# Patient Record
Sex: FEMALE | Race: WHITE | NOT HISPANIC OR LATINO | Employment: OTHER | URBAN - METROPOLITAN AREA
[De-identification: names, ages, dates, MRNs, and addresses within clinical notes are randomized per-mention and may not be internally consistent; named-entity substitution may affect disease eponyms.]

---

## 2017-07-13 LAB — HBA1C MFR BLD HPLC: 8.8 %

## 2017-10-13 LAB
CREAT ?TM UR-SCNC: 164.2 UMOL/L
HBA1C MFR BLD HPLC: 8.2 %
MICROALBUMIN UR-MCNC: 6.7 MG/L (ref 0–20)
MICROALBUMIN/CREAT UR: 4.1 MG/G{CREAT}

## 2018-01-16 LAB — HBA1C MFR BLD HPLC: 7.7 %

## 2018-02-07 ENCOUNTER — OFFICE VISIT (OUTPATIENT)
Dept: DIABETES SERVICES | Facility: HOSPITAL | Age: 68
End: 2018-02-07

## 2018-02-07 DIAGNOSIS — Z96.41 INSULIN PUMP STATUS: Primary | ICD-10-CM

## 2018-02-07 NOTE — PROGRESS NOTES
Met with Danielle for assistance with her [employer] infusion set for [employer] 630G  She brought 2 of them with her today  She filled her reservoir and completed insertion on her own after my instruction  She has very bad RA and has some shaking, it is a challenge for her to get the site on and off  She practiced putting a set on a dummy belly I have, was better able to do it there after some practice  We tried doing a [place] set as well as I thought getting unclipped from a [place] would be easier for her  It was, but she is not strong enough to cock the natasha and has a hard time getting it open  She will stick with the [employer]ie [employer]ff and will call [employer] to order an insertion device  Answered her questions, she knows to call if she needs anything else  She will follow up with Dr Jazzy Wheeler before April

## 2018-02-07 NOTE — PATIENT INSTRUCTIONS
Call [employer] to order the [facility] insertion device, or leo     Call with any questions!

## 2018-03-22 RX ORDER — LISINOPRIL 10 MG/1
TABLET ORAL
Qty: 90 TABLET | Refills: 3 | Status: SHIPPED | OUTPATIENT
Start: 2018-03-22 | End: 2019-04-15 | Stop reason: SDUPTHER

## 2018-04-06 LAB — HBA1C MFR BLD HPLC: 7.6 %

## 2018-04-13 ENCOUNTER — OFFICE VISIT (OUTPATIENT)
Dept: ENDOCRINOLOGY | Facility: HOSPITAL | Age: 68
End: 2018-04-13
Payer: MEDICARE

## 2018-04-13 VITALS
BODY MASS INDEX: 20.84 KG/M2 | DIASTOLIC BLOOD PRESSURE: 78 MMHG | HEIGHT: 61 IN | SYSTOLIC BLOOD PRESSURE: 122 MMHG | WEIGHT: 110.4 LBS | HEART RATE: 66 BPM

## 2018-04-13 DIAGNOSIS — E78.5 HYPERLIPIDEMIA, UNSPECIFIED HYPERLIPIDEMIA TYPE: ICD-10-CM

## 2018-04-13 DIAGNOSIS — E10.649 HYPOGLYCEMIC UNAWARENESS ASSOCIATED WITH TYPE 1 DIABETES MELLITUS: ICD-10-CM

## 2018-04-13 DIAGNOSIS — E10.40 TYPE 1 DIABETES MELLITUS WITH DIABETIC NEUROPATHY (HCC): Primary | ICD-10-CM

## 2018-04-13 PROCEDURE — 99214 OFFICE O/P EST MOD 30 MIN: CPT | Performed by: INTERNAL MEDICINE

## 2018-04-13 RX ORDER — METHOTREXATE 25 MG/ML
INJECTION, SOLUTION INTRA-ARTERIAL; INTRAMUSCULAR; INTRAVENOUS WEEKLY
COMMUNITY
Start: 2018-01-31

## 2018-04-13 RX ORDER — OMEGA-3S/DHA/EPA/FISH OIL/D3 1150-1000
LIQUID (ML) ORAL DAILY
COMMUNITY

## 2018-04-13 RX ORDER — IBUPROFEN 200 MG
200 TABLET ORAL EVERY 6 HOURS PRN
COMMUNITY
End: 2020-01-07 | Stop reason: ALTCHOICE

## 2018-04-13 RX ORDER — MELATONIN
1000 DAILY
COMMUNITY

## 2018-04-13 RX ORDER — LISINOPRIL 10 MG/1
10 TABLET ORAL DAILY
COMMUNITY
Start: 2018-03-22

## 2018-04-13 RX ORDER — FOLIC ACID 1 MG/1
1 TABLET ORAL DAILY
COMMUNITY

## 2018-04-13 NOTE — PROGRESS NOTES
4/13/2018    Assessment/Plan      Diagnoses and all orders for this visit:    Type 1 diabetes mellitus with diabetic neuropathy ([facility] )  -     HEMOGLOBIN A1C W/ EAG ESTIMATION Lab Collect; Future  -     Comprehensive metabolic panel Lab Collect; Future  -     Lipid Panel with Direct LDL reflex Lab Collect; Future  -     glucagon (GLUCAGON EMERGENCY) 1 MG injection; Inject 1 mg under the skin once as needed for low blood sugar for up to 1 dose    Hypoglycemic unawareness associated with type 1 diabetes mellitus (HCC)  -     glucagon (GLUCAGON EMERGENCY) 1 MG injection; Inject 1 mg under the skin once as needed for low blood sugar for up to 1 dose    Hyperlipidemia, unspecified hyperlipidemia type    Other orders  -     lisinopril (ZESTRIL) 10 mg tablet; Take 10 mg by mouth daily    -     HUMALOG 100 UNIT/ML injection;   -     methotrexate 50 MG/2ML injection; Inject under the skin once a week    -     Omega-3 Fat Ac-Cholecalciferol (DRY EYE OMEGA BENEFITS/VIT D-3) 654-884 MG-UNIT CAPS; Take by mouth daily    -     cholecalciferol (VITAMIN D3) 1,000 units tablet; Take 1,000 Units by mouth daily  -     aspirin 81 MG tablet; Take 81 mg by mouth daily  -     Multiple Vitamins-Minerals (MULTIVITAMIN ADULT PO); Take by mouth daily    -     Probiotic Product ([employer] SENIOR PROBIOTIC PO); Take by mouth daily    -     mupirocin (BACTROBAN) 2 % ointment; Apply topically 3 (three) times a day  -     glucagon (GLUCAGEN) 1 mg injection; 1 mg once  -     folic acid (FOLVITE) 1 mg tablet; Take 1 mg by mouth daily  -     TURMERIC CURCUMIN PO; Take 2 each by mouth daily  -     RiTUXimab (RITUXAN IV); Infuse into a venous catheter Every 16 weeks, 2nd dose 2 weeks later then repeat  -     ibuprofen (MOTRIN) 200 mg tablet; Take 200 mg by mouth every 6 (six) hours as needed for mild pain        Assessment/Plan:  1  Type 1 diabetes  When I saw her in the office, had not gotten her hemoglobin A1c report    It did come back at 7 6% which although not yet at goal is definitely markedly improved from last visit when it was 8 2%  I will have her increase her bolus rate from 5:00 p m  to 12:00 a m  to 1 unit per 11 g carbohydrate with meals  The rest of the rates will remain the same  She will continue to check her sugars 3 to 5 times a day  She was notified of her hemoglobin A1c report  2   Diabetic neuropathy  She has some mild diabetic signs on exam, but no diabetic symptoms that she complains of  I will follow these signs over time with no treatment needed at present  3   Hypoglycemic unawareness  She had an episode in the past of loss of consciousness  Because of this, I would like to see her hemoglobin A1c in the low sevens, but not below 7 which would be more dangerous for hypoglycemia  4   Hyperlipidemia  She did not start her atorvastatin as asked, she did some sort of cholesterol supplement over-the-counter instead  I do not necessarily agree with this, but I will wait till next visit repeat her lipid profile before asking her to restart her atorvastatin  I will see her in 3 months with preceding hemoglobin A1c, CMP, and lipid profile  CC: Diabetes Consult    History of Present Illness     HPI: Osvaldo Turner is a 69y o  year old female with type 1 diabetes for 39 years  She was started on an insulin pump around 1998  She is on insulin at home and takes Humalog insulin via the insulin pump basal rate from 12:00 a m  to 8:00 a m  0 475 units/hour, and 8:00 a m  to 12:00 a m  0 9 units/hour, bolus rates include an insulin to carbohydrate ratio from 12:00 a m  to 7:00 a m  of 1 unit per 15 g carbohydrate, 7:00 a m  to 11:00 a m  1 unit per 8 g carbohydrate, 11:00 a m  to 5:00 p m  1 unit per 11 g carbohydrate and 5:00 p m  to 12:00 a m  1 unit per 12 g carbohydrate, with an insulin sensitivity of 1 unit for every 55 blood sugar points for a target blood glucose of 100-120 with a 4 15 hour insulin action   She denies any polyuria and polydipsia  She has no polyphagia, but has once a night nocturia  She is getting some blurry vision with some cataracts  She has no numbness or tingling of the feet  She denies chest pain or shortness of breath  She denies nephropathy, heart attack, stroke and claudication but does admit to neuropathy and retinopathy  Started a diabetes supplement (GBC 2) about 2-3 weeks ago  Thinks blood sugars lower on this  Did not restart atorvastatin in October 2017, instead started cholesterol supplement  Astachol  Hypoglycemic episodes: Yes occasional   H/o of hypoglycemia causing hospitalization or intervention such as glucagon injection  or ambulance call  Yes  Only LOC once  Hypoglycemia symptoms: none and or sweating and slight shakiness  Treatment of hypoglycemia: glucose tablets, or juice if below 5   Glucagon:Yes  Medic alert tag: recommended,Yes  The patient's last eye exam was in December 2017 for a left eye sty, October 2017 for regular appointment with mild retinopathy  The patient's last foot exam was in not seen  Last A1C was 7 6% on April 6, 2018  Blood Sugar/Glucometer/Pump/CGM review: checks blood sugar 3-5 times a day  Vary from low to 200s with no pattern , Feels lower last few weeks with new supplement  Before breakfast:   Before lunch: 100-200s  Before dinner: upper 100s [id number]  Bedtime: low to 200s    Review of Systems   Constitutional: Positive for fatigue  Negative for appetite change and unexpected weight change  3 lb increase in weight since January 2018  HENT: Negative for ear pain, hearing loss, tinnitus and trouble swallowing  Eyes: Positive for visual disturbance  Some blurry vision, cataracts worsening, due for surgery soon  Respiratory: Negative for chest tightness and shortness of breath  Cardiovascular: Negative for chest pain, palpitations and leg swelling     Gastrointestinal: Negative for abdominal pain, constipation, diarrhea and nausea  Endocrine: Negative for polydipsia, polyphagia and polyuria  Nocturia once a night  Musculoskeletal: Positive for arthralgias, back pain and joint swelling  Significant hand pain right greater than left with arthritis  Bilateral shoulder pain and right wrist pain  Some low back pain  Skin: Negative for rash and wound  Has chronic dry skin  Neurological: Negative for dizziness, tremors, light-headedness, numbness and headaches  Psychiatric/Behavioral: Negative for dysphoric mood and sleep disturbance  The patient is not nervous/anxious  Still worry daily about soon         Historical Information   Past Medical History:   Diagnosis Date    Basal cell carcinoma (BCC) of face     left cheek    Cataract     Rheumatoid arthritis ([facility] )      Past Surgical History:   Procedure Laterality Date    EXPLORATORY LAPAROTOMY      FOOT SURGERY Left     foot reconstruction in 2 phases    FOOT SURGERY Right     foot reconstruction    LIVER BIOPSY      TOTAL SHOULDER REPLACEMENT Right      Social History   History   Alcohol Use    3 0 oz/week    5 Cans of beer per week     History   Drug Use No     History   Smoking Status    Never Smoker   Smokeless Tobacco    Never Used     Family History:   Family History   Problem Relation Age of Onset    Stroke Mother     Cancer Father      renal metastatic    Brain cancer Sister     Diabetes type I Maternal Uncle     Diabetes type I Maternal Grandmother     Osteoarthritis Brother     Osteoarthritis Sister     Rheum arthritis Sister     Osteoarthritis Sister     Osteoarthritis Sister     Osteoarthritis Sister     Osteoarthritis Brother     Osteoarthritis Brother     Bipolar disorder Son        Meds/Allergies   Current Outpatient Prescriptions   Medication Sig Dispense Refill    aspirin 81 MG tablet Take 81 mg by mouth daily      cholecalciferol (VITAMIN D3) 1,000 units tablet Take 1,000 Units by mouth daily      folic acid (FOLVITE) 1 mg tablet Take 1 mg by mouth daily      glucagon (GLUCAGEN) 1 mg injection 1 mg once      HUMALOG 100 UNIT/ML injection       ibuprofen (MOTRIN) 200 mg tablet Take 200 mg by mouth every 6 (six) hours as needed for mild pain      lisinopril (ZESTRIL) 10 mg tablet Take 10 mg by mouth daily        methotrexate 50 MG/2ML injection Inject under the skin once a week        Multiple Vitamins-Minerals (MULTIVITAMIN ADULT PO) Take by mouth daily        mupirocin (BACTROBAN) 2 % ointment Apply topically 3 (three) times a day      Omega-3 Fat Ac-Cholecalciferol (DRY EYE OMEGA BENEFITS/VIT D-3) 193-495 MG-UNIT CAPS Take by mouth daily        Probiotic Product (CVS SENIOR PROBIOTIC PO) Take by mouth daily        RiTUXimab (RITUXAN IV) Infuse into a venous catheter Every 16 weeks, 2nd dose 2 weeks later then repeat      TURMERIC CURCUMIN PO Take 2 each by mouth daily      glucagon (GLUCAGON EMERGENCY) 1 MG injection Inject 1 mg under the skin once as needed for low blood sugar for up to 1 dose 1 each 4     No current facility-administered medications for this visit  Allergies   Allergen Reactions    Bactrim [Sulfamethoxazole-Trimethoprim] GI Intolerance    Neosporin [Neomycin-Bacitracin Zn-Polymyx] Itching    Tetracycline GI Intolerance       Objective   Vitals: Blood pressure 122/78, pulse 66, height 5' 1" (1 549 m), weight 50 1 kg (110 lb 6 4 oz)  Invasive Devices          No matching active lines, drains, or airways          Physical Exam   Constitutional: She is oriented to person, place, and time  She appears well-developed and well-nourished  HENT:   Head: Normocephalic and atraumatic  Eyes: Conjunctivae and EOM are normal  Pupils are equal, round, and reactive to light  Neck: Normal range of motion  Neck supple  No thyromegaly present  No carotid bruits  Cardiovascular: Normal rate, regular rhythm, normal heart sounds and intact distal pulses      No murmur heard   Pulmonary/Chest: Effort normal and breath sounds normal  She has no wheezes  Abdominal: Soft  Bowel sounds are normal  There is no tenderness  Musculoskeletal: Normal range of motion  She exhibits deformity  She exhibits no edema  Multiple deformity of the toes and foot from RA with previous surgical scars  Callus plantar surface of bilateral 1st toes  No tremor of the outstretched hands  No CVAT  No spinous process tenderness  Lymphadenopathy:     She has no cervical adenopathy  Neurological: She is alert and oriented to person, place, and time  She has normal reflexes  Vibration sensation diminished to the bilateral DIP  Microfilament sensation intact to the bilateral feet except absent to the left 1st toe  Skin: Skin is warm and dry  No rash noted  Vitals reviewed  The history was obtained from the review of the chart and from the patient  Lab Results:   Most recent blood work on 04/06/2018 showed hemoglobin A1c of 7 6%  CMP showed a glucose of 157 fasting, sodium of 145, but was otherwise normal  CBC was unremarkable  Last hemoglobin A1c on 10/13/2017 was 8 2%  At that time, TSH was 3 29 with a urine microalbumin to creatinine ratio of 4 1  Total cholesterol was 214, triglyceride 142, HDL 66, and   No results found for this or any previous visit (from the past 96614 hour(s))        Future Appointments  Date Time Provider Srikanth Flores   7/12/2018 1:45 PM [facility]

## 2018-04-13 NOTE — LETTER
April 13, 2018     Merlinda Gibbons, MD  [facility] [place] [address] [address]    Patient: Rin Bermeo   YOB: 1950   Date of Visit: 4/13/2018       Dear Dr Naheed Holland: Thank you for referring Rin Bermeo to me for evaluation  Below are my notes for this consultation  If you have questions, please do not hesitate to call me  I look forward to following your patient along with you  Sincerely,        Verna Veliz MD        CC: No Recipients  Verna Veliz MD  4/13/2018  3:56 PM  Sign at close encounter  4/13/2018    Assessment/Plan      Diagnoses and all orders for this visit:    Type [facility] diabetes [facility] diabetic [facility]  -     HEMOGLOBIN A1C W/ EAG ESTIMATION Lab Collect; Future  -     Comprehensive metabolic panel Lab Collect; Future  -     Lipid Panel with Direct LDL reflex Lab Collect; Future  -     glucagon (GLUCAGON EMERGENCY) 1 MG injection; Inject 1 mg under the skin once as needed for low blood sugar for up to 1 dose    Hypoglycemic unawareness associated with type 1 diabetes mellitus (HCC)  -     glucagon (GLUCAGON EMERGENCY) 1 MG injection; Inject 1 mg under the skin once as needed for low blood sugar for up to 1 dose    Hyperlipidemia, unspecified hyperlipidemia type    Other orders  -     lisinopril (ZESTRIL) 10 mg tablet; Take 10 mg by mouth daily    -     HUMALOG 100 UNIT/ML injection;   -     methotrexate 50 MG/2ML injection; Inject under the skin once a week    -     Omega-3 Fat Ac-Cholecalciferol (DRY EYE OMEGA BENEFITS/VIT D-3) 921-541 MG-UNIT CAPS; Take by mouth daily    -     cholecalciferol (VITAMIN D3) 1,000 units tablet; Take 1,000 Units by mouth daily  -     aspirin 81 MG tablet; Take 81 mg by mouth daily  -     Multiple Vitamins-Minerals (MULTIVITAMIN ADULT PO); Take by mouth daily    -     Probiotic Product (CVS SENIOR PROBIOTIC PO); Take by mouth daily    -     mupirocin (BACTROBAN) 2 % ointment;  Apply topically 3 (three) times a day  - glucagon (GLUCAGEN) 1 mg injection; 1 mg once  -     folic acid (FOLVITE) 1 mg tablet; Take 1 mg by mouth daily  -     TURMERIC CURCUMIN PO; Take 2 each by mouth daily  -     RiTUXimab (RITUXAN IV); Infuse into a venous catheter Every 16 weeks, 2nd dose 2 weeks later then repeat  -     ibuprofen (MOTRIN) 200 mg tablet; Take 200 mg by mouth every 6 (six) hours as needed for mild pain        Assessment/Plan:  1  Type 1 diabetes  When I saw her in the office, had not gotten her hemoglobin A1c report  It did come back at 7 6% which although not yet at goal is definitely markedly improved from last visit when it was 8 2%  I will have her increase her bolus rate from 5:00 p m  to 12:00 a m  to 1 unit per 11 g carbohydrate with meals  The rest of the rates will remain the same  She will continue to check her sugars 3 to 5 times a day  She was notified of her hemoglobin A1c report  2   Diabetic neuropathy  She has some mild diabetic signs on exam, but no diabetic symptoms that she complains of  I will follow these signs over time with no treatment needed at present  3   Hypoglycemic unawareness  She had an episode in the past of loss of consciousness  Because of this, I would like to see her hemoglobin A1c in the low sevens, but not below 7 which would be more dangerous for hypoglycemia  4   Hyperlipidemia  She did not start her atorvastatin as asked, she did some sort of cholesterol supplement over-the-counter instead  I do not necessarily agree with this, but I will wait till next visit repeat her lipid profile before asking her to restart her atorvastatin  I will see her in 3 months with preceding hemoglobin A1c, CMP, and lipid profile  CC: Diabetes Consult    History of Present Illness     HPI: Stan Lemons is a 69y o  year old female with type 1 diabetes for 39 years  She was started on an insulin pump around 1998    She is on insulin at home and takes Humalog insulin via the insulin pump basal rate from 12:00 a m  to 8:00 a m  0 475 units/hour, and 8:00 a m  to 12:00 a m  0 9 units/hour, bolus rates include an insulin to carbohydrate ratio from 12:00 a m  to 7:00 a m  of 1 unit per 15 g carbohydrate, 7:00 a m  to 11:00 a m  1 unit per 8 g carbohydrate, 11:00 a m  to 5:00 p m  1 unit per 11 g carbohydrate and 5:00 p m  to 12:00 a m  1 unit per 12 g carbohydrate, with an insulin sensitivity of 1 unit for every 55 blood sugar points for a target blood glucose of 100-120 with a 4 15 hour insulin action  She denies any polyuria and polydipsia  She has no polyphagia, but has once a night nocturia  She is getting some blurry vision with some cataracts  She has no numbness or tingling of the feet  She denies chest pain or shortness of breath  She denies nephropathy, heart attack, stroke and claudication but does admit to neuropathy and retinopathy  Started a diabetes supplement (GBC 2) about 2-3 weeks ago  Thinks blood sugars lower on this  Did not restart atorvastatin in October 2017, instead started cholesterol supplement  Astachol  Hypoglycemic episodes: Yes occasional   H/o of hypoglycemia causing hospitalization or intervention such as glucagon injection  or ambulance call  Yes  Only LOC once  Hypoglycemia symptoms: none and or sweating and slight shakiness  Treatment of hypoglycemia: glucose tablets, or juice if below 5   Glucagon:Yes  Medic alert tag: recommended,Yes  The patient's last eye exam was in December 2017 for a left eye sty, October 2017 for regular appointment with mild retinopathy  The patient's last foot exam was in not seen  Last A1C was 7 6% on April 6, 2018  Blood Sugar/Glucometer/Pump/CGM review: checks blood sugar 3-5 times a day  Vary from low to 200s with no pattern , Feels lower last few weeks with new supplement    Before breakfast:   Before lunch: 100-200s  Before dinner: upper 100s [id number]  Bedtime: low to 200s    Review of Systems   Constitutional: Positive for fatigue  Negative for appetite change and unexpected weight change  3 lb increase in weight since January 2018  HENT: Negative for ear pain, hearing loss, tinnitus and trouble swallowing  Eyes: Positive for visual disturbance  Some blurry vision, cataracts worsening, due for surgery soon  Respiratory: Negative for chest tightness and shortness of breath  Cardiovascular: Negative for chest pain, palpitations and leg swelling  Gastrointestinal: Negative for abdominal pain, constipation, diarrhea and nausea  Endocrine: Negative for polydipsia, polyphagia and polyuria  Nocturia once a night  Musculoskeletal: Positive for arthralgias, back pain and joint swelling  Significant hand pain right greater than left with arthritis  Bilateral shoulder pain and right wrist pain  Some low back pain  Skin: Negative for rash and wound  Has chronic dry skin  Neurological: Negative for dizziness, tremors, light-headedness, numbness and headaches  Psychiatric/Behavioral: Negative for dysphoric mood and sleep disturbance  The patient is not nervous/anxious  Still worry daily about soon         Historical Information   Past Medical History:   Diagnosis Date    Basal cell carcinoma (BCC) of face     left cheek    Cataract     Rheumatoid arthritis ([address] )      Past Surgical History:   Procedure Laterality Date    EXPLORATORY LAPAROTOMY      FOOT SURGERY Left     foot reconstruction in 2 phases    FOOT SURGERY Right     foot reconstruction    LIVER BIOPSY      TOTAL SHOULDER REPLACEMENT Right      Social History   History   Alcohol Use    3 0 oz/week    5 Cans of beer per week     History   Drug Use No     History   Smoking Status    Never Smoker   Smokeless Tobacco    Never Used     Family History:   Family History   Problem Relation Age of Onset    Stroke Mother     Cancer Father      renal metastatic    Brain cancer Sister     Diabetes type I Maternal Uncle  Diabetes type I Maternal Grandmother     Osteoarthritis Brother     Osteoarthritis Sister     Rheum arthritis Sister     Osteoarthritis Sister     Osteoarthritis Sister     Osteoarthritis Sister     Osteoarthritis Brother     Osteoarthritis Brother     Bipolar disorder Son        Meds/Allergies   Current Outpatient Prescriptions   Medication Sig Dispense Refill    aspirin 81 MG tablet Take 81 mg by mouth daily      cholecalciferol (VITAMIN D3) 1,000 units tablet Take 1,000 Units by mouth daily      folic acid (FOLVITE) 1 mg tablet Take 1 mg by mouth daily      glucagon (GLUCAGEN) 1 mg injection 1 mg once      HUMALOG 100 UNIT/ML injection       ibuprofen (MOTRIN) 200 mg tablet Take 200 mg by mouth every 6 (six) hours as needed for mild pain      lisinopril (ZESTRIL) 10 mg tablet Take 10 mg by mouth daily        methotrexate 50 MG/2ML injection Inject under the skin once a week        Multiple Vitamins-Minerals (MULTIVITAMIN ADULT PO) Take by mouth daily        mupirocin (BACTROBAN) 2 % ointment Apply topically 3 (three) times a day      Omega-3 Fat Ac-Cholecalciferol (DRY EYE OMEGA BENEFITS/VIT D-3) 750-027 MG-UNIT CAPS Take by mouth daily        Probiotic Product (CVS SENIOR PROBIOTIC PO) Take by mouth daily        RiTUXimab (RITUXAN IV) Infuse into a venous catheter Every 16 weeks, 2nd dose 2 weeks later then repeat      TURMERIC CURCUMIN PO Take 2 each by mouth daily      glucagon (GLUCAGON EMERGENCY) 1 MG injection Inject 1 mg under the skin once as needed for low blood sugar for up to 1 dose 1 each 4     No current facility-administered medications for this visit  Allergies   Allergen Reactions    Bactrim [Sulfamethoxazole-Trimethoprim] GI Intolerance    Neosporin [Neomycin-Bacitracin Zn-Polymyx] Itching    Tetracycline GI Intolerance       Objective   Vitals: Blood pressure 122/78, pulse 66, height 5' 1" (1 549 m), weight 50 1 kg (110 lb 6 4 oz)    Invasive Devices No matching active lines, drains, or airways          Physical Exam   Constitutional: She is oriented to person, place, and time  She appears well-developed and well-nourished  HENT:   Head: Normocephalic and atraumatic  Eyes: Conjunctivae and EOM are normal  Pupils are equal, round, and reactive to light  Neck: Normal range of motion  Neck supple  No thyromegaly present  No carotid bruits  Cardiovascular: Normal rate, regular rhythm, normal heart sounds and intact distal pulses  No murmur heard  Pulmonary/Chest: Effort normal and breath sounds normal  She has no wheezes  Abdominal: Soft  Bowel sounds are normal  There is no tenderness  Musculoskeletal: Normal range of motion  She exhibits deformity  She exhibits no edema  Multiple deformity of the toes and foot from RA with previous surgical scars  Callus plantar surface of bilateral 1st toes  No tremor of the outstretched hands  No CVAT  No spinous process tenderness  Lymphadenopathy:     She has no cervical adenopathy  Neurological: She is alert and oriented to person, place, and time  She has normal reflexes  Vibration sensation diminished to the bilateral DIP  Microfilament sensation intact to the bilateral feet except absent to the left 1st toe  Skin: Skin is warm and dry  No rash noted  Vitals reviewed  The history was obtained from the review of the chart and from the patient  Lab Results:   Most recent blood work on 04/06/2018 showed hemoglobin A1c of 7 6%  CMP showed a glucose of 157 fasting, sodium of 145, but was otherwise normal  CBC was unremarkable  Last hemoglobin A1c on 10/13/2017 was 8 2%  At that time, TSH was 3 29 with a urine microalbumin to creatinine ratio of 4 1  Total cholesterol was 214, triglyceride 142, HDL 66, and   No results found for this or any previous visit (from the past 96260 hour(s))        Future Appointments  Date Time Provider Sirkanth Flores   7/12/2018 1:45 PM Chapito De Oliveira [place], [address]  [facility]

## 2018-04-13 NOTE — PATIENT INSTRUCTIONS
Blood work is pending, we'll call you with results  Adjust bolus rate at 5 pm to 1 unit per 11 grams carbohydrate(we did this in the office)  Continue all other rates the same  Follow up in with 3 months with blood work

## 2018-06-26 ENCOUNTER — OFFICE VISIT (OUTPATIENT)
Dept: INTERNAL MEDICINE | Facility: CLINIC | Age: 68
End: 2018-06-26
Payer: MEDICARE

## 2018-06-26 VITALS
HEART RATE: 76 BPM | SYSTOLIC BLOOD PRESSURE: 122 MMHG | DIASTOLIC BLOOD PRESSURE: 76 MMHG | OXYGEN SATURATION: 96 % | TEMPERATURE: 98 F | WEIGHT: 108.4 LBS | BODY MASS INDEX: 21.85 KG/M2 | RESPIRATION RATE: 16 BRPM | HEIGHT: 59 IN

## 2018-06-26 DIAGNOSIS — M06.9 RHEUMATOID ARTHRITIS, INVOLVING UNSPECIFIED SITE, UNSPECIFIED RHEUMATOID FACTOR PRESENCE: ICD-10-CM

## 2018-06-26 DIAGNOSIS — Z12.31 VISIT FOR SCREENING MAMMOGRAM: ICD-10-CM

## 2018-06-26 DIAGNOSIS — L02.419 ABSCESS OF AXILLA: Primary | ICD-10-CM

## 2018-06-26 DIAGNOSIS — E10.9 TYPE 1 DIABETES MELLITUS WITHOUT COMPLICATION (CMS/HCC): ICD-10-CM

## 2018-06-26 PROBLEM — E10.649 HYPOGLYCEMIC UNAWARENESS ASSOCIATED WITH TYPE 1 DIABETES MELLITUS: Status: ACTIVE | Noted: 2018-04-13

## 2018-06-26 PROCEDURE — 99214 OFFICE O/P EST MOD 30 MIN: CPT | Performed by: INTERNAL MEDICINE

## 2018-06-26 RX ORDER — METHOTREXATE 2.5 MG/1
TABLET ORAL WEEKLY
COMMUNITY
End: 2018-06-26

## 2018-06-26 RX ORDER — FOLIC ACID 1 MG/1
1 TABLET ORAL DAILY
COMMUNITY
Start: 2017-10-11

## 2018-06-26 RX ORDER — LEVOFLOXACIN 500 MG/1
500 TABLET, FILM COATED ORAL DAILY
Qty: 10 TABLET | Refills: 0 | Status: SHIPPED | OUTPATIENT
Start: 2018-06-26 | End: 2019-10-14 | Stop reason: SDUPTHER

## 2018-06-26 RX ORDER — ASPIRIN 81 MG/1
81 TABLET ORAL DAILY
COMMUNITY
Start: 2016-09-12 | End: 2020-11-10

## 2018-06-26 RX ORDER — VIT C/E/ZN/COPPR/LUTEIN/ZEAXAN 250MG-90MG
1000 CAPSULE ORAL DAILY
COMMUNITY

## 2018-06-26 ASSESSMENT — ENCOUNTER SYMPTOMS
ARTHRALGIAS: 1
CHILLS: 0
FEVER: 0

## 2018-06-26 NOTE — PROGRESS NOTES
Subjective      Patient ID: Lillie Ward is a 67 y.o. female.    Patient presents with c/o infection in her axilla bilaterally.  Has had them on and off for years.  Usually ends up needing antibiotics.  No fevers or chills.  Tender and painful.  No drainage.  Self-injects methotrexate at home using her insulin syringe.  Doesn't know how many cc's she's supposed to inject and no one has been able to give her clear answers.  Never been offered tablets.  Sugars have been well controlled since stopping the prednisone.  No other new/acute complaints.         The following have been reviewed and updated as appropriate in this visit:       Past Medical History:   Diagnosis Date   • Diabetes mellitus type I (CMS/[facility]) ([facility])     Dr. Cerda - Baudilio Smith   • DKA, type 1 (CMS/[facility]) ([facility]) 09/2016    due to insulin pump malfunction - hospitalized   • GERD (gastroesophageal reflux disease)    • H/O colonoscopy 12/2013    [place].  Normal   • Hyperlipidemia    • Pulmonary fibrosis (CMS/[facility]) ([facility])    • Pulmonary nodule, left 07/2017   • Rheumatoid arthritis (CMS/[facility]) ([facility])     Dr. Lawrence Leventhal      Past Surgical History:   Procedure Laterality Date   • FOOT SURGERY Left 2009    reconstruction   • FOOT SURGERY Right 2007    reconstruction   • REPLACEMENT TOTAL KNEE Left 04/04/2017    [facility]   • TOTAL SHOULDER ARTHROPLASTY  1990     Family History   Problem Relation Age of Onset   • Stroke Mother    • Lung cancer Father    • No Known Problems Sister    • Rheum arthritis Other      1 of 8 siblings   • Bipolar disorder Son    • Alcohol abuse Son      Social History     Social History   • Marital status:      Spouse name: N/A   • Number of children: 1   • Years of education: N/A     Occupational History   • Retired teacher      Social History Main Topics   • Smoking status: Never Smoker   • Smokeless tobacco: Never Used   • Alcohol use Yes   • Drug use: No   • Sexual activity: Yes     Other Topics Concern   • Not on file  "    Social History Narrative    Marital status-     Children- 1 son    Caffeine - coffee    Exercise-walking    Diet-low carb    Pets-    Anabaptist- none    Seat belt-yes    Smoke alarm-yes    Firearms-               Review of Systems   Constitutional: Negative for chills and fever.   Respiratory: Negative for shortness of breath.    Cardiovascular: Negative for chest pain.   Gastrointestinal: Negative for abdominal pain.   Musculoskeletal: Positive for arthralgias.   Skin: Positive for rash.       Allergies   Allergen Reactions   • No Known Allergies      Current Outpatient Prescriptions   Medication Sig Dispense Refill   • aspirin 81 mg enteric coated tablet Take 81 mg by mouth daily.     • folic acid (FOLVITE) 1 mg tablet Take 1 mg by mouth daily.     • insulin lispro (HumaLOG U-100 Insulin) 100 unit/mL cartridge inject by subcutaneous route per prescriber's instructions. Insulin dosing requires individualization.     • lisinopril (PRINIVIL) 10 mg tablet TAKE ONE TABLET BY MOUTH EVERY DAY 90 tablet 3   • METHOTREXATE SODIUM INJ Inject 2.5 mg as directed once a week.     • riTUXimab (RITUXAN) 10 mg/mL chemo injection Inject 10 mg/mL as directed. Every 16 weeks     • cholecalciferol, vitamin D3, (VITAMIN D3) 1,000 unit capsule Take by mouth daily.     • Lactobac no.41-Bifidobact no.7 (PROBIOTIC-10) 70 mg (3 billion cell) capsule Take by mouth daily.     • levofloxacin (LEVAQUIN) 500 mg tablet Take 1 tablet (500 mg total) by mouth daily for 10 days. 10 tablet 0   • multivit with minerals/lutein (MULTIVITAMIN 50 PLUS ORAL) No SIG Entered     • turmeric, bulk, (CURCUMIN) 95 % powder No SIG Entered       No current facility-administered medications for this visit.        Objective   Vitals:    06/26/18 0949   BP: 122/76   Pulse: 76   Resp: 16   Temp: 36.7 °C (98 °F)   TempSrc: Oral   SpO2: 96%   Weight: 49.2 kg (108 lb 6.4 oz)   Height: 1.499 m (4' 11\")       Physical Exam   Constitutional: She is oriented to " person, place, and time. She appears well-developed and well-nourished. No distress.   HENT:   Head: Normocephalic and atraumatic.   Pulmonary/Chest: Effort normal.   Musculoskeletal: Normal range of motion.   Neurological: She is alert and oriented to person, place, and time.   Skin: Skin is warm and dry.   Small pimple like lesions on bilateral axilla, L with multiple 7-8mm firm, subcutaenous indurations with central punctum/white head.  Erythematous and tender.  R. Side with few healing lesions   Psychiatric: She has a normal mood and affect. Judgment and thought content normal.       Assessment/Plan   Problem List Items Addressed This Visit     Type 1 diabetes mellitus (CMS/[facility]) ([facility])     Well controlled, per pt.  Improved after stopping steroids.  Will send next set of lab results.          Relevant Medications    insulin lispro (HumaLOG U-100 Insulin) 100 unit/mL cartridge    Rheumatoid arthritis (CMS/[facility]) ([facility])     Advised to discuss with rheum regarding switching to methotrexate tablets rather than injections.         Abscess of axilla - Primary     Pt not sure of what antibiotics she's used in the past.  Will treat with levaquin to cover GNRs given DM.  May need to switch to Bactrim DS or Doxycycline if not better, to cover for MRSA.  Monitor closely         Visit for screening mammogram     Due.  Ordered.         Relevant Orders    BI SCREENING MAMMOGRAM BILATERAL          Machelle Gallardo MD    6/30/2018

## 2018-06-26 NOTE — ASSESSMENT & PLAN NOTE
Pt not sure of what antibiotics she's used in the past.  Will treat with levaquin to cover GNRs given DM.  May need to switch to Bactrim DS or Doxycycline if not better, to cover for MRSA.  Monitor closely

## 2018-06-30 ASSESSMENT — ENCOUNTER SYMPTOMS
SHORTNESS OF BREATH: 0
ABDOMINAL PAIN: 0

## 2018-07-09 LAB
ALBUMIN SERPL-MCNC: 4.1 G/DL (ref 3.6–4.8)
ALBUMIN/GLOB SERPL: 1.6 {RATIO} (ref 1.2–2.2)
ALP SERPL-CCNC: 66 IU/L (ref 39–117)
ALT SERPL-CCNC: 14 IU/L (ref 0–32)
AMBIG ABBREV DEFAULT: NORMAL
AST SERPL-CCNC: 21 IU/L (ref 0–40)
BILIRUB SERPL-MCNC: 0.6 MG/DL (ref 0–1.2)
BUN SERPL-MCNC: 12 MG/DL (ref 8–27)
BUN/CREAT SERPL: 16 (ref 12–28)
CALCIUM SERPL-MCNC: 9.3 MG/DL (ref 8.7–10.3)
CHLORIDE SERPL-SCNC: 105 MMOL/L (ref 96–106)
CHOLEST SERPL-MCNC: 208 MG/DL (ref 100–199)
CO2 SERPL-SCNC: 25 MMOL/L (ref 20–29)
CREAT SERPL-MCNC: 0.76 MG/DL (ref 0.57–1)
EST. AVERAGE GLUCOSE BLD GHB EST-MCNC: 169 MG/DL
GLOBULIN SER-MCNC: 2.5 G/DL (ref 1.5–4.5)
GLUCOSE SERPL-MCNC: 148 MG/DL (ref 65–99)
HBA1C MFR BLD: 7.5 % (ref 4.8–5.6)
HDLC SERPL-MCNC: 81 MG/DL
LDLC SERPL DIRECT ASSAY-MCNC: 111 MG/DL (ref 0–99)
POTASSIUM SERPL-SCNC: 4.3 MMOL/L (ref 3.5–5.2)
PROT SERPL-MCNC: 6.6 G/DL (ref 6–8.5)
SL AMB EGFR AFRICAN AMERICAN: 93 ML/MIN/1.73
SL AMB EGFR NON AFRICAN AMERICAN: 80 ML/MIN/1.73
SODIUM SERPL-SCNC: 144 MMOL/L (ref 134–144)
TRIGL SERPL-MCNC: 113 MG/DL (ref 0–149)

## 2018-07-12 ENCOUNTER — OFFICE VISIT (OUTPATIENT)
Dept: ENDOCRINOLOGY | Facility: HOSPITAL | Age: 68
End: 2018-07-12
Payer: MEDICARE

## 2018-07-12 VITALS
HEART RATE: 62 BPM | BODY MASS INDEX: 20.69 KG/M2 | SYSTOLIC BLOOD PRESSURE: 114 MMHG | DIASTOLIC BLOOD PRESSURE: 70 MMHG | WEIGHT: 109.6 LBS | HEIGHT: 61 IN

## 2018-07-12 DIAGNOSIS — E78.5 HYPERLIPIDEMIA, UNSPECIFIED HYPERLIPIDEMIA TYPE: ICD-10-CM

## 2018-07-12 DIAGNOSIS — E10.649 HYPOGLYCEMIC UNAWARENESS ASSOCIATED WITH TYPE 1 DIABETES MELLITUS: ICD-10-CM

## 2018-07-12 DIAGNOSIS — E10.40 TYPE 1 DIABETES MELLITUS WITH DIABETIC NEUROPATHY (HCC): Primary | ICD-10-CM

## 2018-07-12 DIAGNOSIS — Z96.41 INSULIN PUMP STATUS: ICD-10-CM

## 2018-07-12 PROCEDURE — 99214 OFFICE O/P EST MOD 30 MIN: CPT | Performed by: NURSE PRACTITIONER

## 2018-07-12 RX ORDER — ATORVASTATIN CALCIUM 10 MG/1
10 TABLET, FILM COATED ORAL DAILY
Qty: 90 TABLET | Refills: 2 | Status: SHIPPED | OUTPATIENT
Start: 2018-07-12 | End: 2019-09-30 | Stop reason: SDUPTHER

## 2018-07-12 NOTE — PATIENT INSTRUCTIONS
Be mindful of diet  Stay active in stay hydrated  Please check your blood sugars 4 times daily, enter all information in the pump, and for the record to the office in 2 weeks for review and adjustment, if necessary  Continue lisinopril  Consider starting atorvastatin 10 mg daily, as discussed  Continue with regular supplementation of vitamin D3 daily  Consider a DEX COM sensor trial and for personal use  Consider follow up with podiatry

## 2018-07-12 NOTE — PROGRESS NOTES
Tye Morrison 69 y o  female MRN: [id number]    Encounter: [id number]      Assessment/Plan     Assessment: This is a 69y o -year-old female with type 1 diabetes on insulin pump therapy with hypoglycemic unawareness and hyperlipidemia  Plan:  1  Type 1 diabetes:  Her most recent hemoglobin A1c has improved to 7 5, however this remains above goal   Her [employer] pump was downloaded and report reviewed with the patient at bedside  She is checking her blood sugars only 2 7 times per day  Review of her report reveals some hyperglycemia and limited hypoglycemia in no consistent pattern  She states that she sometimes forgets to bolus for meals causing her hyperglycemia and sometimes boluses for meals and does not eat a lot of carbohydrates causing her to go low  I have asked her to check her blood sugars 4 times daily and enter all information into the pump  She will forward a pump report to the office for review in 1-2 weeks  For now, no changes will be made to her pump settings  Due to her type 1 diabetes on insulin pump therapy and her hyperglycemic unawareness, she would benefit greatly from a dex com continuous glucose monitor for her safety  She is interested in participating in a dex com continuous glucose monitor trial   She was urged to see Podiatry for regular diabetic foot care  Check hemoglobin A1c prior to next visit  2  Hyperlipidemia:  Reviewed her most recent fasting lipid panel with her at the time of the visit  She would like to restart her atorvastatin 10 mg daily which was sent to the pharmacy  Check fasting lipid profile to reassess prior to next visit  3   Hypertension:  She is normotensive in the office today  Continue lisinopril  Check comprehensive metabolic panel prior to next visit  4   Vitamin-D deficiency:  Continue supplementation with 1000 units of vitamin D3 daily      CC:  Type 1 Diabetes follow-up    History of Present Illness     HPI:  69y o  year old female with type 1 diabetes for 39 years  She was started on an insulin pump around 1998  She is on insulin at home and takes Humalog insulin via the insulin pump basal rate from 12:00 a m  to 8:00 a m  0 475 units/hour, and 8:00 a m  to 12:00 a m  0 9 units/hour, bolus rates include an insulin to carbohydrate ratio from 12:00 a m  to 7:00 a m  of 1 unit per 15 g carbohydrate, 7:00 a m  to 11:00 a m  1 unit per 8 g carbohydrate, 11:00 a m  to 5:00 p m  1 unit per 11 g carbohydrate and 5:00 p m  to 12:00 a m  1 unit per 12 g carbohydrate, with an insulin sensitivity of 1 unit for every 55 blood sugar points for a target blood glucose of 100-120 with a 4 15 hour insulin action  She denies any polyuria and polydipsia  She has no polyphagia, but has once a night nocturia  She is getting some blurry vision with some cataracts  She has no numbness or tingling of the feet  She denies chest pain or shortness of breath  She denies nephropathy, heart attack, stroke and claudication but does admit to neuropathy and retinopathy         The patient's last eye exam was last week, she will be obtain surgery for cataracts in September  She is seen every 6 months  She has no complaints about her feet and does not follow Podiatry for regular diabetic foot care  Her most recent hemoglobin A1C was 7 5% on July 6, 2018      Review of her pump report report reveals some hyperglycemia and limited hypoglycemia in no consistent pattern  She states that she sometimes forgets to bolus for meals causing her hyperglycemia and sometimes boluses for meals and does not eat a lot of carbohydrates causing her to go low  She is only checking her blood sugars 2 7 times per day, according to the report     For her hypertension, she is treated with lisinopril 10 mg daily  She was asked to start atorvastatin at her last office visit  She had opted to start an over-the-counter regimen in lieu of starting statin therapy    Her most recent fasting lipid panel from July 6, 2018 reveals a total cholesterol of 208, triglycerides of 113, HDL of 81 and LDL cholesterol of 111  For her vitamin-D deficiency, she supplements with 1000 units of vitamin D3 daily  Review of Systems   Constitutional: Negative  Negative for chills and fever  HENT: Negative  Eyes: Positive for visual disturbance (bliurry vision (especially left eye))  Respiratory: Negative  Negative for chest tightness and shortness of breath  Cardiovascular: Negative  Negative for chest pain  Gastrointestinal: Negative  Negative for abdominal pain, constipation, diarrhea and vomiting  Endocrine: Negative  Negative for cold intolerance, heat intolerance, polydipsia, polyphagia and polyuria  Genitourinary: Negative  Musculoskeletal: Negative  Skin: Negative  Allergic/Immunologic: Negative  Neurological: Negative  Negative for dizziness, syncope, light-headedness and headaches  Hematological: Negative  Psychiatric/Behavioral: Negative  All other systems reviewed and are negative        Historical Information   Past Medical History:   Diagnosis Date    Basal cell carcinoma (BCC) of face     left cheek    Cataract     Rheumatoid arthritis ([address] )      Past Surgical History:   Procedure Laterality Date    EXPLORATORY LAPAROTOMY      FOOT SURGERY Left     foot reconstruction in 2 phases    FOOT SURGERY Right     foot reconstruction    LIVER BIOPSY      TOTAL SHOULDER REPLACEMENT Right      Social History   History   Alcohol Use    3 0 oz/week    5 Cans of beer per week     History   Drug Use No     History   Smoking Status    Never Smoker   Smokeless Tobacco    Never Used     Family History:   Family History   Problem Relation Age of Onset    Stroke Mother     Cancer Father         renal metastatic    Brain cancer Sister     Diabetes type I Maternal Uncle     Diabetes type I Maternal Grandmother     Osteoarthritis Brother     Osteoarthritis Sister     Rheum arthritis Sister     Osteoarthritis Sister     Osteoarthritis Sister     Osteoarthritis Sister     Osteoarthritis Brother     Osteoarthritis Brother     Bipolar disorder Son        Meds/Allergies   Current Outpatient Prescriptions   Medication Sig Dispense Refill    aspirin 81 MG tablet Take 81 mg by mouth daily      cholecalciferol (VITAMIN D3) 1,000 units tablet Take 1,000 Units by mouth daily      folic acid (FOLVITE) 1 mg tablet Take 1 mg by mouth daily      glucagon (GLUCAGEN) 1 mg injection 1 mg once      glucagon (GLUCAGON EMERGENCY) 1 MG injection Inject 1 mg under the skin once as needed for low blood sugar for up to 1 dose 1 each 4    HUMALOG 100 UNIT/ML injection       ibuprofen (MOTRIN) 200 mg tablet Take 200 mg by mouth every 6 (six) hours as needed for mild pain      lisinopril (ZESTRIL) 10 mg tablet Take 10 mg by mouth daily        methotrexate 50 MG/2ML injection Inject under the skin once a week        Multiple Vitamins-Minerals (MULTIVITAMIN ADULT PO) Take by mouth daily        mupirocin (BACTROBAN) 2 % ointment Apply topically 3 (three) times a day      Omega-3 Fat Ac-Cholecalciferol (DRY EYE OMEGA BENEFITS/VIT D-3) 505-413 MG-UNIT CAPS Take by mouth daily        Probiotic Product ([employer] SENIOR PROBIOTIC PO) Take by mouth daily        RiTUXimab (RITUXAN IV) Infuse into a venous catheter Every 16 weeks, 2nd dose 2 weeks later then repeat      TURMERIC CURCUMIN PO Take 2 each by mouth daily       No current facility-administered medications for this visit  Allergies   Allergen Reactions    Bactrim [Sulfamethoxazole-Trimethoprim] GI Intolerance    Neosporin [Neomycin-Bacitracin Zn-Polymyx] Itching    Tetracycline GI Intolerance       Objective   Vitals: There were no vitals taken for this visit  Physical Exam   Constitutional: She is oriented to person, place, and time  She appears well-developed and well-nourished  No distress     Thin build   HENT:   Head: Normocephalic and atraumatic  Mouth/Throat: Oropharynx is clear and moist    Eyes: Conjunctivae and EOM are normal  Pupils are equal, round, and reactive to light  Right eye exhibits no discharge  Left eye exhibits no discharge  Neck: Normal range of motion  Neck supple  No JVD present  No tracheal deviation present  No thyromegaly present  Cardiovascular: Normal rate, regular rhythm, normal heart sounds and intact distal pulses  Pulmonary/Chest: Effort normal and breath sounds normal  No stridor  No respiratory distress  She has no wheezes  She has no rales  She exhibits no tenderness  Abdominal: Soft  Bowel sounds are normal  She exhibits no distension  There is no tenderness  Musculoskeletal: Normal range of motion  She exhibits no edema, tenderness or deformity  Lymphadenopathy:     She has no cervical adenopathy  Neurological: She is alert and oriented to person, place, and time  She displays normal reflexes  No cranial nerve deficit  Coordination normal    Skin: Skin is warm and dry  No rash noted  No erythema  No pallor  Dry skin, especially to feet   Psychiatric: She has a normal mood and affect  Her behavior is normal  Judgment and thought content normal    Vitals reviewed  Lab Results:   Lab Results   Component Value Date/Time    Hemoglobin A1C 7 5 (H) 07/06/2018 09:33 AM    BUN 12 07/06/2018 09:33 AM    Creatinine, Serum 0 76 07/06/2018 09:33 AM    HDL 81 07/06/2018 09:33 AM    Triglycerides 113 07/06/2018 09:33 AM     Portions of the record may have been created with voice recognition software  Occasional wrong word or "sound a like" substitutions may have occurred due to the inherent limitations of voice recognition software  Read the chart carefully and recognize, using context, where substitutions have occurred

## 2018-08-01 DIAGNOSIS — E10.8 TYPE 1 DIABETES MELLITUS WITH COMPLICATION (HCC): Primary | ICD-10-CM

## 2018-08-02 RX ORDER — BLOOD-GLUCOSE METER
EACH MISCELLANEOUS ONCE
Qty: 1 KIT | Refills: 0 | Status: SHIPPED | OUTPATIENT
Start: 2018-08-02 | End: 2022-04-13

## 2018-08-20 ENCOUNTER — CONSULT (OUTPATIENT)
Dept: INTERNAL MEDICINE | Facility: CLINIC | Age: 68
End: 2018-08-20
Payer: MEDICARE

## 2018-08-20 VITALS
HEIGHT: 59 IN | DIASTOLIC BLOOD PRESSURE: 64 MMHG | TEMPERATURE: 98.2 F | WEIGHT: 108.8 LBS | OXYGEN SATURATION: 99 % | RESPIRATION RATE: 12 BRPM | HEART RATE: 74 BPM | SYSTOLIC BLOOD PRESSURE: 122 MMHG | BODY MASS INDEX: 21.93 KG/M2

## 2018-08-20 DIAGNOSIS — H26.9 CATARACT OF BOTH EYES, UNSPECIFIED CATARACT TYPE: ICD-10-CM

## 2018-08-20 DIAGNOSIS — E10.9 TYPE 1 DIABETES MELLITUS WITHOUT COMPLICATION (CMS/HCC): ICD-10-CM

## 2018-08-20 DIAGNOSIS — Z01.818 PRE-OP EVALUATION: Primary | ICD-10-CM

## 2018-08-20 PROCEDURE — 99214 OFFICE O/P EST MOD 30 MIN: CPT | Performed by: INTERNAL MEDICINE

## 2018-08-20 PROCEDURE — 93000 ELECTROCARDIOGRAM COMPLETE: CPT | Performed by: INTERNAL MEDICINE

## 2018-08-20 RX ORDER — FAMOTIDINE 10 MG/1
10 TABLET ORAL DAILY PRN
COMMUNITY
End: 2020-11-10

## 2018-08-20 RX ORDER — ATORVASTATIN CALCIUM 10 MG/1
10 TABLET, FILM COATED ORAL NIGHTLY
COMMUNITY
End: 2021-06-01

## 2018-08-20 ASSESSMENT — ENCOUNTER SYMPTOMS
NERVOUS/ANXIOUS: 0
SORE THROAT: 0
FEVER: 0
CONSTIPATION: 0
SINUS PRESSURE: 0
FREQUENCY: 0
UNEXPECTED WEIGHT CHANGE: 0
SHORTNESS OF BREATH: 0
WEAKNESS: 0
NUMBNESS: 0
SLEEP DISTURBANCE: 0
DIFFICULTY URINATING: 0
BRUISES/BLEEDS EASILY: 1
HEADACHES: 0
PALPITATIONS: 0
ABDOMINAL PAIN: 0
NECK PAIN: 0
CHILLS: 0
DYSURIA: 0
BACK PAIN: 1
DIARRHEA: 0
JOINT SWELLING: 0
COUGH: 0
DIZZINESS: 0
FATIGUE: 0
ARTHRALGIAS: 1
DYSPHORIC MOOD: 0
SEIZURES: 0

## 2018-08-20 NOTE — PROGRESS NOTES
Subjective      Patient ID: Lillie Ward is a 67 y.o. female.    Patient presents for preoperative evaluation requested by allyson Viveros for L. Eye cataract surgery scheduled for 9/6/18 at [facility]. R. Cataract surgery is scheduled in October. Has a severe episode of hypoglycemia about a month ago.  Took an insulin bolus thinking she'll have pasta.  Ate salmon and salad and didn't really eat the pasta.  Her partner heard her make unusual sounds while sleeping and found her unresponsive and called 911.  BG was 18.  It was 99 before bed.  Her insulin pump was administering continuous basal insulin dose.  After multiple glucose injections and oral glucose she came to and stopped the pump herself.  She wasn't taken to the hospital.  BG after the EMT left was about 400.  Spoke with her endocrinologist and will be getting a continuous glucose monitor with an alarm.  Feeling well these days, back to her usual self.  No recurrent episodes of hypoglycemia. No CP, dyspnea, LH/dizziness at rest or with exertion.  No other new/acute complaints.         The following have been reviewed and updated as appropriate in this visit:       Past Medical History:   Diagnosis Date   • Diabetes mellitus type I (CMS/[facility]CC) ([facility])     Dr. Cerda - Baudilio Smith   • DKA, type 1 (CMS/HCC) ([facility]) 09/2016    due to insulin pump malfunction - hospitalized   • GERD (gastroesophageal reflux disease)    • H/O colonoscopy 12/2013    [place].  Normal   • Hyperlipidemia    • Pulmonary fibrosis (CMS/HCC) ([facility])    • Pulmonary nodule, left 07/2017   • Rheumatoid arthritis (CMS/HCC) ([facility])     Dr. Lawrence Leventhal      Past Surgical History:   Procedure Laterality Date   • FOOT SURGERY Left 2009    reconstruction   • FOOT SURGERY Right 2007    reconstruction   • REPLACEMENT TOTAL KNEE Left 04/04/2017    [facility]   • TOTAL SHOULDER ARTHROPLASTY  1990     Family History   Problem Relation Age of Onset   • Stroke Mother    • Lung cancer Father     • No Known Problems Sister    • Rheum arthritis Other         1 of 8 siblings   • Bipolar disorder Son    • Alcohol abuse Son      Social History     Social History   • Marital status:      Spouse name: N/A   • Number of children: 1   • Years of education: N/A     Occupational History   • Retired teacher      Social History Main Topics   • Smoking status: Never Smoker   • Smokeless tobacco: Never Used   • Alcohol use Yes   • Drug use: No   • Sexual activity: Yes     Other Topics Concern   • Not on file     Social History Narrative    Marital status-     Children- 1 son    Caffeine - coffee    Exercise-walking    Diet-low carb    Pets-    Restoration- none    Seat belt-yes    Smoke alarm-yes    Firearms-               Review of Systems   Constitutional: Negative for chills, fatigue, fever and unexpected weight change.   HENT: Negative for congestion, hearing loss, sinus pressure and sore throat.    Eyes: Negative for visual disturbance.   Respiratory: Negative for cough and shortness of breath.    Cardiovascular: Negative for chest pain and palpitations.   Gastrointestinal: Negative for abdominal pain, constipation and diarrhea.   Endocrine: Negative for cold intolerance and heat intolerance.   Genitourinary: Negative for difficulty urinating, dysuria and frequency.   Musculoskeletal: Positive for arthralgias and back pain. Negative for joint swelling and neck pain.   Skin: Negative for rash.        2 lesions on scalp   Allergic/Immunologic: Negative for environmental allergies and food allergies.   Neurological: Negative for dizziness, seizures, weakness, numbness and headaches.   Hematological: Bruises/bleeds easily.   Psychiatric/Behavioral: Negative for dysphoric mood and sleep disturbance. The patient is not nervous/anxious.        Allergies   Allergen Reactions   • Neomycin-Bacitracnzn-Polymyxnb Itching   • Sulfamethoxazole-Trimethoprim      Other reaction(s): GI Intolerance   • Tetracycline       "Other reaction(s): GI Intolerance     Current Outpatient Prescriptions   Medication Sig Dispense Refill   • aspirin 81 mg enteric coated tablet Take 81 mg by mouth daily.     • atorvastatin (LIPITOR) 10 mg tablet Take 10 mg by mouth daily.     • cholecalciferol, vitamin D3, (VITAMIN D3) 1,000 unit capsule Take by mouth daily.     • famotidine (PEPCID) 10 mg tablet Take 10 mg by mouth daily as needed. With ibuprofen     • folic acid (FOLVITE) 1 mg tablet Take 1 mg by mouth daily.     • insulin lispro (HumaLOG U-100 Insulin) 100 unit/mL cartridge inject by subcutaneous route per prescriber's instructions. Insulin dosing requires individualization.     • Lactobac no.41-Bifidobact no.7 (PROBIOTIC-10) 70 mg (3 billion cell) capsule Take by mouth daily.     • lisinopril (PRINIVIL) 10 mg tablet TAKE ONE TABLET BY MOUTH EVERY DAY 90 tablet 3   • METHOTREXATE SODIUM INJ Inject 2.5 mg as directed once a week.     • multivit with minerals/lutein (MULTIVITAMIN 50 PLUS ORAL) No SIG Entered     • riTUXimab (RITUXAN) 10 mg/mL chemo injection Inject 10 mg/mL as directed. Every 16 weeks     • turmeric, bulk, (CURCUMIN) 95 % powder No SIG Entered       No current facility-administered medications for this visit.        Objective   Vitals:    08/20/18 1052   BP: 122/64   BP Location: Right upper arm   Patient Position: Sitting   Pulse: 74   Resp: 12   Temp: 36.8 °C (98.2 °F)   TempSrc: Oral   SpO2: 99%   Weight: 49.4 kg (108 lb 12.8 oz)   Height: 1.499 m (4' 11\")       Physical Exam   Constitutional: She is oriented to person, place, and time. She appears well-developed and well-nourished.   HENT:   Head: Normocephalic and atraumatic.   Right Ear: External ear normal. A foreign body (cerumen impaction) is present.   Left Ear: Tympanic membrane, external ear and ear canal normal.   Nose: Nose normal.   Mouth/Throat: Oropharynx is clear and moist.   Eyes: Conjunctivae and EOM are normal. Pupils are equal, round, and reactive to light. "   Neck: Normal range of motion. Neck supple. No thyromegaly present.   Cardiovascular: Normal rate, regular rhythm, normal heart sounds and intact distal pulses.    No murmur heard.  Pulmonary/Chest: Effort normal and breath sounds normal. No respiratory distress. She has no wheezes. She has no rales.   Abdominal: Soft. Bowel sounds are normal. She exhibits no distension. There is no tenderness. There is no rebound and no guarding.   Musculoskeletal: Normal range of motion. She exhibits no edema.   Neurological: She is alert and oriented to person, place, and time. No cranial nerve deficit or sensory deficit.   Skin: Skin is warm and dry. No rash noted.   Psychiatric: She has a normal mood and affect. Her behavior is normal. Judgment and thought content normal.       Assessment/Plan   Problem List Items Addressed This Visit     Type 1 diabetes mellitus (CMS/HCC) (HCC)     1 episode of hypoglycemia about a month ago.  Saw endo.  Will be getting a continuous glucose monitor.  No recurrence thus far.  Continue to monitor.  Pt to discuss with endo regarding insulin pump prior to surgery         Pre-op evaluation - Primary     Cadiac risk index score 1.   ECG NSR.  Labs last month at [facility] reviewed with pt.  Ok to proceed without further non-invasive cardiac testing         Relevant Orders    ECG 12 LEAD OFFICE PERFORMED (Completed)    Cataract of both eyes     L. Eye surgery on 9/6/18 with Dr. Mireles.  Form to be filled out.  Advised to stop the baby aspirin 1 week prior.                 Machelle Gallardo MD    8/20/2018

## 2018-08-21 NOTE — ASSESSMENT & PLAN NOTE
1 episode of hypoglycemia about a month ago.  Saw endo.  Will be getting a continuous glucose monitor.  No recurrence thus far.  Continue to monitor.  Pt to discuss with endo regarding insulin pump prior to surgery

## 2018-08-21 NOTE — ASSESSMENT & PLAN NOTE
L. Eye surgery on 9/6/18 with Dr. Mireles.  Form to be filled out.  Advised to stop the baby aspirin 1 week prior.

## 2018-08-21 NOTE — ASSESSMENT & PLAN NOTE
Cadiac risk index score 1.   ECG NSR.  Labs last month at [facility] reviewed with pt.  Ok to proceed without further non-invasive cardiac testing

## 2018-08-29 ENCOUNTER — TELEPHONE (OUTPATIENT)
Dept: INTERNAL MEDICINE | Facility: CLINIC | Age: 68
End: 2018-08-29

## 2018-08-30 ENCOUNTER — TELEPHONE (OUTPATIENT)
Dept: ENDOCRINOLOGY | Facility: CLINIC | Age: 68
End: 2018-08-30

## 2018-09-04 NOTE — TELEPHONE ENCOUNTER
Spoke to Cathy Blount from [employer]  She advised that patient would also have to come in for appt as chart note cannot be addended after 45 days  So she has to have appt and show that she is testing 4x/ day in order to be approved for dexcom

## 2018-09-04 NOTE — TELEPHONE ENCOUNTER
Spoke to patient  She understands and will just keep 10/10 appt with Eliot Carlson  She has been having issues with the coordination between her pump and meter  It does register a reading unless she boluses and old meter could not be downloaded  She now has one-touch that can be downloaded so we can do that at next appt

## 2018-09-04 NOTE — TELEPHONE ENCOUNTER
Called pt to advise that we need b/s readings to show that she is testing 4x/ day in order to be approved for dexcom  Pt advised she sent that in at end of July w/ letter to Babak Badillo  I advised that we received lettter but medtronic download was from March/ April and showed that she was only testing 2 9x /day and medtronic will not approve if testing less than 4x/ day  Patient believes she faxed over more recent download/ b/s readings  I advised we do have those

## 2018-10-05 LAB
ALBUMIN SERPL-MCNC: 4.1 G/DL (ref 3.5–4.8)
ALBUMIN/GLOB SERPL: 1.6 {RATIO} (ref 1.2–2.2)
ALP SERPL-CCNC: 63 IU/L (ref 39–117)
ALT SERPL-CCNC: 18 IU/L (ref 0–32)
AMBIG ABBREV DEFAULT: NORMAL
AMBIG ABBREV DEFAULT: NORMAL
AST SERPL-CCNC: 32 IU/L (ref 0–40)
BILIRUB SERPL-MCNC: 0.6 MG/DL (ref 0–1.2)
BUN SERPL-MCNC: 10 MG/DL (ref 8–27)
BUN/CREAT SERPL: 14 (ref 12–28)
CALCIUM SERPL-MCNC: 9.2 MG/DL (ref 8.7–10.3)
CHLORIDE SERPL-SCNC: 103 MMOL/L (ref 96–106)
CHOLEST SERPL-MCNC: 174 MG/DL (ref 100–199)
CO2 SERPL-SCNC: 25 MMOL/L (ref 20–29)
CREAT SERPL-MCNC: 0.71 MG/DL (ref 0.57–1)
EST. AVERAGE GLUCOSE BLD GHB EST-MCNC: 154 MG/DL
GLOBULIN SER-MCNC: 2.5 G/DL (ref 1.5–4.5)
GLUCOSE SERPL-MCNC: 153 MG/DL (ref 65–99)
HBA1C MFR BLD: 7 % (ref 4.8–5.6)
HDLC SERPL-MCNC: 94 MG/DL
LDLC SERPL CALC-MCNC: 67 MG/DL (ref 0–99)
POTASSIUM SERPL-SCNC: 4.8 MMOL/L (ref 3.5–5.2)
PROT SERPL-MCNC: 6.6 G/DL (ref 6–8.5)
SL AMB EGFR AFRICAN AMERICAN: 100 ML/MIN/1.73
SL AMB EGFR NON AFRICAN AMERICAN: 87 ML/MIN/1.73
SL AMB VLDL CHOLESTEROL CALC: 13 MG/DL (ref 5–40)
SODIUM SERPL-SCNC: 143 MMOL/L (ref 134–144)
TRIGL SERPL-MCNC: 64 MG/DL (ref 0–149)

## 2018-10-10 ENCOUNTER — OFFICE VISIT (OUTPATIENT)
Dept: ENDOCRINOLOGY | Facility: HOSPITAL | Age: 68
End: 2018-10-10
Payer: MEDICARE

## 2018-10-10 VITALS
HEIGHT: 61 IN | HEART RATE: 66 BPM | SYSTOLIC BLOOD PRESSURE: 100 MMHG | WEIGHT: 111 LBS | BODY MASS INDEX: 20.96 KG/M2 | DIASTOLIC BLOOD PRESSURE: 70 MMHG

## 2018-10-10 DIAGNOSIS — E10.40 TYPE 1 DIABETES MELLITUS WITH DIABETIC NEUROPATHY (HCC): Primary | ICD-10-CM

## 2018-10-10 DIAGNOSIS — Z96.41 INSULIN PUMP STATUS: ICD-10-CM

## 2018-10-10 DIAGNOSIS — E78.5 HYPERLIPIDEMIA, UNSPECIFIED HYPERLIPIDEMIA TYPE: ICD-10-CM

## 2018-10-10 DIAGNOSIS — E10.649 HYPOGLYCEMIC UNAWARENESS ASSOCIATED WITH TYPE 1 DIABETES MELLITUS: ICD-10-CM

## 2018-10-10 PROCEDURE — 99214 OFFICE O/P EST MOD 30 MIN: CPT | Performed by: NURSE PRACTITIONER

## 2018-10-10 NOTE — PROGRESS NOTES
Andres Lagunas 77 y o  female MRN: [id number]    Encounter: [id number]      Assessment/Plan     Assessment: This is a 77y o -year-old female with  type 1 diabetes on insulin pump therapy with hypoglycemic unawareness and hyperlipidemia        Plan:  1   Type 1 diabetes:  Her most recent hemoglobin A1c has improved to 7 0, however this remains above goal   Her Medtronic pump was downloaded and report reviewed with the patient at bedside  She is checking her blood sugars  4 4 times per day  Review of her report reveals some hyperglycemia and limited hypoglycemia in no consistent pattern  She states that she sometimes forgets to bolus for meals causing her hyperglycemia and sometimes boluses for meals and does not eat a lot of carbohydrates causing her to go low  She also seems to over correct for both hypo and hyperglycemia  For now I have adjusted her midnight basal rate slightly to 0 550 to decrease her incidence of hypoglycemia in the morning  I have asked her to continue to check her blood sugars 4 times daily and enter all information into the pump  She will forward a pump report to the office for review in 1-2 weeks  For now, no changes will be made to her pump settings  Due to her type 1 diabetes, on insulin pump therapy, Checking her blood sugars 4+ times daily and her hypoglycemic unawareness, she would benefit greatly from a dex com continuous glucose monitor for her safety  She is interested in participating in a dex com continuous glucose monitor trial   Check hemoglobin A1c prior to next visit  2  Hyperlipidemia:   Improved since starting atorvastatin  Continue current regimen  3   Hypertension:  She is normotensive in the office today  Continue lisinopril  Check comprehensive metabolic panel prior to next visit  4   Vitamin-D deficiency:  Continue supplementation with 1000 units of vitamin D3 daily      CC:   Type 1 Diabetes follow-up    History of Present Illness     HPI:  69 y o  year old female with type 1 diabetes for 39 years   She was started on an insulin pump around 1998   She is on insulin at home and takes Humalog insulin via the insulin pump basal rate from 12:00 a m  to 8:00 a m  0 6 units/hour, and 8:00 a m  to 12:00 a m  0 7 units/hour, bolus rates include an insulin to carbohydrate ratio from 12:00 a m  to 7:00 a m  of 1 unit per 15 g carbohydrate, 7:00 a m  to 11:00 a m  1 unit per 8 g carbohydrate, 11:00 a m  to 5:00 p m  1 unit per 11 g carbohydrate and 5:00 p m  to 12:00 a m  1 unit per 12 g carbohydrate, with an insulin sensitivity of 1 unit for every 55 blood sugar points for a target blood glucose of 100-120 with a 4 15 hour insulin action  Her most recent hemoglobin A1c from October 4, 2018 is 7 0  She denies any polyuria and polydipsia  Delaney Gilbert has no polyphagia, but has once a night nocturia  Delaney Gilbert is getting some blurry vision with some cataracts   She has no numbness or tingling of the feet   She denies chest pain or shortness of breath   She denies nephropathy, heart attack, stroke and claudication but does admit to neuropathy and retinopathy          The patient's last eye exam was last week, she obtained surgery for cataracts in September and another on October 18, 2018  She is seen every 6 months  She has no complaints about her feet and does not follow Podiatry for regular diabetic foot care       Review of her pump report report reveals some hyperglycemia and limited hypoglycemia in no consistent pattern  She states that she sometimes forgets to bolus for meals causing her hyperglycemia and sometimes boluses for meals and does not eat a lot of carbohydrates causing her to go low  She is checking her blood sugars 4 4 times per day, according to the report      For her hypertension, she is treated with lisinopril 10 mg daily      She was asked to start atorvastatin at her last office visit  She started atorvastatin 10 mg after her last visit    Her most recent fasting lipid panel from October 7, 2018 reveals a total cholesterol of 174, triglycerides of a 64, HDL of 94 and LDL cholesterol of 67      For her vitamin-D deficiency, she supplements with 1000 units of vitamin D3 daily  Review of Systems   Constitutional: Negative  Negative for chills, fatigue and fever  HENT: Negative  Negative for trouble swallowing and voice change  Eyes: Positive for visual disturbance (blurry vision-cataracts)  Negative for photophobia, pain, discharge, redness and itching  Respiratory: Negative  Negative for chest tightness and shortness of breath  Cardiovascular: Negative  Negative for chest pain and palpitations  Gastrointestinal: Negative  Negative for abdominal pain, constipation, diarrhea and vomiting  Endocrine: Negative for cold intolerance, heat intolerance, polydipsia, polyphagia and polyuria  Genitourinary: Negative  Musculoskeletal: Positive for arthralgias (arthritis) and back pain (lower back)  Skin: Negative  Allergic/Immunologic: Negative  Neurological: Negative  Negative for dizziness, syncope, light-headedness and headaches  Hematological: Negative  Psychiatric/Behavioral: Negative  All other systems reviewed and are negative        Historical Information   Past Medical History:   Diagnosis Date    Basal cell carcinoma (BCC) of face     left cheek    Cataract     Rheumatoid arthritis ([address] )      Past Surgical History:   Procedure Laterality Date    EXPLORATORY LAPAROTOMY      FOOT SURGERY Left     foot reconstruction in 2 phases    FOOT SURGERY Right     foot reconstruction    LIVER BIOPSY      TOTAL SHOULDER REPLACEMENT Right      Social History   History   Alcohol Use    3 0 oz/week    5 Cans of beer per week     History   Drug Use No     History   Smoking Status    Never Smoker   Smokeless Tobacco    Never Used     Family History:   Family History   Problem Relation Age of Onset    Stroke Mother     Cancer Father renal metastatic    Brain cancer Sister     Diabetes type I Maternal Uncle     Diabetes type I Maternal Grandmother     Osteoarthritis Brother     Osteoarthritis Sister     Rheum arthritis Sister     Osteoarthritis Sister     Osteoarthritis Sister     Osteoarthritis Sister     Osteoarthritis Brother     Osteoarthritis Brother     Bipolar disorder Son        Meds/Allergies   Current Outpatient Prescriptions   Medication Sig Dispense Refill    aspirin 81 MG tablet Take 81 mg by mouth daily      atorvastatin (LIPITOR) 10 mg tablet Take 1 tablet (10 mg total) by mouth daily (Patient taking differently: Take 10 mg by mouth Every other day  ) 90 tablet 2    Blood Glucose Monitoring Suppl ([employer] [facility]) w/Device KIT by Does not apply route once for 1 dose OneTouch Verip 1 kit 0    cholecalciferol (VITAMIN D3) 1,000 units tablet Take 1,000 Units by mouth daily      folic acid (FOLVITE) 1 mg tablet Take 1 mg by mouth daily      glucagon (GLUCAGON EMERGENCY) 1 MG injection Inject 1 mg under the skin once as needed for low blood sugar for up to 1 dose 1 each 4    glucose blood test strip Use as instructed Test 4x a day 400 each 3    HUMALOG 100 UNIT/ML injection Use via insulin pump       ibuprofen (MOTRIN) 200 mg tablet Take 200 mg by mouth every 6 (six) hours as needed for mild pain      lisinopril (ZESTRIL) 10 mg tablet Take 10 mg by mouth daily        methotrexate 50 MG/2ML injection Inject under the skin once a week        Multiple Vitamins-Minerals (MULTIVITAMIN ADULT PO) Take by mouth daily        mupirocin (BACTROBAN) 2 % ointment Apply topically 3 (three) times a day      Omega-3 Fat Ac-Cholecalciferol (DRY EYE OMEGA BENEFITS/VIT D-3) 860-989 MG-UNIT CAPS Take by mouth daily        Probiotic Product ([employer] SENIOR PROBIOTIC PO) Take by mouth daily        RiTUXimab (RITUXAN IV) Infuse into a venous catheter Every 16 weeks, 2nd dose 2 weeks later then repeat      TURMERIC CURCUMIN PO Take 2 each by mouth daily       No current facility-administered medications for this visit  Allergies   Allergen Reactions    Bactrim [Sulfamethoxazole-Trimethoprim] GI Intolerance     Other reaction(s): GI Intolerance    Neosporin [Neomycin-Bacitracin Zn-Polymyx] Itching    Tetracycline GI Intolerance     Other reaction(s): GI Intolerance       Objective   Vitals: Blood pressure 100/70, pulse 66, height 5' 1" (1 549 m), weight 50 3 kg (111 lb)  Physical Exam   Constitutional: She is oriented to person, place, and time  She appears well-developed and well-nourished  No distress  Thin build   HENT:   Head: Normocephalic and atraumatic  Mouth/Throat: Oropharynx is clear and moist    Eyes: Pupils are equal, round, and reactive to light  Conjunctivae and EOM are normal  Right eye exhibits no discharge  Left eye exhibits no discharge  No scleral icterus  Stye right eye   Neck: Normal range of motion  Neck supple  No JVD present  No tracheal deviation present  No thyromegaly present  Cardiovascular: Normal rate, regular rhythm, normal heart sounds and intact distal pulses  Exam reveals no gallop and no friction rub  No murmur heard  Pulmonary/Chest: Effort normal and breath sounds normal  No stridor  No respiratory distress  She has no wheezes  She has no rales  She exhibits no tenderness  Abdominal: Soft  Bowel sounds are normal  She exhibits no distension  There is no tenderness  Musculoskeletal: Normal range of motion  She exhibits no edema, tenderness or deformity  Lymphadenopathy:     She has no cervical adenopathy  Neurological: She is alert and oriented to person, place, and time  She displays normal reflexes  No cranial nerve deficit  Coordination normal    Skin: Skin is warm and dry  No rash noted  No erythema  No pallor  Dry skin   Psychiatric: She has a normal mood and affect  Her behavior is normal  Judgment and thought content normal    Vitals reviewed      Lab Results: Lab Results   Component Value Date/Time    Hemoglobin A1C 7 0 (H) 10/04/2018 09:25 AM    Hemoglobin A1C 7 5 (H) 07/06/2018 09:33 AM    Hemoglobin A1C 7 6 04/06/2018    Hemoglobin A1C 7 7 01/16/2018    Hemoglobin A1C 8 2 10/13/2017    BUN 10 10/04/2018 09:25 AM    BUN 12 07/06/2018 09:33 AM    Chloride 103 10/04/2018 09:25 AM    Chloride 105 07/06/2018 09:33 AM    CO2 25 10/04/2018 09:25 AM    CO2 25 07/06/2018 09:33 AM    Creatinine 0 71 10/04/2018 09:25 AM    Creatinine 0 76 07/06/2018 09:33 AM    Albumin 4 1 10/04/2018 09:25 AM    Albumin 4 1 07/06/2018 09:33 AM    HDL 94 10/04/2018 09:25 AM    HDL 81 07/06/2018 09:33 AM    LDL Direct 67 10/04/2018 09:25 AM    Triglycerides 64 10/04/2018 09:25 AM    Triglycerides 113 07/06/2018 09:33 AM     Portions of the record may have been created with voice recognition software  Occasional wrong word or "sound a like" substitutions may have occurred due to the inherent limitations of voice recognition software  Read the chart carefully and recognize, using context, where substitutions have occurred

## 2019-01-02 DIAGNOSIS — E10.8 TYPE 1 DIABETES MELLITUS WITH COMPLICATION (HCC): Primary | ICD-10-CM

## 2019-01-02 NOTE — TELEPHONE ENCOUNTER
Pt called for refill of her Humalog please  Pt has follow up in February with Dr Harleen Lynch and last appt and labs were in October

## 2019-01-08 LAB
ALBUMIN SERPL-MCNC: 4.2 G/DL (ref 3.5–4.8)
ALBUMIN/CREAT UR: 4.4 MG/G CREAT (ref 0–30)
ALBUMIN/GLOB SERPL: 1.8 {RATIO} (ref 1.2–2.2)
ALP SERPL-CCNC: 69 IU/L (ref 39–117)
ALT SERPL-CCNC: 23 IU/L (ref 0–32)
AST SERPL-CCNC: 34 IU/L (ref 0–40)
BILIRUB SERPL-MCNC: 1 MG/DL (ref 0–1.2)
BUN SERPL-MCNC: 12 MG/DL (ref 8–27)
BUN/CREAT SERPL: 17 (ref 12–28)
CALCIUM SERPL-MCNC: 9.4 MG/DL (ref 8.7–10.3)
CHLORIDE SERPL-SCNC: 105 MMOL/L (ref 96–106)
CO2 SERPL-SCNC: 22 MMOL/L (ref 20–29)
CREAT SERPL-MCNC: 0.71 MG/DL (ref 0.57–1)
CREAT UR-MCNC: 132.8 MG/DL
EST. AVERAGE GLUCOSE BLD GHB EST-MCNC: 154 MG/DL
GLOBULIN SER-MCNC: 2.4 G/DL (ref 1.5–4.5)
GLUCOSE SERPL-MCNC: 162 MG/DL (ref 65–99)
HBA1C MFR BLD: 7 % (ref 4.8–5.6)
LABCORP COMMENT: NORMAL
MICROALBUMIN UR-MCNC: 5.9 UG/ML
POTASSIUM SERPL-SCNC: 4.5 MMOL/L (ref 3.5–5.2)
PROT SERPL-MCNC: 6.6 G/DL (ref 6–8.5)
SL AMB EGFR AFRICAN AMERICAN: 100 ML/MIN/1.73
SL AMB EGFR NON AFRICAN AMERICAN: 87 ML/MIN/1.73
SODIUM SERPL-SCNC: 142 MMOL/L (ref 134–144)

## 2019-01-10 ENCOUNTER — OFFICE VISIT (OUTPATIENT)
Dept: ENDOCRINOLOGY | Facility: HOSPITAL | Age: 69
End: 2019-01-10
Payer: MEDICARE

## 2019-01-10 VITALS
DIASTOLIC BLOOD PRESSURE: 76 MMHG | HEART RATE: 82 BPM | BODY MASS INDEX: 21.11 KG/M2 | WEIGHT: 111.8 LBS | SYSTOLIC BLOOD PRESSURE: 120 MMHG | HEIGHT: 61 IN

## 2019-01-10 DIAGNOSIS — E10.649 HYPOGLYCEMIC UNAWARENESS ASSOCIATED WITH TYPE 1 DIABETES MELLITUS: ICD-10-CM

## 2019-01-10 DIAGNOSIS — E78.2 MIXED HYPERLIPIDEMIA: ICD-10-CM

## 2019-01-10 DIAGNOSIS — E10.40 TYPE 1 DIABETES MELLITUS WITH DIABETIC NEUROPATHY (HCC): Primary | ICD-10-CM

## 2019-01-10 DIAGNOSIS — E10.8 TYPE 1 DIABETES MELLITUS WITH COMPLICATION (HCC): ICD-10-CM

## 2019-01-10 PROCEDURE — 95251 CONT GLUC MNTR ANALYSIS I&R: CPT | Performed by: INTERNAL MEDICINE

## 2019-01-10 PROCEDURE — 99214 OFFICE O/P EST MOD 30 MIN: CPT | Performed by: INTERNAL MEDICINE

## 2019-01-10 NOTE — PATIENT INSTRUCTIONS
hgabic is 7 0%  This is good  Let's try to eliminate low blood sugars overnight and decrease the basal from 112 am to 8 am to 0 375 units over hour  Continue all other doses the same  Let's get diabetes education to work on review of treatment of low blood sugars and the rebound highs    Follow up in 3 months with blood work

## 2019-01-10 NOTE — LETTER
January 10, 2019     Hira Cabrera MD  [address] [address] [address]    Patient: Hany Belcher   YOB: 1950   Date of Visit: 1/10/2019       Dear Dr Sebastien Blue: Thank you for referring Hany Belcher to me for evaluation  Below are my notes for this consultation  If you have questions, please do not hesitate to call me  I look forward to following your patient along with you  Sincerely,        Edilberto Delatorre MD        CC: No Recipients  Edilberto Delatorre MD  1/10/2019  5:58 PM  Sign at close encounter  1/10/2019    Assessment/Plan      Diagnoses and all orders for this visit:    Type 1 diabetes mellitus with diabetic neuropathy [facility])  -     Ambulatory referral to Diabetic Education; Future  -     HEMOGLOBIN A1C W/ EAG ESTIMATION Lab Collect; Future  -     Comprehensive metabolic panel Lab Collect; Future  -     TSH, 3rd generation Lab Collect; Future  -     Ambulatory referral to medical nutrition therapy for diabetes; Future    Hypoglycemic unawareness associated with type 1 diabetes mellitus ([facility] [address] )  -     Ambulatory referral to Diabetic Education; Future  -     HEMOGLOBIN A1C W/ EAG ESTIMATION Lab Collect; Future  -     Comprehensive metabolic panel Lab Collect; Future  -     TSH, 3rd generation Lab Collect; Future  -     Ambulatory referral to medical nutrition therapy for diabetes; Future    Mixed hyperlipidemia  -     Comprehensive metabolic panel Lab Collect; Future    Type 1 diabetes mellitus with complication (HCC)  -     HUMALOG 100 UNIT/ML injection; Use up to 100 units daily via insulin pump        Assessment/Plan:  1  Type 1 diabetes  Although her hemoglobin A1c is quite good at 7%  She is having wide swings of blood sugars which she will have a low blood sugar and I believe she over treats her low blood sugar than swings high with a rebound high, then she will treat her high blood sugar with insulin and then swing low again    I would like her to see medical nutrition therapy and diabetic education to see about education regarding these issues and helping to treat them and prevent them since she is on an insulin pump with the DEX com  She will continue to utilize the DEX com G5 and check her blood sugars 4 to 5 times a day  I will decrease her basal rate down to 0 375 units/hour from 12:00 a m  to 8:00 a m  to try to prevent low blood sugars overnight based on her DEX com and insulin pump download  The rest of the rates will remain the same for now  2  Hypoglycemic unawareness  She is going to continue utilizing the [address] [place] [address] Street which has warned her before her  has had a rescue her recently  3   Diabetic neuropathy  She has no significant symptoms  This will be followed over time  4   Hyperlipidemia  She will continue atorvastatin 10 mg every other day  I have asked her to follow up in 3 months with preceding CMP, TSH, and hemoglobin A1c       CC: Diabetes type 1 follow-up    History of Present Illness     HPI: Alejandro Burns is a 77y o  year old female with type 1 diabetes for 39 years  She is on insulin at home and takes Humalog insulin via insulin pump  She denies any polyuria, polydipsia, polyphagia and blurry vision  She has once or twice a night nocturia  She denies numbness or tingling of her feet  She denies chest pain or shortness of breath  She denies nephropathy, heart attack, stroke and claudication but does admit to neuropathy and retinopathy  She has a history of hyperlipidemia and is on atorvastatin 10 mg every other day  She denies chest pain, shortness of breath, or worsening myalgias  She was first started on an insulin pumparound 20 years ago in 1998  Currently on Medtronic 630G insulin pump obtained about 1 1 2/ years ago  She now has a dexcom G5 CGMS sensor  Basal rates:  12:00 a m  to 8:00 a m  0 4 units/hour, 8:00 a m  to 12:00 a m  0 7 units/hour    Bolus rates:  12:00 a m  to 7:00 a m  1 unit per 15 g carbohydrate, 7:00 a m  to 11:00 a m  1 unit per 8 g carbohydrate, 11:00 a m  to 5:00 p m  1 unit per 11 g carbohydrate, and 5:00 p m  to 12:00 a m  1 unit per 11 g carbohydrate  Insulin sensitivity:  1 unit for every 55 blood sugar points for a blood glucose target of 100-120  Insulin action:  4 15 hours  Total daily insulin dose 32 89+/-3 3 units with 47% basal and 53% bolus  Hypoglycemic episodes: Yes daily  Overnight from 11 pm to 2 am a s low as 40  H/o of hypoglycemia causing hospitalization or intervention such as glucagon injection  or ambulance call  No   Hypoglycemia symptoms: none  Treatment of hypoglycemia: feels she may be overtreating low blood sugars  Glucagon:Yes  Medic alert tag: recommended,Yes  The patient's last eye exam was in November post cataract surgery  Seen every 6 months  The patient's last foot exam was in not seen  Last A1C was done on 01/07/2019 and was  Lab Results   Component Value Date    HGBA1C 7 0 (H) 01/07/2019     Blood Sugar/Glucometer/Pump/CGM review: checks blood sugars 4-5 times a day and uses dexcom CGMS  dexcom download and pump download  No longer giving insulin for high blood sugar every 1/2 hour and would have low blood sugar  Stopped that about 5 days ago  Will pump off at 40  Review of Systems   Constitutional: Negative for fatigue and unexpected weight change   twice a week  Still fatigued more than expected  HENT: Negative for hearing loss  Eyes: Negative for visual disturbance  Still needs reading glasses  Respiratory: Negative for chest tightness and shortness of breath  Cardiovascular: Negative for chest pain, palpitations and leg swelling  Gastrointestinal: Negative for abdominal pain, constipation, diarrhea and nausea  Endocrine: Negative for polydipsia, polyphagia and polyuria  Nocturia 1-2 times a night  Musculoskeletal: Positive for arthralgias  Negative for back pain          Joints stable with current rheumatoid arthritis treatment  Skin: Negative for wound  Neurological: Negative for numbness  Psychiatric/Behavioral: Negative for sleep disturbance         Historical Information   Past Medical History:   Diagnosis Date    Basal cell carcinoma (BCC) of face     left cheek    Cataract     Rheumatoid arthritis ([address] )      Past Surgical History:   Procedure Laterality Date    CATARACT EXTRACTION, BILATERAL Bilateral     EXPLORATORY LAPAROTOMY      FOOT SURGERY Left     foot reconstruction in 2 phases    FOOT SURGERY Right     foot reconstruction    LIVER BIOPSY      TOTAL SHOULDER REPLACEMENT Right      Social History   History   Alcohol Use    3 0 oz/week    5 Cans of beer per week     History   Drug Use No     History   Smoking Status    Never Smoker   Smokeless Tobacco    Never Used     Family History:   Family History   Problem Relation Age of Onset    Stroke Mother     Cancer Father         renal metastatic    Brain cancer Sister     Diabetes type I Maternal Uncle     Diabetes type I Maternal Grandmother     Osteoarthritis Brother     Osteoarthritis Sister     Rheum arthritis Sister     Osteoarthritis Sister     Osteoarthritis Sister     Osteoarthritis Sister     Osteoarthritis Brother     Osteoarthritis Brother     Bipolar disorder Son        Meds/Allergies   Current Outpatient Prescriptions   Medication Sig Dispense Refill    aspirin 81 MG tablet Take 81 mg by mouth daily      atorvastatin (LIPITOR) 10 mg tablet Take 1 tablet (10 mg total) by mouth daily (Patient taking differently: Take 10 mg by mouth Every other day  ) 90 tablet 2    cholecalciferol (VITAMIN D3) 1,000 units tablet Take 1,000 Units by mouth daily      folic acid (FOLVITE) 1 mg tablet Take 1 mg by mouth daily      glucagon (GLUCAGON EMERGENCY) 1 MG injection Inject 1 mg under the skin once as needed for low blood sugar for up to 1 dose 1 each 4    glucose blood test strip Use as instructed Test 4x a day 400 each 3    HUMALOG 100 UNIT/ML injection Use up to 100 units daily via insulin pump 30 mL 3    ibuprofen (MOTRIN) 200 mg tablet Take 200 mg by mouth every 6 (six) hours as needed for mild pain      lisinopril (ZESTRIL) 10 mg tablet Take 10 mg by mouth daily        methotrexate 50 MG/2ML injection Inject under the skin once a week        Multiple Vitamins-Minerals (MULTIVITAMIN ADULT PO) Take by mouth daily        mupirocin (BACTROBAN) 2 % ointment Apply topically 3 (three) times a day      Omega-3 Fat Ac-Cholecalciferol (DRY EYE OMEGA BENEFITS/VIT D-3) 603-200 MG-UNIT CAPS Take by mouth daily        RiTUXimab (RITUXAN IV) Infuse into a venous catheter Every 16 weeks, 2nd dose 2 weeks later then repeat      TURMERIC CURCUMIN PO Take 2 each by mouth daily      Blood Glucose Monitoring Suppl ([employer] VERIO) w/Device KIT by Does not apply route once for 1 dose OneTouch Verip 1 kit 0    Probiotic Product ([employer] SENIOR PROBIOTIC PO) Take by mouth daily         No current facility-administered medications for this visit  Allergies   Allergen Reactions    Bactrim [Sulfamethoxazole-Trimethoprim] GI Intolerance     Other reaction(s): GI Intolerance    Neosporin [Neomycin-Bacitracin Zn-Polymyx] Itching    Tetracycline GI Intolerance     Other reaction(s): GI Intolerance       Objective   Vitals: Blood pressure 120/76, pulse 82, height 5' 1" (1 549 m), weight 50 7 kg (111 lb 12 8 oz)  Invasive Devices          No matching active lines, drains, or airways          Physical Exam   Constitutional: She is oriented to person, place, and time  She appears well-developed and well-nourished  HENT:   Head: Normocephalic and atraumatic  Eyes: Pupils are equal, round, and reactive to light  Conjunctivae and EOM are normal    Neck: Normal range of motion  Neck supple  No thyromegaly present  No carotid bruits  Cardiovascular: Normal rate, regular rhythm, normal heart sounds and intact distal pulses      No murmur heard   Pulmonary/Chest: Effort normal and breath sounds normal  She has no wheezes  Musculoskeletal: Normal range of motion  She exhibits deformity  She exhibits no edema  No ulcerations of the feet when examined with the shoes and socks removed  Multiple scars and deformities from previous surgeries of her toes  Lymphadenopathy:     She has no cervical adenopathy  Neurological: She is alert and oriented to person, place, and time  Skin: Skin is warm and dry  No rash noted  Vitals reviewed  The history was obtained from the review of the chart and from the patient  Lab Results:    Most recent Alc is  Lab Results   Component Value Date    HGBA1C 7 0 (H) 01/07/2019           CMP demonstrates a glucose of 162 random but was otherwise normal   Lab Results   Component Value Date    BUN 12 01/07/2019    K 4 5 01/07/2019     01/07/2019    CO2 22 01/07/2019     Urine microalbumin to creatinine ratio was 4 4      Future Appointments  Date Time Provider Srikanth Flores   1/24/2019 10:00 AM Norm Abel RD DIAB ED [facility]   4/10/2019 11:15 AM DESHAWN Herron ENDO [facility]

## 2019-01-10 NOTE — PROGRESS NOTES
1/10/2019    Assessment/Plan      Diagnoses and all orders for this visit:    Type 1 diabetes mellitus with diabetic neuropathy ([place] [address] )  -     Ambulatory referral to Diabetic Education; Future  -     HEMOGLOBIN A1C W/ EAG ESTIMATION Lab Collect; Future  -     Comprehensive metabolic panel Lab Collect; Future  -     TSH, 3rd generation Lab Collect; Future  -     Ambulatory referral to medical nutrition therapy for diabetes; Future    Hypoglycemic unawareness associated with type 1 diabetes mellitus ([place] [address] )  -     Ambulatory referral to Diabetic Education; Future  -     HEMOGLOBIN A1C W/ EAG ESTIMATION Lab Collect; Future  -     Comprehensive metabolic panel Lab Collect; Future  -     TSH, 3rd generation Lab Collect; Future  -     Ambulatory referral to medical nutrition therapy for diabetes; Future    Mixed hyperlipidemia  -     Comprehensive metabolic panel Lab Collect; Future    Type 1 diabetes mellitus with complication (HCC)  -     HUMALOG 100 UNIT/ML injection; Use up to 100 units daily via insulin pump        Assessment/Plan:  1  Type 1 diabetes  Although her hemoglobin A1c is quite good at 7%  She is having wide swings of blood sugars which she will have a low blood sugar and I believe she over treats her low blood sugar than swings high with a rebound high, then she will treat her high blood sugar with insulin and then swing low again  I would like her to see medical nutrition therapy and diabetic education to see about education regarding these issues and helping to treat them and prevent them since she is on an insulin pump with the DEX com  She will continue to utilize the DEX com G5 and check her blood sugars 4 to 5 times a day  I will decrease her basal rate down to 0 375 units/hour from 12:00 a m  to 8:00 a m  to try to prevent low blood sugars overnight based on her DEX com and insulin pump download  The rest of the rates will remain the same for now  2  Hypoglycemic unawareness    She is going to continue utilizing the [address] [place] [address] which has warned her before her  has had a rescue her recently  3   Diabetic neuropathy  She has no significant symptoms  This will be followed over time  4   Hyperlipidemia  She will continue atorvastatin 10 mg every other day  I have asked her to follow up in 3 months with preceding CMP, TSH, and hemoglobin A1c       CC: Diabetes type 1 follow-up    History of Present Illness     HPI: Angelina Grossman is a 77y o  year old female with type 1 diabetes for 39 years  She is on insulin at home and takes Humalog insulin via insulin pump  She denies any polyuria, polydipsia, polyphagia and blurry vision  She has once or twice a night nocturia  She denies numbness or tingling of her feet  She denies chest pain or shortness of breath  She denies nephropathy, heart attack, stroke and claudication but does admit to neuropathy and retinopathy  She has a history of hyperlipidemia and is on atorvastatin 10 mg every other day  She denies chest pain, shortness of breath, or worsening myalgias  She was first started on an insulin pumparound 20 years ago in 1998  Currently on [employer] 630G insulin pump obtained about 1 1 2/ years ago  She now has a dexcom G5 CGMS sensor  Basal rates:  12:00 a m  to 8:00 a m  0 4 units/hour, 8:00 a m  to 12:00 a m  0 7 units/hour  Bolus rates:  12:00 a m  to 7:00 a m  1 unit per 15 g carbohydrate, 7:00 a m  to 11:00 a m  1 unit per 8 g carbohydrate, 11:00 a m  to 5:00 p m  1 unit per 11 g carbohydrate, and 5:00 p m  to 12:00 a m  1 unit per 11 g carbohydrate  Insulin sensitivity:  1 unit for every 55 blood sugar points for a blood glucose target of 100-120  Insulin action:  4 15 hours  Total daily insulin dose 32 89+/-3 3 units with 47% basal and 53% bolus  Hypoglycemic episodes: Yes daily  Overnight from 11 pm to 2 am a s low as 40    H/o of hypoglycemia causing hospitalization or intervention such as glucagon injection  or ambulance call  No   Hypoglycemia symptoms: none  Treatment of hypoglycemia: feels she may be overtreating low blood sugars  Glucagon:Yes  Medic alert tag: recommended,Yes  The patient's last eye exam was in November post cataract surgery  Seen every 6 months  The patient's last foot exam was in not seen  Last A1C was done on 01/07/2019 and was  Lab Results   Component Value Date    HGBA1C 7 0 (H) 01/07/2019     Blood Sugar/Glucometer/Pump/CGM review: checks blood sugars 4-5 times a day and uses dexcom CGMS  dexcom download and pump download  No longer giving insulin for high blood sugar every 1/2 hour and would have low blood sugar  Stopped that about 5 days ago  Will pump off at 40  Review of Systems   Constitutional: Negative for fatigue and unexpected weight change   twice a week  Still fatigued more than expected  HENT: Negative for hearing loss  Eyes: Negative for visual disturbance  Still needs reading glasses  Respiratory: Negative for chest tightness and shortness of breath  Cardiovascular: Negative for chest pain, palpitations and leg swelling  Gastrointestinal: Negative for abdominal pain, constipation, diarrhea and nausea  Endocrine: Negative for polydipsia, polyphagia and polyuria  Nocturia 1-2 times a night  Musculoskeletal: Positive for arthralgias  Negative for back pain  Joints stable with current rheumatoid arthritis treatment  Skin: Negative for wound  Neurological: Negative for numbness  Psychiatric/Behavioral: Negative for sleep disturbance         Historical Information   Past Medical History:   Diagnosis Date    Basal cell carcinoma (BCC) of face     left cheek    Cataract     Rheumatoid arthritis ([facility] [address] )      Past Surgical History:   Procedure Laterality Date    CATARACT EXTRACTION, BILATERAL Bilateral     EXPLORATORY LAPAROTOMY      FOOT SURGERY Left     foot reconstruction in 2 phases    FOOT SURGERY Right     foot reconstruction    LIVER BIOPSY      TOTAL SHOULDER REPLACEMENT Right      Social History   History   Alcohol Use    3 0 oz/week    5 Cans of beer per week     History   Drug Use No     History   Smoking Status    Never Smoker   Smokeless Tobacco    Never Used     Family History:   Family History   Problem Relation Age of Onset    Stroke Mother     Cancer Father         renal metastatic    Brain cancer Sister     Diabetes type I Maternal Uncle     Diabetes type I Maternal Grandmother     Osteoarthritis Brother     Osteoarthritis Sister     Rheum arthritis Sister     Osteoarthritis Sister     Osteoarthritis Sister     Osteoarthritis Sister     Osteoarthritis Brother     Osteoarthritis Brother     Bipolar disorder Son        Meds/Allergies   Current Outpatient Prescriptions   Medication Sig Dispense Refill    aspirin 81 MG tablet Take 81 mg by mouth daily      atorvastatin (LIPITOR) 10 mg tablet Take 1 tablet (10 mg total) by mouth daily (Patient taking differently: Take 10 mg by mouth Every other day  ) 90 tablet 2    cholecalciferol (VITAMIN D3) 1,000 units tablet Take 1,000 Units by mouth daily      folic acid (FOLVITE) 1 mg tablet Take 1 mg by mouth daily      glucagon (GLUCAGON EMERGENCY) 1 MG injection Inject 1 mg under the skin once as needed for low blood sugar for up to 1 dose 1 each 4    glucose blood test strip Use as instructed Test 4x a day 400 each 3    HUMALOG 100 UNIT/ML injection Use up to 100 units daily via insulin pump 30 mL 3    ibuprofen (MOTRIN) 200 mg tablet Take 200 mg by mouth every 6 (six) hours as needed for mild pain      lisinopril (ZESTRIL) 10 mg tablet Take 10 mg by mouth daily        methotrexate 50 MG/2ML injection Inject under the skin once a week        Multiple Vitamins-Minerals (MULTIVITAMIN ADULT PO) Take by mouth daily        mupirocin (BACTROBAN) 2 % ointment Apply topically 3 (three) times a day      Omega-3 Fat Ac-Cholecalciferol (DRY EYE OMEGA BENEFITS/VIT D-3) 007-207 MG-UNIT CAPS Take by mouth daily        RiTUXimab (RITUXAN IV) Infuse into a venous catheter Every 16 weeks, 2nd dose 2 weeks later then repeat      TURMERIC CURCUMIN PO Take 2 each by mouth daily      Blood Glucose Monitoring Suppl (ONETOUCH VERIO) w/Device KIT by Does not apply route once for 1 dose OneTouch Verip 1 kit 0    Probiotic Product ([employer] SENIOR PROBIOTIC PO) Take by mouth daily         No current facility-administered medications for this visit  Allergies   Allergen Reactions    Bactrim [Sulfamethoxazole-Trimethoprim] GI Intolerance     Other reaction(s): GI Intolerance    Neosporin [Neomycin-Bacitracin Zn-Polymyx] Itching    Tetracycline GI Intolerance     Other reaction(s): GI Intolerance       Objective   Vitals: Blood pressure 120/76, pulse 82, height 5' 1" (1 549 m), weight 50 7 kg (111 lb 12 8 oz)  Invasive Devices          No matching active lines, drains, or airways          Physical Exam   Constitutional: She is oriented to person, place, and time  She appears well-developed and well-nourished  HENT:   Head: Normocephalic and atraumatic  Eyes: Pupils are equal, round, and reactive to light  Conjunctivae and EOM are normal    Neck: Normal range of motion  Neck supple  No thyromegaly present  No carotid bruits  Cardiovascular: Normal rate, regular rhythm, normal heart sounds and intact distal pulses  No murmur heard  Pulmonary/Chest: Effort normal and breath sounds normal  She has no wheezes  Musculoskeletal: Normal range of motion  She exhibits deformity  She exhibits no edema  No ulcerations of the feet when examined with the shoes and socks removed  Multiple scars and deformities from previous surgeries of her toes  Lymphadenopathy:     She has no cervical adenopathy  Neurological: She is alert and oriented to person, place, and time  Skin: Skin is warm and dry  No rash noted  Vitals reviewed        The history was obtained from the review of the chart and from the patient  Lab Results:    Most recent Alc is  Lab Results   Component Value Date    HGBA1C 7 0 (H) 01/07/2019           CMP demonstrates a glucose of 162 random but was otherwise normal   Lab Results   Component Value Date    BUN 12 01/07/2019    K 4 5 01/07/2019     01/07/2019    CO2 22 01/07/2019     Urine microalbumin to creatinine ratio was 4 4      Future Appointments  Date Time Provider Srikanth Flores   1/24/2019 10:00 AM Summer Diamond RD DIAB ED [facility]   4/10/2019 11:15 AM DESHAWN Palomares ENDO [facility]

## 2019-02-06 ENCOUNTER — TELEPHONE (OUTPATIENT)
Dept: INTERNAL MEDICINE | Facility: CLINIC | Age: 69
End: 2019-02-06

## 2019-04-10 ENCOUNTER — OFFICE VISIT (OUTPATIENT)
Dept: ENDOCRINOLOGY | Facility: HOSPITAL | Age: 69
End: 2019-04-10
Payer: MEDICARE

## 2019-04-10 VITALS
BODY MASS INDEX: 20.99 KG/M2 | SYSTOLIC BLOOD PRESSURE: 118 MMHG | WEIGHT: 111.2 LBS | HEIGHT: 61 IN | HEART RATE: 74 BPM | DIASTOLIC BLOOD PRESSURE: 70 MMHG

## 2019-04-10 DIAGNOSIS — E10.40 TYPE 1 DIABETES MELLITUS WITH DIABETIC NEUROPATHY (HCC): Primary | ICD-10-CM

## 2019-04-10 DIAGNOSIS — E78.2 MIXED HYPERLIPIDEMIA: ICD-10-CM

## 2019-04-10 DIAGNOSIS — E10.649 HYPOGLYCEMIC UNAWARENESS ASSOCIATED WITH TYPE 1 DIABETES MELLITUS: ICD-10-CM

## 2019-04-10 DIAGNOSIS — Z96.41 INSULIN PUMP STATUS: ICD-10-CM

## 2019-04-10 LAB
ALBUMIN SERPL-MCNC: 4.1 G/DL (ref 3.5–4.8)
ALBUMIN/GLOB SERPL: 1.9 {RATIO} (ref 1.2–2.2)
ALP SERPL-CCNC: 63 IU/L (ref 39–117)
ALT SERPL-CCNC: 15 IU/L (ref 0–32)
AST SERPL-CCNC: 27 IU/L (ref 0–40)
BILIRUB SERPL-MCNC: 0.8 MG/DL (ref 0–1.2)
BUN SERPL-MCNC: 9 MG/DL (ref 8–27)
BUN/CREAT SERPL: 13 (ref 12–28)
CALCIUM SERPL-MCNC: 9.2 MG/DL (ref 8.7–10.3)
CHLORIDE SERPL-SCNC: 105 MMOL/L (ref 96–106)
CO2 SERPL-SCNC: 22 MMOL/L (ref 20–29)
CREAT SERPL-MCNC: 0.69 MG/DL (ref 0.57–1)
EST. AVERAGE GLUCOSE BLD GHB EST-MCNC: 148 MG/DL
GLOBULIN SER-MCNC: 2.2 G/DL (ref 1.5–4.5)
GLUCOSE SERPL-MCNC: 123 MG/DL (ref 65–99)
HBA1C MFR BLD: 6.8 % (ref 4.8–5.6)
LABCORP COMMENT: NORMAL
POTASSIUM SERPL-SCNC: 4.2 MMOL/L (ref 3.5–5.2)
PROT SERPL-MCNC: 6.3 G/DL (ref 6–8.5)
SL AMB EGFR AFRICAN AMERICAN: 102 ML/MIN/1.73
SL AMB EGFR NON AFRICAN AMERICAN: 88 ML/MIN/1.73
SODIUM SERPL-SCNC: 143 MMOL/L (ref 134–144)
TSH SERPL DL<=0.005 MIU/L-ACNC: 2.82 UIU/ML (ref 0.45–4.5)

## 2019-04-10 PROCEDURE — 99214 OFFICE O/P EST MOD 30 MIN: CPT | Performed by: NURSE PRACTITIONER

## 2019-04-10 PROCEDURE — 95251 CONT GLUC MNTR ANALYSIS I&R: CPT | Performed by: NURSE PRACTITIONER

## 2019-04-10 RX ORDER — FAMOTIDINE 20 MG/1
20 TABLET, FILM COATED ORAL DAILY
COMMUNITY

## 2019-04-15 ENCOUNTER — OFFICE VISIT (OUTPATIENT)
Dept: INTERNAL MEDICINE | Facility: CLINIC | Age: 69
End: 2019-04-15
Payer: MEDICARE

## 2019-04-15 VITALS
SYSTOLIC BLOOD PRESSURE: 134 MMHG | HEART RATE: 68 BPM | WEIGHT: 110 LBS | RESPIRATION RATE: 16 BRPM | HEIGHT: 59 IN | DIASTOLIC BLOOD PRESSURE: 80 MMHG | OXYGEN SATURATION: 96 % | BODY MASS INDEX: 22.18 KG/M2

## 2019-04-15 DIAGNOSIS — E10.9 TYPE 1 DIABETES MELLITUS WITHOUT COMPLICATION (CMS/HCC): ICD-10-CM

## 2019-04-15 DIAGNOSIS — R07.89 ATYPICAL CHEST PAIN: Primary | ICD-10-CM

## 2019-04-15 PROCEDURE — 99214 OFFICE O/P EST MOD 30 MIN: CPT | Performed by: INTERNAL MEDICINE

## 2019-04-15 RX ORDER — LISINOPRIL 10 MG/1
10 TABLET ORAL
Qty: 90 TABLET | Refills: 3 | Status: SHIPPED | OUTPATIENT
Start: 2019-04-15 | End: 2020-06-18

## 2019-04-15 ASSESSMENT — ENCOUNTER SYMPTOMS
WHEEZING: 1
ABDOMINAL PAIN: 0
DIARRHEA: 0
COUGH: 1
DIFFICULTY URINATING: 0
ROS GI COMMENTS: HEARTBURN
NAUSEA: 0
BLOOD IN STOOL: 0
SHORTNESS OF BREATH: 0
UNEXPECTED WEIGHT CHANGE: 0
FEVER: 0
CONSTIPATION: 0
PALPITATIONS: 0

## 2019-04-15 NOTE — PROGRESS NOTES
Subjective      Patient ID: Lillie Ward is a 68 y.o. female.    HPI   Patient presents for a hospital follow up.  Was admitted on 4/1/19 and discharged the following day.  Woke up with severe chest pain around 7 AM which progressively worsened.  Called 911 and was taken to [place].  Has serial EKGs and troponins which were negative.  Chest pain did improve with sublingual nitro.  Chest pain was felt to be due to acid reflux and was discharged with famotidine and instructions to make an appointment with GI.  Has an appointment on 5/6/19.  No recurrent chest pain since discharge.  Does have some acid reflux symptoms in general, usually worse with gluten-containing foods.  Has been taking the famotidine.  Saw endocrinology for her type I DM.  HbA1c was 6.8.  Currently using Dex com for continuous glucose monitoring.  No hypoglycemia. No other new/acute complaints.     The following have been reviewed and updated as appropriate in this visit:           Past Medical History:   Diagnosis Date   • Diabetes mellitus type I (CMS/HCC) ([facility])     Dr. Cerda - Baudilio Smith   • DKA, type 1 (CMS/HCC) ([facility]) 09/2016    due to insulin pump malfunction - hospitalized   • GERD (gastroesophageal reflux disease)    • H/O colonoscopy 12/2013    [place].  Normal   • Hyperlipidemia    • Pulmonary fibrosis (CMS/HCC) ([facility])    • Pulmonary nodule, left 07/2017   • Rheumatoid arthritis (CMS/HCC) ([facility])     Dr. Lawrence Leventhal      Past Surgical History:   Procedure Laterality Date   • FOOT SURGERY Left 2009    reconstruction   • FOOT SURGERY Right 2007    reconstruction   • REPLACEMENT TOTAL KNEE Left 04/04/2017    [place]   • TOTAL SHOULDER ARTHROPLASTY  1990     Family History   Problem Relation Age of Onset   • Stroke Mother    • Lung cancer Father    • No Known Problems Sister    • Rheum arthritis Other         1 of 8 siblings   • Bipolar disorder Son    • Alcohol abuse Son      Social History     Social History   • Marital status:       Spouse name: N/A   • Number of children: 1   • Years of education: N/A     Occupational History   • Retired teacher      Social History Main Topics   • Smoking status: Never Smoker   • Smokeless tobacco: Never Used   • Alcohol use Yes   • Drug use: No   • Sexual activity: Yes     Other Topics Concern   • Not on file     Social History Narrative    Marital status-     Children- 1 son    Caffeine - coffee    Exercise-walking    Diet-low carb    Pets-    Taoism- none    Seat belt-yes    Smoke alarm-yes    Firearms-               Review of Systems   Constitutional: Negative for fever and unexpected weight change.   Respiratory: Positive for cough and wheezing. Negative for shortness of breath.    Cardiovascular: Positive for chest pain (resolved). Negative for palpitations.   Gastrointestinal: Negative for abdominal pain, blood in stool, constipation, diarrhea and nausea.        Heartburn   Genitourinary: Negative for difficulty urinating.       Allergies   Allergen Reactions   • Neomycin-Bacitracnzn-Polymyxnb Itching   • Sulfamethoxazole-Trimethoprim      Other reaction(s): GI Intolerance   • Tetracycline      Other reaction(s): GI Intolerance     Current Outpatient Prescriptions   Medication Sig Dispense Refill   • atorvastatin (LIPITOR) 10 mg tablet Take 10 mg by mouth every other day.       • cholecalciferol, vitamin D3, (VITAMIN D3) 1,000 unit capsule Take by mouth daily.     • famotidine (PEPCID) 10 mg tablet Take 10 mg by mouth daily as needed. With ibuprofen     • folic acid (FOLVITE) 1 mg tablet Take 1 mg by mouth daily.     • insulin lispro (HumaLOG U-100 Insulin) 100 unit/mL cartridge inject by subcutaneous route per prescriber's instructions. Insulin dosing requires individualization.     • Lactobac no.41-Bifidobact no.7 (PROBIOTIC-10) 70 mg (3 billion cell) capsule Take by mouth daily.     • lisinopril (PRINIVIL) 10 mg tablet Take 1 tablet (10 mg total) by mouth once daily. 90 tablet 3  "  • METHOTREXATE SODIUM INJ Inject 2.5 mg as directed once a week.     • multivit with minerals/lutein (MULTIVITAMIN 50 PLUS ORAL) No SIG Entered     • riTUXimab (RITUXAN) 10 mg/mL chemo injection Inject 10 mg/mL as directed. Every 16 weeks     • turmeric, bulk, (CURCUMIN) 95 % powder No SIG Entered     • aspirin 81 mg enteric coated tablet Take 81 mg by mouth daily.       No current facility-administered medications for this visit.        Objective   Vitals:    04/15/19 1521   BP: 134/80   BP Location: Right upper arm   Patient Position: Sitting   Pulse: 68   Resp: 16   SpO2: 96%   Weight: 49.9 kg (110 lb)   Height: 1.499 m (4' 11\")       Physical Exam   Constitutional: She is oriented to person, place, and time. She appears well-developed and well-nourished.   HENT:   Head: Normocephalic and atraumatic.   Eyes: EOM and lids are normal.   Neck: Neck supple.   Cardiovascular: Normal rate, regular rhythm, S1 normal, S2 normal and normal heart sounds.    No murmur heard.  Pulmonary/Chest: Effort normal. No respiratory distress. She has no decreased breath sounds. She has wheezes (Inspiratory wheezes/squeaks all lung fields). She has no rhonchi. She has no rales.   Abdominal: Soft. Normal appearance and bowel sounds are normal. There is no tenderness.   Musculoskeletal: Normal range of motion.   Neurological: She is alert and oriented to person, place, and time. She has normal strength. No cranial nerve deficit. Gait normal.   Skin: Skin is warm and dry.   Psychiatric: She has a normal mood and affect. Her behavior is normal. Judgment and thought content normal. Cognition and memory are normal.       Assessment/Plan   Problem List Items Addressed This Visit     Type 1 diabetes mellitus (CMS/HCC) (HCC)     Stable. Last hemoglobin A1c 6.8.  Continue insulin through insulin pump.  Continue follow-up with Endo         Atypical chest pain - Primary     Unlikely cardiac given negative EKGs and troponins.  Possibly related to " GERD.  Continue famotidine.  Has appointment with GI on 5/6/19.  Monitor for triggers.  2 of her siblings had coronary calcium scoring done.  Patient concerned.  Declines referral to cardiology for stress test and echo.  Willing to pay out of pocket for coronary calcium score.  Ordered for patient.         Relevant Orders    CT HEART CORONARY CALCIUM SCORE        By signing my name below, IMiranda, attest that this documentation has been prepared under the direction in the presence of Dr. Machelle Gallardo MD.    Electronically Signed: Aimee King. 4/15/2019.     I, Machelle Gallardo MD, personally performed the services described in this documentation. All medical record entries made by the scribe were at my direction and in my presence. I have reviewed the chart and discharge instructions  and agree that the record reflects my personal performance and is accurate and complete. aMchelle Gallardo MD. 4/15/2019.

## 2019-04-15 NOTE — ASSESSMENT & PLAN NOTE
Unlikely cardiac given negative EKGs and troponins.  Possibly related to GERD.  Continue famotidine.  Has appointment with GI on 5/6/19.  Monitor for triggers.  2 of her siblings had coronary calcium scoring done.  Patient concerned.  Declines referral to cardiology for stress test and echo.  Willing to pay out of pocket for coronary calcium score.  Ordered for patient.

## 2019-04-15 NOTE — ASSESSMENT & PLAN NOTE
Stable. Last hemoglobin A1c 6.8.  Continue insulin through insulin pump.  Continue follow-up with Endo

## 2019-07-03 ENCOUNTER — OFFICE VISIT (OUTPATIENT)
Dept: ENDOCRINOLOGY | Facility: HOSPITAL | Age: 69
End: 2019-07-03
Payer: MEDICARE

## 2019-07-03 VITALS
DIASTOLIC BLOOD PRESSURE: 62 MMHG | BODY MASS INDEX: 21.14 KG/M2 | WEIGHT: 112 LBS | HEIGHT: 61 IN | HEART RATE: 69 BPM | SYSTOLIC BLOOD PRESSURE: 120 MMHG

## 2019-07-03 DIAGNOSIS — E10.40 TYPE 1 DIABETES MELLITUS WITH DIABETIC NEUROPATHY (HCC): Primary | ICD-10-CM

## 2019-07-03 DIAGNOSIS — E10.649 HYPOGLYCEMIC UNAWARENESS ASSOCIATED WITH TYPE 1 DIABETES MELLITUS: ICD-10-CM

## 2019-07-03 DIAGNOSIS — E78.2 MIXED HYPERLIPIDEMIA: ICD-10-CM

## 2019-07-03 DIAGNOSIS — Z96.41 INSULIN PUMP STATUS: ICD-10-CM

## 2019-07-03 LAB
ALBUMIN SERPL-MCNC: 4.1 G/DL (ref 3.5–4.8)
ALBUMIN/GLOB SERPL: 1.6 {RATIO} (ref 1.2–2.2)
ALP SERPL-CCNC: 68 IU/L (ref 39–117)
ALT SERPL-CCNC: 17 IU/L (ref 0–32)
AST SERPL-CCNC: 26 IU/L (ref 0–40)
BILIRUB SERPL-MCNC: 1 MG/DL (ref 0–1.2)
BUN SERPL-MCNC: 9 MG/DL (ref 8–27)
BUN/CREAT SERPL: 12 (ref 12–28)
CALCIUM SERPL-MCNC: 9.6 MG/DL (ref 8.7–10.3)
CHLORIDE SERPL-SCNC: 105 MMOL/L (ref 96–106)
CHOLEST SERPL-MCNC: 189 MG/DL (ref 100–199)
CO2 SERPL-SCNC: 27 MMOL/L (ref 20–29)
CREAT SERPL-MCNC: 0.76 MG/DL (ref 0.57–1)
EST. AVERAGE GLUCOSE BLD GHB EST-MCNC: 169 MG/DL
GLOBULIN SER-MCNC: 2.5 G/DL (ref 1.5–4.5)
GLUCOSE SERPL-MCNC: 128 MG/DL (ref 65–99)
HBA1C MFR BLD: 7.5 % (ref 4.8–5.6)
HDLC SERPL-MCNC: 79 MG/DL
LABCORP COMMENT: NORMAL
LDLC SERPL CALC-MCNC: 93 MG/DL (ref 0–99)
POTASSIUM SERPL-SCNC: 4.2 MMOL/L (ref 3.5–5.2)
PROT SERPL-MCNC: 6.6 G/DL (ref 6–8.5)
SL AMB EGFR AFRICAN AMERICAN: 92 ML/MIN/1.73
SL AMB EGFR NON AFRICAN AMERICAN: 80 ML/MIN/1.73
SL AMB VLDL CHOLESTEROL CALC: 17 MG/DL (ref 5–40)
SODIUM SERPL-SCNC: 145 MMOL/L (ref 134–144)
TRIGL SERPL-MCNC: 84 MG/DL (ref 0–149)

## 2019-07-03 PROCEDURE — 95251 CONT GLUC MNTR ANALYSIS I&R: CPT | Performed by: NURSE PRACTITIONER

## 2019-07-03 PROCEDURE — 99214 OFFICE O/P EST MOD 30 MIN: CPT | Performed by: NURSE PRACTITIONER

## 2019-07-03 NOTE — PATIENT INSTRUCTIONS
Be mindful of diet       Stay active in stay hydrated       We made a slightly change to your daytime basal rate today  Continue to utilize the dex com continuous glucose monitor  Watch your e-mail for an invitation to [employer]  Call the office in 2 weeks so that a dex com report can be pulled from dex com clarity      Contact the office with any consistent hypoglycemia      Continue lisinopril       Continue atorvastatin 10 mg daily      Continue with regular supplementation of vitamin D3 daily       Obtain a Medic Alert Bracelet

## 2019-07-03 NOTE — PROGRESS NOTES
Salima Fuentes 77 y o  female MRN: [id number]    Encounter: [id number]      Assessment/Plan     Assessment: This is a 77y o -year-old female with type 1 diabetes on insulin pump therapy with hypoglycemic unawareness and hyperlipidemia  Plan:  1   Type 1 diabetes:  Her most recent hemoglobin A1c is 7 5  Her dex com continuous glucose monitor and Medtronic insulin pump were downloaded and reviewed with her at the time of the visit  She states that she was without her dex com and not following a proper diabetic diet during her vacations over the past few months  She also notes family stress  For now, we will adjust her basal rate at 8:00 a m  to 0 95  She will continue to utilize the dex com continuous glucose monitor   We will obtain a dex com report via dex com clarity in 2 weeks to review  We will contact her with any changes, if necessary  Check hemoglobin A1c prior to next visit  2  Hyperlipidemia:  Stable  Continue atorvastatin  3   Hypertension: Rosy Gould is normotensive in the office today   Continue lisinopril   Check comprehensive metabolic panel prior to next visit  4   Vitamin-D deficiency:  Continue supplementation with 1000 units of vitamin D3 daily      CC:  Type 1 Diabetes follow-up    History of Present Illness     HPI:  77y o  year old female with type 1 diabetes for 39 years   She was started on an insulin pump around 1998   She is on insulin at home and takes Humalog insulin via the insulin pump basal rate from 12:00 a m  to 8:00 a m  0 6 units/hour, and 8:00 a m  to 12:00 a m  0 7 units/hour, bolus rates include an insulin to carbohydrate ratio from 12:00 a m  to 7:00 a m  of 1 unit per 15 g carbohydrate, 7:00 a m  to 11:00 a m  1 unit per 8 g carbohydrate, 11:00 a m  to 5:00 p m  1 unit per 11 g carbohydrate and 5:00 p m  to 12:00 a m  1 unit per 12 g carbohydrate, with an insulin sensitivity of 1 unit for every 55 blood sugar points for a target blood glucose of 100-120 with a 4 hour insulin action  Deidra Hanson most recent hemoglobin A1c from July 2, 2019 is 7 5  She denies any polyuria and polydipsia  Jerzy Fontanez has no polyphagia, but has once a night nocturia  Jerzy Fontanez is getting some blurry vision with some cataracts   She has no numbness or tingling of the feet   She denies chest pain or shortness of breath   She denies nephropathy, heart attack, stroke and claudication but does admit to neuropathy and retinopathy       Her most recent diabetic eye exam was in April 2018, she obtained surgery for cataracts in October and another on November 2018  She is seen every 6 months  She has no complaints about her feet and does not follow Podiatry for regular diabetic foot care       Review of her dex com download reveals an average glucose of 155 with a standard deviation of 66  Her Medtronic insulin pump report reveals an average total daily insulin utilization of 31 with a standard deviation of 3 5 with 44% basal and 56% bolus        For her hypertension, she is treated with lisinopril 10 mg daily      For her hyperlipidemia, she is treated with atorvastatin 10 mg       For her vitamin-D deficiency, she supplements with 1000 units of vitamin D3 daily       Review of Systems   Constitutional: Negative  Negative for chills, fatigue and fever  HENT: Negative  Negative for trouble swallowing and voice change  Respiratory: Negative  Negative for chest tightness and shortness of breath  Cardiovascular: Negative  Negative for chest pain and palpitations  Gastrointestinal: Negative  Negative for abdominal pain, constipation, diarrhea and vomiting  Endocrine: Positive for polyuria (up to twice per night nocturia)  Negative for cold intolerance, heat intolerance, polydipsia and polyphagia  Genitourinary: Negative  Musculoskeletal: Positive for arthralgias ( arthritis) and back pain ( chronic low back)  Skin: Negative  Allergic/Immunologic: Negative  Neurological: Negative    Negative for dizziness, syncope, light-headedness and headaches  Hematological: Negative  Psychiatric/Behavioral: Negative  All other systems reviewed and are negative        Historical Information   Past Medical History:   Diagnosis Date    Basal cell carcinoma (BCC) of face     left cheek    Cataract     Rheumatoid arthritis ([address] )      Past Surgical History:   Procedure Laterality Date    CATARACT EXTRACTION, BILATERAL Bilateral     EXPLORATORY LAPAROTOMY      FOOT SURGERY Left     foot reconstruction in 2 phases    FOOT SURGERY Right     foot reconstruction    LIVER BIOPSY      TOTAL SHOULDER REPLACEMENT Right      Social History   Social History     Substance and Sexual Activity   Alcohol Use Yes    Alcohol/week: 5 0 standard drinks    Types: 5 Cans of beer per week     Social History     Substance and Sexual Activity   Drug Use No     Social History     Tobacco Use   Smoking Status Never Smoker   Smokeless Tobacco Never Used     Family History:   Family History   Problem Relation Age of Onset    Stroke Mother     Cancer Father         renal metastatic    Brain cancer Sister     Diabetes type I Maternal Uncle     Diabetes type I Maternal Grandmother     Osteoarthritis Brother     Osteoarthritis Sister     Rheum arthritis Sister     Osteoarthritis Sister     Osteoarthritis Sister     Osteoarthritis Sister     Osteoarthritis Brother     Osteoarthritis Brother     Bipolar disorder Son        Meds/Allergies   Current Outpatient Medications   Medication Sig Dispense Refill    aspirin 81 MG tablet Take 81 mg by mouth daily      atorvastatin (LIPITOR) 10 mg tablet Take 1 tablet (10 mg total) by mouth daily (Patient taking differently: Take 10 mg by mouth Every other day  ) 90 tablet 2    Blood Glucose Monitoring Suppl (Katja Zamora) w/Device KIT by Does not apply route once for 1 dose OneTouch Verip 1 kit 0    cholecalciferol (VITAMIN D3) 1,000 units tablet Take 1,000 Units by mouth daily      famotidine (PEPCID) 20 mg tablet Take 20 mg by mouth 2 (two) times a day      folic acid (FOLVITE) 1 mg tablet Take 1 mg by mouth daily      glucagon (GLUCAGON EMERGENCY) 1 MG injection Inject 1 mg under the skin once as needed for low blood sugar for up to 1 dose 1 each 4    glucose blood test strip Use as instructed Test 4x a day 400 each 3    HUMALOG 100 UNIT/ML injection Use up to 100 units daily via insulin pump 30 mL 3    ibuprofen (MOTRIN) 200 mg tablet Take 200 mg by mouth every 6 (six) hours as needed for mild pain      lisinopril (ZESTRIL) 10 mg tablet Take 10 mg by mouth daily        methotrexate 50 MG/2ML injection Inject under the skin once a week        Multiple Vitamins-Minerals (MULTIVITAMIN ADULT PO) Take by mouth daily        mupirocin (BACTROBAN) 2 % ointment Apply topically 3 (three) times a day      Omega-3 Fat Ac-Cholecalciferol (DRY EYE OMEGA BENEFITS/VIT D-3) 983-436 MG-UNIT CAPS Take by mouth daily        Probiotic Product ([employer] SENIOR PROBIOTIC PO) Take by mouth daily        RiTUXimab (RITUXAN IV) Infuse into a venous catheter Every 16 weeks, 2nd dose 2 weeks later then repeat      TURMERIC CURCUMIN PO Take 2 each by mouth daily       No current facility-administered medications for this visit  Allergies   Allergen Reactions    Bactrim [Sulfamethoxazole-Trimethoprim] GI Intolerance     Other reaction(s): GI Intolerance    Neosporin [Neomycin-Bacitracin Zn-Polymyx] Itching    Tetracycline GI Intolerance     Other reaction(s): GI Intolerance       Objective   Vitals: Blood pressure 120/62, pulse 69, height 5' 1" (1 549 m), weight 50 8 kg (112 lb)  Physical Exam   Constitutional: She is oriented to person, place, and time  She appears well-developed and well-nourished  Thin build   HENT:   Head: Normocephalic and atraumatic  Mouth/Throat: Oropharynx is clear and moist    Eyes: Pupils are equal, round, and reactive to light   Conjunctivae and EOM are normal    Neck: Normal range of motion  Neck supple  Cardiovascular: Normal rate, regular rhythm, normal heart sounds and intact distal pulses  Pulses are no weak pulses  Pulses:       Dorsalis pedis pulses are 1+ on the right side, and 1+ on the left side  Posterior tibial pulses are 1+ on the right side, and 1+ on the left side  Pulmonary/Chest: Effort normal and breath sounds normal    Abdominal: Soft  Bowel sounds are normal    Musculoskeletal: Normal range of motion  Feet:   Right Foot:   Skin Integrity: Positive for dry skin  Negative for ulcer, skin breakdown, erythema, warmth or callus  Left Foot:   Skin Integrity: Positive for dry skin  Negative for ulcer, skin breakdown, erythema, warmth or callus  Neurological: She is alert and oriented to person, place, and time  Skin: Skin is warm and dry  Dry skin   Psychiatric: She has a normal mood and affect  Her behavior is normal  Judgment and thought content normal    Vitals reviewed  Patient's shoes and socks removed  Right Foot/Ankle   Right Foot Inspection  Skin Exam: skin normal, skin intact and dry skin no warmth, no callus, no erythema, no maceration, no abnormal color, no pre-ulcer, no ulcer and no callus                          Toe Exam: ROM and strength within normal limits  Sensory       Monofilament testing: intact  Vascular  Capillary refills: < 3 seconds  The right DP pulse is 1+  The right PT pulse is 1+  Left Foot/Ankle  Left Foot Inspection  Skin Exam: skin normal, skin intact and dry skinno warmth, no erythema, no maceration, normal color, no pre-ulcer, no ulcer and no callus                         Toe Exam: ROM and strength within normal limits                   Sensory       Monofilament: intact  Vascular  Capillary refills: < 3 seconds  The left DP pulse is 1+  The left PT pulse is 1+  Assign Risk Category:  No deformity present; No loss of protective sensation;  No weak pulses       Risk: 0    Lab Results:   Lab Results Component Value Date/Time    Hemoglobin A1C 7 5 (H) 07/02/2019 09:18 AM    Hemoglobin A1C 6 8 (H) 04/08/2019 09:43 AM    Hemoglobin A1C 7 0 (H) 01/07/2019 09:36 AM    BUN 9 07/02/2019 09:18 AM    BUN 9 04/08/2019 09:43 AM    BUN 12 01/07/2019 09:36 AM    Potassium 4 2 07/02/2019 09:18 AM    Potassium 4 2 04/08/2019 09:43 AM    Potassium 4 5 01/07/2019 09:36 AM    Chloride 105 07/02/2019 09:18 AM    Chloride 105 04/08/2019 09:43 AM    Chloride 105 01/07/2019 09:36 AM    CO2 27 07/02/2019 09:18 AM    CO2 22 04/08/2019 09:43 AM    CO2 22 01/07/2019 09:36 AM    Creatinine 0 76 07/02/2019 09:18 AM    Creatinine 0 69 04/08/2019 09:43 AM    Creatinine 0 71 01/07/2019 09:36 AM    AST 26 07/02/2019 09:18 AM    AST 27 04/08/2019 09:43 AM    AST 34 01/07/2019 09:36 AM    ALT 17 07/02/2019 09:18 AM    ALT 15 04/08/2019 09:43 AM    ALT 23 01/07/2019 09:36 AM    Albumin 4 1 07/02/2019 09:18 AM    Albumin 4 1 04/08/2019 09:43 AM    Albumin 4 2 01/07/2019 09:36 AM    Globulin, Total 2 5 07/02/2019 09:18 AM    Globulin, Total 2 2 04/08/2019 09:43 AM    Globulin, Total 2 4 01/07/2019 09:36 AM    HDL 79 07/02/2019 09:18 AM    HDL 94 10/04/2018 09:25 AM    HDL 81 07/06/2018 09:33 AM    Triglycerides 84 07/02/2019 09:18 AM    Triglycerides 64 10/04/2018 09:25 AM    Triglycerides 113 07/06/2018 09:33 AM     Portions of the record may have been created with voice recognition software  Occasional wrong word or "sound a like" substitutions may have occurred due to the inherent limitations of voice recognition software  Read the chart carefully and recognize, using context, where substitutions have occurred

## 2019-09-17 ENCOUNTER — TELEPHONE (OUTPATIENT)
Dept: ENDOCRINOLOGY | Facility: HOSPITAL | Age: 69
End: 2019-09-17

## 2019-09-20 LAB — HBA1C MFR BLD HPLC: 6.7 %

## 2019-09-30 DIAGNOSIS — E78.5 HYPERLIPIDEMIA, UNSPECIFIED HYPERLIPIDEMIA TYPE: ICD-10-CM

## 2019-10-01 RX ORDER — ATORVASTATIN CALCIUM 10 MG/1
10 TABLET, FILM COATED ORAL DAILY
Qty: 90 TABLET | Refills: 2 | Status: SHIPPED | OUTPATIENT
Start: 2019-10-01 | End: 2020-11-23 | Stop reason: SDUPTHER

## 2019-10-04 ENCOUNTER — OFFICE VISIT (OUTPATIENT)
Dept: ENDOCRINOLOGY | Facility: HOSPITAL | Age: 69
End: 2019-10-04
Payer: MEDICARE

## 2019-10-04 VITALS
HEART RATE: 67 BPM | SYSTOLIC BLOOD PRESSURE: 122 MMHG | BODY MASS INDEX: 21.41 KG/M2 | DIASTOLIC BLOOD PRESSURE: 80 MMHG | HEIGHT: 61 IN | WEIGHT: 113.4 LBS

## 2019-10-04 DIAGNOSIS — E10.40 TYPE 1 DIABETES MELLITUS WITH DIABETIC NEUROPATHY (HCC): Primary | ICD-10-CM

## 2019-10-04 DIAGNOSIS — E78.5 HYPERLIPIDEMIA, UNSPECIFIED HYPERLIPIDEMIA TYPE: ICD-10-CM

## 2019-10-04 DIAGNOSIS — Z96.41 INSULIN PUMP STATUS: ICD-10-CM

## 2019-10-04 DIAGNOSIS — E10.649 HYPOGLYCEMIC UNAWARENESS ASSOCIATED WITH TYPE 1 DIABETES MELLITUS: ICD-10-CM

## 2019-10-04 PROCEDURE — 99214 OFFICE O/P EST MOD 30 MIN: CPT | Performed by: NURSE PRACTITIONER

## 2019-10-04 NOTE — PATIENT INSTRUCTIONS
Be mindful of diet       Stay active in stay hydrated       We made a slightly change to your midnight basal rate today to help your overnight hypoglycemia  Be sure to perform a bolus consistently at meals      Continue to utilize the dex com continuous glucose monitor      Please look into your dex com log in and password      Call the office in 2 weeks so that a dex com report can be pulled from dex com clarity      Contact the office with any consistent hypoglycemia      Continue lisinopril       Continue atorvastatin 10 mg daily      Continue with regular supplementation of vitamin D3 daily       Obtain a Medic Alert Bracelet

## 2019-10-04 NOTE — PROGRESS NOTES
Lizabeth Morales 68 y o  female MRN: [id number]    Encounter: [id number]      Assessment/Plan     Assessment: This is a 68y o -year-old female with type 1 diabetes on insulin pump therapy with hypoglycemic unawareness and hyperlipidemia  Plan:  1   Type 1 diabetes:  Her most recent hemoglobin A1c is 7 5  Her dex com continuous glucose monitor and Medtronic insulin pump were downloaded and reviewed with her at the time of the visit  There is some limited dex com information due to recent sensor issue which was recently corrected over the past 2 days  She notes fairly consistent overnight hypoglycemia  She also notes family stress  For now, we will adjust her basal rate at midnight  to 0 325  She will continue to utilize the dex com continuous glucose monitor   We will obtain a dex com report via dex com clarity in 2 weeks to review  We will contact her with any changes, if necessary  Check hemoglobin A1c prior to next visit  2  Hyperlipidemia: Continue atorvastatin   Check fasting lipid panel prior to next visit  3   Hypertension: Santiago Wills is normotensive in the office today   Continue lisinopril   Check comprehensive metabolic panel prior to next visit    4   Vitamin-D deficiency:  Continue supplementation with 1000 units of vitamin D3 daily        CC:  Type 1 Diabetes follow-up    History of Present Illness     HPI:  77y o  year old female with type 1 diabetes for 39 years   She was started on an insulin pump around 1998   She is on insulin at home and takes Humalog insulin via the insulin pump basal rate from 12:00 a m  to 8:00 a m  0 375 units/hour, 5:00 a m  to 8:00 a m  0 500 and 8:00 a m  to 12:00 a m  0 9 units/hour, bolus rates include an insulin to carbohydrate ratio from 12:00 a m  to 7:00 a m  of 1 unit per 15 g carbohydrate, 7:00 a m  to 11:00 a m  1 unit per 8 g carbohydrate, 11:00 a m  to 5:00 p m  1 unit per 11 g carbohydrate and 5:00 p m  to 12:00 a m  1 unit per 11 g carbohydrate, with an insulin sensitivity of 1 unit for every 55 blood sugar points for a target blood glucose of 100-120 with a 4 hour insulin action    Her most recent hemoglobin A1c from September 20, 2019 is  6 7  She denies any polyuria and polydipsia   She has no polyphagia, but has once a night nocturia  Arelis Ackerman is getting some blurry vision with some cataracts   She has no numbness or tingling of the feet   She denies chest pain or shortness of breath   She denies nephropathy, heart attack, stroke and claudication but does admit to neuropathy and retinopathy       Her most recent diabetic eye exam was in June 2019  She is seen every 4 months  She has no complaints about her feet and does not follow Podiatry for regular diabetic foot care       Review of her dex com download reveals only 2 days of readings as she had sensor issues that were recently corrected  She does report fairly consistent overnight hypoglycemia  Her Medtronic insulin pump report reveals an average total daily insulin utilization of 29 35 +/-5 1  55% of which is basal and 45% bolus        For her hypertension, she is treated with lisinopril 10 mg daily      For her hyperlipidemia, she is treated with atorvastatin 10 mg       For her vitamin-D deficiency, she supplements with 1000 units of vitamin D3 daily  Review of Systems   Constitutional: Negative  Negative for chills, fatigue and fever  HENT: Negative  Negative for trouble swallowing and voice change  Eyes: Negative  Negative for visual disturbance  Respiratory: Negative  Negative for chest tightness and shortness of breath  Cardiovascular: Negative  Negative for chest pain  Gastrointestinal: Negative  Negative for abdominal pain, constipation, diarrhea and vomiting  Endocrine: Positive for polyuria (Twice per night nocturia)  Negative for cold intolerance, heat intolerance, polydipsia and polyphagia  Genitourinary: Negative      Musculoskeletal: Positive for arthralgias and back pain ( chronic)  Skin: Negative  Allergic/Immunologic: Negative  Neurological: Negative  Negative for dizziness, syncope, light-headedness and headaches  Hematological: Negative  Psychiatric/Behavioral: Negative  All other systems reviewed and are negative        Historical Information   Past Medical History:   Diagnosis Date    Basal cell carcinoma (BCC) of face     left cheek    Cataract     Rheumatoid arthritis ([address] )      Past Surgical History:   Procedure Laterality Date    CATARACT EXTRACTION, BILATERAL Bilateral     EXPLORATORY LAPAROTOMY      FOOT SURGERY Left     foot reconstruction in 2 phases    FOOT SURGERY Right     foot reconstruction    LIVER BIOPSY      TOTAL SHOULDER REPLACEMENT Right      Social History   Social History     Substance and Sexual Activity   Alcohol Use Yes    Alcohol/week: 5 0 standard drinks    Types: 5 Cans of beer per week     Social History     Substance and Sexual Activity   Drug Use No     Social History     Tobacco Use   Smoking Status Never Smoker   Smokeless Tobacco Never Used     Family History:   Family History   Problem Relation Age of Onset    Stroke Mother     Cancer Father         renal metastatic    Brain cancer Sister     Diabetes type I Maternal Uncle     Diabetes type I Maternal Grandmother     Osteoarthritis Brother     Osteoarthritis Sister     Rheum arthritis Sister     Osteoarthritis Sister     Osteoarthritis Sister     Osteoarthritis Sister     Osteoarthritis Brother     Osteoarthritis Brother     Bipolar disorder Son        Meds/Allergies   Current Outpatient Medications   Medication Sig Dispense Refill    aspirin 81 MG tablet Take 81 mg by mouth daily      atorvastatin (LIPITOR) 10 mg tablet Take 1 tablet (10 mg total) by mouth daily 90 tablet 2    Blood Glucose Monitoring Suppl (Meliton De La Cruz) w/Device KIT by Does not apply route once for 1 dose OneTouch Verip 1 kit 0    cholecalciferol (VITAMIN D3) 1,000 units tablet Take 1,000 Units by mouth daily      famotidine (PEPCID) 20 mg tablet Take 20 mg by mouth daily       folic acid (FOLVITE) 1 mg tablet Take 1 mg by mouth daily      glucagon (GLUCAGON EMERGENCY) 1 MG injection Inject 1 mg under the skin once as needed for low blood sugar for up to 1 dose 1 each 4    glucose blood test strip Use as instructed Test 4x a day 400 each 3    HUMALOG 100 UNIT/ML injection Use up to 100 units daily via insulin pump 30 mL 3    ibuprofen (MOTRIN) 200 mg tablet Take 200 mg by mouth every 6 (six) hours as needed for mild pain      lisinopril (ZESTRIL) 10 mg tablet Take 10 mg by mouth daily        methotrexate 50 MG/2ML injection Inject under the skin once a week        Multiple Vitamins-Minerals (MULTIVITAMIN ADULT PO) Take by mouth daily        mupirocin (BACTROBAN) 2 % ointment Apply topically 3 (three) times a day      Omega-3 Fat Ac-Cholecalciferol (DRY EYE OMEGA BENEFITS/VIT D-3) 675-745 MG-UNIT CAPS Take by mouth daily        Probiotic Product ([employer] SENIOR PROBIOTIC PO) Take by mouth daily        RiTUXimab (RITUXAN IV) Infuse into a venous catheter Every 16 weeks, 2nd dose 2 weeks later then repeat      TURMERIC CURCUMIN PO Take 2 each by mouth daily       No current facility-administered medications for this visit  Allergies   Allergen Reactions    Bactrim [Sulfamethoxazole-Trimethoprim] GI Intolerance     Other reaction(s): GI Intolerance    Neosporin [Neomycin-Bacitracin Zn-Polymyx] Itching    Tetracycline GI Intolerance     Other reaction(s): GI Intolerance       Objective     Physical Exam   Constitutional: She is oriented to person, place, and time  She appears well-developed and well-nourished  Thin build   HENT:   Head: Normocephalic and atraumatic  Mouth/Throat: Oropharynx is clear and moist    Eyes: Pupils are equal, round, and reactive to light  Conjunctivae and EOM are normal    Neck: Normal range of motion  Neck supple     Cardiovascular: Normal rate, regular rhythm, normal heart sounds and intact distal pulses  Pulses are no weak pulses  Pulses:       Dorsalis pedis pulses are 1+ on the right side, and 1+ on the left side  Posterior tibial pulses are 1+ on the right side, and 1+ on the left side  Pulmonary/Chest: Effort normal and breath sounds normal    Abdominal: Soft  Bowel sounds are normal    Musculoskeletal: Normal range of motion  Feet:   Right Foot:   Skin Integrity: Negative for ulcer, skin breakdown, erythema, warmth, callus or dry skin  Left Foot:   Skin Integrity: Negative for ulcer, skin breakdown, erythema, warmth, callus or dry skin  Neurological: She is alert and oriented to person, place, and time  Skin: Skin is warm and dry  Dry skin   Psychiatric: She has a normal mood and affect  Her behavior is normal  Judgment and thought content normal    Vitals reviewed  Patient's shoes and socks removed  Right Foot/Ankle   Right Foot Inspection  Skin Exam: skin normal and skin intact no dry skin, no warmth, no callus, no erythema, no maceration, no abnormal color, no pre-ulcer, no ulcer and no callus                          Toe Exam: ROM and strength within normal limits  Sensory       Monofilament testing: intact  Vascular  Capillary refills: < 3 seconds  The right DP pulse is 1+  The right PT pulse is 1+  Left Foot/Ankle  Left Foot Inspection  Skin Exam: skin normal and skin intactno dry skin, no warmth, no erythema, no maceration, normal color, no pre-ulcer, no ulcer and no callus                         Toe Exam: ROM and strength within normal limits                   Sensory       Monofilament: intact  Vascular  Capillary refills: < 3 seconds  The left DP pulse is 1+  The left PT pulse is 1+  Assign Risk Category:  No deformity present; No loss of protective sensation;  No weak pulses       Risk: 0    Lab Results:   Lab Results   Component Value Date/Time    Hemoglobin A1C 7 5 (H) 07/02/2019 09:18 AM    Hemoglobin A1C 6 8 (H) 04/08/2019 09:43 AM    Hemoglobin A1C 7 0 (H) 01/07/2019 09:36 AM    BUN 9 07/02/2019 09:18 AM    BUN 9 04/08/2019 09:43 AM    BUN 12 01/07/2019 09:36 AM    Potassium 4 2 07/02/2019 09:18 AM    Potassium 4 2 04/08/2019 09:43 AM    Potassium 4 5 01/07/2019 09:36 AM    Chloride 105 07/02/2019 09:18 AM    Chloride 105 04/08/2019 09:43 AM    Chloride 105 01/07/2019 09:36 AM    CO2 27 07/02/2019 09:18 AM    CO2 22 04/08/2019 09:43 AM    CO2 22 01/07/2019 09:36 AM    Creatinine 0 76 07/02/2019 09:18 AM    Creatinine 0 69 04/08/2019 09:43 AM    Creatinine 0 71 01/07/2019 09:36 AM    AST 26 07/02/2019 09:18 AM    AST 27 04/08/2019 09:43 AM    AST 34 01/07/2019 09:36 AM    ALT 17 07/02/2019 09:18 AM    ALT 15 04/08/2019 09:43 AM    ALT 23 01/07/2019 09:36 AM    Albumin 4 1 07/02/2019 09:18 AM    Albumin 4 1 04/08/2019 09:43 AM    Albumin 4 2 01/07/2019 09:36 AM    Globulin, Total 2 5 07/02/2019 09:18 AM    Globulin, Total 2 2 04/08/2019 09:43 AM    Globulin, Total 2 4 01/07/2019 09:36 AM    HDL 79 07/02/2019 09:18 AM    HDL 94 10/04/2018 09:25 AM    Triglycerides 84 07/02/2019 09:18 AM    Triglycerides 64 10/04/2018 09:25 AM     Portions of the record may have been created with voice recognition software  Occasional wrong word or "sound a like" substitutions may have occurred due to the inherent limitations of voice recognition software  Read the chart carefully and recognize, using context, where substitutions have occurred

## 2019-10-11 ENCOUNTER — OFFICE VISIT (OUTPATIENT)
Dept: INTERNAL MEDICINE | Facility: CLINIC | Age: 69
End: 2019-10-11
Payer: MEDICARE

## 2019-10-11 VITALS
BODY MASS INDEX: 22.38 KG/M2 | HEIGHT: 59 IN | TEMPERATURE: 98.1 F | WEIGHT: 111 LBS | OXYGEN SATURATION: 96 % | SYSTOLIC BLOOD PRESSURE: 118 MMHG | HEART RATE: 76 BPM | DIASTOLIC BLOOD PRESSURE: 64 MMHG

## 2019-10-11 DIAGNOSIS — Z78.0 POSTMENOPAUSAL: ICD-10-CM

## 2019-10-11 DIAGNOSIS — R07.89 ATYPICAL CHEST PAIN: ICD-10-CM

## 2019-10-11 DIAGNOSIS — L03.115 CELLULITIS OF RIGHT LOWER EXTREMITY: Primary | ICD-10-CM

## 2019-10-11 DIAGNOSIS — E10.9 TYPE 1 DIABETES MELLITUS WITHOUT COMPLICATION (CMS/HCC): ICD-10-CM

## 2019-10-11 PROBLEM — E10.40 TYPE 1 DIABETES MELLITUS WITH DIABETIC NEUROPATHY (CMS/HCC): Status: ACTIVE | Noted: 2018-04-13

## 2019-10-11 PROCEDURE — G0008 ADMIN INFLUENZA VIRUS VAC: HCPCS | Performed by: INTERNAL MEDICINE

## 2019-10-11 PROCEDURE — 99214 OFFICE O/P EST MOD 30 MIN: CPT | Mod: 25 | Performed by: INTERNAL MEDICINE

## 2019-10-11 PROCEDURE — 90653 IIV ADJUVANT VACCINE IM: CPT | Performed by: INTERNAL MEDICINE

## 2019-10-11 RX ORDER — CLINDAMYCIN HYDROCHLORIDE 300 MG/1
300 CAPSULE ORAL EVERY 6 HOURS
Qty: 40 CAPSULE | Refills: 0 | Status: SHIPPED | OUTPATIENT
Start: 2019-10-11 | End: 2019-10-21

## 2019-10-11 RX ORDER — BACITRACIN 500 [USP'U]/G
OINTMENT TOPICAL 2 TIMES DAILY
Qty: 15 G | Refills: 1 | Status: SHIPPED | OUTPATIENT
Start: 2019-10-11 | End: 2019-10-18

## 2019-10-11 ASSESSMENT — ENCOUNTER SYMPTOMS
SHORTNESS OF BREATH: 0
UNEXPECTED WEIGHT CHANGE: 0
BRUISES/BLEEDS EASILY: 1
DIFFICULTY URINATING: 0
ABDOMINAL PAIN: 0
DIAPHORESIS: 0
FEVER: 0
DYSURIA: 0
CHILLS: 0
ARTHRALGIAS: 1

## 2019-10-11 NOTE — PROGRESS NOTES
Subjective      Patient ID: Lillie Ward is a 69 y.o. female.    HPI   Patient presents with c/o a bug bite on her right ankle that she noticed 10 days ago. Has been scratching.  Concerned about infection given pain and redness.  Scratched to the point of breaking skin.  Denies any drainage. Tried using warm compresses and topical cream with no improvement. However, the mupirocin topical cream  in . Took clindamycin in the past for prior skin infections with improvement. C/o frequent bruising on both arms. Denies any injuries. Request a recommendation for a dermatologist. Due for flu vaccine. Due for DEXA scan. Currently taking Vit D + Calcium supplements. Brings in labs with HbA1c 6.7.  Has a new insulin pump and a continuous glucose monitor with alarms for hypo and hyperglycemia.  No other new/acute complaints.     The following have been reviewed and updated as appropriate in this visit:           Past Medical History:   Diagnosis Date   • Diabetes mellitus type I (CMS/[facility])     Dr. Cerda - Baudilio Smith   • DKA, type 1 (CMS/[facility]) 2016    due to insulin pump malfunction - hospitalized   • GERD (gastroesophageal reflux disease)    • H/O colonoscopy 2013    [facility].  Normal   • Hyperlipidemia    • Pulmonary fibrosis (CMS/[facility])    • Pulmonary nodule, left 2017   • Rheumatoid arthritis (CMS/[facility])     Dr. Lawrence Leventhal      Past Surgical History:   Procedure Laterality Date   • FOOT SURGERY Left     reconstruction   • FOOT SURGERY Right     reconstruction   • REPLACEMENT TOTAL KNEE Left 2017    [facility]   • TOTAL SHOULDER ARTHROPLASTY       Family History   Problem Relation Age of Onset   • Stroke Biological Mother    • Lung cancer Biological Father    • No Known Problems Sister    • Rheum arthritis Other         1 of 8 siblings   • Bipolar disorder Son    • Alcohol abuse Son      Social History     Social History   • Marital status:      Spouse name: N/A   • Number of  children: 1   • Years of education: N/A     Occupational History   • Retired teacher      Social History Main Topics   • Smoking status: Never Smoker   • Smokeless tobacco: Never Used   • Alcohol use Yes   • Drug use: No   • Sexual activity: Yes     Other Topics Concern   • Not on file     Social History Narrative    Marital status-     Children- 1 son    Caffeine - coffee    Exercise-walking    Diet-low carb    Pets-    Gnosticism- none    Seat belt-yes    Smoke alarm-yes    Firearms-               Review of Systems   Constitutional: Negative for chills, diaphoresis, fever and unexpected weight change.   Respiratory: Negative for shortness of breath.    Cardiovascular: Negative for chest pain.   Gastrointestinal: Negative for abdominal pain.   Genitourinary: Negative for difficulty urinating and dysuria.   Musculoskeletal: Positive for arthralgias (RA).   Skin: Positive for rash (right ankle).   Hematological: Bruises/bleeds easily.       Allergies   Allergen Reactions   • Neomycin-Bacitracnzn-Polymyxnb Itching   • Sulfamethoxazole-Trimethoprim      Other reaction(s): GI Intolerance   • Tetracycline      Other reaction(s): GI Intolerance     Current Outpatient Prescriptions   Medication Sig Dispense Refill   • aspirin 81 mg enteric coated tablet Take 81 mg by mouth daily.     • atorvastatin (LIPITOR) 10 mg tablet Take 10 mg by mouth every other day.       • cholecalciferol, vitamin D3, (VITAMIN D3) 1,000 unit capsule Take by mouth daily.     • famotidine (PEPCID) 10 mg tablet Take 10 mg by mouth daily as needed. With ibuprofen     • folic acid (FOLVITE) 1 mg tablet Take 1 mg by mouth daily.     • insulin lispro (HumaLOG U-100 Insulin) 100 unit/mL cartridge inject by subcutaneous route per prescriber's instructions. Insulin dosing requires individualization.     • Lactobac no.41-Bifidobact no.7 (PROBIOTIC-10) 70 mg (3 billion cell) capsule Take by mouth daily.     • lisinopril (PRINIVIL) 10 mg tablet Take 1  "tablet (10 mg total) by mouth once daily. 90 tablet 3   • METHOTREXATE SODIUM INJ Inject 2.5 mg as directed once a week.     • multivit with minerals/lutein (MULTIVITAMIN 50 PLUS ORAL) No SIG Entered     • mupirocin (BACTROBAN) 2 % nasal ointment Apply topically.     • riTUXimab (RITUXAN) 10 mg/mL chemo injection Inject 10 mg/mL as directed. Every 16 weeks     • turmeric, bulk, (CURCUMIN) 95 % powder No SIG Entered     • bacitracin 500 unit/gram ointment Apply topically 2 (two) times a day for 7 days. 15 g 1   • clindamycin (CLEOCIN HCL) 300 mg capsule Take 1 capsule (300 mg total) by mouth every 6 (six) hours for 10 days. 40 capsule 0     No current facility-administered medications for this visit.        Objective   Vitals:    10/11/19 1023   BP: 118/64   Pulse: 76   Temp: 36.7 °C (98.1 °F)   SpO2: 96%   Weight: 50.3 kg (111 lb)   Height: 1.499 m (4' 11\")       Physical Exam   Constitutional: She is oriented to person, place, and time. She appears well-developed and well-nourished. No distress.   HENT:   Head: Normocephalic and atraumatic.   Pulmonary/Chest: Effort normal.   Musculoskeletal: Normal range of motion. She exhibits tenderness (R.ankle tenderness around cellulitic area.  Nl ROM). She exhibits no edema.   Neurological: She is alert and oriented to person, place, and time.   Skin: Skin is warm and dry. Rash (posterolateral distal ankle with elongated, [id number], yellow crusted superficial laceration with surrounding erythema, about [id number].  no bleeding or drainage) noted.   Psychiatric: She has a normal mood and affect. Judgment and thought content normal.       Assessment/Plan   Problem List Items Addressed This Visit        Nervous    Atypical chest pain     Did not get the coronary calcium score test yet.  Will reprint for pt            Genitourinary    Postmenopausal     Due for DEXA         Relevant Orders    DEXA BONE DENSITY       Musculoskeletal    Cellulitis of right lower extremity - Primary     " Cellulitis.  Discussed need for gram positive and negative coverage given h/o DM.  Last time did not feel the levaquin helped significantly.  Historically she always responded well to clindamycin.  Advised it only covers gram positive organisms.  Will start clindamycin.  If no significant improvement by Monday, she will call for adjustment to antibiotics or further w/u with imaging to r/o osteomyelitis or deeper tissue involvement.  If worsens, or she becomes febrile, she will go to the ER.             Endocrine/Metabolic    Type 1 diabetes mellitus (CMS/HCC)     A1c 6.7.  New insulin pump and continuous glucose monitor has been allowing more stable BG readings.  Continue f/u with endo in [place]             By signing my name below, I, Miranda Torres, attest that this documentation has been prepared under the direction in the presence of Dr. Machelle Gallardo MD.    Electronically Signed: Aimee King. 10/11/2019.     I, Machelle Gallardo MD, personally performed the services described in this documentation. All medical record entries made by the scribe were at my direction and in my presence. I have reviewed the chart and discharge instructions  and agree that the record reflects my personal performance and is accurate and complete. Machelle Gallardo MD. 10/11/2019.

## 2019-10-11 NOTE — PATIENT INSTRUCTIONS
[facility], Dr. Wilkerson - PHONE #    Reprint coronary calcium score order from 4/2019

## 2019-10-11 NOTE — ASSESSMENT & PLAN NOTE
Cellulitis.  Discussed need for gram positive and negative coverage given h/o DM.  Last time did not feel the levaquin helped significantly.  Historically she always responded well to clindamycin.  Advised it only covers gram positive organisms.  Will start clindamycin.  If no significant improvement by Monday, she will call for adjustment to antibiotics or further w/u with imaging to r/o osteomyelitis or deeper tissue involvement.  If worsens, or she becomes febrile, she will go to the ER.

## 2019-10-11 NOTE — ASSESSMENT & PLAN NOTE
A1c 6.7.  New insulin pump and continuous glucose monitor has been allowing more stable BG readings.  Continue f/u with alex in [place]

## 2019-10-14 ENCOUNTER — TELEPHONE (OUTPATIENT)
Dept: INTERNAL MEDICINE | Facility: CLINIC | Age: 69
End: 2019-10-14

## 2019-10-14 RX ORDER — LEVOFLOXACIN 500 MG/1
500 TABLET, FILM COATED ORAL DAILY
Qty: 10 TABLET | Refills: 0 | Status: SHIPPED | OUTPATIENT
Start: 2019-10-14 | End: 2019-10-24

## 2019-10-14 NOTE — TELEPHONE ENCOUNTER
You saw patient on Friday for a foot infection.  She said she's been taking the antibiotic and it hasn't gotten any better.  She said that you asked her to call 1st thing this morning if it wasn't any better.

## 2019-10-22 ENCOUNTER — TELEPHONE (OUTPATIENT)
Dept: INTERNAL MEDICINE | Facility: CLINIC | Age: 69
End: 2019-10-22

## 2019-10-22 NOTE — TELEPHONE ENCOUNTER
If she took 10 days of Levofloxacin, it should be more than enough time. It should continue to get better.  If she worsens in the next day or so, she can call and I'll send her more antibiotics.

## 2019-10-22 NOTE — TELEPHONE ENCOUNTER
Patient has an infection is her foot that is now starting to heal after taking two antibiotics.  She is on her last day of medicine and wants to know if she should have another round of antibiotics.

## 2019-10-22 NOTE — TELEPHONE ENCOUNTER
I informed the patient per Dr. Gallardo that if she took Levofloxacin for 10 days it should be more than enough time and her foot should continue to get better.  I told the patient per Dr. Gallardo that if her foot worsens in the next day or so, she can call and Dr. Gallardo will send in more antibiotics for her.  The patient understood.

## 2019-11-08 DIAGNOSIS — E10.8 TYPE 1 DIABETES MELLITUS WITH COMPLICATION (HCC): ICD-10-CM

## 2019-12-13 ENCOUNTER — TELEPHONE (OUTPATIENT)
Dept: ENDOCRINOLOGY | Facility: HOSPITAL | Age: 69
End: 2019-12-13

## 2020-01-06 LAB
ALBUMIN SERPL-MCNC: 4.2 G/DL (ref 3.5–4.8)
ALBUMIN/GLOB SERPL: 1.8 {RATIO} (ref 1.2–2.2)
ALP SERPL-CCNC: 69 IU/L (ref 39–117)
ALT SERPL-CCNC: 20 IU/L (ref 0–32)
AST SERPL-CCNC: 30 IU/L (ref 0–40)
BILIRUB SERPL-MCNC: 1.3 MG/DL (ref 0–1.2)
BUN SERPL-MCNC: 10 MG/DL (ref 8–27)
BUN/CREAT SERPL: 13 (ref 12–28)
CALCIUM SERPL-MCNC: 9.3 MG/DL (ref 8.7–10.3)
CHLORIDE SERPL-SCNC: 103 MMOL/L (ref 96–106)
CHOLEST SERPL-MCNC: 211 MG/DL (ref 100–199)
CO2 SERPL-SCNC: 24 MMOL/L (ref 20–29)
CREAT SERPL-MCNC: 0.75 MG/DL (ref 0.57–1)
EST. AVERAGE GLUCOSE BLD GHB EST-MCNC: 160 MG/DL
GLOBULIN SER-MCNC: 2.3 G/DL (ref 1.5–4.5)
GLUCOSE SERPL-MCNC: 125 MG/DL (ref 65–99)
HBA1C MFR BLD: 7.2 % (ref 4.8–5.6)
HDLC SERPL-MCNC: 83 MG/DL
LDLC SERPL CALC-MCNC: 108 MG/DL (ref 0–99)
POTASSIUM SERPL-SCNC: 4.4 MMOL/L (ref 3.5–5.2)
PROT SERPL-MCNC: 6.5 G/DL (ref 6–8.5)
SL AMB EGFR AFRICAN AMERICAN: 93 ML/MIN/1.73
SL AMB EGFR NON AFRICAN AMERICAN: 80 ML/MIN/1.73
SL AMB VLDL CHOLESTEROL CALC: 20 MG/DL (ref 5–40)
SODIUM SERPL-SCNC: 142 MMOL/L (ref 134–144)
TRIGL SERPL-MCNC: 100 MG/DL (ref 0–149)
TSH SERPL DL<=0.005 MIU/L-ACNC: 3.28 UIU/ML (ref 0.45–4.5)

## 2020-01-07 ENCOUNTER — OFFICE VISIT (OUTPATIENT)
Dept: ENDOCRINOLOGY | Facility: HOSPITAL | Age: 70
End: 2020-01-07
Payer: MEDICARE

## 2020-01-07 VITALS
HEART RATE: 70 BPM | SYSTOLIC BLOOD PRESSURE: 110 MMHG | DIASTOLIC BLOOD PRESSURE: 72 MMHG | WEIGHT: 113.2 LBS | BODY MASS INDEX: 21.37 KG/M2 | HEIGHT: 61 IN

## 2020-01-07 DIAGNOSIS — E10.8 TYPE 1 DIABETES MELLITUS WITH COMPLICATION (HCC): ICD-10-CM

## 2020-01-07 DIAGNOSIS — E78.2 MIXED HYPERLIPIDEMIA: ICD-10-CM

## 2020-01-07 DIAGNOSIS — E10.649 HYPOGLYCEMIC UNAWARENESS ASSOCIATED WITH TYPE 1 DIABETES MELLITUS: ICD-10-CM

## 2020-01-07 DIAGNOSIS — E10.40 TYPE 1 DIABETES MELLITUS WITH DIABETIC NEUROPATHY (HCC): Primary | ICD-10-CM

## 2020-01-07 PROCEDURE — 95251 CONT GLUC MNTR ANALYSIS I&R: CPT | Performed by: INTERNAL MEDICINE

## 2020-01-07 PROCEDURE — 99215 OFFICE O/P EST HI 40 MIN: CPT | Performed by: INTERNAL MEDICINE

## 2020-01-07 NOTE — PROGRESS NOTES
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 ([facility] )  -     HEMOGLOBIN A1C W/ EAG ESTIMATION Lab Collect; Future  -     Comprehensive metabolic panel Lab Collect; Future  -     Lipid Panel with Direct LDL reflex Lab Collect; Future  -     Microalbumin / creatinine urine ratio Lab Collect; Future    Mixed hyperlipidemia  -     HEMOGLOBIN A1C W/ EAG ESTIMATION Lab Collect; Future  -     Comprehensive metabolic panel Lab Collect; Future  -     Lipid Panel with Direct LDL reflex Lab Collect; Future  -     Microalbumin / creatinine urine ratio Lab Collect; Future    Type 1 diabetes mellitus with complication (HCC)  -     HUMALOG 100 UNIT/ML injection; USE UP  UNITS VIA INSULIN PUMP        Assessment/Plan:  1  Type 1 diabetes  Her most recent hemoglobin A1c is 7 2%  This is a bit higher than last visit when it was less than 7%  I have asked her to adjust some of her insulin pump rates based on her DEX com download  I have asked her to change her basal rate from 12:00 a m  to 5:00 a m  to 0 3 units/hour and 5:00 a m  to 8:00 a m  to 0 375 units/hour  She will adjust her 8:00 a m  to 12:00 a m  basal rate to 0 8 units/hour  I have asked her to adjust her bolus insulin sensitivity factor to 1 unit per 60 blood sugar points  The rest of the basal and bolus rates will remain the same  We discussed temporary basal of 50% for several hours after exercise to prevent hypoglycemia in the afternoon    When she is due for an insulin pump upgrade, I have asked her to consider the T slim by tandem as this is integrated with the [address] com sensor  She will continue to utilize the DEX com continuous glucose monitoring system and check her blood sugars up to 4 times a day as needed  2  Diabetic neuropathy  She has no neuropathic symptoms   Diabetic foot exam is are performed in the office on a regular basis  3  Hypoglycemic unawareness  She is utilizing the DEX com CGMS system because of her hypoglycemic unawareness which has prevented any severe hypoglycemic events requiring assistance her hospitalization  4  Hyperlipidemia  Her lipids have increased since she decreased her Lipitor to every other day so I have asked her to take her Lipitor on a daily basis and then I will repeat her lipids next visit  I have asked her to follow up in 3 months with preceding hemoglobin A1c, CMP, lipid panel, and urine microalbumin to creatinine ratio  CC: Diabetes type 1, lipid  follow-up    History of Present Illness     HPI: Michelle Bazzi is a 68y o  year old female with type 1 diabetes with neuropathy and hypoglycemic unawareness for 40 years, hyperlipidemia here for follow-up  She is on insulin at home and takes Humalog insulin via the insulin pump    She denies any polyuria, polydipsia, polyphagia, and blurry vision  She has nocturia once a night  She denies numbness or tingling of the feet  She denies chest pain or shortness of breath  She denies nephropathy, heart attack, stroke and claudication but does admit to neuropathy and retinopathy  Hypoglycemic episodes: Yes daily  Takes a while to increase once low  H/o of hypoglycemia causing hospitalization or intervention such as glucagon injection  or ambulance call  Yes not recently with the dexcom  Glucagon:No   Medic alert tag: recommended,Yes  She is on insulin pump therapy and has been on insulin pump since 1998  She has the medtronic 630G insulin pump  Her current pump is about 3and half years old    Basal rates:  12:00 a m  to 5:00 a m  0 325 units/hour, 5:00 a m  to 8:00 a m  0 375 units/hour, 8:00 a m  to 12:00 a m  0 825 units/hour  Bolus rates:  12:00 a m  to 7:00 a m  1 unit per 15 g carbohydrate, 7:00 a m  to 11:00 a m  1 unit per 8 g carbohydrate, 11:00 a m  to 5:00 p m  1 unit per 11 g carbohydrate, 5:00 p m  to 12:00 a m  1 unit per 11 g carbohydrate  Insulin sensitivity:  1 unit for every 55 blood sugar points for a target blood glucose of 100-120  Insulin action:  4 15 hours  Total daily insulin dosage of 30 28 units +/-3 0 5 units with 55% basal and 45% bolus  The patient's last eye exam was in December 2019 and goes every 6 months with mile fleeting retinopathy changes  The patient's last foot exam was in October 2019 at endocrine office visit  Last A1C was   Lab Results   Component Value Date    HGBA1C 7 2 (H) 01/03/2020     Blood Sugar/Glucometer/Pump/CGM review:  She is currently using a DEX com G5 CGMS system  Having lows mid afternoon and into the evening most days  Will have high blood sugars 7-9 pm and then low afterwards  Sugars a bit higher november 2019 with infection  Low blood sugars may be on days she lifts weights from 11 am to 12 pm     She has hyperlipidemia and takes atorvastatin 10 mg every other day  She denies chest pain or shortness of breath  Review of Systems   Constitutional: Positive for fatigue  Negative for unexpected weight change  Occasional fatigue  Has a  and lifts weights 2 times a week  HENT: Negative for trouble swallowing  Eyes: Negative for visual disturbance  Respiratory: Negative for chest tightness and shortness of breath  Cardiovascular: Negative for chest pain and leg swelling  Gastrointestinal: Negative for abdominal pain, constipation, diarrhea and nausea  Endocrine: Negative for polydipsia, polyphagia and polyuria  Nocturia once a night  Skin: Negative for wound  Had a right ankle infection about late November 2019, she used 2 courses of antibiotics   Not get Neurological: Negative for dizziness, weakness, light-headedness, numbness and headaches  Psychiatric/Behavioral: Negative for sleep disturbance         Historical Information   Past Medical History:   Diagnosis Date    Basal cell carcinoma (BCC) of face     left cheek    Cataract     Rheumatoid arthritis ([address] )      Past Surgical History:   Procedure Laterality Date    CATARACT EXTRACTION, BILATERAL Bilateral     EXPLORATORY LAPAROTOMY      FOOT SURGERY Left     foot reconstruction in 2 phases    FOOT SURGERY Right     foot reconstruction    LIVER BIOPSY      TOTAL SHOULDER REPLACEMENT Right      Social History   Social History     Substance and Sexual Activity   Alcohol Use Yes    Alcohol/week: 5 0 standard drinks    Types: 5 Cans of beer per week     Social History     Substance and Sexual Activity   Drug Use No     Social History     Tobacco Use   Smoking Status Never Smoker   Smokeless Tobacco Never Used     Family History:   Family History   Problem Relation Age of Onset    Stroke Mother     Cancer Father         renal metastatic    Brain cancer Sister     Diabetes type I Maternal Uncle     Diabetes type I Maternal Grandmother     Osteoarthritis Brother     Osteoarthritis Sister     Rheum arthritis Sister     Osteoarthritis Sister     Osteoarthritis Sister     Osteoarthritis Sister     Osteoarthritis Brother     Osteoarthritis Brother     Bipolar disorder Son        Meds/Allergies   Current Outpatient Medications   Medication Sig Dispense Refill    aspirin 81 MG tablet Take 81 mg by mouth daily      atorvastatin (LIPITOR) 10 mg tablet Take 1 tablet (10 mg total) by mouth daily (Patient taking differently: Take 10 mg by mouth Every other day ) 90 tablet 2    cholecalciferol (VITAMIN D3) 1,000 units tablet Take 1,000 Units by mouth daily      famotidine (PEPCID) 20 mg tablet Take 20 mg by mouth daily prn      folic acid (FOLVITE) 1 mg tablet Take 1 mg by mouth daily      glucagon (GLUCAGON EMERGENCY) 1 MG injection Inject 1 mg under the skin once as needed for low blood sugar for up to 1 dose 1 each 4    glucose blood test strip Use as instructed Test 4x a day 400 each 3    HUMALOG 100 UNIT/ML injection USE UP  UNITS VIA INSULIN PUMP 30 mL 3    lisinopril (ZESTRIL) 10 mg tablet Take 10 mg by mouth daily        methotrexate 50 MG/2ML injection Inject under the skin once a week        Multiple Vitamins-Minerals (MULTIVITAMIN ADULT PO) Take by mouth daily        mupirocin (BACTROBAN) 2 % ointment Apply topically 3 (three) times a day      Omega-3 Fat Ac-Cholecalciferol (DRY EYE OMEGA BENEFITS/VIT D-3) 081-820 MG-UNIT CAPS Take by mouth daily        Probiotic Product ([employer] SENIOR PROBIOTIC PO) Take by mouth daily        RiTUXimab (RITUXAN IV) Infuse into a venous catheter Every 16 weeks, 2nd dose 2 weeks later then repeat      Blood Glucose Monitoring Suppl (ONETOUCH VERIO) w/Device KIT by Does not apply route once for 1 dose OneTouch Verip (Patient not taking: Reported on 1/7/2020) 1 kit 0    TURMERIC CURCUMIN PO Take 2 each by mouth daily       No current facility-administered medications for this visit  Allergies   Allergen Reactions    Bactrim [Sulfamethoxazole-Trimethoprim] GI Intolerance     Other reaction(s): GI Intolerance    Neosporin [Neomycin-Bacitracin Zn-Polymyx] Itching    Tetracycline GI Intolerance     Other reaction(s): GI Intolerance       Objective   Vitals: Blood pressure 110/72, pulse 70, height 5' 1" (1 549 m), weight 51 3 kg (113 lb 3 2 oz)  Invasive Devices     None                 Physical Exam   Constitutional: She is oriented to person, place, and time  She appears well-developed and well-nourished  HENT:   Head: Normocephalic and atraumatic  Eyes: Pupils are equal, round, and reactive to light  Conjunctivae and EOM are normal    Neck: Normal range of motion  Neck supple  No thyromegaly present  Thyroid normal in size  No carotid bruits  Cardiovascular: Normal rate, regular rhythm, normal heart sounds and intact distal pulses  No murmur heard  Pulmonary/Chest: Effort normal and breath sounds normal  She has no wheezes  Abdominal: Soft  There is no tenderness  Musculoskeletal: She exhibits deformity  She exhibits no edema  No ulcerations of the feet  Obvious foot deformities from rheumatoid arthritis surgeries  Rheumatoid arthritis changes of the hands  Lymphadenopathy:     She has no cervical adenopathy  Neurological: She is alert and oriented to person, place, and time  She has normal reflexes  Skin: Skin is warm and dry  No rash noted  Vitals reviewed  The history was obtained from the review of the chart and from the patient  Lab Results:    Most recent Alc is  Lab Results   Component Value Date    HGBA1C 7 2 (H) 01/03/2020           CMP done on 01/03/2020 showed a glucose of 123 fasting  The rest of the CMP was normal     Lab Results   Component Value Date    CREATININE 0 75 01/03/2020    CREATININE 0 76 07/02/2019    CREATININE 0 69 04/08/2019    BUN 10 01/03/2020    K 4 4 01/03/2020     01/03/2020    CO2 24 01/03/2020     Total cholesterol 211, LDL cholesterol 108     Lab Results   Component Value Date    HDL 83 01/03/2020    TRIG 100 01/03/2020       Lab Results   Component Value Date    ALT 20 01/03/2020    AST 30 01/03/2020       Lab Results   Component Value Date    TSH 3 280 01/03/2020         Future Appointments   Date Time Provider Srikanth Flores   4/7/2020  1:15 PM DESHAWN Saunders [facility]

## 2020-01-07 NOTE — PATIENT INSTRUCTIONS
hgba1c is 7 2%  This is as bit higher  Let's adjust the pump to basal rates: 112 am to 5 am 0 3 units per hour and 5 am to 8 am 0 375 units per hour and 8 am to 12 am 0 8 units per hour  Let's adjust the bolus insulin sensitivity factor to 1 unit per 60  Consider temporary basal of 50 % for several hours after exercise  Look up the Tslim insulin pump by [employer]andem  This connects to the dexcom and is integrated  You can't get this until you are up for pump upgrade, Usually 4 years  The cholesterol has gone up, I recommend taking lipitor daily  Follow up in 3 months with blood work

## 2020-03-24 ENCOUNTER — TELEPHONE (OUTPATIENT)
Dept: ENDOCRINOLOGY | Facility: HOSPITAL | Age: 70
End: 2020-03-24

## 2020-03-27 LAB
CREAT ?TM UR-SCNC: 233.8 UMOL/L
EXT MICROALBUMIN URINE RANDOM: 82.8
HBA1C MFR BLD HPLC: 7.7 %
MICROALBUMIN/CREAT UR: 35 MG/G{CREAT}

## 2020-04-07 ENCOUNTER — TELEPHONE (OUTPATIENT)
Dept: DIABETES SERVICES | Facility: HOSPITAL | Age: 70
End: 2020-04-07

## 2020-04-07 ENCOUNTER — TELEMEDICINE (OUTPATIENT)
Dept: ENDOCRINOLOGY | Facility: HOSPITAL | Age: 70
End: 2020-04-07
Payer: MEDICARE

## 2020-04-07 DIAGNOSIS — E10.40 TYPE 1 DIABETES MELLITUS WITH DIABETIC NEUROPATHY (HCC): Primary | ICD-10-CM

## 2020-04-07 DIAGNOSIS — E55.9 VITAMIN D DEFICIENCY: ICD-10-CM

## 2020-04-07 DIAGNOSIS — E78.2 MIXED HYPERLIPIDEMIA: ICD-10-CM

## 2020-04-07 DIAGNOSIS — Z96.41 INSULIN PUMP STATUS: ICD-10-CM

## 2020-04-07 DIAGNOSIS — E10.649 HYPOGLYCEMIC UNAWARENESS ASSOCIATED WITH TYPE 1 DIABETES MELLITUS: ICD-10-CM

## 2020-04-07 DIAGNOSIS — E78.5 HYPERLIPIDEMIA, UNSPECIFIED HYPERLIPIDEMIA TYPE: ICD-10-CM

## 2020-04-07 PROCEDURE — 99214 OFFICE O/P EST MOD 30 MIN: CPT | Performed by: NURSE PRACTITIONER

## 2020-04-09 ENCOUNTER — TELEPHONE (OUTPATIENT)
Dept: DIABETES SERVICES | Facility: CLINIC | Age: 70
End: 2020-04-09

## 2020-04-16 ENCOUNTER — TELEPHONE (OUTPATIENT)
Dept: ENDOCRINOLOGY | Facility: HOSPITAL | Age: 70
End: 2020-04-16

## 2020-04-21 ENCOUNTER — OFFICE VISIT (OUTPATIENT)
Dept: DIABETES SERVICES | Facility: HOSPITAL | Age: 70
End: 2020-04-21
Payer: MEDICARE

## 2020-04-21 DIAGNOSIS — E10.40 TYPE 1 DIABETES MELLITUS WITH DIABETIC NEUROPATHY (HCC): Primary | ICD-10-CM

## 2020-04-21 PROCEDURE — 95249 CONT GLUC MNTR PT PROV EQP: CPT | Performed by: DIETITIAN, REGISTERED

## 2020-04-22 ENCOUNTER — TELEPHONE (OUTPATIENT)
Dept: DIABETES SERVICES | Facility: HOSPITAL | Age: 70
End: 2020-04-22

## 2020-04-22 DIAGNOSIS — E10.40 TYPE 1 DIABETES MELLITUS WITH DIABETIC NEUROPATHY (HCC): Primary | ICD-10-CM

## 2020-06-24 ENCOUNTER — PATIENT OUTREACH (OUTPATIENT)
Dept: INTERNAL MEDICINE | Facility: CLINIC | Age: 70
End: 2020-06-24

## 2020-07-06 LAB
ALBUMIN SERPL-MCNC: 4.2 G/DL (ref 3.7–4.7)
ALBUMIN/GLOB SERPL: 2 {RATIO} (ref 1.2–2.2)
ALP SERPL-CCNC: 63 IU/L (ref 39–117)
ALT SERPL-CCNC: 24 IU/L (ref 0–32)
AST SERPL-CCNC: 34 IU/L (ref 0–40)
BILIRUB SERPL-MCNC: 1.5 MG/DL (ref 0–1.2)
BUN SERPL-MCNC: 11 MG/DL (ref 8–27)
BUN/CREAT SERPL: 14 (ref 12–28)
CALCIUM SERPL-MCNC: 9.3 MG/DL (ref 8.7–10.3)
CHLORIDE SERPL-SCNC: 105 MMOL/L (ref 96–106)
CHOLEST SERPL-MCNC: 175 MG/DL (ref 100–199)
CO2 SERPL-SCNC: 26 MMOL/L (ref 20–29)
CREAT SERPL-MCNC: 0.77 MG/DL (ref 0.57–1)
EST. AVERAGE GLUCOSE BLD GHB EST-MCNC: 157 MG/DL
GLOBULIN SER-MCNC: 2.1 G/DL (ref 1.5–4.5)
GLUCOSE SERPL-MCNC: 81 MG/DL (ref 65–99)
HBA1C MFR BLD: 7.1 % (ref 4.8–5.6)
HDLC SERPL-MCNC: 80 MG/DL
LDLC SERPL CALC-MCNC: 79 MG/DL (ref 0–99)
POTASSIUM SERPL-SCNC: 4.3 MMOL/L (ref 3.5–5.2)
PROT SERPL-MCNC: 6.3 G/DL (ref 6–8.5)
SL AMB EGFR AFRICAN AMERICAN: 90 ML/MIN/1.73
SL AMB EGFR NON AFRICAN AMERICAN: 78 ML/MIN/1.73
SL AMB VLDL CHOLESTEROL CALC: 16 MG/DL (ref 5–40)
SODIUM SERPL-SCNC: 142 MMOL/L (ref 134–144)
TRIGL SERPL-MCNC: 82 MG/DL (ref 0–149)
TSH SERPL DL<=0.005 MIU/L-ACNC: 4.35 UIU/ML (ref 0.45–4.5)

## 2020-07-07 ENCOUNTER — TELEPHONE (OUTPATIENT)
Dept: ENDOCRINOLOGY | Facility: HOSPITAL | Age: 70
End: 2020-07-07

## 2020-07-07 ENCOUNTER — OFFICE VISIT (OUTPATIENT)
Dept: ENDOCRINOLOGY | Facility: HOSPITAL | Age: 70
End: 2020-07-07
Payer: MEDICARE

## 2020-07-07 VITALS
SYSTOLIC BLOOD PRESSURE: 120 MMHG | DIASTOLIC BLOOD PRESSURE: 70 MMHG | HEIGHT: 61 IN | HEART RATE: 74 BPM | BODY MASS INDEX: 21.26 KG/M2 | TEMPERATURE: 98.2 F | WEIGHT: 112.6 LBS

## 2020-07-07 DIAGNOSIS — I10 ESSENTIAL HYPERTENSION: ICD-10-CM

## 2020-07-07 DIAGNOSIS — E10.40 TYPE 1 DIABETES MELLITUS WITH DIABETIC NEUROPATHY (HCC): Primary | ICD-10-CM

## 2020-07-07 DIAGNOSIS — E78.2 MIXED HYPERLIPIDEMIA: ICD-10-CM

## 2020-07-07 DIAGNOSIS — E10.649 HYPOGLYCEMIA UNAWARENESS ASSOCIATED WITH TYPE 1 DIABETES MELLITUS (HCC): ICD-10-CM

## 2020-07-07 DIAGNOSIS — Z96.41 INSULIN PUMP IN PLACE: ICD-10-CM

## 2020-07-07 PROCEDURE — 95251 CONT GLUC MNTR ANALYSIS I&R: CPT | Performed by: INTERNAL MEDICINE

## 2020-07-07 PROCEDURE — 99215 OFFICE O/P EST HI 40 MIN: CPT | Performed by: INTERNAL MEDICINE

## 2020-07-07 NOTE — PROGRESS NOTES
7/7/2020    Assessment/Plan      Diagnoses and all orders for this visit:    Type 1 diabetes mellitus with diabetic neuropathy ([facility] [address] )  -     HEMOGLOBIN A1C W/ EAG ESTIMATION Lab Collect; Future  -     Comprehensive metabolic panel Lab Collect; Future    Hypoglycemic unawareness associated with type 1 diabetes mellitus  -     HEMOGLOBIN A1C W/ EAG ESTIMATION Lab Collect; Future  -     Comprehensive metabolic panel Lab Collect; Future    Insulin pump in place  -     HEMOGLOBIN A1C W/ EAG ESTIMATION Lab Collect; Future  -     Comprehensive metabolic panel Lab Collect; Future    Mixed hyperlipidemia  -     HEMOGLOBIN A1C W/ EAG ESTIMATION Lab Collect; Future  -     Comprehensive metabolic panel Lab Collect; Future    Essential hypertension  -     HEMOGLOBIN A1C W/ EAG ESTIMATION Lab Collect; Future  -     Comprehensive metabolic panel Lab Collect; Future        Assessment/Plan:  1  Type 1 diabetes  Her most recent hemoglobin A1c is improved at 7 1% but based on her DEX com download, she has some wide fluctuations in blood sugars in the evening and after meals  I have asked her to adjust her basal rate from 12:00 a m  To 8:00 a m  To 0 45 units/hour, 8:00 a m  To 2:00 p m  0 725 units/hour, 2:00 p m  To 6:30 p m  0 675 units/hour, and 6:30 p m  to 12:00 a m  0 75 units/hour  I will also have her adjust her bolus rates from 12:00 a m  To 8:00 a m  To 1 unit per 15 g carbohydrate, 8:00 a m  To 9:00 p m  1 unit per 12 g carbohydrate, and 9:00 p m  To 12:00 a m  1 unit per 15 g carbohydrate  She will continue the same insulin sensitivity and insulin action  I have asked her to download her DEX com again in 2 weeks along with a pump download so that we can make adjustments in her pump rates again  2  Hypoglycemic unawareness  She will continue to utilize the DEX com continuous glucose monitoring system to warn her if she has any low blood sugars  3  Diabetic neuropathy    She has no neuropathic symptoms and diabetic foot exams are up-to-date  4  Hypertension  Blood pressure is under good control on her current dose of lisinopril 10 mg daily  5  Hyperlipidemia  Most recent lipid profile was good and she will continue the same atorvastatin 10 mg daily  I have asked her to follow up in 3 months with preceding hemoglobin A1c and CMP  CC: Diabetes type 1, lipid follow-up    History of Present Illness     HPI: Eduardo Pagan is a 68y o  year old female with type 1 diabetes with neuropathy and hypoglycemic unawareness for 40 years, hyperlipidemia for follow-up visit  She is on insulin at home and takes Humalog insulin via the insulin pump  She denies any polyuria, polydipsia, polyphagia, and blurry vision  She has once a night nocturia  She denies numbness or tingling of the feet  She denies chest pain or shortness of breath  She denies nephropathy, heart attack, stroke and claudication but does admit to neuropathy and retinopathy  Utilizes insulin pump therapy and has been on insulin pump since 1998  She has a [employer]tronic 630 G insulin pump and utilizes a DEX com continuous glucose monitoring system  Her current pump is new given she had a crack in her old pump and had it replaced about 5-6 weeks ago  Basal rates:   12:00 a m  to 8:00 a m  0 5 units/hour, 8:00 a m  to 3:30 p m  0 725 units/hour, 3:30 p m  to 6:30 p m  0 7 units/hour, 6:30 p m  to 12:00 a m  0 725 units/hour  Bolus rates:    1 unit for every 15 g carbohydrate  Insulin sensitivity factor:  1 unit for every 55 blood sugar points for a target blood glucose of 100-120  Insulin action:  6 hours  Total daily insulin dosage of 31 97 units +/-5 0 4 units at 45% basal and 55% bolus  Hypoglycemic episodes: Yes several times per week  Occurs at 3-4 pm   Glucagon:Yes  Medic alert tag: recommended,Yes  The patient's last eye exam was in fall 2019  Has an appointment next week    The patient's last foot exam was in October 2019 at endocrine office visit  Last A1C was   Lab Results   Component Value Date    HGBA1C 7 1 (H) 07/02/2020     Blood Sugar/Glucometer/Pump/CGM review:  She utilizes the DEX com continuous glucose monitoring system to check her blood sugars throughout the day  Blood sugar average on the DEX com is 171 mg/dL with a standard deviation of 68 mg/dL  52% of blood sugars are in range, 31% high, 12% very high, 4% low, and less than 1% very low  The DEX com download demonstrates elevated blood sugars after breakfast and after supper with continued high blood sugars throughout the night until they start to go down around 3:00 a m  Gerhardt Sep She can have some low blood sugars in the mid afternoon between 3 and 4:00 p m  and in the early morning between 4 and 6:00 a m  Got a new pump and not sure if settings all correct, happened 5-6 weeks ago  She has hyperlipidemia and takes atorvastatin 10 mg daily  She denies chest pain or shortness of breath  She has hypertension and takes lisinopril 10 mg daily  She denies headache or stroke-like symptoms  Review of Systems   Constitutional: Positive for fatigue  Negative for unexpected weight change  Fatigue varies with RA, humidity  HENT: Negative for trouble swallowing  Eyes: Negative for visual disturbance  Has very dry eyes at night, using dry eye drops  Respiratory: Negative for chest tightness and shortness of breath  Cardiovascular: Negative for chest pain and leg swelling  Gastrointestinal: Negative for abdominal pain, constipation, diarrhea and nausea  Endocrine: Negative for polydipsia, polyphagia and polyuria  Nocturia once a night  Musculoskeletal: Positive for arthralgias  RA aches and pains vary  Walks 2-3 miles at least 5 days a week  Skin: Negative for wound  Dry skin and itchy skin  Neurological: Negative for dizziness, weakness, light-headedness, numbness and headaches     Psychiatric/Behavioral: Negative for sleep disturbance         Historical Information   Past Medical History:   Diagnosis Date    Basal cell carcinoma (BCC) of face     left cheek    Cataract     Rheumatoid arthritis ([address] )      Past Surgical History:   Procedure Laterality Date    CATARACT EXTRACTION, BILATERAL Bilateral     EXPLORATORY LAPAROTOMY      FOOT SURGERY Left     foot reconstruction in 2 phases    FOOT SURGERY Right     foot reconstruction    LIVER BIOPSY      TOTAL SHOULDER REPLACEMENT Right      Social History   Social History     Substance and Sexual Activity   Alcohol Use Yes    Alcohol/week: 5 0 standard drinks    Types: 5 Cans of beer per week     Social History     Substance and Sexual Activity   Drug Use No     Social History     Tobacco Use   Smoking Status Never Smoker   Smokeless Tobacco Never Used     Family History:   Family History   Problem Relation Age of Onset    Stroke Mother     Cancer Father         renal metastatic    Brain cancer Sister     Diabetes type I Maternal Uncle     Diabetes type I Maternal Grandmother     Osteoarthritis Brother     Osteoarthritis Sister     Rheum arthritis Sister     Osteoarthritis Sister     Osteoarthritis Sister     Osteoarthritis Sister     Osteoarthritis Brother     Osteoarthritis Brother     Bipolar disorder Son        Meds/Allergies   Current Outpatient Medications   Medication Sig Dispense Refill    atorvastatin (LIPITOR) 10 mg tablet Take 1 tablet (10 mg total) by mouth daily 90 tablet 2    cholecalciferol (VITAMIN D3) 1,000 units tablet Take 1,000 Units by mouth daily      famotidine (PEPCID) 20 mg tablet Take 20 mg by mouth daily prn      folic acid (FOLVITE) 1 mg tablet Take 1 mg by mouth daily      glucagon (GLUCAGON EMERGENCY) 1 MG injection Inject 1 mg under the skin once as needed for low blood sugar for up to 1 dose 1 each 4    glucose blood test strip Use as instructed Test 4x a day 400 each 3    HUMALOG 100 UNIT/ML injection USE UP  UNITS VIA INSULIN PUMP 30 mL 3    lisinopril (ZESTRIL) 10 mg tablet Take 10 mg by mouth daily        methotrexate 50 MG/2ML injection Inject under the skin once a week        Multiple Vitamins-Minerals (MULTIVITAMIN ADULT PO) Take by mouth daily        mupirocin (BACTROBAN) 2 % ointment Apply topically 3 (three) times a day      Omega-3 Fat Ac-Cholecalciferol (DRY EYE OMEGA BENEFITS/VIT D-3) 072-424 MG-UNIT CAPS Take by mouth daily        Probiotic Product ([employer] SENIOR PROBIOTIC PO) Take by mouth daily        RiTUXimab (RITUXAN IV) Infuse into a venous catheter Every 16 weeks, 2nd dose 2 weeks later then repeat      TURMERIC CURCUMIN PO Take 2 each by mouth daily      Blood Glucose Monitoring Suppl (ONETOUCH VERIO) w/Device KIT by Does not apply route once for 1 dose OneTouch Verip (Patient not taking: Reported on 1/7/2020) 1 kit 0     No current facility-administered medications for this visit  Allergies   Allergen Reactions    Bactrim [Sulfamethoxazole-Trimethoprim] GI Intolerance     Other reaction(s): GI Intolerance    Neosporin [Neomycin-Bacitracin Zn-Polymyx] Itching    Tetracycline GI Intolerance     Other reaction(s): GI Intolerance       Objective   Vitals: Blood pressure 120/70, pulse 74, temperature 98 2 °F (36 8 °C), height 5' 1" (1 549 m), weight 51 1 kg (112 lb 9 6 oz)  Invasive Devices     None                 Physical Exam   Constitutional: She is oriented to person, place, and time  She appears well-developed and well-nourished  HENT:   Head: Normocephalic and atraumatic  Eyes: Conjunctivae and EOM are normal    Neck: Normal range of motion  Neck supple  No thyromegaly present  Thyroid normal in size  No carotid bruits  Cardiovascular: Normal rate, regular rhythm, normal heart sounds and intact distal pulses  No murmur heard  Pulmonary/Chest: Effort normal and breath sounds normal  She has no wheezes  Abdominal: Soft  There is no tenderness     Musculoskeletal: She exhibits deformity  She exhibits no edema  Rheumatoid arthritis deformities of the hands bilaterally and of the feet post surgery  No ulceration of the feet  Lymphadenopathy:     She has no cervical adenopathy  Neurological: She is alert and oriented to person, place, and time  She has normal reflexes  Skin: Skin is warm and dry  No rash noted  Vitals reviewed  The history was obtained from the review of the chart and from the patient  Lab Results:    Most recent Alc is  Lab Results   Component Value Date    HGBA1C 7 1 (H) 07/02/2020           CMP done on 07/02/2020 showed a glucose of 81 fasting but was otherwise normal   Lab Results   Component Value Date    CREATININE 0 77 07/02/2020    CREATININE 0 75 01/03/2020    CREATININE 0 76 07/02/2019    BUN 11 07/02/2020    K 4 3 07/02/2020     07/02/2020    CO2 26 07/02/2020   Total cholesterol 175, LDL cholesterol 79     Lab Results   Component Value Date    HDL 80 07/02/2020    TRIG 82 07/02/2020     Lab Results   Component Value Date    ALT 24 07/02/2020    AST 34 07/02/2020     Lab Results   Component Value Date    TSH 4 350 07/02/2020       Future Appointments   Date Time Provider Srikanth Flores   10/7/2020  1:45 PM DESHAWN Ramesh [facility]

## 2020-07-07 NOTE — PATIENT INSTRUCTIONS
The hgba1c is 7 1%, this is not bad  Adjust basal:   12 am to 8 am 0 450 units per hour, 8 am to 2 pm 0 725 units per hour; 2 pm to 6:30 pm decrease to 0 675 units per hour, 6:30 pm to 12 am increase 0 750 units per hour  Adjust carb ratios:  12 am to 8 am 1 unit per 15 grams carb, 8 am to 9 pm 1 unit per 12 grams carb, 9 pm to 12 am 1 unit per 15 grams carb  Work on downloading dexcom  In 2 weeks with pump download  Call us and tell us  Follow up in 3 months with blood work

## 2020-09-02 DIAGNOSIS — E10.8 TYPE 1 DIABETES MELLITUS WITH COMPLICATION (HCC): ICD-10-CM

## 2020-10-05 LAB
ALBUMIN SERPL-MCNC: 4.3 G/DL (ref 3.7–4.7)
ALBUMIN/GLOB SERPL: 1.8 {RATIO} (ref 1.2–2.2)
ALP SERPL-CCNC: 66 IU/L (ref 39–117)
ALT SERPL-CCNC: 20 IU/L (ref 0–32)
AST SERPL-CCNC: 34 IU/L (ref 0–40)
BILIRUB SERPL-MCNC: 1 MG/DL (ref 0–1.2)
BUN SERPL-MCNC: 13 MG/DL (ref 8–27)
BUN/CREAT SERPL: 17 (ref 12–28)
CALCIUM SERPL-MCNC: 9.6 MG/DL (ref 8.7–10.3)
CHLORIDE SERPL-SCNC: 103 MMOL/L (ref 96–106)
CO2 SERPL-SCNC: 25 MMOL/L (ref 20–29)
CREAT SERPL-MCNC: 0.78 MG/DL (ref 0.57–1)
GLOBULIN SER-MCNC: 2.4 G/DL (ref 1.5–4.5)
GLUCOSE SERPL-MCNC: 132 MG/DL (ref 65–99)
HBA1C MFR BLD: 7 % (ref 4.8–5.6)
POTASSIUM SERPL-SCNC: 3.9 MMOL/L (ref 3.5–5.2)
PROT SERPL-MCNC: 6.7 G/DL (ref 6–8.5)
SL AMB EGFR AFRICAN AMERICAN: 88 ML/MIN/1.73
SL AMB EGFR NON AFRICAN AMERICAN: 76 ML/MIN/1.73
SODIUM SERPL-SCNC: 143 MMOL/L (ref 134–144)

## 2020-10-07 ENCOUNTER — OFFICE VISIT (OUTPATIENT)
Dept: ENDOCRINOLOGY | Facility: HOSPITAL | Age: 70
End: 2020-10-07
Payer: MEDICARE

## 2020-10-07 VITALS
TEMPERATURE: 97.6 F | BODY MASS INDEX: 21.14 KG/M2 | SYSTOLIC BLOOD PRESSURE: 126 MMHG | WEIGHT: 112 LBS | HEART RATE: 80 BPM | HEIGHT: 61 IN | DIASTOLIC BLOOD PRESSURE: 80 MMHG

## 2020-10-07 DIAGNOSIS — Z96.41 INSULIN PUMP IN PLACE: ICD-10-CM

## 2020-10-07 DIAGNOSIS — I10 ESSENTIAL HYPERTENSION: ICD-10-CM

## 2020-10-07 DIAGNOSIS — E10.40 TYPE 1 DIABETES MELLITUS WITH DIABETIC NEUROPATHY (HCC): Primary | ICD-10-CM

## 2020-10-07 DIAGNOSIS — E10.649 HYPOGLYCEMIA UNAWARENESS ASSOCIATED WITH TYPE 1 DIABETES MELLITUS (HCC): ICD-10-CM

## 2020-10-07 DIAGNOSIS — E78.2 MIXED HYPERLIPIDEMIA: ICD-10-CM

## 2020-10-07 PROCEDURE — 95251 CONT GLUC MNTR ANALYSIS I&R: CPT | Performed by: NURSE PRACTITIONER

## 2020-10-07 PROCEDURE — 99214 OFFICE O/P EST MOD 30 MIN: CPT | Performed by: NURSE PRACTITIONER

## 2020-11-10 ENCOUNTER — CONSULT (OUTPATIENT)
Dept: INTERNAL MEDICINE | Facility: CLINIC | Age: 70
End: 2020-11-10
Payer: MEDICARE

## 2020-11-10 ENCOUNTER — TELEPHONE (OUTPATIENT)
Dept: ENDOCRINOLOGY | Facility: HOSPITAL | Age: 70
End: 2020-11-10

## 2020-11-10 VITALS
TEMPERATURE: 97.1 F | SYSTOLIC BLOOD PRESSURE: 118 MMHG | RESPIRATION RATE: 14 BRPM | DIASTOLIC BLOOD PRESSURE: 60 MMHG | HEART RATE: 66 BPM | OXYGEN SATURATION: 98 %

## 2020-11-10 DIAGNOSIS — M06.9 RHEUMATOID ARTHRITIS, INVOLVING UNSPECIFIED SITE, UNSPECIFIED WHETHER RHEUMATOID FACTOR PRESENT (CMS/HCC): ICD-10-CM

## 2020-11-10 DIAGNOSIS — M25.551 RIGHT HIP PAIN: ICD-10-CM

## 2020-11-10 DIAGNOSIS — J84.10 FIBROSIS OF LUNG (CMS/HCC): ICD-10-CM

## 2020-11-10 DIAGNOSIS — E10.8 TYPE 1 DIABETES MELLITUS WITH COMPLICATION (HCC): ICD-10-CM

## 2020-11-10 DIAGNOSIS — E78.5 HYPERLIPIDEMIA, UNSPECIFIED HYPERLIPIDEMIA TYPE: ICD-10-CM

## 2020-11-10 DIAGNOSIS — E10.40 TYPE 1 DIABETES MELLITUS WITH DIABETIC NEUROPATHY (CMS/HCC): ICD-10-CM

## 2020-11-10 DIAGNOSIS — Z01.818 PRE-OP EXAM: Primary | ICD-10-CM

## 2020-11-10 DIAGNOSIS — K21.9 GASTROESOPHAGEAL REFLUX DISEASE, UNSPECIFIED WHETHER ESOPHAGITIS PRESENT: ICD-10-CM

## 2020-11-10 PROCEDURE — 93000 ELECTROCARDIOGRAM COMPLETE: CPT | Performed by: NURSE PRACTITIONER

## 2020-11-10 PROCEDURE — 99214 OFFICE O/P EST MOD 30 MIN: CPT | Performed by: NURSE PRACTITIONER

## 2020-11-10 RX ORDER — GUAIFENESIN AND PHENYLEPHRINE HCL 400; 10 MG/1; MG/1
1000 TABLET ORAL DAILY
COMMUNITY
End: 2022-09-21

## 2020-11-10 RX ORDER — FAMOTIDINE 10 MG/1
20 TABLET ORAL DAILY PRN
COMMUNITY
Start: 2020-11-10 | End: 2023-01-23

## 2020-11-10 SDOH — HEALTH STABILITY: MENTAL HEALTH: HOW MANY DRINKS CONTAINING ALCOHOL DO YOU HAVE ON A TYPICAL DAY WHEN YOU ARE DRINKING?: 1 OR 2

## 2020-11-10 SDOH — HEALTH STABILITY: MENTAL HEALTH: HOW OFTEN DO YOU HAVE A DRINK CONTAINING ALCOHOL?: 2-3 TIMES A WEEK

## 2020-11-10 ASSESSMENT — ENCOUNTER SYMPTOMS
APPETITE CHANGE: 0
BLOOD IN STOOL: 0
MYALGIAS: 0
DIZZINESS: 0
FEVER: 0
NERVOUS/ANXIOUS: 0
DECREASED CONCENTRATION: 0
ABDOMINAL PAIN: 0
CONSTIPATION: 0
CHILLS: 0
WEAKNESS: 0
POLYDIPSIA: 0
FREQUENCY: 0
DYSURIA: 0
LIGHT-HEADEDNESS: 0
ARTHRALGIAS: 1
NAUSEA: 0
FATIGUE: 0
VOICE CHANGE: 0
SLEEP DISTURBANCE: 0
ADENOPATHY: 0
DIFFICULTY URINATING: 0
COUGH: 0
HEMATURIA: 0
SHORTNESS OF BREATH: 0
DIAPHORESIS: 0
UNEXPECTED WEIGHT CHANGE: 0
BACK PAIN: 0
RECTAL PAIN: 0
TROUBLE SWALLOWING: 0
NECK PAIN: 0
VOMITING: 0
ACTIVITY CHANGE: 0
SORE THROAT: 0
HEADACHES: 0
NUMBNESS: 0
ABDOMINAL DISTENTION: 0
EYE REDNESS: 0
WHEEZING: 0
POLYPHAGIA: 0
CHEST TIGHTNESS: 0
PALPITATIONS: 0
ANAL BLEEDING: 0
DIARRHEA: 0
BRUISES/BLEEDS EASILY: 0
EYE PAIN: 0

## 2020-11-10 NOTE — ASSESSMENT & PLAN NOTE
Patient is scheduled for an intermediate risk procedure.  Her EKG reveals a normal sinus rhythm and her blood pressure is controlled.  Last A1c noted to be 7.0 in regards to her diabetes.  Functional capacity greater than 4 METS.  RCRI is 6.0%.  She may proceed with her surgery without additional cardiovascular testing.

## 2020-11-10 NOTE — ASSESSMENT & PLAN NOTE
Patient reports she was evaluated by pulmonary and does not have.  Patient reports no issues with general anesthesia when recovering from previous procedures.

## 2020-11-10 NOTE — PROGRESS NOTES
Subjective      Patient ID: Lillie Ward is a 70 y.o. female.    70-year-old female with a past medical history of rheumatoid arthritis, hyperlipidemia, type 1 diabetes presents for an in office visit today for preoperative evaluation.  She is scheduled to have a right total hip replacement done on 11/16/2020 with Dr. Rohit Pereira [facility].  She has been having pain in that right hip for about 4 months now and it has been advised that she have the hip replaced.    Patient is able to ambulate up 2 flights of stairs without chest pain, shortness of breath, dizziness, lightheadedness, palpitations.  She denies history of HTN, BRAD, CVA, TIA, MI, CHF, PE, DVT, CKD.  She reports her type 1 diabetes is controlled.  She currently has an insulin pump and is followed by endocrinology.  She does take lisinopril but she said it is for kidney protection due to her diabetes and does not have a diagnosis of hypertension.  Also, reports she was evaluated by a pulmonologist in the past for questionable pulmonary fibrosis and was told that she does not have it.  She reports she has not had any respiratory issues recovering from anesthesia.  Denies any family history of clotting disorder or early coronary disease.  She reports no issues with general anesthesia in the past.  Denies any postop complications from previous procedures done.    Denies fever, chills, cough, shortness of breath, dizziness, lightheadedness, palpitations, chest pain, leg swelling, abdominal pain, nausea, vomiting, black or bloody stools, constipation, diarrhea, difficulty urinating.      The following have been reviewed and updated as appropriate in this visit:    Tobacco  Problems  Fam Hx  Soc Hx        Past Medical History:   Diagnosis Date   • Diabetes mellitus type I (CMS/[facility])     Dr. Cerda - [facility]   • DKA, type 1 (CMS/[facility]) 09/2016    due to insulin pump malfunction - hospitalized   • GERD (gastroesophageal reflux disease)    • H/O  [facility]oscopy 12/2013    [facility].  Normal   • Hyperlipidemia    • Pulmonary fibrosis (CMS/HCC)    • Pulmonary nodule, left 07/2017   • Rheumatoid arthritis (CMS/HCC)     Dr. Lawrence Leventhal      Past Surgical History:   Procedure Laterality Date   • FOOT SURGERY Left 2009    reconstruction   • FOOT SURGERY Right 2007    reconstruction   • REPLACEMENT TOTAL KNEE Left 04/04/2017    [facility]   • TOTAL SHOULDER ARTHROPLASTY  1990     Family History   Problem Relation Age of Onset   • Stroke Biological Mother    • Lung cancer Biological Father    • No Known Problems Biological Sister    • Rheum arthritis Other         1 of 8 siblings   • Bipolar disorder Biological Son    • Alcohol abuse Biological Son    • Depression Biological Son      Social History     Tobacco Use   • Smoking status: Never Smoker   • Smokeless tobacco: Never Used   Substance Use Topics   • Alcohol use: Yes     Frequency: 2-3 times a week     Drinks per session: 1 or 2   • Drug use: No       Review of Systems   Constitutional: Negative for activity change, appetite change, chills, diaphoresis, fatigue, fever and unexpected weight change.   HENT: Negative for congestion, ear pain, hearing loss, sore throat, trouble swallowing and voice change.    Eyes: Negative for pain, redness and visual disturbance.   Respiratory: Negative for cough, chest tightness, shortness of breath and wheezing.    Cardiovascular: Negative for chest pain, palpitations and leg swelling.   Gastrointestinal: Negative for abdominal distention, abdominal pain, anal bleeding, blood in stool, constipation, diarrhea, nausea, rectal pain and vomiting.   Endocrine: Negative for cold intolerance, heat intolerance, polydipsia, polyphagia and polyuria.   Genitourinary: Negative for difficulty urinating, dysuria, frequency, hematuria, menstrual problem, pelvic pain, urgency and vaginal bleeding.   Musculoskeletal: Positive for arthralgias. Negative for back pain, myalgias and neck pain.         Right hip pain   Skin: Negative for rash.   Allergic/Immunologic: Negative for immunocompromised state.   Neurological: Negative for dizziness, weakness, light-headedness, numbness and headaches.   Hematological: Negative for adenopathy. Does not bruise/bleed easily.   Psychiatric/Behavioral: Negative for decreased concentration, self-injury, sleep disturbance and suicidal ideas. The patient is not nervous/anxious.        Allergies   Allergen Reactions   • Neomycin    • Neomycin-Bacitracin-Polymyxin Itching   • Neomycin-Bacitracnzn-Polymyxnb Itching   • Sulfamethoxazole-Trimethoprim      Other reaction(s): GI Intolerance   • Tetracycline      Other reaction(s): GI Intolerance     Current Outpatient Medications   Medication Sig Dispense Refill   • glucagon, human recombinant, 1 mg injection Infuse 1 mg into a venous catheter once.     • turmeric root extract 500 mg capsule Take 1,000 mg by mouth daily.     • atorvastatin (LIPITOR) 10 mg tablet Take 10 mg by mouth every other day.       • cholecalciferol, vitamin D3, (VITAMIN D3) 1,000 unit capsule Take by mouth daily.     • famotidine (PEPCID) 10 mg tablet Take 2 tablets (20 mg total) by mouth daily as needed. With ibuprofen     • folic acid (FOLVITE) 1 mg tablet Take 1 mg by mouth daily.     • insulin lispro (HumaLOG U-100 Insulin) 100 unit/mL cartridge inject by subcutaneous route per prescriber's instructions. Insulin dosing requires individualization.     • Lactobac no.41-Bifidobact no.7 (PROBIOTIC-10) 70 mg (3 billion cell) capsule Take by mouth daily.     • lisinopriL (PRINIVIL) 10 mg tablet Take 1 tablet by mouth once daily 90 tablet 1   • METHOTREXATE SODIUM INJ Inject 2.5 mg as directed once a week.     • multivit with minerals/lutein (MULTIVITAMIN 50 PLUS ORAL) No SIG Entered     • riTUXimab (RITUXAN) 10 mg/mL chemo injection Inject 10 mg/mL as directed. Every 16 weeks       No current facility-administered medications for this visit.        Objective    Vitals:    11/10/20 1107   BP: 118/60   Pulse: 66   Resp: 14   Temp: 36.2 °C (97.1 °F)   SpO2: 98%       Physical Exam  Vitals signs reviewed.   Constitutional:       General: She is not in acute distress.     Appearance: Normal appearance. She is well-developed and normal weight.   HENT:      Head: Normocephalic and atraumatic.      Jaw: No trismus.      Right Ear: Hearing and external ear normal.      Left Ear: Hearing and external ear normal.      Nose: Nose normal.      Right Sinus: No maxillary sinus tenderness or frontal sinus tenderness.      Left Sinus: No maxillary sinus tenderness or frontal sinus tenderness.      Mouth/Throat:      Pharynx: Uvula midline. No uvula swelling.   Eyes:      General: No scleral icterus.     Extraocular Movements: Extraocular movements intact.      Conjunctiva/sclera: Conjunctivae normal.      Pupils: Pupils are equal, round, and reactive to light.   Neck:      Musculoskeletal: Normal range of motion and neck supple.      Trachea: No tracheal deviation.   Cardiovascular:      Rate and Rhythm: Normal rate and regular rhythm.      Heart sounds: Normal heart sounds. No murmur.   Pulmonary:      Effort: Pulmonary effort is normal. No respiratory distress.      Breath sounds: Normal breath sounds.   Abdominal:      General: Bowel sounds are normal. There is no distension.      Palpations: Abdomen is soft.      Tenderness: There is no abdominal tenderness.   Musculoskeletal: Normal range of motion.         General: Deformity present.      Left lower leg: No edema.      Comments: PT with boutonniere and swan neck deformity in hands bilaterally with ulnar deviation   Lymphadenopathy:      Cervical: No cervical adenopathy.   Skin:     General: Skin is warm and dry.      Capillary Refill: Capillary refill takes less than 2 seconds.   Neurological:      General: No focal deficit present.      Mental Status: She is alert and oriented to person, place, and time.      Cranial Nerves: No  cranial nerve deficit.      Sensory: No sensory deficit.   Psychiatric:         Mood and Affect: Mood normal.         Behavior: Behavior normal.         Thought Content: Thought content normal.         Judgment: Judgment normal.         Assessment/Plan   Problem List Items Addressed This Visit        Nervous    Type 1 diabetes mellitus with diabetic neuropathy (CMS/HCC)     Last A1c noted to be 7.0 which was completed in 10/20/2020.  Will defer to endocrinology in regards to her insulin pump management during her hospitalization.  Patient reports she will call Dr. Hadley today to discuss.         Relevant Medications    glucagon, human recombinant, 1 mg injection    Right hip pain     Patient is scheduled for a right total hip replacement with Dr. Rohit Pereira on 11/16/2020 at [facility].            Respiratory    Fibrosis of lung (CMS/[facility])     Patient reports she was evaluated by pulmonary and does not have.  Patient reports no issues with general anesthesia when recovering from previous procedures.            Digestive    GERD (gastroesophageal reflux disease)     Continue Pepcid as needed.  Avoid triggers.         Relevant Medications    famotidine (PEPCID) 10 mg tablet       Endocrine/Metabolic    Hyperlipidemia     Continue statin.  LDL at goal.            Immune    Rheumatoid arthritis (CMS/[facility])     Patient continues to follow with rheumatology.  Receives methotrexate injections.  She will be due again in January.  Continue folic acid.            Other    Pre-op exam - Primary     Patient is scheduled for an intermediate risk procedure.  Her EKG reveals a normal sinus rhythm and her blood pressure is controlled.  Last A1c noted to be 7.0 in regards to her diabetes.  Functional capacity greater than 4 METS.  RCRI is 6.0%.  She may proceed with her surgery without additional cardiovascular testing.         Relevant Orders    ECG 12 LEAD OFFICE PERFORMED (Completed)          NOAH Melendez    11/10/2020

## 2020-11-10 NOTE — ASSESSMENT & PLAN NOTE
Patient continues to follow with rheumatology.  Receives methotrexate injections.  She will be due again in January.  Continue folic acid.

## 2020-11-10 NOTE — ASSESSMENT & PLAN NOTE
Last A1c noted to be 7.0 which was completed in 10/20/2020.  Will defer to endocrinology in regards to her insulin pump management during her hospitalization.  Patient reports she will call Dr. Hadley today to discuss.

## 2020-11-10 NOTE — ASSESSMENT & PLAN NOTE
Patient is scheduled for a right total hip replacement with Dr. Rohit Pereira on 11/16/2020 at [facility].

## 2020-11-11 ENCOUNTER — OFFICE VISIT (OUTPATIENT)
Dept: PREADMISSION TESTING | Facility: HOSPITAL | Age: 70
End: 2020-11-11
Attending: ORTHOPAEDIC SURGERY
Payer: MEDICARE

## 2020-11-11 ENCOUNTER — TELEPHONE (OUTPATIENT)
Dept: INTERNAL MEDICINE | Facility: CLINIC | Age: 70
End: 2020-11-11

## 2020-11-11 ENCOUNTER — TRANSCRIBE ORDERS (OUTPATIENT)
Dept: LAB | Facility: HOSPITAL | Age: 70
End: 2020-11-11

## 2020-11-11 ENCOUNTER — HOSPITAL ENCOUNTER (OUTPATIENT)
Dept: CARDIOLOGY | Facility: HOSPITAL | Age: 70
Discharge: HOME | End: 2020-11-11
Attending: ORTHOPAEDIC SURGERY
Payer: MEDICARE

## 2020-11-11 ENCOUNTER — APPOINTMENT (OUTPATIENT)
Dept: LAB | Facility: HOSPITAL | Age: 70
End: 2020-11-11
Attending: ORTHOPAEDIC SURGERY
Payer: MEDICARE

## 2020-11-11 VITALS
RESPIRATION RATE: 16 BRPM | DIASTOLIC BLOOD PRESSURE: 61 MMHG | BODY MASS INDEX: 21.14 KG/M2 | SYSTOLIC BLOOD PRESSURE: 135 MMHG | TEMPERATURE: 97.2 F | OXYGEN SATURATION: 96 % | HEART RATE: 83 BPM | HEIGHT: 61 IN | WEIGHT: 112 LBS

## 2020-11-11 DIAGNOSIS — E10.42 DIABETIC PERIPHERAL NEUROPATHY ASSOCIATED WITH TYPE 1 DIABETES MELLITUS (CMS/HCC): ICD-10-CM

## 2020-11-11 DIAGNOSIS — E78.5 HYPERLIPIDEMIA, UNSPECIFIED HYPERLIPIDEMIA TYPE: ICD-10-CM

## 2020-11-11 DIAGNOSIS — M16.11 UNILATERAL PRIMARY OSTEOARTHRITIS, RIGHT HIP: ICD-10-CM

## 2020-11-11 DIAGNOSIS — M16.11 UNILATERAL PRIMARY OSTEOARTHRITIS, RIGHT HIP: Primary | ICD-10-CM

## 2020-11-11 DIAGNOSIS — M05.79 RHEUMATOID ARTHRITIS INVOLVING MULTIPLE SITES WITH POSITIVE RHEUMATOID FACTOR (CMS/HCC): ICD-10-CM

## 2020-11-11 DIAGNOSIS — Z01.818 PREOP EXAMINATION: Primary | ICD-10-CM

## 2020-11-11 DIAGNOSIS — E10.649 HYPOGLYCEMIA UNAWARENESS ASSOCIATED WITH TYPE 1 DIABETES MELLITUS (CMS/HCC): ICD-10-CM

## 2020-11-11 LAB
ABO + RH BLD: NORMAL
ANION GAP SERPL CALC-SCNC: 12 MEQ/L (ref 3–15)
BLD GP AB SCN SERPL QL: NEGATIVE
BUN SERPL-MCNC: 8 MG/DL (ref 8–20)
CALCIUM SERPL-MCNC: 9.8 MG/DL (ref 8.9–10.3)
CHLORIDE SERPL-SCNC: 105 MEQ/L (ref 98–109)
CO2 SERPL-SCNC: 24 MEQ/L (ref 22–32)
CREAT SERPL-MCNC: 0.6 MG/DL (ref 0.6–1.1)
D AG BLD QL: POSITIVE
ERYTHROCYTE [DISTWIDTH] IN BLOOD BY AUTOMATED COUNT: 13.5 % (ref 11.7–14.4)
EST. AVERAGE GLUCOSE BLD GHB EST-MCNC: 151 MG/DL
GFR SERPL CREATININE-BSD FRML MDRD: >60 ML/MIN/1.73M*2
GLUCOSE SERPL-MCNC: 73 MG/DL (ref 70–99)
HBA1C MFR BLD HPLC: 6.9 %
HCT VFR BLDCO AUTO: 43.1 % (ref 35–45)
HGB BLD-MCNC: 15.1 G/DL (ref 11.8–15.7)
LABORATORY COMMENT REPORT: NORMAL
MCH RBC QN AUTO: 32.5 PG (ref 28–33.2)
MCHC RBC AUTO-ENTMCNC: 35 G/DL (ref 32.2–35.5)
MCV RBC AUTO: 92.7 FL (ref 83–98)
PDW BLD AUTO: 10.4 FL (ref 9.4–12.3)
PLATELET # BLD AUTO: 258 K/UL (ref 150–369)
POTASSIUM SERPL-SCNC: 4.3 MEQ/L (ref 3.6–5.1)
RBC # BLD AUTO: 4.65 M/UL (ref 3.93–5.22)
SODIUM SERPL-SCNC: 141 MEQ/L (ref 136–144)
WBC # BLD AUTO: 6.14 K/UL (ref 3.8–10.5)

## 2020-11-11 PROCEDURE — 85027 COMPLETE CBC AUTOMATED: CPT | Mod: GA

## 2020-11-11 PROCEDURE — 82310 ASSAY OF CALCIUM: CPT

## 2020-11-11 PROCEDURE — 83036 HEMOGLOBIN GLYCOSYLATED A1C: CPT | Mod: GA

## 2020-11-11 PROCEDURE — 36415 COLL VENOUS BLD VENIPUNCTURE: CPT

## 2020-11-11 PROCEDURE — U0003 INFECTIOUS AGENT DETECTION BY NUCLEIC ACID (DNA OR RNA); SEVERE ACUTE RESPIRATORY SYNDROME CORONAVIRUS 2 (SARS-COV-2) (CORONAVIRUS DISEASE [COVID-19]), AMPLIFIED PROBE TECHNIQUE, MAKING USE OF HIGH THROUGHPUT TECHNOLOGIES AS DESCRIBED BY CMS-2020-01-R: HCPCS

## 2020-11-11 PROCEDURE — 99214 OFFICE O/P EST MOD 30 MIN: CPT | Performed by: HOSPITALIST

## 2020-11-11 PROCEDURE — 86850 RBC ANTIBODY SCREEN: CPT

## 2020-11-11 RX ORDER — ASPIRIN 81 MG/1
81 TABLET ORAL EVERY OTHER DAY
Status: ON HOLD | COMMUNITY
End: 2020-11-17 | Stop reason: SDUPTHER

## 2020-11-11 RX ORDER — ACETAMINOPHEN 325 MG/1
TABLET ORAL EVERY 6 HOURS PRN
Status: ON HOLD | COMMUNITY
End: 2020-11-17 | Stop reason: SDUPTHER

## 2020-11-11 ASSESSMENT — PAIN SCALES - GENERAL: PAINLEVEL: 6

## 2020-11-11 NOTE — ASSESSMENT & PLAN NOTE
Patient will continue atorvastatin uninterrupted. She will hold her fish oil supplement.    Patient has multiple significant cardiovascular risk factors, including type 1 diabetes, hyperlipidemia, and inflammatory joint disease. Her EKG is without ischemic changes. I do think cardiology consultation prior to surgery would be prudent and discussed this with Dr. Pereira who will be seeing the patient tomorrow and can facilitate.

## 2020-11-11 NOTE — PRE-PROCEDURE INSTRUCTIONS
1. We will call you between 3 pm and 7 pm on November 13, 2020 to determine that arrival time for your procedure. If you do not hear by 6PM. Please call [phone] for arrival time.    2. Please report to Main Entrance near Parking lot A, walk into main lobby and report to the admission desk on the first floor on the day of your procedure.       3. Please follow the following fasting guidelines:     No solids for EIGHT HOURS prior to surgery.    Unlimited clear liquids, meaning water or PLAIN black coffee WITHOUT any milk or cream, are permitted up to TWO HOURS prior to arrival at the hospital.  DR BEASLEY INSTRUCTIONS    4. Early on the morning of the procedure please take your usual dose of the listed medications with a sip of water:  INSULIN PUMP PER MD ENDOCRINE   PROTOCOL PER DR BEASLEY   NO VITAMINS OR HERBAL SUPPLEMENTS      5. Other Instructions: You may brush your teeth the morning of the procedure. Rinse and spit, do not swallow.  Bring a list of your medications with dosages with you.  Use surgical wash as directed.    6. If you develop a cold, cough, fever, rash, or other symptom prior to the data of the procedure, please report it to your physician immediately.   7. If you need to cancel the procedure for any reason, please contact your physician or call the unit listed above.   8. Make arrangements to have someone drive you home from the procedure. If you have not arranged for transportation home, your surgery may be cancelled.    9. You may not take public transportation unless accompanied by a responsible person.   10. You may not drive a car or operate complex or potentially dangerous machinery for 24 hours following anesthesia and/or sedation.   11. If it is medically necessary for you to have a longer stay, you will be informed as soon as the decision is made.   12. Do not wear or bring anything of value to the hospital including jewelry of any kind, money, or wallet. Do not wear make-up or contact  lenses. DO bring your glasses and hearing aid. DO NOT BRING MEDICATIONS FROM HOME.   13. No lotion, creams, powders, or oils on skin the morning of procedure    14. Dress in comfortable clothes.   15.  If instructed, please bring a copy of your Advanced Directive (Living Will/Durable Power of ) on the day of your procedure.      Pre operative instructions given as per protocol.  Form explained by: Barbie Lopez RN     I have read and understand the above information. I have had sufficient opportunity to ask questions I might have and they have been answered to my satisfaction. I agree to comply with the Patient Responsibilities listed above and have received a copy of this form.

## 2020-11-11 NOTE — ASSESSMENT & PLAN NOTE
Longstanding RA managed by Dr. Leventhal. Patient takes methotrexate weekly, and rituximab every 4 months, last 9/7/20. 2017 ACR guidelines recommend elective surgeries to be scheduled for 7 months after last rituximab infusion. I spoke with Dr. Pereira and he did speak with Dr. Leventhal directly regarding this, and he cleared patient to proceed with surgery. I defer to his management. Patient will continue her methotrexate.

## 2020-11-11 NOTE — CONSULTS
[facility] Medicine Service -  Pre-Operative Consultation       Patient Name: Lillie Ward  Referring Surgeon: Rohit Pereira DO  Reason for Referral: Pre-Operative Evaluation  Surgical Procedure: Right Total Hip Replacement  Operative Date: 11/16/20  Other Providers:      PCP: Machelle Gallardo MD     Cardiology: Adry Hadley MD  Endocrinology: Rohit Couch MD  Rheumatology: Dr. Leventhal     HISTORY OF PRESENT ILLNESS      Lillie Ward is a 70 y.o. female presenting today to the [facility] at [facility] for pre-operative evaluation prior to planned surgery.    Patient reports 4 months of right groin and buttock pain. She tells me her symptoms are intermittent. She tells me she will have 3 days of absolutely no pain at all and will walk her dogs without any difficulties and will feel pain free. Then she will have 3-4 days in a row with constant severe pain where she can barely walk and is tremendously limited. She tells me during these times, the pain is so great that she feels as though the hip may give way and she is fearful of falling. Imaging has shown end stage degenerative disease of the hip and she would like to proceed to surgery.     In regards to medical history:  - Type 1 Diabetes with Diabetic Neuropathy and Hypoglycemic Unawareness. She has a history of DKA in 2016 in setting of insulin pump malfunction. She has a history of asymptomatic hypoglycemia with glucose of 18 at one point.  She follows with Dr. Hadley and utilizes an insulin pump and continuous glucose monitor- Dex Com- which alerts her for hyper/hypoglycemia- alerted last night for glucose 42. Her last A1C in October was 7.0. She has normal renal function without microalbuminuria. She would like to use her insulin pump post-operatively while she is here in the hospital.   - Hyperlipidemia. Her last FLP in July- LDL 79, HDL 80, Tg 82  - Rheumatoid arthritis for which she is  managed by rheumatology. She takes methotrexate weekly and rituximab every 16 weeks, last dose being 9/7/20.  - GERD, medically managed with PPI  - Patient underwent pulmonary evaluation 2 years ago for ? Pulmonary fibrosis and tells me she told she did not have this.     The patient denies any current or recent chest pain or pressure, dyspnea, cough, sputum, fevers, chills, abdominal pain, nausea, vomiting, diarrhea or other symptoms.     Denies a personal or family history of easy/excessive bleeding or thrombosis.    Functionally, the patient is able to ascend a flight or so of stairs with no dyspnea or chest pain. She was doing strength training with a  before the pandemic but has stopped that now. She walks her two dogs on the days when she does not have hip pain, shorter on painful days. She tells me one of her dogs is elderly and they do not walk at a fast pace. She denies chest pain, chest pressure, chest heaviness, shortness of breath, palpitations, lightheadedness, or feeling unwell in any way when she is exerting herself.     The patient denies, on specific questioning, the following:  No history of MI, arrhythmia,or CHF.  No history of BRAD.  No history of DVT/PE.  No history of COPD.  No history of CVA.  No history of CKD.     PAST MEDICAL AND SURGICAL HISTORY      Past Medical History:   Diagnosis Date   • Acid reflux    • Diabetes mellitus type I (CMS/HCC)     Dr. Cerda - [facility]   • DKA, type 1 (CMS/HCC) 09/2016    due to insulin pump malfunction - hospitalized   • GERD (gastroesophageal reflux disease)    • [facility]  Normal   • Hyperlipidemia    • Hypoglycemia unawareness associated with type 1 diabetes mellitus (CMS/HCC)    • Macular degeneration     BEGINNING STAGES   • Pulmonary fibrosis (CMS/HCC)     CLEARED BY PULMONOLIGIST 2 YEARS AGO 2018   • Pulmonary nodule, left 07/2017   • Rheumatoid arthritis (CMS/HCC)       RHEUMATIOD   • Skin cancer        Past  Surgical History:   Procedure Laterality Date   • COLONOSCOPY     • EYE SURGERY Bilateral     CATARACT   • FOOT SURGERY Left 2009    reconstruction   • FOOT SURGERY Right 2007    reconstruction   • JOINT REPLACEMENT Right     SHOULDER   • REPLACEMENT TOTAL KNEE Left 04/04/2017    [facility]   • SKIN BIOPSY     • TOTAL SHOULDER ARTHROPLASTY Right 1990       MEDICATIONS        Current Outpatient Medications:   •  acetaminophen (TYLENOL) 325 mg tablet, Take by mouth every 6 (six) hours as needed for mild pain., Disp: , Rfl:   •  aspirin 81 mg enteric coated tablet, Take 81 mg by mouth every other day., Disp: , Rfl:   •  docosahexaenoic acid/epa (FISH OIL ORAL), Take by mouth 2 (two) times a day. For dry eyes, Disp: , Rfl:   •  vit A/vit C/vit E/zinc/copper (PRESERVISION AREDS ORAL), Take by mouth daily., Disp: , Rfl:   •  atorvastatin (LIPITOR) 10 mg tablet, Take 10 mg by mouth nightly.  , Disp: , Rfl:   •  cholecalciferol, vitamin D3, (VITAMIN D3) 1,000 unit capsule, Take 1,000 Units by mouth daily.  , Disp: , Rfl:   •  famotidine (PEPCID) 10 mg tablet, Take 2 tablets (20 mg total) by mouth daily as needed. With ibuprofen, Disp: , Rfl:   •  folic acid (FOLVITE) 1 mg tablet, Take 1 mg by mouth daily., Disp: , Rfl:   •  glucagon, human recombinant, 1 mg injection, Infuse 1 mg into a venous catheter once., Disp: , Rfl:   •  insulin lispro (HumaLOG U-100 Insulin) 100 unit/mL cartridge, inject by subcutaneous route per prescriber's instructions. Insulin dosing requires individualization., Disp: , Rfl:   •  Lactobac no.41-Bifidobact no.7 (PROBIOTIC-10) 70 mg (3 billion cell) capsule, Take by mouth daily., Disp: , Rfl:   •  lisinopriL (PRINIVIL) 10 mg tablet, Take 1 tablet by mouth once daily (Patient taking differently: Take 10 mg by mouth daily.  ), Disp: 90 tablet, Rfl: 1  •  METHOTREXATE SODIUM INJ, Inject 2.5 mg as directed once a week. Fridays , Disp: , Rfl:   •  multivit with minerals/lutein (MULTIVITAMIN 50 PLUS ORAL), No  "SIG Entered, Disp: , Rfl:   •  riTUXimab (RITUXAN) 10 mg/mL chemo injection, Inject 10 mg/mL as directed. Every 16 weeks   Sept 7,2020 , Disp: , Rfl:   •  turmeric root extract 500 mg capsule, Take 1,000 mg by mouth daily., Disp: , Rfl:     ALLERGIES      Neomycin, Neomycin-bacitracin-polymyxin, Neomycin-bacitracnzn-polymyxnb, Sulfamethoxazole-trimethoprim, and Tetracycline    FAMILY HISTORY      family history includes Alcohol abuse in her biological son; Bipolar disorder in her biological son; Depression in her biological son; Lung cancer in her biological father; No Known Problems in her biological sister; Rheum arthritis in an other family member; Stroke in her biological mother.    Denies any prior known family history of DVTs/PEs/clotting disorder    SOCIAL HISTORY      Social History     Tobacco Use   • Smoking status: Never Smoker   • Smokeless tobacco: Never Used   Substance Use Topics   • Alcohol use: Yes     Frequency: 2-3 times a week     Drinks per session: 1 or 2   • Drug use: No     She is a . She lives with her partner, Marito, who will be available to assist her after surgery. She has 15 steps in her home.    REVIEW OF SYSTEMS      Constitutional: Denies Fever, Chills, Fatigue, Malaise, Unintentional Weight loss  HEENT: Denies Vision changes, Epistaxis, Trouble Swallowing, Sore Throat  Respiratory: Denies Cough, Shortness of Breath, Wheezing  Cardiovascular: Denies Chest Pain, Exertional Dyspnea, Palpitations, Edema  GI: Denies Abdominal pain, Nausea, Vomiting, Changes in bowel movements, Blood in stool   : Denies Dysuria, Hematuria  Musculoskeletal: Denies Back pain, Neck pain + Right groin and buttock pain.  Skin: Denies rash  Neuro: Denies Headache, Syncope, Weakness, Numbness  Heme: Denies Bleeding      PHYSICAL EXAMINATION      Visit Vitals  /61 (BP Location: Left upper arm, Patient Position: Sitting)   Pulse 83   Temp 36.2 °C (97.2 °F) (Temporal)   Resp 16   Ht 1.549 m (5' 1\")   Wt " 50.8 kg (112 lb)   SpO2 96%   BMI 21.16 kg/m²     Body mass index is 21.16 kg/m².    Physical Exam   Constitutional: Well developed, Well Nourished, No apparent distress, Appears Comfortable  Neck: supple, no LAD  Cardiovascular: Normal rate, Regular Rhythm, Normal heart sounds, No murmur noted, No carotid bruits  Pulmonary: Normal effort without distress, Normal breath sounds without wheezing, rhonchi, or rales  Abdomen: Soft, no distension, nontender, normal bowel sounds  Extremities: No edema, rheumatoid deformities in hands, swan neck, ulnar deviation  Neurologic: No gross abnormalities, patient able to ambulate around exam room independently  Skin: Warm, No rash  Psychiatric: Normal mood and affect      LABS / EKG        Labs  Lab Results   Component Value Date     11/11/2020    K 4.3 11/11/2020     11/11/2020    BUN 8 11/11/2020    CREATININE 0.6 11/11/2020    WBC 6.14 11/11/2020    HGB 15.1 11/11/2020    HCT 43.1 11/11/2020     11/11/2020    HGBA1C 6.9 (H) 11/11/2020         ECG/Telemetry  Normal Sinus Rhythm, No ischemic changes.     ASSESSMENT AND PLAN         Pre-op evaluation  Medical management and kody-operative risk commentary as noted below.      Primary osteoarthritis of right hip  Patient reports 4 months of progressive right hip pain which significantly limits her daily activities. She would like to proceed with joint replacement. She is currently taking tylenol for pain and may continue this. She will hold her supplements and herbal medications in preparation for surgery. She will follow Dr. Pereira's instructions regarding her aspirin.       Hypoglycemia unawareness associated with type 1 diabetes mellitus (CMS/[facility])  Type 1 Diabetes, diabetic neuropathy, and management complicated by hypoglycemic unawareness. She follows closely with Dr. Hadley and utilizes an insulin pump and a Dex Com continuous glucose monitor that alarms if glucose < 70. She did get an alarm with glucose 42  last night, and tells me she gets a hypoglycemic alert about once a week. I asked patient to contact Dr. Hadley to discuss management in light of upcoming surgery, and follow her preoperative instructions. Patient tells me she would like to use an insulin drip during surgery, but would like to resume use of her insulin pump post-op. I notified Dr. Pereira of this, forwarded insulin pump policy, and endocrinology will be consulted on admission for management.     Patient is on lisinopril long term for renal protection, and she will hold this on the morning of surgery. This may be resumed post-op assuming no renal dysfunction, hypotension, nor volume depletion.       Rheumatoid arthritis (CMS/[facility]) (HCC)  Longstanding RA managed by Dr. Leventhal. Patient takes methotrexate weekly, and rituximab every 4 months, last 9/7/20. 2017 ACR guidelines recommend elective surgeries to be scheduled for 7 months after last rituximab infusion. I spoke with Dr. Pereira and he did speak with Dr. Leventhal directly regarding this, and he cleared patient to proceed with surgery. I defer to his management. Patient will continue her methotrexate.      Hyperlipidemia  Patient will continue atorvastatin uninterrupted. She will hold her fish oil supplement.    Patient has multiple significant cardiovascular risk factors, including type 1 diabetes, hyperlipidemia, and inflammatory joint disease. Her EKG is without ischemic changes. I do think cardiology consultation prior to surgery would be prudent and discussed this with Dr. Pereira who will be seeing the patient tomorrow and can facilitate.     GERD (gastroesophageal reflux disease)  Patient will continue her famotidine.       In regards to perioperative cardiac risk:  The patient denies any history of ischemic heart disease, denies any history of CHF, denies any history of CVA, is on insulin, and does not have a pre-operative creatinine > 2 mg/dL.   The Revised Cardiac Risk Index (RCRI) for  this patient indicates 1.0% risk risk of cardiac death, nonfatal MI, and nonfatal cardiac arrest.      Further comments:  Resume supplements when OK with surgical team.  I would encourage incentive spirometry to assist with minimizing kody-operative pulmonary risk.  DVT prophylaxis and timing of such per the discretion of the surgeon.     Please do not hesitate to contact [facility] during the upcoming hospitalization with any questions or concerns.     Xiao Tompkins MD  11/11/2020

## 2020-11-11 NOTE — ASSESSMENT & PLAN NOTE
Type 1 Diabetes, diabetic neuropathy, and management complicated by hypoglycemic unawareness. She follows closely with Dr. Hadley and utilizes an insulin pump and a Dex Com continuous glucose monitor that alarms if glucose < 70. She did get an alarm with glucose 42 last night, and tells me she gets a hypoglycemic alert about once a week. I asked patient to contact Dr. Hadley to discuss management in light of upcoming surgery, and follow her preoperative instructions. Patient tells me she would like to use an insulin drip during surgery, but would like to resume use of her insulin pump post-op. I notified Dr. Pereira of this, forwarded insulin pump policy, and endocrinology will be consulted on admission for management.     Patient is on lisinopril long term for renal protection, and she will hold this on the morning of surgery. This may be resumed post-op assuming no renal dysfunction, hypotension, nor volume depletion.

## 2020-11-11 NOTE — ASSESSMENT & PLAN NOTE
Patient reports 4 months of progressive right hip pain which significantly limits her daily activities. She would like to proceed with joint replacement. She is currently taking tylenol for pain and may continue this. She will hold her supplements and herbal medications in preparation for surgery. She will follow Dr. Pereira's instructions regarding her aspirin.

## 2020-11-11 NOTE — H&P (VIEW-ONLY)
[facility] Medicine Service -  Pre-Operative Consultation       Patient Name: Lillie Ward  Referring Surgeon: Rohit Pereira DO  Reason for Referral: Pre-Operative Evaluation  Surgical Procedure: Right Total Hip Replacement  Operative Date: 11/16/20  Other Providers:      PCP: Machelle Gallardo MD     Cardiology: Adry Hadley MD  Endocrinology: Rohit Couch MD  Rheumatology: Dr. Leventhal     HISTORY OF PRESENT ILLNESS      Lillie Ward is a 70 y.o. female presenting today to the [facility] at [facility] for pre-operative evaluation prior to planned surgery.    Patient reports 4 months of right groin and buttock pain. She tells me her symptoms are intermittent. She tells me she will have 3 days of absolutely no pain at all and will walk her dogs without any difficulties and will feel pain free. Then she will have 3-4 days in a row with constant severe pain where she can barely walk and is tremendously limited. She tells me during these times, the pain is so great that she feels as though the hip may give way and she is fearful of falling. Imaging has shown end stage degenerative disease of the hip and she would like to proceed to surgery.     In regards to medical history:  - Type 1 Diabetes with Diabetic Neuropathy and Hypoglycemic Unawareness. She has a history of DKA in 2016 in setting of insulin pump malfunction. She has a history of asymptomatic hypoglycemia with glucose of 18 at one point.  She follows with Dr. Hadley and utilizes an insulin pump and continuous glucose monitor- Dex Com- which alerts her for hyper/hypoglycemia- alerted last night for glucose 42. Her last A1C in October was 7.0. She has normal renal function without microalbuminuria. She would like to use her insulin pump post-operatively while she is here in the hospital.   - Hyperlipidemia. Her last FLP in July- LDL 79, HDL 80, Tg 82  - Rheumatoid arthritis for which she is  managed by rheumatology. She takes methotrexate weekly and rituximab every 16 weeks, last dose being 9/7/20.  - GERD, medically managed with PPI  - Patient underwent pulmonary evaluation 2 years ago for ? Pulmonary fibrosis and tells me she told she did not have this.     The patient denies any current or recent chest pain or pressure, dyspnea, cough, sputum, fevers, chills, abdominal pain, nausea, vomiting, diarrhea or other symptoms.     Denies a personal or family history of easy/excessive bleeding or thrombosis.    Functionally, the patient is able to ascend a flight or so of stairs with no dyspnea or chest pain. She was doing strength training with a  before the pandemic but has stopped that now. She walks her two dogs on the days when she does not have hip pain, shorter on painful days. She tells me one of her dogs is elderly and they do not walk at a fast pace. She denies chest pain, chest pressure, chest heaviness, shortness of breath, palpitations, lightheadedness, or feeling unwell in any way when she is exerting herself.     The patient denies, on specific questioning, the following:  No history of MI, arrhythmia,or CHF.  No history of BRAD.  No history of DVT/PE.  No history of COPD.  No history of CVA.  No history of CKD.     PAST MEDICAL AND SURGICAL HISTORY      Past Medical History:   Diagnosis Date   • Acid reflux    • Diabetes mellitus type I (CMS/HCC)     Dr. Cerda - [facility]   • DKA, type 1 (CMS/HCC) 09/2016    due to insulin pump malfunction - hospitalized   • GERD (gastroesophageal reflux disease)    • H/O colonoscopy 12/2013    [facility].  Normal   • Hyperlipidemia    • Hypoglycemia unawareness associated with type 1 diabetes mellitus (CMS/HCC)    • Macular degeneration     BEGINNING STAGES   • Pulmonary fibrosis (CMS/HCC)     CLEARED BY PULMONOLIGIST 2 YEARS AGO 2018   • Pulmonary nodule, left 07/2017   • Rheumatoid arthritis (CMS/HCC)       RHEUMATIOD   • Skin cancer        Past  Surgical History:   Procedure Laterality Date   • COLONOSCOPY     • EYE SURGERY Bilateral     CATARACT   • FOOT SURGERY Left 2009    reconstruction   • FOOT SURGERY Right 2007    reconstruction   • JOINT REPLACEMENT Right     SHOULDER   • REPLACEMENT TOTAL KNEE Left 04/04/2017    [facility]   • SKIN BIOPSY     • TOTAL SHOULDER ARTHROPLASTY Right 1990       MEDICATIONS        Current Outpatient Medications:   •  acetaminophen (TYLENOL) 325 mg tablet, Take by mouth every 6 (six) hours as needed for mild pain., Disp: , Rfl:   •  aspirin 81 mg enteric coated tablet, Take 81 mg by mouth every other day., Disp: , Rfl:   •  docosahexaenoic acid/epa (FISH OIL ORAL), Take by mouth 2 (two) times a day. For dry eyes, Disp: , Rfl:   •  vit A/vit C/vit E/zinc/copper (PRESERVISION AREDS ORAL), Take by mouth daily., Disp: , Rfl:   •  atorvastatin (LIPITOR) 10 mg tablet, Take 10 mg by mouth nightly.  , Disp: , Rfl:   •  cholecalciferol, vitamin D3, (VITAMIN D3) 1,000 unit capsule, Take 1,000 Units by mouth daily.  , Disp: , Rfl:   •  famotidine (PEPCID) 10 mg tablet, Take 2 tablets (20 mg total) by mouth daily as needed. With ibuprofen, Disp: , Rfl:   •  folic acid (FOLVITE) 1 mg tablet, Take 1 mg by mouth daily., Disp: , Rfl:   •  glucagon, human recombinant, 1 mg injection, Infuse 1 mg into a venous catheter once., Disp: , Rfl:   •  insulin lispro (HumaLOG U-100 Insulin) 100 unit/mL cartridge, inject by subcutaneous route per prescriber's instructions. Insulin dosing requires individualization., Disp: , Rfl:   •  Lactobac no.41-Bifidobact no.7 (PROBIOTIC-10) 70 mg (3 billion cell) capsule, Take by mouth daily., Disp: , Rfl:   •  lisinopriL (PRINIVIL) 10 mg tablet, Take 1 tablet by mouth once daily (Patient taking differently: Take 10 mg by mouth daily.  ), Disp: 90 tablet, Rfl: 1  •  METHOTREXATE SODIUM INJ, Inject 2.5 mg as directed once a week. Fridays , Disp: , Rfl:   •  multivit with minerals/lutein (MULTIVITAMIN 50 PLUS ORAL), No  "SIG Entered, Disp: , Rfl:   •  riTUXimab (RITUXAN) 10 mg/mL chemo injection, Inject 10 mg/mL as directed. Every 16 weeks   Sept 7,2020 , Disp: , Rfl:   •  turmeric root extract 500 mg capsule, Take 1,000 mg by mouth daily., Disp: , Rfl:     ALLERGIES      Neomycin, Neomycin-bacitracin-polymyxin, Neomycin-bacitracnzn-polymyxnb, Sulfamethoxazole-trimethoprim, and Tetracycline    FAMILY HISTORY      family history includes Alcohol abuse in her biological son; Bipolar disorder in her biological son; Depression in her biological son; Lung cancer in her biological father; No Known Problems in her biological sister; Rheum arthritis in an other family member; Stroke in her biological mother.    Denies any prior known family history of DVTs/PEs/clotting disorder    SOCIAL HISTORY      Social History     Tobacco Use   • Smoking status: Never Smoker   • Smokeless tobacco: Never Used   Substance Use Topics   • Alcohol use: Yes     Frequency: 2-3 times a week     Drinks per session: 1 or 2   • Drug use: No     She is a . She lives with her partner, Marito, who will be available to assist her after surgery. She has 15 steps in her home.    REVIEW OF SYSTEMS      Constitutional: Denies Fever, Chills, Fatigue, Malaise, Unintentional Weight loss  HEENT: Denies Vision changes, Epistaxis, Trouble Swallowing, Sore Throat  Respiratory: Denies Cough, Shortness of Breath, Wheezing  Cardiovascular: Denies Chest Pain, Exertional Dyspnea, Palpitations, Edema  GI: Denies Abdominal pain, Nausea, Vomiting, Changes in bowel movements, Blood in stool   : Denies Dysuria, Hematuria  Musculoskeletal: Denies Back pain, Neck pain + Right groin and buttock pain.  Skin: Denies rash  Neuro: Denies Headache, Syncope, Weakness, Numbness  Heme: Denies Bleeding      PHYSICAL EXAMINATION      Visit Vitals  /61 (BP Location: Left upper arm, Patient Position: Sitting)   Pulse 83   Temp 36.2 °C (97.2 °F) (Temporal)   Resp 16   Ht 1.549 m (5' 1\")   Wt " 50.8 kg (112 lb)   SpO2 96%   BMI 21.16 kg/m²     Body mass index is 21.16 kg/m².    Physical Exam   Constitutional: Well developed, Well Nourished, No apparent distress, Appears Comfortable  Neck: supple, no LAD  Cardiovascular: Normal rate, Regular Rhythm, Normal heart sounds, No murmur noted, No carotid bruits  Pulmonary: Normal effort without distress, Normal breath sounds without wheezing, rhonchi, or rales  Abdomen: Soft, no distension, nontender, normal bowel sounds  Extremities: No edema, rheumatoid deformities in hands, swan neck, ulnar deviation  Neurologic: No gross abnormalities, patient able to ambulate around exam room independently  Skin: Warm, No rash  Psychiatric: Normal mood and affect      LABS / EKG        Labs  Lab Results   Component Value Date     11/11/2020    K 4.3 11/11/2020     11/11/2020    BUN 8 11/11/2020    CREATININE 0.6 11/11/2020    WBC 6.14 11/11/2020    HGB 15.1 11/11/2020    HCT 43.1 11/11/2020     11/11/2020    HGBA1C 6.9 (H) 11/11/2020         ECG/Telemetry  Normal Sinus Rhythm, No ischemic changes.     ASSESSMENT AND PLAN         Pre-op evaluation  Medical management and kody-operative risk commentary as noted below.      Primary osteoarthritis of right hip  Patient reports 4 months of progressive right hip pain which significantly limits her daily activities. She would like to proceed with joint replacement. She is currently taking tylenol for pain and may continue this. She will hold her supplements and herbal medications in preparation for surgery. She will follow Dr. Pereira's instructions regarding her aspirin.       Hypoglycemia unawareness associated with type 1 diabetes mellitus (CMS/[facility])  Type 1 Diabetes, diabetic neuropathy, and management complicated by hypoglycemic unawareness. She follows closely with Dr. Hadley and utilizes an insulin pump and a Dex Com continuous glucose monitor that alarms if glucose < 70. She did get an alarm with glucose 42  last night, and tells me she gets a hypoglycemic alert about once a week. I asked patient to contact Dr. Hadley to discuss management in light of upcoming surgery, and follow her preoperative instructions. Patient tells me she would like to use an insulin drip during surgery, but would like to resume use of her insulin pump post-op. I notified Dr. Pereira of this, forwarded insulin pump policy, and endocrinology will be consulted on admission for management.     Patient is on lisinopril long term for renal protection, and she will hold this on the morning of surgery. This may be resumed post-op assuming no renal dysfunction, hypotension, nor volume depletion.       Rheumatoid arthritis (CMS/[facility]) (HCC)  Longstanding RA managed by Dr. Leventhal. Patient takes methotrexate weekly, and rituximab every 4 months, last 9/7/20. 2017 ACR guidelines recommend elective surgeries to be scheduled for 7 months after last rituximab infusion. I spoke with Dr. Pereira and he did speak with Dr. Leventhal directly regarding this, and he cleared patient to proceed with surgery. I defer to his management. Patient will continue her methotrexate.      Hyperlipidemia  Patient will continue atorvastatin uninterrupted. She will hold her fish oil supplement.    Patient has multiple significant cardiovascular risk factors, including type 1 diabetes, hyperlipidemia, and inflammatory joint disease. Her EKG is without ischemic changes. I do think cardiology consultation prior to surgery would be prudent and discussed this with Dr. Pereira who will be seeing the patient tomorrow and can facilitate.     GERD (gastroesophageal reflux disease)  Patient will continue her famotidine.       In regards to perioperative cardiac risk:  The patient denies any history of ischemic heart disease, denies any history of CHF, denies any history of CVA, is on insulin, and does not have a pre-operative creatinine > 2 mg/dL.   The Revised Cardiac Risk Index (RCRI) for  this patient indicates 1.0% risk risk of cardiac death, nonfatal MI, and nonfatal cardiac arrest.      Further comments:  Resume supplements when OK with surgical team.  I would encourage incentive spirometry to assist with minimizing kody-operative pulmonary risk.  DVT prophylaxis and timing of such per the discretion of the surgeon.     Please do not hesitate to contact [facility] during the upcoming hospitalization with any questions or concerns.     Xiao Tompkins MD  11/11/2020

## 2020-11-12 ENCOUNTER — TELEPHONE (OUTPATIENT)
Dept: ENDOCRINOLOGY | Facility: HOSPITAL | Age: 70
End: 2020-11-12

## 2020-11-12 ENCOUNTER — OFFICE VISIT (OUTPATIENT)
Dept: CARDIOLOGY | Facility: CLINIC | Age: 70
End: 2020-11-12
Payer: MEDICARE

## 2020-11-12 ENCOUNTER — TELEPHONE (OUTPATIENT)
Dept: SCHEDULING | Facility: CLINIC | Age: 70
End: 2020-11-12

## 2020-11-12 VITALS
TEMPERATURE: 98.7 F | HEART RATE: 90 BPM | OXYGEN SATURATION: 96 % | BODY MASS INDEX: 21.14 KG/M2 | WEIGHT: 112 LBS | SYSTOLIC BLOOD PRESSURE: 122 MMHG | HEIGHT: 61 IN | DIASTOLIC BLOOD PRESSURE: 68 MMHG

## 2020-11-12 DIAGNOSIS — Z01.810 PREOP CARDIOVASCULAR EXAM: Primary | ICD-10-CM

## 2020-11-12 PROBLEM — I10 ESSENTIAL HYPERTENSION: Status: ACTIVE | Noted: 2020-07-07

## 2020-11-12 PROBLEM — Z96.41 INSULIN PUMP IN PLACE: Status: ACTIVE | Noted: 2020-07-07

## 2020-11-12 PROCEDURE — 99203 OFFICE O/P NEW LOW 30 MIN: CPT | Performed by: INTERNAL MEDICINE

## 2020-11-12 RX ORDER — DEXTROSE 40 %
15-30 GEL (GRAM) ORAL AS NEEDED
Status: CANCELLED | OUTPATIENT
Start: 2020-11-12

## 2020-11-12 RX ORDER — CELECOXIB 200 MG/1
200 CAPSULE ORAL ONCE
Status: CANCELLED | OUTPATIENT
Start: 2020-11-12 | End: 2020-11-12

## 2020-11-12 RX ORDER — BUPIVACAINE HYDROCHLORIDE AND EPINEPHRINE 2.5; 5 MG/ML; UG/ML
30 INJECTION, SOLUTION EPIDURAL; INFILTRATION; INTRACAUDAL; PERINEURAL ONCE
Status: CANCELLED | OUTPATIENT
Start: 2020-11-12 | End: 2020-11-12

## 2020-11-12 RX ORDER — ACETAMINOPHEN 325 MG/1
975 TABLET ORAL ONCE
Status: CANCELLED | OUTPATIENT
Start: 2020-11-12 | End: 2020-11-12

## 2020-11-12 RX ORDER — DEXTROSE 50 % IN WATER (D50W) INTRAVENOUS SYRINGE
25 AS NEEDED
Status: CANCELLED | OUTPATIENT
Start: 2020-11-12

## 2020-11-12 RX ORDER — CEFAZOLIN SODIUM 2 G/50ML
2 SOLUTION INTRAVENOUS
Status: CANCELLED | OUTPATIENT
Start: 2020-11-12

## 2020-11-12 RX ORDER — IBUPROFEN 200 MG
16-32 TABLET ORAL AS NEEDED
Status: CANCELLED | OUTPATIENT
Start: 2020-11-12

## 2020-11-12 RX ORDER — TRANEXAMIC ACID 10 MG/ML
20 INJECTION, SOLUTION INTRAVENOUS ONCE
Status: CANCELLED | OUTPATIENT
Start: 2020-11-12 | End: 2020-11-12

## 2020-11-12 NOTE — LETTER
November 12, 2020     Rohit Pereira, DO  [address]  [place]  [facility] [address]    Patient: Lillie Ward  YOB: 1950  Date of Visit: 11/12/2020      Dear Dr. Pereira:    Thank you for referring Lillie Ward to me for evaluation. Below are my notes for this consultation.    If you have questions, please do not hesitate to call me. I look forward to following your patient along with you.         Sincerely,        Madiha Wise MD        CC: MD Xiao Don MD Barrasse, Linda D, MD  11/12/2020  2:35 PM  Signed                       Cardiology Consult/New Patient     Patient ID: Lillie Ward 70 y.o. female. 1950  PCP: Machelle Gallardo MD   History Present Illness     HPI:     Dear Dr Tompkins,    On November 12, 2020 I met Lillie Ward in the office for the first time at your request.  She is a 70-year-old female with severe rheumatoid arthritis and diabetes mellitus.  She is treated for hyperlipidemia and reflux in addition to this.    She is scheduled to have a right hip replacement by Dr. Pereira on November 16.  She has never had any cardiovascular issues.  She denies hypertension.  She does not smoke.  She has no significant family history for premature coronary disease.  There are days that she is quite active when her hip is not terribly painful.  During those days she can walk 2 flights of steps without difficulty.  He also walks her dog 50 minutes at least 1-2 times daily.  She denies chest pain, shortness of breath, palpitations, edema, PND, or orthopnea.  She has never had syncope.    She is retired and a .  She does live with a significant other.  She has 2 grown children who are well.  She does not smoke.  She drinks 5 beers per week.    Her surgeries include bilateral cataracts and a right total knee replacement.  She is also had 2 separate foot surgeries.  She takes her aspirin every 3 days.  She is allergic to Neosporin.    She has no GI  symptoms, liver disease, or known kidney disease.  She has no pulmonary symptoms.    The rest of her review of systems is completely negative.        Past History     Past Medical History:   Diagnosis Date   • Acid reflux    • Diabetes mellitus type I (CMS/HCC)     Dr. Cerda - [facility]   • DKA, type 1 (CMS/HCC) 09/2016    due to insulin pump malfunction - hospitalized   • GERD (gastroesophageal reflux disease)    • H/O colonoscopy 12/2013    [place].  Normal   • Hyperlipidemia    • Hypoglycemia unawareness associated with type 1 diabetes mellitus (CMS/HCC)    • Macular degeneration     BEGINNING STAGES   • Pulmonary fibrosis (CMS/HCC)     CLEARED BY PULMONOLIGIST 2 YEARS AGO 2018   • Pulmonary nodule, left 07/2017   • Rheumatoid arthritis (CMS/HCC)       RHEUMATIOD   • Skin cancer      Past Surgical History:   Procedure Laterality Date   • COLONOSCOPY     • EYE SURGERY Bilateral     CATARACT   • FOOT SURGERY Left 2009    reconstruction   • FOOT SURGERY Right 2007    reconstruction   • JOINT REPLACEMENT Right     SHOULDER   • REPLACEMENT TOTAL KNEE Left 04/04/2017    [facility]   • SKIN BIOPSY     • TOTAL SHOULDER ARTHROPLASTY Right 1990     Social History     Tobacco Use   • Smoking status: Never Smoker   • Smokeless tobacco: Never Used   Substance Use Topics   • Alcohol use: Yes     Frequency: 2-3 times a week     Drinks per session: 1 or 2   • Drug use: No     Family History   Problem Relation Age of Onset   • Stroke Biological Mother    • Lung cancer Biological Father    • No Known Problems Biological Sister    • Rheum arthritis Other         1 of 8 siblings   • Bipolar disorder Biological Son    • Alcohol abuse Biological Son    • Depression Biological Son      Allergies/Medications   Allergies: Neomycin, Neomycin-bacitracin-polymyxin, Neomycin-bacitracnzn-polymyxnb, Sulfamethoxazole-trimethoprim, and Tetracycline    Current Medications:   Outpatient Encounter Medications as of 11/12/2020:   •  acetaminophen  "(TYLENOL) 325 mg tablet, Take by mouth every 6 (six) hours as needed for mild pain.  •  aspirin 81 mg enteric coated tablet, Take 81 mg by mouth every other day.  •  atorvastatin (LIPITOR) 10 mg tablet, Take 10 mg by mouth nightly.    •  cholecalciferol, vitamin D3, (VITAMIN D3) 1,000 unit capsule, Take 1,000 Units by mouth daily.    •  docosahexaenoic acid/epa (FISH OIL ORAL), Take by mouth 2 (two) times a day. For dry eyes  •  famotidine (PEPCID) 10 mg tablet, Take 2 tablets (20 mg total) by mouth daily as needed. With ibuprofen  •  folic acid (FOLVITE) 1 mg tablet, Take 1 mg by mouth daily.  •  glucagon, human recombinant, 1 mg injection, Infuse 1 mg into a venous catheter once.  •  insulin lispro (HumaLOG U-100 Insulin) 100 unit/mL cartridge, inject by subcutaneous route per prescriber's instructions. Insulin dosing requires individualization.  •  Lactobac no.41-Bifidobact no.7 (PROBIOTIC-10) 70 mg (3 billion cell) capsule, Take by mouth daily.  •  lisinopriL (PRINIVIL) 10 mg tablet, Take 1 tablet by mouth once daily (Patient taking differently: Take 10 mg by mouth daily.  )  •  METHOTREXATE SODIUM INJ, Inject 2.5 mg as directed once a week. Fridays   •  multivit with minerals/lutein (MULTIVITAMIN 50 PLUS ORAL), No SIG Entered  •  riTUXimab (RITUXAN) 10 mg/mL chemo injection, Inject 10 mg/mL as directed. Every 16 weeks   Sept 7,2020   •  turmeric root extract 500 mg capsule, Take 1,000 mg by mouth daily.  •  vit A/vit C/vit E/zinc/copper (PRESERVISION AREDS ORAL), Take by mouth daily.     ROS   The rest of her review of systems is completely negative.    Vitals:    11/12/20 1416 11/12/20 1418   BP: 122/70 122/68   BP Location: Left upper arm Right upper arm   Patient Position: Sitting Sitting   Pulse: 90    Temp: 37.1 °C (98.7 °F)    TempSrc: Oral    SpO2: 96%    Weight: 50.8 kg (112 lb)    Height: 1.549 m (5' 1\")         Physical Exam   She is comfortable.  Skin is warm and dry.  Conjunctiva pink.  Affect is " appropriate.  Blood pressure is 122/70.  Heart rate is 80 and regular.  No JVD or HJR.  Chest is clear.  Regular rhythm.  Normal S1 and S2.  I heard no murmurs or gallops.  Abdomen is soft with good bowel sounds.  No organomegaly.  No clubbing, cyanosis, or edema.  Palpable peripheral pulses.  No rash.  Severe deformity of both hands related to her rheumatoid arthritis.  Labs/ Imaging/Procedures     Lab Results   Component Value Date    WBC 6.14 11/11/2020    HGB 15.1 11/11/2020    HCT 43.1 11/11/2020    MCV 92.7 11/11/2020     11/11/2020     Lab Results   Component Value Date    GLUCOSE 73 11/11/2020    CALCIUM 9.8 11/11/2020     11/11/2020    K 4.3 11/11/2020    CO2 24 11/11/2020     11/11/2020    BUN 8 11/11/2020    CREATININE 0.6 11/11/2020     Lab Results   Component Value Date    HGBA1C 6.9 (H) 11/11/2020           EKG  Today's EKG is within normal limits.  Assessment/Plan     Problem List Items Addressed This Visit     None          Impression:     Despite her hip issues, and is functionally in reasonable shape.  She has no cardiovascular symptoms.  Her EKG is normal.  Her physical examination is normal.  I would place her at low to moderate risk for general anesthesia.    She understands that her aspirin and fish oil should be discontinued prior to her surgery.  I will discharge her from my care unless any new problems should pop up.    Thank you for allowing me to participate in the care of this patient.  I hope this information is helpful.  Please do not hesitate to contact me with any questions or concerns.      Madiha Wise MD FACC, FACP  11/12/2020

## 2020-11-12 NOTE — TELEPHONE ENCOUNTER
Amanda/Kenia  surg on 11/16  for right hip replacement  fax [phone]  ph [phone]  appnt todat at 1:40.  Called kop for ok

## 2020-11-13 ENCOUNTER — ANESTHESIA EVENT (OUTPATIENT)
Dept: OPERATING ROOM | Facility: HOSPITAL | Age: 70
Setting detail: SURGERY ADMIT
DRG: 470 | End: 2020-11-13
Payer: MEDICARE

## 2020-11-13 LAB — SARS-COV-2 RNA RESP QL NAA+PROBE: NOT DETECTED

## 2020-11-16 ENCOUNTER — APPOINTMENT (INPATIENT)
Dept: RADIOLOGY | Facility: HOSPITAL | Age: 70
DRG: 470 | End: 2020-11-16
Attending: PHYSICIAN ASSISTANT
Payer: MEDICARE

## 2020-11-16 ENCOUNTER — ANESTHESIA (OUTPATIENT)
Dept: OPERATING ROOM | Facility: HOSPITAL | Age: 70
Setting detail: SURGERY ADMIT
DRG: 470 | End: 2020-11-16
Payer: MEDICARE

## 2020-11-16 ENCOUNTER — HOSPITAL ENCOUNTER (INPATIENT)
Facility: HOSPITAL | Age: 70
LOS: 1 days | Discharge: HOME | DRG: 470 | End: 2020-11-17
Attending: ORTHOPAEDIC SURGERY | Admitting: ORTHOPAEDIC SURGERY
Payer: MEDICARE

## 2020-11-16 PROBLEM — R07.89 CHEST DISCOMFORT: Status: ACTIVE | Noted: 2020-11-16

## 2020-11-16 PROBLEM — R50.9 FEVER: Status: ACTIVE | Noted: 2020-11-16

## 2020-11-16 PROBLEM — M16.9 OA (OSTEOARTHRITIS) OF HIP: Status: ACTIVE | Noted: 2020-11-16

## 2020-11-16 LAB
ABO + RH BLD: NORMAL
D AG BLD QL: POSITIVE
GLUCOSE BLD-MCNC: 136 MG/DL (ref 70–99)
GLUCOSE BLD-MCNC: 166 MG/DL (ref 70–99)
GLUCOSE BLD-MCNC: 209 MG/DL (ref 70–99)
GLUCOSE BLD-MCNC: 77 MG/DL (ref 70–99)
GLUCOSE BLD-MCNC: 82 MG/DL (ref 70–99)
LABORATORY COMMENT REPORT: NORMAL
POCT TEST: ABNORMAL
POCT TEST: NORMAL
POCT TEST: NORMAL

## 2020-11-16 PROCEDURE — 25800000 HC PHARMACY IV SOLUTIONS: Performed by: NURSE ANESTHETIST, CERTIFIED REGISTERED

## 2020-11-16 PROCEDURE — 63600000 HC DRUGS/DETAIL CODE: Performed by: ANESTHESIOLOGY

## 2020-11-16 PROCEDURE — 71000001 HC PACU PHASE 1 INITIAL 30MIN: Performed by: ORTHOPAEDIC SURGERY

## 2020-11-16 PROCEDURE — 71000011 HC PACU PHASE 1 EA ADDL MIN: Performed by: ORTHOPAEDIC SURGERY

## 2020-11-16 PROCEDURE — 36000005 HC OR LEVEL 5 INITIAL 30MIN: Performed by: ORTHOPAEDIC SURGERY

## 2020-11-16 PROCEDURE — 12000000 HC ROOM AND CARE MED/SURG

## 2020-11-16 PROCEDURE — C1713 ANCHOR/SCREW BN/BN,TIS/BN: HCPCS | Performed by: ORTHOPAEDIC SURGERY

## 2020-11-16 PROCEDURE — 63600000 HC DRUGS/DETAIL CODE: Performed by: PHYSICIAN ASSISTANT

## 2020-11-16 PROCEDURE — 25800000 HC PHARMACY IV SOLUTIONS: Performed by: PHYSICIAN ASSISTANT

## 2020-11-16 PROCEDURE — 63700000 HC SELF-ADMINISTRABLE DRUG: Performed by: PHYSICIAN ASSISTANT

## 2020-11-16 PROCEDURE — 99232 SBSQ HOSP IP/OBS MODERATE 35: CPT | Performed by: INTERNAL MEDICINE

## 2020-11-16 PROCEDURE — 25000000 HC PHARMACY GENERAL: Performed by: ORTHOPAEDIC SURGERY

## 2020-11-16 PROCEDURE — 36000015 HC OR LEVEL 5 EA ADDL MIN: Performed by: ORTHOPAEDIC SURGERY

## 2020-11-16 PROCEDURE — 25000000 HC PHARMACY GENERAL: Performed by: NURSE ANESTHETIST, CERTIFIED REGISTERED

## 2020-11-16 PROCEDURE — 73501 X-RAY EXAM HIP UNI 1 VIEW: CPT | Mod: RT

## 2020-11-16 PROCEDURE — 97162 PT EVAL MOD COMPLEX 30 MIN: CPT | Mod: GP

## 2020-11-16 PROCEDURE — 36415 COLL VENOUS BLD VENIPUNCTURE: CPT | Performed by: ORTHOPAEDIC SURGERY

## 2020-11-16 PROCEDURE — 0SR904A REPLACEMENT OF RIGHT HIP JOINT WITH CERAMIC ON POLYETHYLENE SYNTHETIC SUBSTITUTE, UNCEMENTED, OPEN APPROACH: ICD-10-PCS | Performed by: ORTHOPAEDIC SURGERY

## 2020-11-16 PROCEDURE — 63600000 HC DRUGS/DETAIL CODE: Mod: JW | Performed by: NURSE ANESTHETIST, CERTIFIED REGISTERED

## 2020-11-16 PROCEDURE — C1776 JOINT DEVICE (IMPLANTABLE): HCPCS | Performed by: ORTHOPAEDIC SURGERY

## 2020-11-16 PROCEDURE — 27200000 HC STERILE SUPPLY: Performed by: ORTHOPAEDIC SURGERY

## 2020-11-16 PROCEDURE — 25000000 HC PHARMACY GENERAL: Performed by: ANESTHESIOLOGY

## 2020-11-16 PROCEDURE — 37000010 HC ANESTHESIA SPINAL: Performed by: ORTHOPAEDIC SURGERY

## 2020-11-16 PROCEDURE — 25000000 HC PHARMACY GENERAL: Performed by: PHYSICIAN ASSISTANT

## 2020-11-16 DEVICE — 6.5MM LOW PROFILE HEX SCREW 40MM
Type: IMPLANTABLE DEVICE | Site: HIP | Status: FUNCTIONAL
Brand: TRIDENT II

## 2020-11-16 DEVICE — 6.5MM LOW PROFILE HEX SCREW 25MM
Type: IMPLANTABLE DEVICE | Site: HIP | Status: FUNCTIONAL
Brand: TRIDENT II

## 2020-11-16 DEVICE — 0 DEGREE POLYETHYLENE INSERT
Type: IMPLANTABLE DEVICE | Site: HIP | Status: FUNCTIONAL
Brand: TRIDENT

## 2020-11-16 DEVICE — TRIDENT II TRITANIUM CLUSTER 48D
Type: IMPLANTABLE DEVICE | Site: HIP | Status: FUNCTIONAL
Brand: TRIDENT II

## 2020-11-16 DEVICE — 132 DEGREE NECK ANGLE HIP STEM
Type: IMPLANTABLE DEVICE | Site: HIP | Status: FUNCTIONAL
Brand: ACCOLADE

## 2020-11-16 DEVICE — CERAMIC V40 FEMORAL HEAD
Type: IMPLANTABLE DEVICE | Site: HIP | Status: FUNCTIONAL
Brand: BIOLOX

## 2020-11-16 RX ORDER — MEPERIDINE HYDROCHLORIDE 50 MG/ML
12.5 INJECTION INTRAMUSCULAR; INTRAVENOUS; SUBCUTANEOUS EVERY 10 MIN PRN
Status: DISCONTINUED | OUTPATIENT
Start: 2020-11-16 | End: 2020-11-16 | Stop reason: HOSPADM

## 2020-11-16 RX ORDER — ONDANSETRON HYDROCHLORIDE 2 MG/ML
4 INJECTION, SOLUTION INTRAVENOUS
Status: DISCONTINUED | OUTPATIENT
Start: 2020-11-16 | End: 2020-11-16 | Stop reason: HOSPADM

## 2020-11-16 RX ORDER — HYDROMORPHONE HYDROCHLORIDE 1 MG/ML
0.5 INJECTION, SOLUTION INTRAMUSCULAR; INTRAVENOUS; SUBCUTANEOUS
Status: DISCONTINUED | OUTPATIENT
Start: 2020-11-16 | End: 2020-11-17 | Stop reason: HOSPADM

## 2020-11-16 RX ORDER — ACETAMINOPHEN 325 MG/1
975 TABLET ORAL EVERY 8 HOURS
Status: DISCONTINUED | OUTPATIENT
Start: 2020-11-16 | End: 2020-11-17 | Stop reason: HOSPADM

## 2020-11-16 RX ORDER — DEXTROSE 50 % IN WATER (D50W) INTRAVENOUS SYRINGE
25 AS NEEDED
Status: DISCONTINUED | OUTPATIENT
Start: 2020-11-16 | End: 2020-11-16

## 2020-11-16 RX ORDER — CEFAZOLIN SODIUM 2 G/50ML
2 SOLUTION INTRAVENOUS
Status: COMPLETED | OUTPATIENT
Start: 2020-11-16 | End: 2020-11-16

## 2020-11-16 RX ORDER — NAPROXEN SODIUM 220 MG/1
81 TABLET, FILM COATED ORAL 2 TIMES DAILY
Status: DISCONTINUED | OUTPATIENT
Start: 2020-11-16 | End: 2020-11-17 | Stop reason: HOSPADM

## 2020-11-16 RX ORDER — ACETAMINOPHEN 325 MG/1
975 TABLET ORAL ONCE
Status: COMPLETED | OUTPATIENT
Start: 2020-11-16 | End: 2020-11-16

## 2020-11-16 RX ORDER — DEXTROSE 40 %
15-30 GEL (GRAM) ORAL AS NEEDED
Status: DISCONTINUED | OUTPATIENT
Start: 2020-11-16 | End: 2020-11-17 | Stop reason: HOSPADM

## 2020-11-16 RX ORDER — SODIUM CHLORIDE 9 MG/ML
INJECTION, SOLUTION INTRAVENOUS CONTINUOUS PRN
Status: DISCONTINUED | OUTPATIENT
Start: 2020-11-16 | End: 2020-11-16 | Stop reason: SURG

## 2020-11-16 RX ORDER — FAMOTIDINE 10 MG/ML
INJECTION INTRAVENOUS AS NEEDED
Status: DISCONTINUED | OUTPATIENT
Start: 2020-11-16 | End: 2020-11-16 | Stop reason: SURG

## 2020-11-16 RX ORDER — CELECOXIB 200 MG/1
200 CAPSULE ORAL ONCE
Status: COMPLETED | OUTPATIENT
Start: 2020-11-16 | End: 2020-11-16

## 2020-11-16 RX ORDER — BUPIVACAINE HYDROCHLORIDE 7.5 MG/ML
INJECTION, SOLUTION INTRASPINAL
Status: COMPLETED | OUTPATIENT
Start: 2020-11-16 | End: 2020-11-16

## 2020-11-16 RX ORDER — BUPIVACAINE HYDROCHLORIDE AND EPINEPHRINE 2.5; 5 MG/ML; UG/ML
30 INJECTION, SOLUTION EPIDURAL; INFILTRATION; INTRACAUDAL; PERINEURAL ONCE
Status: COMPLETED | OUTPATIENT
Start: 2020-11-16 | End: 2020-11-16

## 2020-11-16 RX ORDER — OXYCODONE HYDROCHLORIDE 5 MG/1
5 TABLET ORAL EVERY 4 HOURS PRN
Status: DISCONTINUED | OUTPATIENT
Start: 2020-11-16 | End: 2020-11-17 | Stop reason: HOSPADM

## 2020-11-16 RX ORDER — OXYCODONE HYDROCHLORIDE 5 MG/1
10 TABLET ORAL EVERY 4 HOURS PRN
Status: DISCONTINUED | OUTPATIENT
Start: 2020-11-16 | End: 2020-11-17 | Stop reason: HOSPADM

## 2020-11-16 RX ORDER — SODIUM CHLORIDE, SODIUM GLUCONATE, SODIUM ACETATE, POTASSIUM CHLORIDE AND MAGNESIUM CHLORIDE 30; 37; 368; 526; 502 MG/100ML; MG/100ML; MG/100ML; MG/100ML; MG/100ML
INJECTION, SOLUTION INTRAVENOUS CONTINUOUS PRN
Status: DISCONTINUED | OUTPATIENT
Start: 2020-11-16 | End: 2020-11-16 | Stop reason: SURG

## 2020-11-16 RX ORDER — HYDROMORPHONE HYDROCHLORIDE 1 MG/ML
0.5 INJECTION, SOLUTION INTRAMUSCULAR; INTRAVENOUS; SUBCUTANEOUS
Status: DISCONTINUED | OUTPATIENT
Start: 2020-11-16 | End: 2020-11-16 | Stop reason: HOSPADM

## 2020-11-16 RX ORDER — ONDANSETRON HYDROCHLORIDE 2 MG/ML
4 INJECTION, SOLUTION INTRAVENOUS EVERY 8 HOURS PRN
Status: DISCONTINUED | OUTPATIENT
Start: 2020-11-16 | End: 2020-11-17 | Stop reason: HOSPADM

## 2020-11-16 RX ORDER — LISINOPRIL 10 MG/1
10 TABLET ORAL DAILY
Status: DISCONTINUED | OUTPATIENT
Start: 2020-11-17 | End: 2020-11-16

## 2020-11-16 RX ORDER — KETOROLAC TROMETHAMINE 15 MG/ML
7.5 INJECTION, SOLUTION INTRAMUSCULAR; INTRAVENOUS
Status: DISCONTINUED | OUTPATIENT
Start: 2020-11-16 | End: 2020-11-17 | Stop reason: HOSPADM

## 2020-11-16 RX ORDER — POLYETHYLENE GLYCOL 3350 17 G/17G
17 POWDER, FOR SOLUTION ORAL DAILY
Status: DISCONTINUED | OUTPATIENT
Start: 2020-11-16 | End: 2020-11-17 | Stop reason: HOSPADM

## 2020-11-16 RX ORDER — INSULIN ASPART 100 [IU]/ML
3-5 INJECTION, SOLUTION INTRAVENOUS; SUBCUTANEOUS
Status: DISCONTINUED | OUTPATIENT
Start: 2020-11-16 | End: 2020-11-17

## 2020-11-16 RX ORDER — DIPHENHYDRAMINE HCL 25 MG
25 CAPSULE ORAL EVERY 6 HOURS PRN
Status: DISCONTINUED | OUTPATIENT
Start: 2020-11-16 | End: 2020-11-17 | Stop reason: HOSPADM

## 2020-11-16 RX ORDER — TRAMADOL HYDROCHLORIDE 50 MG/1
50 TABLET ORAL EVERY 6 HOURS PRN
Status: DISCONTINUED | OUTPATIENT
Start: 2020-11-16 | End: 2020-11-17 | Stop reason: HOSPADM

## 2020-11-16 RX ORDER — BISACODYL 10 MG/1
10 SUPPOSITORY RECTAL DAILY PRN
Status: DISCONTINUED | OUTPATIENT
Start: 2020-11-16 | End: 2020-11-17 | Stop reason: HOSPADM

## 2020-11-16 RX ORDER — ALUMINUM HYDROXIDE, MAGNESIUM HYDROXIDE, AND SIMETHICONE 1200; 120; 1200 MG/30ML; MG/30ML; MG/30ML
30 SUSPENSION ORAL EVERY 4 HOURS PRN
Status: DISCONTINUED | OUTPATIENT
Start: 2020-11-16 | End: 2020-11-17 | Stop reason: HOSPADM

## 2020-11-16 RX ORDER — DEXTROSE 50 % IN WATER (D50W) INTRAVENOUS SYRINGE
25 AS NEEDED
Status: DISCONTINUED | OUTPATIENT
Start: 2020-11-16 | End: 2020-11-17 | Stop reason: HOSPADM

## 2020-11-16 RX ORDER — TRANEXAMIC ACID 10 MG/ML
20 INJECTION, SOLUTION INTRAVENOUS ONCE
Status: DISCONTINUED | OUTPATIENT
Start: 2020-11-16 | End: 2020-11-16

## 2020-11-16 RX ORDER — METOCLOPRAMIDE HYDROCHLORIDE 5 MG/ML
INJECTION INTRAMUSCULAR; INTRAVENOUS AS NEEDED
Status: DISCONTINUED | OUTPATIENT
Start: 2020-11-16 | End: 2020-11-16 | Stop reason: SURG

## 2020-11-16 RX ORDER — BUPIVACAINE HYDROCHLORIDE AND EPINEPHRINE 2.5; 5 MG/ML; UG/ML
INJECTION, SOLUTION EPIDURAL; INFILTRATION; INTRACAUDAL; PERINEURAL AS NEEDED
Status: DISCONTINUED | OUTPATIENT
Start: 2020-11-16 | End: 2020-11-16 | Stop reason: HOSPADM

## 2020-11-16 RX ORDER — POLYETHYLENE GLYCOL 3350 17 G/17G
17 POWDER, FOR SOLUTION ORAL DAILY PRN
Status: DISCONTINUED | OUTPATIENT
Start: 2020-11-16 | End: 2020-11-17 | Stop reason: HOSPADM

## 2020-11-16 RX ORDER — ATORVASTATIN CALCIUM 10 MG/1
10 TABLET, FILM COATED ORAL NIGHTLY
Status: DISCONTINUED | OUTPATIENT
Start: 2020-11-16 | End: 2020-11-17 | Stop reason: HOSPADM

## 2020-11-16 RX ORDER — PROPOFOL 10 MG/ML
INJECTION, EMULSION INTRAVENOUS CONTINUOUS PRN
Status: DISCONTINUED | OUTPATIENT
Start: 2020-11-16 | End: 2020-11-16 | Stop reason: SURG

## 2020-11-16 RX ORDER — FENTANYL CITRATE 50 UG/ML
50 INJECTION, SOLUTION INTRAMUSCULAR; INTRAVENOUS
Status: DISCONTINUED | OUTPATIENT
Start: 2020-11-16 | End: 2020-11-16 | Stop reason: HOSPADM

## 2020-11-16 RX ORDER — CELECOXIB 200 MG/1
200 CAPSULE ORAL DAILY
Status: DISCONTINUED | OUTPATIENT
Start: 2020-11-17 | End: 2020-11-17 | Stop reason: HOSPADM

## 2020-11-16 RX ORDER — ONDANSETRON 4 MG/1
4 TABLET, ORALLY DISINTEGRATING ORAL EVERY 8 HOURS PRN
Status: DISCONTINUED | OUTPATIENT
Start: 2020-11-16 | End: 2020-11-17 | Stop reason: HOSPADM

## 2020-11-16 RX ORDER — KETOROLAC TROMETHAMINE 30 MG/ML
INJECTION, SOLUTION INTRAMUSCULAR; INTRAVENOUS AS NEEDED
Status: DISCONTINUED | OUTPATIENT
Start: 2020-11-16 | End: 2020-11-16 | Stop reason: SURG

## 2020-11-16 RX ORDER — DIPHENHYDRAMINE HCL 50 MG/ML
25 VIAL (ML) INJECTION EVERY 6 HOURS PRN
Status: DISCONTINUED | OUTPATIENT
Start: 2020-11-16 | End: 2020-11-17 | Stop reason: HOSPADM

## 2020-11-16 RX ORDER — SODIUM CHLORIDE, SODIUM LACTATE, POTASSIUM CHLORIDE, CALCIUM CHLORIDE 600; 310; 30; 20 MG/100ML; MG/100ML; MG/100ML; MG/100ML
INJECTION, SOLUTION INTRAVENOUS CONTINUOUS
Status: ACTIVE | OUTPATIENT
Start: 2020-11-16 | End: 2020-11-17

## 2020-11-16 RX ORDER — IBUPROFEN 200 MG
16-32 TABLET ORAL AS NEEDED
Status: DISCONTINUED | OUTPATIENT
Start: 2020-11-16 | End: 2020-11-17 | Stop reason: HOSPADM

## 2020-11-16 RX ORDER — DEXTROSE 40 %
15-30 GEL (GRAM) ORAL AS NEEDED
Status: DISCONTINUED | OUTPATIENT
Start: 2020-11-16 | End: 2020-11-16

## 2020-11-16 RX ORDER — PHENYLEPHRINE HYDROCHLORIDE 10 MG/ML
INJECTION INTRAVENOUS AS NEEDED
Status: DISCONTINUED | OUTPATIENT
Start: 2020-11-16 | End: 2020-11-16 | Stop reason: SURG

## 2020-11-16 RX ORDER — PROPOFOL 10 MG/ML
INJECTION, EMULSION INTRAVENOUS AS NEEDED
Status: DISCONTINUED | OUTPATIENT
Start: 2020-11-16 | End: 2020-11-16 | Stop reason: SURG

## 2020-11-16 RX ORDER — CEFAZOLIN SODIUM 1 G/50ML
1 SOLUTION INTRAVENOUS
Status: COMPLETED | OUTPATIENT
Start: 2020-11-16 | End: 2020-11-17

## 2020-11-16 RX ORDER — EPHEDRINE SULFATE 50 MG/ML
INJECTION, SOLUTION INTRAVENOUS AS NEEDED
Status: DISCONTINUED | OUTPATIENT
Start: 2020-11-16 | End: 2020-11-16 | Stop reason: SURG

## 2020-11-16 RX ORDER — ONDANSETRON HYDROCHLORIDE 2 MG/ML
INJECTION, SOLUTION INTRAVENOUS AS NEEDED
Status: DISCONTINUED | OUTPATIENT
Start: 2020-11-16 | End: 2020-11-16 | Stop reason: SURG

## 2020-11-16 RX ORDER — ONDANSETRON 4 MG/1
4 TABLET, ORALLY DISINTEGRATING ORAL
Status: DISCONTINUED | OUTPATIENT
Start: 2020-11-17 | End: 2020-11-17 | Stop reason: HOSPADM

## 2020-11-16 RX ORDER — AMOXICILLIN 250 MG
1 CAPSULE ORAL 2 TIMES DAILY
Status: DISCONTINUED | OUTPATIENT
Start: 2020-11-16 | End: 2020-11-17 | Stop reason: HOSPADM

## 2020-11-16 RX ORDER — IBUPROFEN 200 MG
16-32 TABLET ORAL AS NEEDED
Status: DISCONTINUED | OUTPATIENT
Start: 2020-11-16 | End: 2020-11-16

## 2020-11-16 RX ORDER — SODIUM CHLORIDE 0.9 G/100ML
INJECTION, SOLUTION IRRIGATION AS NEEDED
Status: DISCONTINUED | OUTPATIENT
Start: 2020-11-16 | End: 2020-11-16 | Stop reason: HOSPADM

## 2020-11-16 RX ORDER — MIDAZOLAM HYDROCHLORIDE 2 MG/2ML
INJECTION, SOLUTION INTRAMUSCULAR; INTRAVENOUS AS NEEDED
Status: DISCONTINUED | OUTPATIENT
Start: 2020-11-16 | End: 2020-11-16 | Stop reason: SURG

## 2020-11-16 RX ORDER — FAMOTIDINE 20 MG/1
20 TABLET, FILM COATED ORAL DAILY
Status: DISCONTINUED | OUTPATIENT
Start: 2020-11-16 | End: 2020-11-17 | Stop reason: HOSPADM

## 2020-11-16 RX ADMIN — PHENYLEPHRINE HYDROCHLORIDE 100 MCG: 10 INJECTION INTRAVENOUS at 07:55

## 2020-11-16 RX ADMIN — PHENYLEPHRINE HYDROCHLORIDE 100 MCG: 10 INJECTION INTRAVENOUS at 08:16

## 2020-11-16 RX ADMIN — PROPOFOL INJECTABLE EMULSION 10 MG: 10 INJECTION, EMULSION INTRAVENOUS at 07:17

## 2020-11-16 RX ADMIN — PROPOFOL INJECTABLE EMULSION 30 MG: 10 INJECTION, EMULSION INTRAVENOUS at 07:25

## 2020-11-16 RX ADMIN — CEFAZOLIN SODIUM 1 G: 1 SOLUTION INTRAVENOUS at 17:07

## 2020-11-16 RX ADMIN — ACETAMINOPHEN 975 MG: 325 TABLET, FILM COATED ORAL at 22:30

## 2020-11-16 RX ADMIN — BUPIVACAINE HYDROCHLORIDE IN DEXTROSE 2 ML: 7.5 INJECTION, SOLUTION SUBARACHNOID at 07:19

## 2020-11-16 RX ADMIN — OXYCODONE HYDROCHLORIDE 10 MG: 5 TABLET ORAL at 15:13

## 2020-11-16 RX ADMIN — METOCLOPRAMIDE 10 MG: 5 INJECTION, SOLUTION INTRAMUSCULAR; INTRAVENOUS at 07:31

## 2020-11-16 RX ADMIN — KETOROLAC TROMETHAMINE 7.5 MG: 15 INJECTION, SOLUTION INTRAMUSCULAR; INTRAVENOUS at 22:29

## 2020-11-16 RX ADMIN — DOCUSATE SODIUM AND SENNOSIDES 1 TABLET: 8.6; 5 TABLET, FILM COATED ORAL at 20:28

## 2020-11-16 RX ADMIN — SODIUM CHLORIDE, SODIUM GLUCONATE, SODIUM ACETATE, POTASSIUM CHLORIDE AND MAGNESIUM CHLORIDE: 526; 502; 368; 37; 30 INJECTION, SOLUTION INTRAVENOUS at 07:12

## 2020-11-16 RX ADMIN — KETOROLAC TROMETHAMINE 7.5 MG: 15 INJECTION, SOLUTION INTRAMUSCULAR; INTRAVENOUS at 17:07

## 2020-11-16 RX ADMIN — MIDAZOLAM HYDROCHLORIDE 1 MG: 1 INJECTION, SOLUTION INTRAMUSCULAR; INTRAVENOUS at 07:15

## 2020-11-16 RX ADMIN — ATORVASTATIN CALCIUM 10 MG: 10 TABLET, FILM COATED ORAL at 22:30

## 2020-11-16 RX ADMIN — FAMOTIDINE 20 MG: 10 INJECTION, SOLUTION INTRAVENOUS at 07:35

## 2020-11-16 RX ADMIN — SODIUM CHLORIDE: 9 INJECTION, SOLUTION INTRAVENOUS at 07:12

## 2020-11-16 RX ADMIN — PHENYLEPHRINE HYDROCHLORIDE 50 MCG: 10 INJECTION INTRAVENOUS at 07:41

## 2020-11-16 RX ADMIN — ONDANSETRON HYDROCHLORIDE 4 MG: 2 SOLUTION INTRAMUSCULAR; INTRAVENOUS at 08:19

## 2020-11-16 RX ADMIN — HYDROMORPHONE HYDROCHLORIDE 0.5 MG: 1 INJECTION, SOLUTION INTRAMUSCULAR; INTRAVENOUS; SUBCUTANEOUS at 13:39

## 2020-11-16 RX ADMIN — PROPOFOL INJECTABLE EMULSION 100 MCG/KG/MIN: 10 INJECTION, EMULSION INTRAVENOUS at 07:24

## 2020-11-16 RX ADMIN — ACETAMINOPHEN 975 MG: 325 TABLET, FILM COATED ORAL at 15:13

## 2020-11-16 RX ADMIN — ACETAMINOPHEN 975 MG: 325 TABLET, FILM COATED ORAL at 06:36

## 2020-11-16 RX ADMIN — EPHEDRINE SULFATE 10 MG: 50 INJECTION, SOLUTION INTRAVENOUS at 08:22

## 2020-11-16 RX ADMIN — ALUMINUM HYDROXIDE, MAGNESIUM HYDROXIDE, AND SIMETHICONE 30 ML: 200; 200; 20 SUSPENSION ORAL at 09:15

## 2020-11-16 RX ADMIN — ASPIRIN 81 MG: 81 TABLET, CHEWABLE ORAL at 20:28

## 2020-11-16 RX ADMIN — PHENYLEPHRINE HYDROCHLORIDE 100 MCG: 10 INJECTION INTRAVENOUS at 07:46

## 2020-11-16 RX ADMIN — KETOROLAC TROMETHAMINE 7.5 MG: 30 INJECTION, SOLUTION INTRAMUSCULAR at 08:39

## 2020-11-16 RX ADMIN — CELECOXIB 200 MG: 200 CAPSULE ORAL at 06:36

## 2020-11-16 RX ADMIN — OXYCODONE HYDROCHLORIDE 10 MG: 5 TABLET ORAL at 20:28

## 2020-11-16 RX ADMIN — PHENYLEPHRINE HYDROCHLORIDE 50 MCG: 10 INJECTION INTRAVENOUS at 08:37

## 2020-11-16 RX ADMIN — SODIUM CHLORIDE, POTASSIUM CHLORIDE, SODIUM LACTATE AND CALCIUM CHLORIDE: 600; 310; 30; 20 INJECTION, SOLUTION INTRAVENOUS at 09:47

## 2020-11-16 RX ADMIN — VANCOMYCIN HYDROCHLORIDE 1 G: 1 INJECTION, POWDER, LYOPHILIZED, FOR SOLUTION INTRAVENOUS at 06:48

## 2020-11-16 RX ADMIN — SODIUM CHLORIDE, POTASSIUM CHLORIDE, SODIUM LACTATE AND CALCIUM CHLORIDE: 600; 310; 30; 20 INJECTION, SOLUTION INTRAVENOUS at 22:30

## 2020-11-16 RX ADMIN — CEFAZOLIN 2 G: 330 INJECTION, POWDER, FOR SOLUTION INTRAMUSCULAR; INTRAVENOUS at 07:10

## 2020-11-16 ASSESSMENT — COGNITIVE AND FUNCTIONAL STATUS - GENERAL
STANDING UP FROM CHAIR USING ARMS: 3 - A LITTLE
CLIMB 3 TO 5 STEPS WITH RAILING: 3 - A LITTLE
WALKING IN HOSPITAL ROOM: 3 - A LITTLE
HELP NEEDED FOR BATHING: 3 - A LITTLE
MOVING TO AND FROM BED TO CHAIR: 3 - A LITTLE
MOVING TO AND FROM BED TO CHAIR: 3 - A LITTLE
CLIMB 3 TO 5 STEPS WITH RAILING: 2 - A LOT
TOILETING: 3 - A LITTLE
HELP NEEDED FOR PERSONAL GROOMING: 3 - A LITTLE
DRESSING REGULAR UPPER BODY CLOTHING: 3 - A LITTLE
STANDING UP FROM CHAIR USING ARMS: 3 - A LITTLE
EATING MEALS: 3 - A LITTLE
WALKING IN HOSPITAL ROOM: 3 - A LITTLE
DRESSING REGULAR LOWER BODY CLOTHING: 3 - A LITTLE

## 2020-11-16 NOTE — ANESTHESIOLOGIST PRE-PROCEDURE ATTESTATION
Pre-Procedure Patient Identification:  I am the Primary Anesthesiologist and have identified the patient on 11/16/20 at 6:44 AM.   I have confirmed the following procedure(s) Right Total Hip Replacement (R) will be performed by the following surgeon/proceduralist Duyr, Rohit ROB DO.

## 2020-11-16 NOTE — ASSESSMENT & PLAN NOTE
Management per orthopedic primary service  DVT prophylaxis, PT/OT when able to tolerate  incentive spirometry

## 2020-11-16 NOTE — HOSPITAL COURSE
Lillie is a 70 y.o. female admitted on 11/16/2020 with OA (osteoarthritis) of hip. Principal problem is OA (osteoarthritis) of hip.    Past Medical History  Lillie has a past medical history of Acid reflux, Diabetes mellitus type I (CMS/[facility]), DKA, type 1 (CMS/[facility]) (09/2016), GERD (gastroesophageal reflux disease), H/O colonoscopy (12/2013), Hyperlipidemia, Hypoglycemia unawareness associated with type 1 diabetes mellitus (CMS/[facility]), Macular degeneration, Pulmonary fibrosis (CMS/[facility]), Pulmonary nodule, left (07/2017), Rheumatoid arthritis (CMS/[facility]), and Skin cancer.    History of Present Illness  DX  s/p R NOHEMI due to OA 11/16

## 2020-11-16 NOTE — ASSESSMENT & PLAN NOTE
-continue home PPI   -OP f/u for consideration of discontinuation of PPI to avoid long term risk of PNA, c. diff, CVA, chronic renal failure, and dementia

## 2020-11-16 NOTE — PROGRESS NOTES
Patient: Lillie Ward  Location: [place] [address]  MRN: [id number]  Today's date: 11/16/2020    Lillie is a 70 y.o. female admitted on 11/16/2020 with OA (osteoarthritis) of hip. Principal problem is OA (osteoarthritis) of hip.    Past Medical History  Lillie has a past medical history of Acid reflux, Diabetes mellitus type I (CMS/[facility]), DKA, type 1 (CMS/[facility]) (09/2016), GERD (gastroesophageal reflux disease), H/O colonoscopy (12/2013), Hyperlipidemia, Hypoglycemia unawareness associated with type 1 diabetes mellitus (CMS/[facility]), Macular degeneration, Pulmonary fibrosis (CMS/[facility]), Pulmonary nodule, left (07/2017), Rheumatoid arthritis (CMS/[facility]), and Skin cancer.    History of Present Illness   s/p R NOHEMI  Concluded session with pt sitting in bed side chair with RN remote in hand.    PT Vitals    Date/Time Pulse SpO2 BP [facility]   11/16/20 1445 85 94 % 125/58 MISAEL      PT Pain    Date/Time Pain Type Pref Pain Scale Side Orientation Location Rating: Rest Rating: Activity Interventions [facility]   11/16/20 1445 Pain Assessment number (Numeric Rating Pain Scale) Right posterior hip 4 4 position adjusted;quiet environment facilitated MISAEL          Prior Living Environment      Most Recent Value   Living Arrangements  house   Living Environment Comment  lives with partner   Number of Stairs, Main Entrance  1   Number of Stairs, Within Home, Primary  12   Stair Railings, Within Home, Primary  railings safe and in good condition          Prior Level of Function      Most Recent Value   Dominant Hand  right   Ambulation  independent   Transferring  independent   Toileting  independent   Bathing  independent   Dressing  independent   Eating  independent   Communication  understands/communicates without difficulty   Swallowing  swallows foods/liquids without difficulty   Assistive Device/Animal Currently Used at Home  walker, front-wheeled, commode, 3-in-1          PT Evaluation and Treatment - 11/16/20 1459        Time  Calculation    Start Time  1440     Stop Time  1457     Time Calculation (min)  17 min        Session Details    Document Type  initial evaluation     Mode of Treatment  individual therapy;physical therapy        General Information    Patient Profile Reviewed?  yes     Onset of Illness/Injury or Date of Surgery  11/16/20     Referring Physician  sizer     Existing Precautions/Restrictions  fall;hip;weight bearing        Weight-Bearing Status    Right LE Weight-Bearing Status  weight-bearing as tolerated (WBAT)        Cognition/Psychosocial    Orientation Status (Cognition)  oriented x 4        Sensory Assessment (Somatosensory)    Sensory Assessment (Somatosensory)  sensation intact        Range of Motion (ROM)    Range of Motion  ROM is WNL        Strength (Manual Muscle Testing)    Strength (Manual Muscle Testing)  strength is WNL        Bed Mobility    [place], Supine to Sit  supervision        Bed to Chair Transfer    [place], Bed to Chair  supervision        Chair to Bed Transfer    [place], Chair to Bed  not tested        Sit to Stand Transfer    [place], Sit to Stand Transfer  supervision        Stand to Sit Transfer    [place], Stand to Sit Transfer  supervision        Gait Training    [place], Gait  supervision     Assistive Device  walker, front-wheeled     Distance in Feet  250 feet     Gait Pattern Utilized  step-through     Deviations/Abnormal Patterns (Gait)  harpal decreased;gait speed decreased     Maintains Weight-Bearing Status  able to maintain        Stairs Training    [place], Stairs  not tested        AM-PAC (TM) - Mobility (Current Function)    Turning from your back to your side while in a flat bed without using bedrails?  3 - A Little     Moving from lying on your back to sitting on the side of a flat bed without using bedrails?  3 - A Little     Moving to and from a bed to a chair?  3 - A Little     Standing up from a chair using your arms?  3 - A Little      To walk in a hospital room?  3 - A Little     Climbing 3-5 steps with a railing?  3 - A Little     AM-PAC (TM) Mobility Score  18        Therapy Assessment/Plan (PT)    Rehab Potential (PT)  good, to achieve stated therapy goals     Therapy Frequency (PT)  daily     Problem List  mobility;pain     Patient/Family Therapy Goals Statement (PT)  decrease pain        Progress Summary (PT)    Daily Outcome Statement (PT)  Pt doing well from a mobility standpoint. Has an insulin pump and wound VAC in place. Anticipate pt will be able to return home.        Therapy Plan Review/Discharge Plan (PT)    PT Recommended Discharge Disposition  home     Anticipated Equipment Needs at Discharge (PT Eval)  none     Therapy Plan Review (PT)  evaluation/treatment results reviewed                       Education provided this session. See the Patient Education summary report for full details.    PT Goals      Most Recent Value   Bed Mobility Goal 1   Activity/Assistive Device  bed mobility activities, all at 11/16/2020 1459   [place]  modified independence at 11/16/2020 1459   Time Frame  2 days at 11/16/2020 1459   Progress/Outcome  goal ongoing at 11/16/2020 1459   Transfer Goal 1   Activity/Assistive Device  all transfers at 11/16/2020 1459   [place]  modified independence at 11/16/2020 1459   Time Frame  2 days at 11/16/2020 1459   Progress/Outcome  goal ongoing at 11/16/2020 1459   Gait Training Goal 1   Activity/Assistive Device  gait (walking locomotion), walker, front-wheeled at 11/16/2020 1459   [place]  modified independence at 11/16/2020 1459   Distance  250 at 11/16/2020 1459   Time Frame  2 days at 11/16/2020 1459   Progress/Outcome  goal ongoing at 11/16/2020 1459   Stairs Goal 1   Activity/Assistive Device  stairs, all skills at 11/16/2020 1459   [place]  modified independence at 11/16/2020 1459   Number of Stairs  12 at 11/16/2020 1459   Time Frame  2 days at 11/16/2020 1459   Progress/Outcome   goal ongoing at 11/16/2020 1455

## 2020-11-16 NOTE — PERIOPERATIVE NURSING NOTE
At appz 0905 pt complained of SSCP No radiation to arm or jaw. No N/V ? SOB. EKG performed and Dr Gamez called. Pt with hx of GERD ws given Maalox and pain subsided

## 2020-11-16 NOTE — ASSESSMENT & PLAN NOTE
EKG normal sinus rhythm, with a rate of 94 without ST elevations or depressions.  She does have risk factors including type 1 diabetes and an autoimmune inflammatory condition, but fortunately her symptoms improved with Maalox.  She does have a history of GERD-like symptoms that typically occur with food    As needed EKG for acute chest pain, trial nitroglycerin, troponin  Outpatient cardiology follow-up for calcium scoring versus stress test  Famotidine 20 mg p.o. daily

## 2020-11-16 NOTE — ANESTHESIA PROCEDURE NOTES
Spinal Block    Patient location during procedure: OR  Start time: 11/16/2020 7:17 AM  End time: 11/16/2020 7:19 AM  Staffing  Anesthesiologist: Sarika Thomas MD  Performed: anesthesiologist   Reason for block: primary anesthetic  Preanesthetic Checklist  Completed: patient identified, site marked, surgical consent, pre-op evaluation, timeout performed, IV checked, risks and benefits discussed, monitors and equipment checked and sterile field maintained during procedure  Spinal Block  Patient position: sitting  Prep: ChloraPrep  Patient monitoring: heart rate, cardiac monitor, continuous pulse ox and blood pressure  Approach: right paramedian  Location: L2-3  Injection technique: single-shot  Needle  Needle type: pencil-tip   Needle gauge: 25 G  Assessment  Sensory level: t6.  Events: cerebrospinal fluid  Medications Administered - 11/16/2020 7:19 AM   Bupivacaine PF (MARCAINE SPINAL) 0.75% intrathecal injection, 2 mL

## 2020-11-16 NOTE — OR SURGEON
Pre-Procedure patient identification:  I am the primary operating surgeon/proceduralist and I have identified the patient and confirmed laterality is right hip on 11/16/20 at 6:47 AM Rohit Pereira DO  Phone Number: [phone]

## 2020-11-16 NOTE — ASSESSMENT & PLAN NOTE
Continue lisinopril 10 mg PO daily as ordered by primary service  If there is any elevation in Cr post-operatively, I would recommend holding and substitute for amlodipine or nifedipine for acute BP management

## 2020-11-16 NOTE — ANESTHESIA PREPROCEDURE EVALUATION
Relevant Problems   CARDIOVASCULAR   (+) Essential hypertension      GASTROINTESTINAL   (+) GERD (gastroesophageal reflux disease)      MUSCULOSKELETAL   (+) Primary osteoarthritis of right hip   (+) Rheumatoid arthritis (CMS/HCC)      Other   (+) Type 1 diabetes mellitus (CMS/HCC)   (+) Type 1 diabetes mellitus with diabetic neuropathy (CMS/HCC)       Anesthesia ROS/MED HX    Anesthesia History    Previous anesthetics  Neuro/Psych - neg  Cardiovascular   hypertension   Covid19 Test Reviewed and ECG reviewed  Comments: Denies loose  Hematological - neg  GI/Hepatic   GERD  Musculoskeletal   Arthritis  Renal Disease- neg  Endo/Other   Diabetes and patient Insulin dependent  Body Habitus: Normal  ROS/MED HX Comments:    Pulmonary: Pulmonary fibrosis       Past Surgical History:   Procedure Laterality Date   • COLONOSCOPY     • EYE SURGERY Bilateral     CATARACT   • FOOT SURGERY Left 2009    reconstruction   • FOOT SURGERY Right 2007    reconstruction   • JOINT REPLACEMENT Right     SHOULDER   • REPLACEMENT TOTAL KNEE Left 04/04/2017    [place]   • SKIN BIOPSY     • TOTAL SHOULDER ARTHROPLASTY Right 1990       Physical Exam    Airway   Mallampati: II   TM distance: >3 FB   Neck ROM: full  Cardiovascular - normal   Rhythm: regular   Rate: normalPulmonary - normal   clear to auscultation  Other Findings   Denies loose  Dental    Teeth Problems: missing        Anesthesia Plan    Plan: spinal    Technique: spinal   ASA 3  Anesthetic plan and risks discussed with: patient       Wt Readings from Last 3 Encounters:   11/12/20 50.8 kg (112 lb)   11/11/20 50.8 kg (112 lb)   10/11/19 50.3 kg (111 lb)       Temp Readings from Last 3 Encounters:   11/16/20 38 °C (100.4 °F) (Temporal)   11/12/20 37.1 °C (98.7 °F) (Oral)   11/11/20 36.2 °C (97.2 °F) (Temporal)       BP Readings from Last 3 Encounters:   11/16/20 136/65   11/12/20 122/68   11/11/20 135/61       Pulse Readings from Last 3 Encounters:   11/16/20 91   11/12/20 90   11/11/20  83       Lab Results   Component Value Date    WBC 6.14 11/11/2020    HGB 15.1 11/11/2020    HCT 43.1 11/11/2020    MCV 92.7 11/11/2020     11/11/2020       Lab Results   Component Value Date    GLUCOSE 73 11/11/2020    CALCIUM 9.8 11/11/2020     11/11/2020    K 4.3 11/11/2020    CO2 24 11/11/2020     11/11/2020    BUN 8 11/11/2020    CREATININE 0.6 11/11/2020       No results found for: HCGPREGUR, PREGSERUM, HCG, HCGQUANT          Current Facility-Administered Medications   Medication Dose Route Frequency Provider Last Rate Last Dose   • acetaminophen (TYLENOL) tablet 975 mg  975 mg oral Once Libia Umanzor PA C       • bupivacaine-epinephrine (PF) (MARCAINE w/EPI) 0.25 %-1:200,000 injection 30 mL  30 mL injection Once Libia Umanzor PA C       • ceFAZolin (ANCEF) IVPB 2 g/50 mL D5W  2 g intravenous 60 min Pre-Op Libia Umanzor PA C       • celecoxib (celeBREX) capsule 200 mg  200 mg oral Once Libia Umanzor PA C       • glucose chewable tablet 16-32 g of dextrose  16-32 g of dextrose oral PRN Libia Umanzor PA C        Or   • dextrose 40 % oral gel 15-30 g of dextrose  15-30 g of dextrose oral PRN Libia Umanzor PA C        Or   • glucagon (GLUCAGEN) injection 1 mg  1 mg intramuscular PRN Libia Umanzor PA C        Or   • dextrose in water injection 12.5 g  25 mL intravenous PRN Libia Umanzor PA C       • povidone-iodine 5 % kit 1 each  1 application Each Nostril 60 min Pre-Op Libia Umanzor PA C       • tranexamic acid (CYKLOKAPRON) 1,000 mg/100 mL sodium chloride 0.7% (premix)  20 mg/kg intravenous Once Libia Umanzor PA C       • vancomycin 1 g/250 mL IVPB in NSS  1 g intravenous 90 min Pre-Op Libia Umanzor PA C           Prior to Admission medications    Medication Sig Start Date End Date Taking? Authorizing Provider   acetaminophen (TYLENOL) 325 mg tablet Take by mouth every 6 (six) hours as needed for mild pain.   Yes  Anna Valero MD   aspirin 81 mg enteric coated tablet Take 81 mg by mouth every other day.   Yes Anna Valero MD   atorvastatin (LIPITOR) 10 mg tablet Take 10 mg by mouth nightly.     Yes Anna Valero MD   cholecalciferol, vitamin D3, (VITAMIN D3) 1,000 unit capsule Take 1,000 Units by mouth daily.     Yes Anna Valero MD   docosahexaenoic acid/epa (FISH OIL ORAL) Take by mouth 2 (two) times a day. For dry eyes   Yes Anna Valero MD   famotidine (PEPCID) 10 mg tablet Take 2 tablets (20 mg total) by mouth daily as needed. With ibuprofen 11/10/20  Yes Jordan Dumas CRNP   folic acid (FOLVITE) 1 mg tablet Take 1 mg by mouth daily. 10/11/17  Yes Anna Valero MD   glucagon, human recombinant, 1 mg injection Infuse 1 mg into a venous catheter once.   Yes Anna Valero MD   insulin lispro (HumaLOG U-100 Insulin) 100 unit/mL cartridge inject by subcutaneous route per prescriber's instructions. Insulin dosing requires individualization. 9/12/16  Yes Anna Valero MD   Lactobac no.41-Bifidobact no.7 (PROBIOTIC-10) 70 mg (3 billion cell) capsule Take by mouth daily.   Yes Anna Valero MD   lisinopriL (PRINIVIL) 10 mg tablet Take 1 tablet by mouth once daily  Patient taking differently: Take 10 mg by mouth daily.   6/18/20  Yes Machelle Gallardo MD   METHOTREXATE SODIUM INJ Inject 2.5 mg as directed once a week. Fridays    Yes Anna Valero MD   multivit with minerals/lutein (MULTIVITAMIN 50 PLUS ORAL) No SIG Entered   Yes Anna Valero MD   turmeric root extract 500 mg capsule Take 1,000 mg by mouth daily.   Yes Anna Valero MD   vit A/vit C/vit E/zinc/copper (PRESERVISION AREDS ORAL) Take by mouth daily.   Yes Anna Valero MD   riTUXimab (RITUXAN) 10 mg/mL chemo injection Inject 10 mg/mL as directed. Every 16 weeks   Sept 7,2020  10/11/17   Anna Valreo MD       Patient Active Problem List   Diagnosis    • Type 1 diabetes mellitus (CMS/HCC)   • GERD (gastroesophageal reflux disease)   • Hyperlipidemia   • Fibrosis of lung (CMS/HCC)   • Rheumatoid arthritis (CMS/HCC)   • Hypoglycemia unawareness associated with type 1 diabetes mellitus (CMS/HCC)   • Abscess of axilla   • Visit for screening mammogram   • Pre-op evaluation   • Cataract of both eyes   • Atypical chest pain   • Postmenopausal   • Cellulitis of right lower extremity   • Type 1 diabetes mellitus with diabetic neuropathy (CMS/HCC)   • Pre-op exam   • Right hip pain   • Primary osteoarthritis of right hip   • Essential hypertension   • Insulin pump in place       Past Medical History:   Diagnosis Date   • Acid reflux    • Diabetes mellitus type I (CMS/HCC)     Dr. Cerda - [facility]   • DKA, type 1 (CMS/HCC) 09/2016    due to insulin pump malfunction - hospitalized   • GERD (gastroesophageal reflux disease)    • H/O colonoscopy 12/2013    [place].  Normal   • Hyperlipidemia    • Hypoglycemia unawareness associated with type 1 diabetes mellitus (CMS/HCC)    • Macular degeneration     BEGINNING STAGES   • Pulmonary fibrosis (CMS/HCC)     CLEARED BY PULMONOLIGIST 2 YEARS AGO 2018   • Pulmonary nodule, left 07/2017   • Rheumatoid arthritis (CMS/HCC)       RHEUMATIOD   • Skin cancer        Past Surgical History:   Procedure Laterality Date   • COLONOSCOPY     • EYE SURGERY Bilateral     CATARACT   • FOOT SURGERY Left 2009    reconstruction   • FOOT SURGERY Right 2007    reconstruction   • JOINT REPLACEMENT Right     SHOULDER   • REPLACEMENT TOTAL KNEE Left 04/04/2017    [place]   • SKIN BIOPSY     • TOTAL SHOULDER ARTHROPLASTY Right 1990

## 2020-11-16 NOTE — INTERVAL H&P NOTE
H&P reviewed. The patient was examined and there are no changes to the H&P. Was cleared by cardiology as well

## 2020-11-16 NOTE — ASSESSMENT & PLAN NOTE
100.4 on admission; hypotension during procedure, could be sepsis related versus procedural/positional/sedation  No AG on AM labs, not DKA related, covid negative  Ordered for ancef and vancomycin, kody-operatively    Consider CXR, UA, blood cultures  Hold antibiotics unless obvious source  Does not need infectious disease consult at this point, but if remains persistent, should be worked up

## 2020-11-16 NOTE — ASSESSMENT & PLAN NOTE
methotrexate weekly and rituximab every 16 weeks, last dose being 9/7/20  Consider rheumatology consult for management recommendations

## 2020-11-16 NOTE — ASSESSMENT & PLAN NOTE
Lab Results   Component Value Date    HGBA1C 6.9 (H) 11/11/2020     -home regimen: insulin pump  -no anion gap, and bicarb normal, no need for insulin gtt  -POCT ACHS, correction scale, goal BS <180 (nice sugars trial)  -diabetic, carb controlled diet  -endocrinology consult for pump management, insulin recommendations, follow up recommendations  -holding lisinopril 10 mg daily given her post operative relative hypotension, and can also hold if any elevation in Cr post-operatively

## 2020-11-16 NOTE — PLAN OF CARE
Problem: Adult Inpatient Plan of Care  Goal: Plan of Care Review  Outcome: Progressing  Flowsheets (Taken 11/16/2020 1617)  Progress: improving  Outcome Summary: pt got oob with assistx1 with walker ,rec'd oxycodone with releif noted.   Plan of Care Review  Plan of Care Reviewed With: patient  Progress: improving  Outcome Summary: pt got oob with assistx1 with walker ,rec'd oxycodone with releif noted.

## 2020-11-16 NOTE — OP NOTE
Post-operative Note Right Total Hip Replacement     Date: 11/16/2020    Location:  [facility] OR    Pre-op Diagnosis: Right Hip Rheumatoid     Post-op Diagnosis:  same    Procedure:   Right Total Hip Arthroplasty    Surgeon(s) and Role:     * Rohit Pereira, DO - Primary     * Lorin Sandoval DO - Resident - Assisting    Anesthesiologist:  Sarika Thomas MD    Anesthesia:  Spinal    EBL: 100 mL    Complications:  None    Drain:  None    Specimens:  None    Implants:    Implant Name Type Inv. Item Serial No.  Lot No. LRB No. Used Action   ACETABULAR SHELL CLUSTERHOLE 48MM - [id number] Acetabular cup shell ACETABULAR SHELL CLUSTERHOLE 48MM  [employer] [id number] Right 1 Implanted   HIP POLY INSERT TRIDENT X3 36MM - [id number] Acetabular cup liner HIP POLY INSERT TRIDENT X3 36MM  [employer] [id number] Right 1 Implanted   SCREW 6.5MM X 25MM HEX LOW PROFILE - [id number] Bone screw SCREW 6.5MM X 25MM HEX LOW PROFILE  [employer] [id number] Right 1 Implanted   SCREW 6.5 X 40MM LOW PROFILE HEX - [id number] Bone screw SCREW 6.5 X 40MM LOW PROFILE HEX  [employer] [id number] Right 1 Implanted   HIP STEM SIZE 4/35MM 132DEG ANGLE NECK - [id number] Femoral hip stem HIP STEM SIZE 4/35MM 132DEG ANGLE NECK  [employer] [id number] Right 1 Implanted   HEAD BIOLOX V40 DELTA CERAMIC 36MM/0 - [id number] Femoral head HEAD BIOLOX V40 DELTA CERAMIC 36MM/0  [employer] [id number] Right 1 Implanted       Indications:    Lillie Ward is a 70 y.o. year old female with a history of right hip arthritis and pain.  The patient failed a conservative treatment program and elected to proceed with hip replacement.  All risks, benefits and associated treatment options were discussed pre-operatively.  All complications were discussed including but not limited to pain, infection, scar, stiffness, bleeding, blood loss, neurovascular injury, implant failure, fracture, dislocation, leg length discrepancy, DVT, MI, PE,  fat emboli syndrome, need for further surgery, loss of limb and loss of life.    Gross Findings:    At the time of surgery the patient was found to have end stage arthritis on femoral and acetabular surfaces.    Procedure:    After informed consent was obtained, risks and benefits discussed, the patient was identified in the operating room.  After the induction of Spinal anesthesia  and the administration of IV antibiotics and TXA the patient was positioned into a lateral position with the operative hip up.  All bony prominences were well padded.  Hip positioners were used to secure the patient to the table and the operative leg was prepped and draped in the usual sterile fashion.  A time-out was then performed.    A posterolateral approach to the hip was utilized, carried down through the skin and subcutaneous tissue.  The fascia was incised in line with its fibers.  The gluteus arnoldo was bluntly dissected.  The piriformis tendon was identified tagged for later re-approximation and for protection of the sciatic nerve and released from its trochanteric insertion point.  An arthrotomy was performed in line with the skin incision and the limb was tagged for later repair.  The hip was then dislocated.    A femoral neck osteotomy was made with a oscillating saw with regards to preoperative and intra-operative measurements.  The acetabulum was then exposed.    The labrum and remenants of the pulvinar were excised.  Reamings were performed in 2mm and then 1mm increments up to a size 48mm reamer.  Cancellous bleeding bone was noted.  The acetabular shell was then placed aiming for 45 degrees of abduction and 20 degrees of anteversion.  Once a good fit was obtained 2 screws were placed followed by the liner and attention was directed back to the femoral side.    The femur was elevated into the wound.  A canal finder was used followed and enable lateralized into the lateral neck.  Broaches were utilized up to a size 4.  A  size 36-2.5 trial head was placed and the hip was reduced, taken through a range of motion and stability was fair. A +0 was placed and stability was excellent.    At this point all trial components were removed and the hip was irrigated.  I then seated the femoral stem.  I retrialed and stability, leg lengths and soft-tissue balancing was most appropriate with a size 36+0mm head.  The head was then placed onto a cleaned and dried trunnion and impacted and checked for stability.  The hip was then reduced and taken once again through a range of motion and found to be stable. A final irrigation was performed.     The arthrotomy was closed with interrupted #1 Ethibond sutures.  The piriformis tendon was brought back up to a trochanteric insertion point with a #1 Ethibond suture.  The fascia was closed with interrupted #1 Ethibond and running barbed monofilament suture. The anterior fascia and the subcutaneous tissue was injected with 0.25% bupivacaine with epinephrine. The subcutaneous tissue was then approximated with 0-Vicryl.  The skin was closed with 3-0 Vicryl, 4-0 barbed monofilament suture and dermabond.  The patient was awakened from anesthesia and transported to the recovery room in stable condition. All counts were correct and debrief was performed.    Rohit Pereira,   11/16/2020

## 2020-11-16 NOTE — ANESTHESIA POSTPROCEDURE EVALUATION
Patient: Lillie Ward    Procedure Summary     Date: 11/16/20 Room / [facility]: [facility] OR [facility] / [facility] OR    Anesthesia Start: 0712 Anesthesia Stop: 0859    Procedure: Right Total Hip Replacement (Right Hip) Diagnosis:       Osteoarthritis of right hip, unspecified osteoarthritis type      (M16.11 Osteoarthritis)    Surgeon: Rohit Pereira DO Responsible Provider: Sarika Thomas MD    Anesthesia Type: spinal ASA Status: 3          Anesthesia Type: spinal  PACU Vitals  11/16/2020 0851 - 11/16/2020 0951      11/16/2020  0855 11/16/2020  0900 11/16/2020  0915 11/16/2020  0930    BP:  (!) 90/46  --  (!) 75/30  (!) 80/33    Temp:  36.6 °C (97.8 °F)  --  --  --    Pulse:  98  92  96  80    Resp:  14  15  (!) 22  14    SpO2:  99 %  100 %  100 %  100 %              11/16/2020  0945             BP:  (!) 78/45       Temp:  --       Pulse:  86       Resp:  (!) 25       SpO2:  100 %               Anesthesia Post Evaluation    Pain management: satisfactory to patient  Mode of pain management: IV medication  Patient location during evaluation: PACU  Patient participation: complete - patient participated  Level of consciousness: awake and alert  Cardiovascular status: acceptable  Airway Patency: adequate  Respiratory status: acceptable  Hydration status: stable  Anesthetic complications: no

## 2020-11-16 NOTE — CONSULTS
Initial Consultation Note       DETAILS OF CONSULTATION   Consulting Service:  Internal Medicine    Requesting Physician: Dr. Pereira    Reason for Consultation: post-operative management     HISTORY OF PRESENT ILLNESS      A 70-year-old female with a past medical history of HLD, T1DM (insulin pump, A1c 6.9, 11/2020), HTN, RA on methotrexate, OA who presents for right total hip replacement, covid negative.    She has no personal history of MI, DVT/PE, COPD, CVA, CKD. The patient failed a conservative treatment program and elected to proceed with hip replacement. She has been having pain in that right hip for about 4 months.    She has a history or alleged pulmonary fibrosis for which she was seen in the past, and determined not to have it. She is typically capable of ambulating two flights of stairs without chest pain, SOB, dizziness, palpitations, or lightheadedness.     She was admitted earlier in the morning for proedure and febrile to 100.4. her vital signs have shown a progressive hypotension, with improvement, tested covid negative. EBL: 100 mL.  Postoperatively, she had a instance of chest discomfort, substernal, nonradiating, not associated with shortness of breath, and improved with Maalox.    At the time of my examination, she has no complaints other than feeling warm.  Review of systems is negative for fever, chills, chest pain, shortness of breath, abdominal pain, nausea, vomiting, diarrhea or constipation.  She denies any sick contacts or recent symptoms of cough or sinus congestion.    Per specialists include:  PCP: Machelle Gallardo MD                                      Cardiology: Adry Hadley MD  Endocrinology: Rohit Couch MD  Rheumatology: Dr. Leventhal  PAST MEDICAL AND SURGICAL HISTORY        Past Medical History:   Diagnosis Date   • Acid reflux    • Diabetes mellitus type I (CMS/[facility])     Dr. Cerda - Baud[facility] Smith   • DKA, type 1 (CMS/[facility]) 09/2016    due to insulin pump  malfunction - hospitalized   • GERD (gastroesophageal reflux disease)    • H/O colonoscopy 12/2013    [facility].  Normal   • Hyperlipidemia    • Hypoglycemia unawareness associated with type 1 diabetes mellitus (CMS/HCC)    • Macular degeneration     BEGINNING STAGES   • Pulmonary fibrosis (CMS/HCC)     CLEARED BY PULMONOLIGIST 2 YEARS AGO 2018   • Pulmonary nodule, left 07/2017   • Rheumatoid arthritis (CMS/HCC)       RHEUMATIOD   • Skin cancer        Past Surgical History:   Procedure Laterality Date   • COLONOSCOPY     • EYE SURGERY Bilateral     CATARACT   • FOOT SURGERY Left 2009    reconstruction   • FOOT SURGERY Right 2007    reconstruction   • JOINT REPLACEMENT Right     SHOULDER   • REPLACEMENT TOTAL KNEE Left 04/04/2017    [facility]   • SKIN BIOPSY     • TOTAL SHOULDER ARTHROPLASTY Right 1990       Machelle Gallardo MD    MEDICATIONS        Medications Prior to Admission   Medication Sig Dispense Refill Last Dose   • acetaminophen (TYLENOL) 325 mg tablet Take by mouth every 6 (six) hours as needed for mild pain.   11/15/2020 at Unknown time   • aspirin 81 mg enteric coated tablet Take 81 mg by mouth every other day.   Past Week at Unknown time   • atorvastatin (LIPITOR) 10 mg tablet Take 10 mg by mouth nightly.     11/15/2020 at Unknown time   • cholecalciferol, vitamin D3, (VITAMIN D3) 1,000 unit capsule Take 1,000 Units by mouth daily.     Past Week at Unknown time   • docosahexaenoic acid/epa (FISH OIL ORAL) Take by mouth 2 (two) times a day. For dry eyes   Past Week at Unknown time   • famotidine (PEPCID) 10 mg tablet Take 2 tablets (20 mg total) by mouth daily as needed. With ibuprofen   Past Week at Unknown time   • folic acid (FOLVITE) 1 mg tablet Take 1 mg by mouth daily.   11/15/2020 at Unknown time   • glucagon, human recombinant, 1 mg injection Infuse 1 mg into a venous catheter once.   Past Week at Unknown time   • insulin lispro (HumaLOG U-100 Insulin) 100 unit/mL cartridge inject by subcutaneous  route per prescriber's instructions. Insulin dosing requires individualization.   11/16/2020 at Unknown time   • Lactobac no.41-Bifidobact no.7 (PROBIOTIC-10) 70 mg (3 billion cell) capsule Take by mouth daily.   Past Week at Unknown time   • lisinopriL (PRINIVIL) 10 mg tablet Take 1 tablet by mouth once daily (Patient taking differently: Take 10 mg by mouth daily.  ) 90 tablet 1 11/15/2020 at Unknown time   • METHOTREXATE SODIUM INJ Inject 2.5 mg as directed once a week. Fridays    Past Week at Unknown time   • multivit with minerals/lutein (MULTIVITAMIN 50 PLUS ORAL) No SIG Entered   Past Week at Unknown time   • turmeric root extract 500 mg capsule Take 1,000 mg by mouth daily.   Past Week at Unknown time   • vit A/vit C/vit E/zinc/copper (PRESERVISION AREDS ORAL) Take by mouth daily.   Past Week at Unknown time   • riTUXimab (RITUXAN) 10 mg/mL chemo injection Inject 10 mg/mL as directed. Every 16 weeks   Sept 7,2020    More than a month at Unknown time       ALLERGIES        Neomycin, Neomycin-bacitracin-polymyxin, Neomycin-bacitracnzn-polymyxnb, Sulfamethoxazole-trimethoprim, and Tetracycline    FAMILY HISTORY        Family History   Problem Relation Age of Onset   • Stroke Biological Mother    • Lung cancer Biological Father    • No Known Problems Biological Sister    • Rheum arthritis Other         1 of 8 siblings   • Bipolar disorder Biological Son    • Alcohol abuse Biological Son    • Depression Biological Son        SOCIAL HISTORY        Social History     Socioeconomic History   • Marital status:      Spouse name: None   • Number of children: 1   • Years of education: None   • Highest education level: None   Occupational History   • Occupation: Retired teacher   Social Needs   • Financial resource strain: None   • Food insecurity     Worry: None     Inability: None   • Transportation needs     Medical: None     Non-medical: None   Tobacco Use   • Smoking status: Never Smoker   • Smokeless tobacco:  Never Used   Substance and Sexual Activity   • Alcohol use: Yes     Frequency: 2-3 times a week     Drinks per session: 1 or 2   • Drug use: No   • Sexual activity: Yes   Lifestyle   • Physical activity     Days per week: None     Minutes per session: None   • Stress: None   Relationships   • Social connections     Talks on phone: None     Gets together: None     Attends Christianity service: None     Active member of club or organization: None     Attends meetings of clubs or organizations: None     Relationship status: None   • Intimate partner violence     Fear of current or ex partner: None     Emotionally abused: None     Physically abused: None     Forced sexual activity: None   Other Topics Concern   • None   Social History Narrative    Marital status-     Children- 1 son    Caffeine - coffee    Exercise-walking    Diet-low carb    Pets-    Tenriism- none    Seat belt-yes    Smoke alarm-yes    Firearms-           REVIEW OF SYSTEMS      Review of Systems    PHYSICAL EXAMINATION      Visit Vitals  BP (!) 100/57 (BP Location: Right upper arm, Patient Position: Lying)   Pulse 77   Temp 36.8 °C (98.2 °F) (Oral)   Resp 17   SpO2 98%     There is no height or weight on file to calculate BMI.    Intake/Output Summary (Last 24 hours) at 11/16/2020 1441  Last data filed at 11/16/2020 0843  Gross per 24 hour   Intake 1200 ml   Output 100 ml   Net 1100 ml       Physical Exam  Constitutional: Well developed, Well Nourished, No apparent distress, Appears Comfortable  Neck: supple, no LAD  Cardiovascular: Normal rate, Regular Rhythm, Normal heart sounds, No murmur noted, No carotid bruits  Pulmonary: Normal effort without distress, Normal breath sounds without wheezing, rhonchi, or rales  Abdomen: Soft, no distension, nontender, normal bowel sounds  Extremities: right hip wound dressing; non-tender; No edema, rheumatoid deformities in hands, swan neck, ulnar deviation; right flank insulin pump  Neurologic: No gross  abnormalities, patient able to ambulate around exam room independently  Skin: Warm, No rash  Psychiatric: Normal mood and affect  LABS / IMAGING / EKG        Labs  Results from last 7 days   Lab Units 11/11/20  1231   WBC K/uL 6.14   HEMOGLOBIN g/dL 15.1   HEMATOCRIT % 43.1   PLATELETS K/uL 258     Results from last 7 days   Lab Units 11/11/20  1231   SODIUM mEQ/L 141   POTASSIUM mEQ/L 4.3   CHLORIDE mEQ/L 105   CO2 mEQ/L 24   BUN mg/dL 8   CREATININE mg/dL 0.6   CALCIUM mg/dL 9.8   GLUCOSE mg/dL 73     Imaging personally reviewed(does not include unread studies):  X-ray Hip With Or Without Pelvis 1 Vw Right    Result Date: 11/16/2020  IMPRESSION: Right total hip replacement COMMENT: AP imaging of the pelvis was performed demonstrating a right total hip replacement.  The prosthetic elements are in satisfactory position without evidence of fracture or loosening.  Air is seen about the right hip postoperatively. The imaged portions of the bony pelvis and left hip appear intact.    ECG/Telemetry  Admission EKG NSR    11/10/2020 EKG: NSR, rate controlled, No ST elevations or depressions; no LVH    ASSESSMENT AND PLAN         * OA (osteoarthritis) of hip  Assessment & Plan  Management per orthopedic primary service  DVT prophylaxis, PT/OT when able to tolerate  incentive spirometry    Chest discomfort  Assessment & Plan  EKG normal sinus rhythm, with a rate of 94 without ST elevations or depressions.  She does have risk factors including type 1 diabetes and an autoimmune inflammatory condition, but fortunately her symptoms improved with Maalox.  She does have a history of GERD-like symptoms that typically occur with food    As needed EKG for acute chest pain, trial nitroglycerin, troponin  Outpatient cardiology follow-up for calcium scoring versus stress test  Famotidine 20 mg p.o. daily    Fever  Assessment & Plan  100.4 on admission; hypotension during procedure, could be sepsis related versus  procedural/positional/sedation  No AG on AM labs, not DKA related, covid negative  Ordered for ancef and vancomycin, kody-operatively    Consider CXR, UA, blood cultures  Hold antibiotics unless obvious source  Does not need infectious disease consult at this point, but if remains persistent, should be worked up    Type 1 diabetes mellitus with diabetic neuropathy (CMS/[facility])  Assessment & Plan  Lab Results   Component Value Date    HGBA1C 6.9 (H) 11/11/2020     -home regimen: insulin pump  -no anion gap, and bicarb normal, no need for insulin gtt  -POCT ACHS, correction scale, goal BS <180 (nice sugars trial)  -diabetic, carb controlled diet  -endocrinology consult for pump management, insulin recommendations  -Continue lisinopril 10 mg daily (If there is any elevation in Cr post-operatively, I would recommend holding and substitute for amlodipine or nifedipine for acute BP management. Can also hold if hypotensive, MAP <60-65)    Rheumatoid arthritis (CMS/[facility])  Assessment & Plan  methotrexate weekly and rituximab every 16 weeks, last dose being 9/7/20  Consider rheumatology consult for management recommendations    Hyperlipidemia  Assessment & Plan  Continue atorvastatin 10 mg PO daily    GERD (gastroesophageal reflux disease)  Assessment & Plan  -continue home PPI   -OP f/u for consideration of discontinuation of PPI to avoid long term risk of PNA, c. diff, CVA, chronic renal failure, and dementia    please await attending attestation for final recommendations     Code Status: Full Code     ATTENDING DOCUMENTATION  ALSO SEE ATTENDING ATTESTATION SECTION OF NOTE

## 2020-11-16 NOTE — PLAN OF CARE
Problem: Adult Inpatient Plan of Care  Goal: Plan of Care Review  Flowsheets (Taken 11/16/2020 1512)  Progress: improving  Plan of Care Reviewed With: patient  Outcome Summary: Anticipate d/c home with significant other once stable     Problem: Adult Inpatient Plan of Care  Goal: Readiness for Transition of Care  Intervention: Mutually Develop Transition Plan  Flowsheets  Taken 11/16/2020 1512 by Katiana Stafford RN  Anticipated Discharge Disposition: home with assistance  Discharge Coordination/Progress: Patient is POD 1 RTHR. Met with patient bedside to discuss potential d/c needs. Patient lives independently with her significant other in multistory home, [address] [facility], powder room, FF to second floor. She has a RW and BSC from prior surgeries, has had HHC in the past but cannot remember from whom, denies hx of SNF. Her plan is for home, her significant other can transport and assist. Verified pharamcy and PCP.  Anticipated Changes Related to Illness: inability to care for self  Readmission Within the Last 30 Days: no previous admission in last 30 days  Patient/Family Anticipates Transition to: home with family  Transportation Anticipated: family or friend will provide  Concerns to be Addressed: denies needs/concerns at this time  Taken 11/16/2020 4372 by Judson Rodriguez PT  Assistive Device/Animal Currently Used at Home:   walker, front-wheeled   commode, 3-in-1

## 2020-11-17 VITALS
OXYGEN SATURATION: 95 % | TEMPERATURE: 98.4 F | DIASTOLIC BLOOD PRESSURE: 52 MMHG | RESPIRATION RATE: 16 BRPM | HEART RATE: 90 BPM | SYSTOLIC BLOOD PRESSURE: 109 MMHG

## 2020-11-17 LAB
ANION GAP SERPL CALC-SCNC: 8 MEQ/L (ref 3–15)
BUN SERPL-MCNC: 9 MG/DL (ref 8–20)
CALCIUM SERPL-MCNC: 8.5 MG/DL (ref 8.9–10.3)
CHLORIDE SERPL-SCNC: 110 MEQ/L (ref 98–109)
CO2 SERPL-SCNC: 23 MEQ/L (ref 22–32)
CREAT SERPL-MCNC: 0.7 MG/DL (ref 0.6–1.1)
ERYTHROCYTE [DISTWIDTH] IN BLOOD BY AUTOMATED COUNT: 14 % (ref 11.7–14.4)
GFR SERPL CREATININE-BSD FRML MDRD: >60 ML/MIN/1.73M*2
GLUCOSE BLD-MCNC: 76 MG/DL (ref 70–99)
GLUCOSE BLD-MCNC: 79 MG/DL (ref 70–99)
GLUCOSE SERPL-MCNC: 79 MG/DL (ref 70–99)
HCT VFR BLDCO AUTO: 37.7 % (ref 35–45)
HGB BLD-MCNC: 12.2 G/DL (ref 11.8–15.7)
MCH RBC QN AUTO: 31 PG (ref 28–33.2)
MCHC RBC AUTO-ENTMCNC: 32.4 G/DL (ref 32.2–35.5)
MCV RBC AUTO: 95.9 FL (ref 83–98)
PDW BLD AUTO: 10.7 FL (ref 9.4–12.3)
PLATELET # BLD AUTO: 192 K/UL (ref 150–369)
POCT TEST: NORMAL
POCT TEST: NORMAL
POTASSIUM SERPL-SCNC: 4.8 MEQ/L (ref 3.6–5.1)
RBC # BLD AUTO: 3.93 M/UL (ref 3.93–5.22)
SODIUM SERPL-SCNC: 141 MEQ/L (ref 136–144)
WBC # BLD AUTO: 7.22 K/UL (ref 3.8–10.5)

## 2020-11-17 PROCEDURE — 63700000 HC SELF-ADMINISTRABLE DRUG: Performed by: PHYSICIAN ASSISTANT

## 2020-11-17 PROCEDURE — 36415 COLL VENOUS BLD VENIPUNCTURE: CPT | Performed by: PHYSICIAN ASSISTANT

## 2020-11-17 PROCEDURE — 85027 COMPLETE CBC AUTOMATED: CPT | Performed by: PHYSICIAN ASSISTANT

## 2020-11-17 PROCEDURE — 80048 BASIC METABOLIC PNL TOTAL CA: CPT | Performed by: PHYSICIAN ASSISTANT

## 2020-11-17 PROCEDURE — 97116 GAIT TRAINING THERAPY: CPT | Mod: GP,CQ

## 2020-11-17 PROCEDURE — 97166 OT EVAL MOD COMPLEX 45 MIN: CPT | Mod: GO

## 2020-11-17 PROCEDURE — 63600000 HC DRUGS/DETAIL CODE: Performed by: PHYSICIAN ASSISTANT

## 2020-11-17 PROCEDURE — 97535 SELF CARE MNGMENT TRAINING: CPT | Mod: GO

## 2020-11-17 RX ORDER — AMOXICILLIN 250 MG
1 CAPSULE ORAL 2 TIMES DAILY PRN
Start: 2020-11-17 | End: 2022-01-18

## 2020-11-17 RX ORDER — ASPIRIN 81 MG/1
81 TABLET ORAL 2 TIMES DAILY
Qty: 60 TABLET | Refills: 0
Start: 2020-11-17 | End: 2023-01-23

## 2020-11-17 RX ORDER — OXYCODONE HYDROCHLORIDE 5 MG/1
5 TABLET ORAL EVERY 4 HOURS PRN
Qty: 30 TABLET | Refills: 0 | Status: SHIPPED | OUTPATIENT
Start: 2020-11-17 | End: 2020-11-22

## 2020-11-17 RX ORDER — ASPIRIN 81 MG/1
81 TABLET ORAL EVERY OTHER DAY
Qty: 15 TABLET | Refills: 0
Start: 2020-12-14 | End: 2021-04-05

## 2020-11-17 RX ORDER — ACETAMINOPHEN 325 MG/1
325 TABLET ORAL EVERY 6 HOURS PRN
Start: 2020-11-30 | End: 2021-04-05

## 2020-11-17 RX ORDER — ACETAMINOPHEN 500 MG
975 TABLET ORAL EVERY 8 HOURS
Qty: 84 TABLET | Refills: 0
Start: 2020-11-17 | End: 2020-12-01

## 2020-11-17 RX ORDER — POLYETHYLENE GLYCOL 3350 17 G/17G
17 POWDER, FOR SOLUTION ORAL DAILY PRN
Start: 2020-11-17 | End: 2022-01-18

## 2020-11-17 RX ADMIN — OXYCODONE HYDROCHLORIDE 10 MG: 5 TABLET ORAL at 13:01

## 2020-11-17 RX ADMIN — OXYCODONE HYDROCHLORIDE 10 MG: 5 TABLET ORAL at 00:26

## 2020-11-17 RX ADMIN — CEFAZOLIN SODIUM 1 G: 1 SOLUTION INTRAVENOUS at 00:22

## 2020-11-17 RX ADMIN — CELECOXIB 200 MG: 200 CAPSULE ORAL at 08:18

## 2020-11-17 RX ADMIN — FAMOTIDINE 20 MG: 20 TABLET ORAL at 08:18

## 2020-11-17 RX ADMIN — DOCUSATE SODIUM AND SENNOSIDES 1 TABLET: 8.6; 5 TABLET, FILM COATED ORAL at 08:18

## 2020-11-17 RX ADMIN — ONDANSETRON 4 MG: 4 TABLET, ORALLY DISINTEGRATING ORAL at 04:28

## 2020-11-17 RX ADMIN — ASPIRIN 81 MG: 81 TABLET, CHEWABLE ORAL at 08:20

## 2020-11-17 RX ADMIN — POLYETHYLENE GLYCOL 3350 17 G: 17 POWDER, FOR SOLUTION ORAL at 08:20

## 2020-11-17 RX ADMIN — KETOROLAC TROMETHAMINE 7.5 MG: 15 INJECTION, SOLUTION INTRAMUSCULAR; INTRAVENOUS at 04:27

## 2020-11-17 ASSESSMENT — COGNITIVE AND FUNCTIONAL STATUS - GENERAL
DRESSING REGULAR UPPER BODY CLOTHING: 4 - NONE
MOVING TO AND FROM BED TO CHAIR: 3 - A LITTLE
DRESSING REGULAR LOWER BODY CLOTHING: 3 - A LITTLE
HELP NEEDED FOR BATHING: 3 - A LITTLE
TOILETING: 3 - A LITTLE
AFFECT: WFL;ANXIOUS
HELP NEEDED FOR PERSONAL GROOMING: 4 - NONE
WALKING IN HOSPITAL ROOM: 3 - A LITTLE
EATING MEALS: 4 - NONE
CLIMB 3 TO 5 STEPS WITH RAILING: 3 - A LITTLE
STANDING UP FROM CHAIR USING ARMS: 3 - A LITTLE

## 2020-11-17 NOTE — PLAN OF CARE
Problem: Adult Inpatient Plan of Care  Goal: Plan of Care Review  Outcome: Progressing  Flowsheets (Taken 11/17/2020 0302)  Progress: improving  Plan of Care Reviewed With: patient  Outcome Summary: Pt received in bed, c/o pain in right hip. Relieved w/ ambulation and medication. IVF as ordered, voiding in bathroom w/out difficulty. VSS, will continue to monitor.  Goal: Patient-Specific Goal (Individualization)  Outcome: Progressing  Goal: Absence of Hospital-Acquired Illness or Injury  Outcome: Progressing  Goal: Optimal Comfort and Wellbeing  Outcome: Progressing  Goal: Readiness for Transition of Care  Outcome: Progressing     Problem: Joint Function Impaired (Hip Arthroplasty)  Goal: Optimal Functional Ability  Outcome: Progressing     Problem: Fall Injury Risk  Goal: Absence of Fall and Fall-Related Injury  Outcome: Progressing   Plan of Care Review  Plan of Care Reviewed With: patient  Progress: improving  Outcome Summary: Pt received in bed, c/o pain in right hip. Relieved w/ ambulation and medication. IVF as ordered, voiding in bathroom w/out difficulty. VSS, will continue to monitor.

## 2020-11-17 NOTE — PROGRESS NOTES
Patient: Lillie Ward  Location: [place] [address]  MRN: [id number]  Today's date: 11/17/2020    Jen Alvarez, OTR/L    RECS: DC OT/ no further skilled OT intervention indicated    DISPO: home w/ A    DME: hip kit; has BSC/ RW/ grab bar    Left  patient  chair, - alarm, + incontinence pad, + call bell in reach, +Nurse aware.    Lillie is a 70 y.o. female admitted on 11/16/2020 with OA (osteoarthritis) of hip. Principal problem is OA (osteoarthritis) of hip.    Past Medical History  Lillie has a past medical history of Acid reflux, Diabetes mellitus type I (CMS/[facility]), DKA, type 1 (CMS/[facility]) (09/2016), GERD (gastroesophageal reflux disease), H/O colonoscopy (12/2013), Hyperlipidemia, Hypoglycemia unawareness associated with type 1 diabetes mellitus (CMS/[facility]), Macular degeneration, Pulmonary fibrosis (CMS/[facility]), Pulmonary nodule, left (07/2017), Rheumatoid arthritis (CMS/[facility]), and Skin cancer.    History of Present Illness  DX  s/p R NOHEMI due to OA 11/16    OT Vitals    Date/Time Pulse HR Source SpO2 Pt Activity O2 Therapy BP BP Location BP Method Pt Position [facility]   11/17/20 0806 85 Monitor 92 % At rest None (Room air) 114/56 Left upper arm Automatic Sitting GDS   11/17/20 0853 90 Monitor 95 % Other (Comment) None (Room air) 109/52 Left upper arm Automatic Sitting GDS      OT Pain    Date/Time Pref Pain Scale Side Location Rating: Rest Rating: Activity Interventions [facility]   11/17/20 0806 number (Numeric Rating Pain Scale) Right hip 3 4 position adjusted GDS          Prior Living Environment      Most Recent Value   Living Arrangements  house   Living Environment Comment  2SH, significant other, 1 CAMILA, +UT w/ RTS,  FF/ B rails to bed and bath, stall w/ grab bar,  has BSC   Number of Stairs, Main Entrance  1   Number of Stairs, Within Home, Primary  12   Stair Railings, Within Home, Primary  railings safe and in good condition          Prior Level of Function      Most Recent Value   Dominant Hand  right    Ambulation  independent   Transferring  independent   Toileting  independent   Bathing  independent   Dressing  independent   Eating  independent   Communication  understands/communicates without difficulty   Swallowing  swallows foods/liquids without difficulty   Prior Level of Function Comment  PTA, reports ind ADL/ transfers,  occ A socks   Assistive Device/Animal Currently Used at Home  dressing device, grab bar, medication pump, commode, 3-in-1 [RW]          Occupational Profile      Most Recent Value   Reason for Services/Referral  ADL/ MOb dysfunction   Occupational History/Life Experiences  retired: teacher   Patient Goals  go home          OT Evaluation and Treatment - 11/17/20 0806        Time Calculation    Start Time  0806     Stop Time  0853     Time Calculation (min)  47 min        Session Details    Document Type  initial evaluation     Mode of Treatment  occupational therapy        General Information    Patient Profile Reviewed?  yes     Onset of Illness/Injury or Date of Surgery  11/16/20     Referring Physician  Dr Pereira     General Observations of Patient  received pt in chair/ agreeable to OT     Existing Precautions/Restrictions  fall;hip;weight bearing        Weight-Bearing Status    Right LE Weight-Bearing Status  weight-bearing as tolerated (WBAT)    posterior hip prec       Cognition/Psychosocial    Affect/Mental Status (Cognitive)  WFL;anxious     Orientation Status (Cognition)  oriented x 4     Follows Commands (Cognition)  WFL     Comment, Cognition  awake, alert, oriented, follows commands, educ hip prec > tends to cross LEs and bend forward; pt verbalized understanding of hip dislocation risks; rolled blanket placed between pt legs at end of session        Hearing Assessment    Hearing Status  WFL        Vision Assessment/Intervention    Visual Impairment/Limitations  corrective lenses for reading        Sensory Assessment (Somatosensory)    Sensory Assessment (Somatosensory)  UE  sensation intact        Range of Motion (ROM)    Range of Motion  bilateral upper extremities        Range of Motion Comprehensive    Comment: Range of Motion  h/o arthritis; sh replacement> demos ~ 80 R sh flex/ L ~ 90 flex; elb lack ~ 15 end range ext; wrists neutral; digits w/ prominent jt changes/ ulnar sway/ achieves 3/4 fist; Pt manages this lack of sh flex w/ forward flex in the past> OT discussed compensation methods to assist w/ this.         Strength (Manual Muscle Testing)    Strength (Manual Muscle Testing)  bilateral upper extremities        Strength Comprehensive (MMT)    Comment  B sh flex ~ 3+ w/in avail range; elb ~ 4-/5 in avail range; grasp F+>G- in avail range        Bed Mobility    Comment (Bed Mobility)  received pt OOB in chair        Transfers    Transfers  toilet transfer;shower transfer;car transfer     Maintains Weight-Bearing Status (Transfers)  able to maintain weight-bearing status;cues to maintain weight-bearing status    cues hip prec       Bed to Chair Transfer    Comment  received pt in chair        Sit to Stand Transfer    Long, Sit to Stand Transfer  supervision;verbal cues     Verbal Cues  hand placement;maintaining precautions;preparatory posture;technique     Assistive Device  walker, front-wheeled     Comment  cues safe hip tech/ SUP to transition to stand/ no LOB        Stand to Sit Transfer    Long, Stand to Sit Transfer  verbal cues;supervision     Verbal Cues  hand placement;maintaining precautions;preparatory posture;technique     Assistive Device  walker, front-wheeled     Comment  cues safe hip tech/ SUP for eccentric control/ no LOB        Toilet Transfer    Transfer Technique  sit-stand;stand-sit     Long, Toilet Transfer  supervision;verbal cues     Verbal Cues  hand placement;maintaining precautions;preparatory posture;technique     Assistive Device  raised toilet seat;walker, front-wheeled     Comment  has RTS/BSC at home        Shower  Transfer    Comment  educ safe step in tech; has grab bar        Car Transfer    Comment  educ/demo simulated car transfer tech> pt verbalized good understanding        Balance    Balance Assessment  sitting static balance;sitting dynamic balance;standing static balance;standing dynamic balance     Static Sitting Balance  WFL     Dynamic Sitting Balance  WFL     Static Standing Balance  WFL;supported;standing    RW    Dynamic Standing Balance  mild impairment;supported;standing    amb w/ RW> SUP; no LOB    Balance Test Results  Functional Reach Test     Comment, Balance  SUP in standing but no overt LOB        Functional Reach Test    Trial One: Functional Reach Test (in)  4 inches     Trial Two: Functional Reach Test (in)  4 inches     Average (in)  4 inches        Basic Activities of Daily Living (BADLs)    Energy Conservation Techniques  activity adapted to utilize sitting position;ask for assistance with difficult tasks;avoid bending, reaching, stooping;items placed within easy reach;regular rest breaks    educ routine position changes/ pacing       Bathing    Comment  reports A via nursing; discussed and demo  safe tech/recommended LHBS from hip kit; has grab bar        Upper Body Dressing    Comment  IND/seated        Lower Body Dressing    Self-Performance  threads right leg, underpants;dons/doffs left sock;dons/doffs right sock     [place]  supervision;verbal cues     Position  supported sitting     Adaptive Equipment  dressing stick;reacher;sock aid    has reacher; recommend purchase of full hip kit/ info prov       Grooming    Comment  IND to wash hands        Toileting    [place]  adjust/manage clothing;perform bladder hygiene;perform bowel hygiene;modified independence;verbal cues     Position  supported sitting     Adaptive Equipment  raised toilet seat     Comment  educ/prac adapt hyg tech> performed w mod ind/cues        Self-Feeding    Comment  IND/MOD IND due to jt changes        AM-PAC  (TM) - ADL (Current Function)    Putting on and taking off regular lower body clothing?  3 - A Little     Bathing?  3 - A Little     Toileting?  3 - A Little     Putting on/taking off regular upper body clothing?  4 - None     How much help for taking care of personal grooming?  4 - None     Eating meals?  4 - None     AM-PAC (TM) ADL Score  21        Therapy Assessment/Plan (OT)    Rehab Potential (OT)  other (see comments)    all OT educ completed/ will DC OT at this time    Therapy Frequency (OT)  evaluation only        Progress Summary (OT)    Daily Outcome Statement (OT)  [facility] 21; demos SUP but safe trasnfers; SUP/cues>mod ind/cues seated ADls. Has BSC/ reacher; recommend purchase of full hip kit w/ info provided. All OT educ completed/ will DC OT. REC home w/ A        Therapy Plan Review/Discharge Plan (OT)    OT Recommended Discharge Disposition  home with assist     Anticipated Equipment Needs At Discharge (OT)  dressing equipment    has BSC/ RW/ grab bar                  Education provided this session. See the Patient Education summary report for full details.

## 2020-11-17 NOTE — PROGRESS NOTES
Patient: Lillie Ward   MRN: [id number]   : 1950       Daily Progress Note  (LOS: 1 days) 1 Day Post-Op s/p Procedure(s):  Right Total Hip Replacement     Subjective     70 y.o. y/o female s/p Procedure(s):  Right Total Hip Replacement.     Patient is doing well. Pain is controlled. Denies chest pain, shortness of breath. Denies nausea or vomiting. Admits to flatus but no BM. Voiding without difficulties. Tolerating diet. OOB with PT today. Denies fever or chills. Administering own insulin with insulin pump. Also doing self monitoring of blood glucose    Patient Active Problem List   Diagnosis   • Type 1 diabetes mellitus (CMS/HCC)   • GERD (gastroesophageal reflux disease)   • Hyperlipidemia   • Fibrosis of lung (CMS/HCC)   • Rheumatoid arthritis (CMS/HCC)   • Hypoglycemia unawareness associated with type 1 diabetes mellitus (CMS/HCC)   • Abscess of axilla   • Visit for screening mammogram   • Pre-op evaluation   • Cataract of both eyes   • Atypical chest pain   • Postmenopausal   • Cellulitis of right lower extremity   • Type 1 diabetes mellitus with diabetic neuropathy (CMS/HCC)   • Pre-op exam   • Right hip pain   • Primary osteoarthritis of right hip   • Essential hypertension   • Insulin pump in place   • OA (osteoarthritis) of hip   • Fever   • Chest discomfort       Objective     LAST VITALS:    Vitals:    20 0115 20 0700 20 0806 20 0853   BP: 110/60 (!) 116/51 (!) 114/56 (!) 109/52   BP Location: Left upper arm Left upper arm Left upper arm Left upper arm   Patient Position: Lying Sitting Sitting Sitting   Pulse: 78 90 85 90   Resp:  16     Temp: 36.8 °C (98.2 °F) 36.9 °C (98.4 °F)     TempSrc: Oral Oral     SpO2: 94% 93% 92% 95%       Temp (24hrs), Av.8 °C (98.2 °F), Min:36.6 °C (97.8 °F), Max:36.9 °C (98.4 °F)        Intake/Output Summary (Last 24 hours) at 2020 1120  Last data filed at 2020  Gross per 24 hour   Intake --   Output 575 ml   Net -575 ml       No intake/output data recorded.    Allergies: Neomycin, Neomycin-bacitracin-polymyxin, Neomycin-bacitracnzn-polymyxnb, Sulfamethoxazole-trimethoprim, and Tetracycline       PHYSICAL EXAM:    Prevena dressing: clean, dry and intact with expected postop edema and ecchymosis. + 5/5 DF/PF/EHL bilateral. +NVI with sensation intact. Distal Pulses: 2+ bilateral,Calves: soft, non tender bilateral      LABS:     Results from last 7 days   Lab Units 11/17/20  0747   WBC K/uL 7.22   HEMOGLOBIN g/dL 12.2   HEMATOCRIT % 37.7   PLATELETS K/uL 192   SODIUM mEQ/L 141   POTASSIUM mEQ/L 4.8   CHLORIDE mEQ/L 110*   CO2 mEQ/L 23   BUN mg/dL 9   CREATININE mg/dL 0.7   GLUCOSE mg/dL 79   CALCIUM mg/dL 8.5*         Assessment/Plan     70 y.o. y/o female s/p Procedure(s):  Right Total Hip Replacement     1 Day Post-Op     1. DVT prophylaxis: Asprin 81 mg po BID for 4 weeks and bilateral SCD  2. PT/OT per protocol  3. Incentive spirometer  4. Pain management: Scheduled: tylenol, celebrex, toradol PRN: oxycodone, tramadol, IV dilaudid   5. Bowel regimen: senokot, colace, miralax  6. Medicine: appreciate recommendations   7. DM1 with insulin pump: Pt BG in very good control. No further intervention needed.    Dispo: home d/c today    SARAH Howell  11/17/2020  11:20 AM

## 2020-11-17 NOTE — PLAN OF CARE
Problem: Adult Inpatient Plan of Care  Goal: Plan of Care Review  Outcome: Progressing  Flowsheets (Taken 11/17/2020 1004)  Progress: improving  Plan of Care Reviewed With: patient  Outcome Summary: Pt demonstrated safe transfers, mob w/ RW and stair navigation.  Rec home when deemed med stable.

## 2020-11-17 NOTE — PROGRESS NOTES
Patient: Lillie Ward  Location: [place] [address]  MRN: [id number]  Today's date: 11/17/2020     Pt seated in bedside chair w/ call bell in reach.  All needs met at this time.    Lillie is a 70 y.o. female admitted on 11/16/2020 with OA (osteoarthritis) of hip. Principal problem is OA (osteoarthritis) of hip.    Past Medical History  Lillie has a past medical history of Acid reflux, Diabetes mellitus type I (CMS/[facility]), DKA, type 1 (CMS/[facility]) (09/2016), GERD (gastroesophageal reflux disease), H/O colonoscopy (12/2013), Hyperlipidemia, Hypoglycemia unawareness associated with type 1 diabetes mellitus (CMS/[facility]), Macular degeneration, Pulmonary fibrosis (CMS/[facility]), Pulmonary nodule, left (07/2017), Rheumatoid arthritis (CMS/[facility]), and Skin cancer.    History of Present Illness  DX  s/p R NOHEMI due to OA 11/16    PT Vitals    Date/Time Pulse HR Source SpO2 Pt Activity O2 Therapy BP BP Location BP Method Pt Position [facility]   11/17/20 0853 90 Monitor 95 % Other (Comment) None (Room air) 109/52 Left upper arm Automatic Sitting GDS      PT Pain    Date/Time Pain Type Pref Pain Scale Side Location Rating: Rest Rating: Activity Interventions [facility]   11/17/20 0852 Pain Assessment number (Numeric Rating Pain Scale) Right hip 3 4 position adjusted [facility]          Prior Living Environment      Most Recent Value   Living Arrangements  house   Living Environment Comment  2SH, significant other, 1 CAMILA, +NC w/ RTS,  FF/ B rails to bed and bath, stall w/ grab bar,  has BSC   Number of Stairs, Main Entrance  1   Number of Stairs, Within Home, Primary  12   Stair Railings, Within Home, Primary  railings safe and in good condition          Prior Level of Function      Most Recent Value   Dominant Hand  right   Ambulation  independent   Transferring  independent   Toileting  independent   Bathing  independent   Dressing  independent   Eating  independent   Communication  understands/communicates without difficulty   Swallowing   swallows foods/liquids without difficulty   Prior Level of Function Comment  PTA, reports ind ADL/ transfers,  occ A socks   Assistive Device/Animal Currently Used at Home  dressing device, grab bar, medication pump, commode, 3-in-1 [RW]          PT Evaluation and Treatment - 11/17/20 0852        Time Calculation    Start Time  0852     Stop Time  0904     Time Calculation (min)  12 min        Session Details    Document Type  daily treatment/progress note     Mode of Treatment  physical therapy        General Information    Patient Profile Reviewed?  yes     General Observations of Patient  pt received in bed, agreeable to session.     Existing Precautions/Restrictions  fall;hip;weight bearing        Weight-Bearing Status    Right LE Weight-Bearing Status  weight-bearing as tolerated (WBAT)        Sit to Stand Transfer    [place], Sit to Stand Transfer  supervision;nonverbal cues (demo/gesture)     Verbal Cues  hand placement     Assistive Device  walker, front-wheeled        Stand to Sit Transfer    [place], Stand to Sit Transfer  supervision;verbal cues     Verbal Cues  hand placement;safety;technique     Assistive Device  walker, front-wheeled        Gait Training    [place], Gait  supervision;verbal cues     Assistive Device  walker, front-wheeled     Distance in Feet  300 feet     Gait Pattern Utilized  step-through     Deviations/Abnormal Patterns (Gait)  gait speed decreased     Maintains Weight-Bearing Status  able to maintain     Comment  steady gait, no episodes of LOB.        Stairs Training    [place], Stairs  supervision     Assistive Device  railing     Handrail Location  both sides     Number of Stairs  9     Ascending Stairs Technique  step-to-step     Descending Stairs Technique  step-to-step     Comment  cues for sequencing.        AM-PAC (TM) - Mobility (Current Function)    Turning from your back to your side while in a flat bed without using bedrails?  3 - A Little      Moving from lying on your back to sitting on the side of a flat bed without using bedrails?  3 - A Little     Moving to and from a bed to a chair?  3 - A Little     Standing up from a chair using your arms?  3 - A Little     To walk in a hospital room?  3 - A Little     Climbing 3-5 steps with a railing?  3 - A Little     AM-PAC (TM) Mobility Score  18        Therapy Assessment/Plan (PT)    Rehab Potential (PT)  good, to achieve stated therapy goals     Therapy Frequency (PT)  daily        Progress Summary (PT)    Daily Outcome Statement (PT)  Pt demonstrated safe transfers, mob w/ RW and stair navigation.  Rec home when deemed med stable.  Issued and reviewed HEP.       Symptoms Noted During/After Treatment  none        Therapy Plan Review/Discharge Plan (PT)    PT Recommended Discharge Disposition  home     Anticipated Equipment Needs at Discharge (PT Eval)  none        Plan of Care Review    Plan of Care Reviewed With  patient                       Education provided this session. See the Patient Education summary report for full details.    PT Goals      Most Recent Value   Bed Mobility Goal 1   Activity/Assistive Device  bed mobility activities, all at 11/16/2020 1459   [place]  modified independence at 11/16/2020 1459   Time Frame  2 days at 11/16/2020 1459   Progress/Outcome  goal ongoing at 11/16/2020 1459   Transfer Goal 1   Activity/Assistive Device  all transfers at 11/16/2020 1459   [place]  modified independence at 11/16/2020 1459   Time Frame  2 days at 11/16/2020 1459   Progress/Outcome  goal ongoing at 11/16/2020 1459   Gait Training Goal 1   Activity/Assistive Device  gait (walking locomotion), walker, front-wheeled at 11/16/2020 1459   [place]  modified independence at 11/16/2020 1459   Distance  250 at 11/16/2020 1459   Time Frame  2 days at 11/16/2020 1459   Progress/Outcome  goal ongoing at 11/16/2020 1459   Stairs Goal 1   Activity/Assistive Device  stairs, all skills at  11/16/2020 1459   [place]  modified independence at 11/16/2020 1459   Number of Stairs  12 at 11/16/2020 1459   Time Frame  2 days at 11/16/2020 1459   Progress/Outcome  goal ongoing at 11/16/2020 1452

## 2020-11-17 NOTE — PLAN OF CARE
Problem: Adult Inpatient Plan of Care  Goal: Plan of Care Review  Outcome: Progressing  Flowsheets (Taken 11/17/2020 0914)  Plan of Care Reviewed With: patient  Outcome Summary:   OT ev completed   SUP transfers/ cues hip prec   SUP>mod IND ADL. Has BSC/reacher. All OT educ completed. REC home w/ A

## 2020-11-17 NOTE — PATIENT CARE CONFERENCE
Care Progression Rounds Note  Date: 11/17/2020  Time: 10:44 AM     Patient Name: Lillie Ward     Medical Record Number: [id number]   YOB: 1950  Sex: Female      Room/Bed: 0174    Admitting Diagnosis: Osteoarthritis of right hip, unspecified osteoarthritis type [M16.11]  OA (osteoarthritis) of hip [M16.9]   Admit Date/Time: 11/16/2020  5:53 AM    Primary Diagnosis: OA (osteoarthritis) of hip  Principal Problem: OA (osteoarthritis) of hip    GMLOS: pending  Anticipated Discharge Date: 11/17/2020    AM-PAC  Mobility Score: 18    Discharge Planning:  Living Arrangements: house  Anticipated Discharge Disposition: home with assistance    Barriers to Discharge:  Barriers to Discharge: Consultant recommendations pending, Therapy update pending    Participants:  advanced practice provider, , nursing, occupational therapy, physical therapy

## 2020-11-17 NOTE — DISCHARGE INSTRUCTIONS
POST-OPERATIVE (AFTER SURGERY INSTRUCTIONS) -HIP REPLACEMENT  Surgeon:  Dr. Rohit Pereira Nurse: Juanita Mcneal RN, BSN  Phone/Voicemail: [phone] 8:00am-4:00pm Monday-Friday  [facility] Main Phone: [phone]   Call [phone] for patient emergencies during or after business hours     Congratulations!  You've made a big step!  Our focus now is on your recovery and getting you back to a healthy, happy, pain-free lifestyle.  Every day, you will find you can do a bit more.  You are literally teaching yourself to walk normally again! Here are answers to some common questions:    1. ACTIVITY/REHABILITATION/PHYSICALTHERAPY (PT)  There is a great deal of healing that will take place during the next several months of your recovery as your bone literally grows into the new implant and your muscles heal.  Please do not overdo it!  Let your hip heal and it will likely give you a lifetime of pain relief. Your focus should simply be on walking and doing your normal activities of daily living (ADL).  You use all the muscles around your hip when you walk and when you're up doing things.  You do not need to set records, simply walk as much as you feel comfortable every day.  It is normal to have discomfort, but if you are having intense pain, stop the activity and rest.  You'll notice just about every day that you are feeling stronger and can walk further with less assistance. Don't be concerned about specific exercises, JUST WALK!!  The plan is for you to go home directly from the hospital (there may be an exception, but it is rare).  Most people will be able to go home in 1 day after surgery.  The timing of your discharge will be determined by how well you do with the hospital physical therapy.  We want to make sure that you are safe to go home.  If you can move around the hospital without assistance, it's unlikely that you'll need extra assistance when you get home.  Home care services; such as physical  therapy and nursing are rarely required, even if you live alone. Remember, we simply want you to be able to safely get around your home and WALK!  Inpatient rehabilitation is almost never required in this day and age. In the unusual situation that it is necessary, it will be arranged by the hospital .  Even at inpatient rehab, we just want you to focus on walking. The vast majority of patients will NOT even need any formal outpatient PT!  However, if you are having any problems at home, please call us and we can discuss the option of a more formal PT program. We've learned that patients “taking it easy” for the first couple of weeks after surgery may have better outcomes. Doing intense PT right after surgery can cause pain and swelling.  If you avoid overdoing it, you will have less pain and swelling.    • Every day you will feel stronger and more stable. You will use a walker at first and you should plan to advance to a cane sometime within a couple weeks after surgery - you can do this whenever you feel comfortable. Using the cane helps your muscles heal faster, so use the cane for about a month. Stay active, but don't overdo it - we want the tissues and bone to heal well before we start stressing the hip.  You can resume upper body exercises or start using a stationary bike (but not necessary!) after 2 weeks.  Remember: NO FORMAL Physical Therapy…Just WALK!  2. SWELLING   It is NORMAL for your leg to swell significantly after surgery.  If you're having a lot of swelling, you must spend more time elevating your leg well above your heart (see image below).  You may need to elevate your leg on 4 or 5 pillows for 30-60 minutes at a time, 5 times a day.  If your leg swelling is not improving over time then please call our office for further instruction. Some swelling can persist for weeks to months after the procedure.    This is How to Elevate Your Legs above Heart Level:            To Reduce Swelling  and pain  Do this 30-60 mins at a time!  Do this 4 to 5 times a day!                              3. MEDICINES  You will be given prescriptions at the time of discharge from the hospital.   Please inform hospital and office staff of any medication allergies.   If you are not tolerating medication well in the hospital prior to discharge then please let your hospital nurse know that you are experiencing side effects.   Your nurse will review your medication with you prior to your discharge from the hospital.    Pain Medicine: The key medications for decreased narcotic use will be Tylenol (acetaminophen) and an anti-inflammatory such as Meloxicam or Celebrex.   Taking these medications on a regular basis as ordered, for the first 2 weeks after surgery will help your recovery tremendously.  *If not medically contraindicated you will take 1000mg of Tylenol every 8 hours.* Most patients will be given a prescription for Oxycodone, Hydrocodone, or Tramadol.  Take narcotic/opioid medication as needed, but wean off of it as soon as you can. Narcotic addiction can begin after only 3 days of taking the medication.  You should notice that you need less narcotic/opioid pain medicine as time passes. It is important, however, to have adequate pain control so that you can participate in PT and perform your stretches - proper pain control will make your recovery easier and faster!   Do NOT drive while you are taking narcotic pain medicines.  Stop taking the pain medicine if you become dizzy or disoriented.  Constipation is a major side effect of narcotics and many patients will avoid narcotics, as they prefer to deal with a bit of pain rather than constipation.  If you stop taking the pain medicine, you can typically stop taking the stool softener. Gradually weaning off the narcotic/opioid pain medication is safer than abruptly stopping them.     Constipation Management/Stool Softener: (usually Colace).  This helps to avoid  constipation caused by the other medications. Take the stool softener in the morning and in the evening daily for 2-4 weeks, or as long as you are taking the narcotics. It is fine to take over-the-counter laxatives in addition to Colace for constipation management.  If you are not constipated or you experience diarrhea, stop taking the stool softener!    Blood Thinners: Aspirin, Coumadin (Warfarin), Xarelto, Eliquis or Lovenox (Enoxaparin) - ONLY ONE - you should not be on more than one of these medicines.  These medicines will help to reduce the risk of blood clots.  However, if your blood becomes too “thin”, you may see bleeding (at the surgical site, gums, in urine, rectum, etc.), severe bruising, or stomach upset.  Please call the office if this occurs.  Do not take vitamin K, vitamin E, or supplements until seen in the office.  It is fine to take a multivitamin. Please remember that the most important way to reduce the risk of clot formation is to get up and get going!        ASPIRIN/ECOTRIN (nearly all patients):   If you are discharged on Aspirin 81mg, take it once in the morning with breakfast and once in the evening with dinner for 4 weeks after your surgery.       COUMADIN (Warfarin):  If you are taking Coumadin, you must get a blood test (INR) drawn 2x/week (you will have a prescription for this).  Your dose will be adjusted based on the lab values.  Stay on Coumadin for 4 weeks.  If you were taking Coumadin before surgery you may resume your normal dose 4 weeks after your surgery.  Typically the cardiologist or your medical doctor will follow your labs and adjust the Coumadin dose.  If you do not receive instruction from them or cannot get in touch with them, then please call our office.  3. MEDICINES (Blood Thinners Continued)    LOVENOX (Enoxaparin):    This is an injection you will give to yourself for 14 days after surgery.  NOTE: If you notice any excessive bruising or bleeding from your surgical  wound (or elsewhere), call our office.    Other Blood Thinners:  If you are taking other types of blood thinners such as Xarelto, Eliquis, Plavix, Aggrenox, Arixtra, Heparin, etc. please confirm your specific dosing and when to resume these medicines prior to your discharge.  Please DO NOT leave the hospital without a clear understanding of the instructions regarding blood thinners!    DO NOT TAKE ANTI-INFLAMMATORY MEDICATIONS SUCH AS ADVIL, ALEVE, MOTRIN AND IBUPROFEN WHILE TAKING THE ABOVE BLOOD THINNING MEDICATIONS!!!  THIS CAN CAUSE DAMAGE TO YOUR STOMACH OR LEAD TO EXCESSIVE BLEEDING.    4. SURGICAL DRESSING/INCISION  Your incision is covered with a Prevena dressing (see instructions below). The dressing will remain on for one week. After one week, remove Prevena dressing (see instructions below). Once dressing is removed, your incision can remain open to the air. If you have any blood oozing from the incision then place a gauze pad and an adhesive bandage over the surgical site to apply some compression. Once removed, simply let the water of the shower and soap run over the incision daily and leave the incision open to air. Do not scrub the incision. Do not apply any lotions, creams, or ointments to your incision until fully closed and healed (4-6 weeks). Do not soak in a tub or swim until incision fully closed and healed (4-6 weeks). There are typically not any staples or sutures that will need to be removed from your incision at 2 weeks post op.  There are deep sutures under the skin that will dissolve over time.      5. SLEEPING   It is NORMAL to have difficulty sleeping after orthopaedic surgery.  It may take several months until you are sleeping normally. Elevating your leg throughout the day will reduce swelling that builds up at night - this can help you get a better night's rest. We generally advise against taking sleeping medication postoperatively, unless you were doing so prior to the surgery.    6.  SLEEPING POSITION   There are no restrictions regarding position.  When lying on your side, you may place a pillow between your knees for comfort, if necessary.     7. HIP PRECAUTIONS   “Listen” to your hip - don't push beyond a pulling or painful sensation.  Keep your body (trunk) between your legs when you bend. Be careful not to bend past 90 degrees.  Move smoothly during the first 4 -6 weeks after surgery.  Avoid any major twisting at the waist. Don't cross your legs at the knees for the first month after surgery. After one month, the chance of hip dislocation with activity is lower.            8. DRIVING  You must have advanced to a cane, be off narcotics, and feel comfortable and safe to drive! If it was your left hip, you may be ready after 3 weeks.  For the right hip, it usually takes longer; you may be ready as early as 4 weeks. You should drive only if you feel safe! Practice first in empty parking lot. If in doubt, call us.     9. INFECTION PRECAUTIONS  You will need to take an antibiotic approximately one hour before any dental appointments. You will be given a prescription at your four month post-op appointment. Current recommendations are to continue to do this forever after your surgery. We recommend waiting 4 months from the date of your surgery before having any routine dental work or prior to scheduling any elective invasive procedures.    10. FLYING/TRIPS    By 6 weeks after surgery, you should be able to take just about any trip. Prior to that, it is likely that it will be uncomfortable, especially for longer trips, so we advise you to plan your travel accordingly.  For patients who come from far away, you can typically fly home within the first week. During any long trip (plane, train, auto), we suggest that you to take time to get up, stretch your legs, and walk around. Take one Aspirin 81mg tablet on the morning and one tablet on the evening of your trip (if not on other blood thinners).  Special cards stating that you have a hip replacement are no longer provided, as the [facility] no longer accepts them.  Plan to spend some extra time at airport security in case you set off the alarm when you pass through the metal detector!    11. DISABILITY FORMS  Please contact my nurse if you have any disability, work, or other forms that need to be completed. Forms can be processed via the  at our [facility] offices. Form processing requires a signed release of information form on file and form payment.    12. POST-OP APPOINTMENT: call [phone] if you do not have one scheduled.  Your appointment was made when you scheduled surgery. Routine post op visits are around 2 weeks, then 4 weeks and then 4 months post op.      13. RETURN TO WORK   I expect my patients to return to work within 3 months from surgery.  Most patients return around 4-6 weeks post op. You can return when you feel ready. You can return with restrictions (if your job allows you to do so).  Call my nurse if you need a note.                           Dr. Pereira Team Tips:    • Call my Nurse if you have questions or concerns when you are home.  Please call at any time if you experience worsening pain, new numbness/tingling or weakness, wound redness or drainage, bleeding, loss of motion, fevers (temp above 101 F), or chills.  Never hesitate to call if you have a concern.  Juanita Mcneal RN, BSN: [phone] for Non-Emergent needs: (Mon-Fri 8:00am to 4:00pm)  [facility] Main Phone #: [phone] for Emergency needs during or after business hours     • Pain medication refills- Call my nurse at least 3 business days in advance of running out of medication. Many narcotics/opioids can't be called into or faxed to your pharmacy. Patients may need to make arrangements to  a prescription in an office (where my nurse is) or have it mailed to their home. We do not refill pain medication after 3 months from your surgery date. To  prevent narcotic dependency, we recommend you start decreasing the amount of narcotic medication you are taking as soon as possible after surgery and rely on just Tylenol or NSAID's for pain management. I expect most patients to be completely off narcotics/opioids by their 1 month follow up visit.    • It's normal to have an elevated temperature or low grade fever that may persist for 6 to 8 weeks after surgery. Call if 101 degrees or higher.    • It's normal for the hip area to feel warm and can persist for up to 6 months.    • It's normal to feel numbness around the incisional area (may resolve in 6 months to 1 year, but may be permanent). It's normal to feel a burning pain around the hip or near the incision (this will calm down as the swelling and inflammation lessens in your leg).    • You might find it hard to raise or lift your leg on your own right after surgery. It might take up to 4 weeks before you regain full strength. The exercises you do daily will help strengthen the leg.    • It can be normal for some redness/pinkness to appear around the incision. Angry or bright redness should be a concern and call my nurse if this occurs.    • It is normal to have bruising up and down the operative leg and may take a long time to go away.    • It's good to apply ice for about 20 minutes 3-4 times a day (not directly on your skin though) to help with pain and swelling.    • It's normal to hear or feel clicking in the hip (which may go away as inflammation goes down).    • If you have increased swelling, you are likely overdoing it.    • It might take a few weeks to “turn the corner” after surgery. Try to stay positive during this time. You will be able to return to your normal activities at a gradual pace in due time.    ENJOY YOUR NEW HIP!!    INFECTION PRECAUTIONS FOR PATIENTS WITH TOTAL JOINT REPLACEMENTS  Follow these infection precautions to reduce the chance of infection to your joint.  These instructions are  written for your dentist and treating physician to follow.  An untreated infection elsewhere in your body may spread to your joint replacement.   PROCEDURE RECOMMENDED ANTIBOTIC   Teeth cleaning, scaling, dental extractions and root canal, teeth filling, capping and bridge work.     ***PLEASE WAIT 4 MONTHS FROM DATE OF SURGERY FOR ELECTIVE DENTAL PROCEDURES AND ELECTIVE PROCEDURES OF ANY KIND****   Amoxicillin 500mg, take 4 capsules ONE HOUR PRIOR to procedure  or  Cleocin (Clindamycin) 150mg cap take 4 capsules ONE HOUR PRIOR to procedure (Use if Penicillin allergic)  Alternatives:   Azithromycin 500mg, take 1 cap 1 hr. prior to procedure  Doxycycline 100 mg, take 1 cap 1 hr. prior to procedure  Erythromycin 800mg, 1 hr. prior to procedure   Skin boils and infected skin lesions Cleocin 150mg capsules. Take 1 capsule every six hours until lesion is healed. See PCP or dermatologist.   Foot procedures (especially ingrown nails) but NOT for nail cutting or pedicures. Cleocin 150 mg capsules. Take 4 capsule ONE HOUR PRIOR to procedure, Alternative Dicloxacillin 1 gm., Keflex 2 gm. or Erythromycin 800 mg   Genitourinary tract or Gastrointestinal tract procedures: colonoscopy, endoscopy, sigmoidoscopy, or cystoscopy INFORM YOUR DOCTOR THAT YOU HAVE A JOINT REPLACEMENT. NO ANTIBOTIC NEEDED UNLESS AN INFECTION IS PRESENT   Breast biopsy, Pap smear NO ANTIBOTIC NEEDED UNLESS INFECTION IS PRESENT   Surgery  Inform the surgeon performing your procedure that you have a joint replacement   Cardiac Catheterization Inform your cardiologist that you have a joint replacement.    *NOTE: YOU MAY TAKE CLEOCIN DESPITE PENCILLIN ALLERGY. If you have problems taking the antibiotics prescribed, please consult with your medical doctor or our infectious disease department for advice.     CARING FOR YOUR PREVENA™ System:  Keep your therapy unit in a  safe place where:  • Tubing will not become  kinked or pinched  • It cannot be pulled off a  table  or dropped onto the floor  Showering:  If cleared by your doctor, a quick, light shower is ok.  Keep the therapy unit away from direct water spray.  Do not submerge dressing in water. Before showering,  disconnect the dressing from the therapy unit.  1. Hold the ON/OFF button down until therapy stops  2. Close the clamp located on the connection tubing  3. Disconnect the tubing from the therapy unit  • When towel drying, be careful not to disrupt  the dressing.  See PREVENA PLUS™ Therapy Patient Guide for  additional details.  Batteries:  • This therapy unit has a rechargeable battery  and comes with a .  • Bring your  with you if you are gone  for extended periods of time.  • For convenience, consider charging your  therapy unit battery while you are sleeping.  Your PREVENA™ Therapy Dressing  Do not attempt to remove or peak under the  dressing while therapy is being applied.  PREVENA™ 125 Therapy Unit  THERAPY UNIT  System:  Keep your therapy unit in a  safe place where:  • Tubing will not become  kinked or pinched  • It cannot be pulled off  a table or dropped onto  the floor  Showering:  If cleared by your doctor, a quick, light shower is ok.  Keep the therapy unit away from direct water spray.  Do not submerge dressing in water.  • When towel drying, be careful not to disrupt  the dressing.  See PREVENA™ Therapy Patient Guide for  additional details.  Batteries:  • This therapy unit comes with three  AA size batteries and cannot be recharged.  • It is recommended that you keep extra batteries  on hand. If the batteries run out, consult your  PREVENA™ Therapy Patient Guide on how to  replace the batteries.  NOTE: Specific indications, contraindications, warnings, precautions  and safety information exist for the PREVENA™ Therapy. Please consult  the applicable PREVENA™ System Clinician Guide instructions for use  prior to application. Rx only.  Copyright 2017 "[employer] Inc[employer] All rights  reserved. All trademarks designated  herein are proprietary to [employer] its affiliates, and/or licensors.  [id number], EN (2/17) • LIT#29-A-330  PREVENA™ System specialists are  here to support you. Call LEIF at  [phone]

## 2020-11-18 ENCOUNTER — PATIENT OUTREACH (OUTPATIENT)
Dept: CASE MANAGEMENT | Facility: CLINIC | Age: 70
End: 2020-11-18

## 2020-11-18 NOTE — PROGRESS NOTES
NAME: Lillie Ward    MRN: [id number]    YOB: 1950    Event Review: Elective admission for Right Total Hip Replacement.      Patient stated that overall she is doing well. Her incision will be covered with a dressing and has a device to aid healing and prevent infection. She is managing post op discomfort with Tylenol and Oxycodone, however she is experiencing a decreased appetite, nausea and dry heaves after taking the Oxycodone.     Patient 's blood sugars are ranging between 125- 129.    Assessment completed with:: Patient  Patient stated reason for hospitalization: Elective Admission for Right Total Hip Replacement  Discharge Diagnosis: Elective Admission for Right Total Hip Replacement  Patient readmitted in the last 30 days: No           Relevant surgery/procedures: Right Total Hip Replacement  Discharging Facility: [facility]  Date of Admission: 11/16/20  Date of Discharge: 11/17/20           Reviewed AVS (Discharge Instructions)?: Yes     What is the patient's perception of their health status since discharge? : New symptoms unrealted to diagnosis(Decreased appetite, nausea and dry heaves. Patient believes symptom caused by Oxycodone)    Medication Review:    Medication Review: Yes     Reported by:: Patient  Any new medications prescribed at discharge?: Yes(New: Oxycodone, Miralax, Senokot S Change: Tylenol, Aspirin)  Is the patient having any side effects they believe may be caused by any medication additions or changes?: (!) Yes(Decreased appetite, nausea & dry heaves from Oxycodone)  New prescriptions filled?: Yes     Do you have enough of your regularly prescribed medications?: Yes     Was a medication discrepancy indentified?: No     Nursing Interventions: Nurse provided patient education  Reconciled the current and discharge medications: Yes    Home Care Services: None       Post-Discharge Durable Medical Equipment:    What Durable Medical Equipment (DME) was Ordered: None  ordered, patient had a walker, 3 in 1 commode     Patient has an incentive spirometer, Insulin Pump, Dexcon       Appointment Scheduling:     Patient Scheduling Dispositions: Pt prefers to see specialist     Telemedicine with Dr. Pereira on     Interventions/ Care Coordination:    Interventions/ Care Coordination: Encouraged patient to schedule with the PCP/Specialist  General Education: Post-Op instructions(Access to Care)       Home Management:    Living Arrangement: Spouse  Support System:: Spouse, Friends  Type of Residence: 2 story house    Existing DME:    Durable Medical Equipment: Rollator, Bedside commode, Glucometer(Incentive Spirometer, Insulin Pump)  Oxygen Use: No         Pain Assessment:    Chronic pain: No(Post op discomfort)         Diet/Nutrition:    Type of Diet:: Diabetic, Cardiac 2mg sodium  Diet Adherence: Adherent with diet

## 2020-11-18 NOTE — UM PHYSICIAN REVIEW NOTE
Post discharge review    Outpatient services appropriate for this patient with 1 midnight stay s/p NOHEMI

## 2020-11-23 DIAGNOSIS — E78.5 HYPERLIPIDEMIA, UNSPECIFIED HYPERLIPIDEMIA TYPE: ICD-10-CM

## 2020-11-23 RX ORDER — ATORVASTATIN CALCIUM 10 MG/1
10 TABLET, FILM COATED ORAL DAILY
Qty: 90 TABLET | Refills: 3 | Status: SHIPPED | OUTPATIENT
Start: 2020-11-23 | End: 2021-10-05

## 2020-12-02 ENCOUNTER — TELEMEDICINE (OUTPATIENT)
Dept: INTERNAL MEDICINE | Facility: CLINIC | Age: 70
End: 2020-12-02
Payer: MEDICARE

## 2020-12-02 DIAGNOSIS — R19.7 DIARRHEA, UNSPECIFIED TYPE: Primary | ICD-10-CM

## 2020-12-02 PROCEDURE — 99442 PR PHYS/QHP TELEPHONE EVALUATION 11-20 MIN: CPT | Mod: 95 | Performed by: NURSE PRACTITIONER

## 2020-12-02 ASSESSMENT — ENCOUNTER SYMPTOMS
FATIGUE: 1
DIZZINESS: 0
MYALGIAS: 0
HEADACHES: 0
WHEEZING: 0
SHORTNESS OF BREATH: 0
COUGH: 0
CHEST TIGHTNESS: 0
CONSTIPATION: 0
FEVER: 0
ARTHRALGIAS: 0
APPETITE CHANGE: 1
CHILLS: 0
DIARRHEA: 1
NAUSEA: 1
PALPITATIONS: 0
DYSURIA: 0
FREQUENCY: 0
EYE PAIN: 0
VOMITING: 0
SORE THROAT: 1
EYE REDNESS: 0
WEAKNESS: 0
LIGHT-HEADEDNESS: 0

## 2020-12-02 NOTE — ASSESSMENT & PLAN NOTE
Patient sent to ER for evaluation for possible dehydration to diarrhea and to rule out DKA.  Patient reports that she will go to [facility] for evaluation.  Patient declined ambulance.  Reports her  was able to drive her.

## 2020-12-02 NOTE — PROGRESS NOTES
Verification of Patient Location:  The patient affirms they are currently located in the following state: [place]    Request for Consent:    Audio Only Encounter   You and I are about to have a telemedicine check-in or visit. This is allowed because you have requested it. This telemedicine visit will be billed to your health insurance or you, if you are self-insured. You understand you will be responsible for any copayments or coinsurances that apply to your telemedicine visit. Before starting our telemedicine visit, I am required to get your consent for this virtual check-in or visit by telemedicine. Do you consent?    Patient Response to Request for Consent:  Yes      Visit Documentation:  Subjective     Patient ID: Lillie Ward is a 70 y.o. female.  1950      6-year-old female with a past medical history of type 1 diabetes presents for telemedicine phone call today.  Patient is status post right hip replacement about 16 days ago.  Patient reports she had some constipation due to the oxycodone that she was taking for pain relief.  She started laxatives which did alleviate her constipation.  She only took those for 3 days.  Since this past Friday she has been having diarrhea daily.  She reports no appetite as of now.  She has noted blood sugars to be as low as 40 and then as of today going into the 400s.  She is on an insulin pump.  She been trying to drink Pedialyte.  Denies any recent antibiotics.  She reports she has not taken any laxatives since last week.  Denies any bad food.      The following have been reviewed and updated as appropriate in this visit:       Review of Systems   Constitutional: Positive for appetite change and fatigue. Negative for chills and fever.   HENT: Positive for sore throat. Negative for congestion.    Eyes: Negative for pain and redness.   Respiratory: Negative for cough, chest tightness, shortness of breath and wheezing.    Cardiovascular: Negative for chest pain and  palpitations.   Gastrointestinal: Positive for diarrhea and nausea. Negative for constipation and vomiting.        Dry heaves    Abdominal cramping with diarrhea   Genitourinary: Negative for dysuria, frequency and urgency.   Musculoskeletal: Negative for arthralgias and myalgias.   Skin: Negative for rash.   Neurological: Negative for dizziness, weakness, light-headedness and headaches.         Assessment/Plan   Diagnoses and all orders for this visit:    Diarrhea, unspecified type (Primary)  Assessment & Plan:  Patient sent to ER for evaluation for possible dehydration to diarrhea and to rule out DKA.  Patient reports that she will go to [place] [facility] for evaluation.  Patient declined ambulance.  Reports her  was able to drive her.        Time Spent in Medical Discussion During This Encounter:     12 minutes

## 2020-12-11 RX ORDER — LISINOPRIL 10 MG/1
TABLET ORAL
Qty: 90 TABLET | Refills: 1 | Status: SHIPPED | OUTPATIENT
Start: 2020-12-11 | End: 2021-07-12

## 2020-12-11 NOTE — TELEPHONE ENCOUNTER
Medicine Refill Request    Last Office Visit: 10/11/2019  Last Telemedicine Visit: 12/2/2020 Jordan Dumas CRNP    Next Office Visit: Visit date not found  Next Telemedicine Visit: Visit date not found         Current Outpatient Medications:   •  acetaminophen (TYLENOL) 325 mg tablet, Take 1 tablet (325 mg total) by mouth every 6 (six) hours as needed for mild pain. Hold for 14 days while taking acetaminophen 1000 mg every 8 hours, Disp: , Rfl:   •  aspirin 81 mg enteric coated tablet, Take 1 tablet (81 mg total) by mouth 2 (two) times a day., Disp: 60 tablet, Rfl: 0  •  [START ON 12/14/2020] aspirin 81 mg enteric coated tablet, Take 1 tablet (81 mg total) by mouth every other day. Hold for 4 weeks while taking aspirin 81 mg twice a day, Disp: 15 tablet, Rfl: 0  •  atorvastatin (LIPITOR) 10 mg tablet, Take 10 mg by mouth nightly.  , Disp: , Rfl:   •  cholecalciferol, vitamin D3, (VITAMIN D3) 1,000 unit capsule, Take 1,000 Units by mouth daily.  , Disp: , Rfl:   •  docosahexaenoic acid/epa (FISH OIL ORAL), Take by mouth 2 (two) times a day. For dry eyes, Disp: , Rfl:   •  famotidine (PEPCID) 10 mg tablet, Take 2 tablets (20 mg total) by mouth daily as needed. With ibuprofen, Disp: , Rfl:   •  folic acid (FOLVITE) 1 mg tablet, Take 1 mg by mouth daily., Disp: , Rfl:   •  glucagon, human recombinant, 1 mg injection, Infuse 1 mg into a venous catheter once., Disp: , Rfl:   •  insulin lispro (HumaLOG U-100 Insulin) 100 unit/mL cartridge, inject by subcutaneous route per prescriber's instructions. Insulin dosing requires individualization., Disp: , Rfl:   •  Lactobac no.41-Bifidobact no.7 (PROBIOTIC-10) 70 mg (3 billion cell) capsule, Take by mouth daily., Disp: , Rfl:   •  lisinopriL (PRINIVIL) 10 mg tablet, Take 1 tablet by mouth once daily (Patient taking differently: Take 10 mg by mouth daily.  ), Disp: 90 tablet, Rfl: 1  •  METHOTREXATE SODIUM INJ, Inject 2.5 mg as directed once a week. Fridays , Disp: , Rfl:   •   multivit with minerals/lutein (MULTIVITAMIN 50 PLUS ORAL), No SIG Entered, Disp: , Rfl:   •  polyethylene glycol (MIRALAX) 17 gram packet, Take 17 g by mouth daily as needed (constipation). Please obtain over the counter, Disp: , Rfl:   •  riTUXimab (RITUXAN) 10 mg/mL chemo injection, Inject 10 mg/mL as directed. Every 16 weeks   Sept 7,2020 , Disp: , Rfl:   •  sennosides-docusate sodium (SENOKOT-S) 8.6-50 mg, Take 1 tablet by mouth 2 (two) times a day as needed for constipation. Please obtain over the counter, Disp: , Rfl:   •  turmeric root extract 500 mg capsule, Take 1,000 mg by mouth daily., Disp: , Rfl:   •  vit A/vit C/vit E/zinc/copper (PRESERVISION AREDS ORAL), Take by mouth daily., Disp: , Rfl:       BP Readings from Last 3 Encounters:   11/17/20 (!) 109/52   11/12/20 122/68   11/11/20 135/61       Recent Lab results:  No results found for: CHOL, No results found for: HDL, No results found for: LDLCALC, No results found for: TRIG     Lab Results   Component Value Date    GLUCOSE 79 11/17/2020   ,   Lab Results   Component Value Date    HGBA1C 6.9 (H) 11/11/2020         Lab Results   Component Value Date    CREATININE 0.7 11/17/2020       No results found for: TSH

## 2020-12-14 ENCOUNTER — TELEPHONE (OUTPATIENT)
Dept: ENDOCRINOLOGY | Facility: HOSPITAL | Age: 70
End: 2020-12-14

## 2021-01-05 ENCOUNTER — OFFICE VISIT (OUTPATIENT)
Dept: ENDOCRINOLOGY | Facility: HOSPITAL | Age: 71
End: 2021-01-05
Payer: MEDICARE

## 2021-01-05 VITALS
BODY MASS INDEX: 20.62 KG/M2 | SYSTOLIC BLOOD PRESSURE: 120 MMHG | WEIGHT: 109.2 LBS | DIASTOLIC BLOOD PRESSURE: 64 MMHG | HEIGHT: 61 IN | HEART RATE: 80 BPM

## 2021-01-05 DIAGNOSIS — Z96.41 INSULIN PUMP IN PLACE: ICD-10-CM

## 2021-01-05 DIAGNOSIS — E10.40 TYPE 1 DIABETES MELLITUS WITH DIABETIC NEUROPATHY (HCC): ICD-10-CM

## 2021-01-05 DIAGNOSIS — E10.649 HYPOGLYCEMIA UNAWARENESS ASSOCIATED WITH TYPE 1 DIABETES MELLITUS (HCC): Primary | ICD-10-CM

## 2021-01-05 DIAGNOSIS — I10 ESSENTIAL HYPERTENSION: ICD-10-CM

## 2021-01-05 DIAGNOSIS — E78.2 MIXED HYPERLIPIDEMIA: ICD-10-CM

## 2021-01-05 LAB
CHOLEST SERPL-MCNC: 198 MG/DL (ref 100–199)
HBA1C MFR BLD: 6.3 % (ref 4.8–5.6)
HDLC SERPL-MCNC: 88 MG/DL
LDLC SERPL CALC-MCNC: 92 MG/DL (ref 0–99)
SL AMB VLDL CHOLESTEROL CALC: 18 MG/DL (ref 5–40)
TRIGL SERPL-MCNC: 105 MG/DL (ref 0–149)

## 2021-01-05 PROCEDURE — 99214 OFFICE O/P EST MOD 30 MIN: CPT | Performed by: PHYSICIAN ASSISTANT

## 2021-01-05 RX ORDER — ASPIRIN 81 MG/1
81 TABLET, CHEWABLE ORAL DAILY
COMMUNITY

## 2021-01-05 NOTE — PATIENT INSTRUCTIONS
Continue to monitor diet and maintain physical activity  Made adjustments to pump today  Continue to utilize dexcom  Have Dexcom downloaded in 2-3 weeks for review  Follow up with diabetes education if blood sugar is not improving  Follow up in 3 months

## 2021-01-05 NOTE — PROGRESS NOTES
Keegan Rogers 65 y o  female MRN: [id number]    Encounter: [id number]      Assessment/Plan     Assessment: This is a 65y o -year-old female with type 1 diabetes on insulin pump therapy with hypoglycemia unawareness, hyperlipidemia, and hypertension  Plan:  1  Type 1 diabetes:  Most recent hemoglobin A1c was 6 3  However, she has significant variation in her blood sugar throughout the day  At this time decreased her basal rate at midnight to 0 250, and increased her basal rate at 8:00 a m  To 0 975  Also add an additional carb ratio time at 5:00 p m  To have an insulin to carb ratio of 1 unit for every 15 g  Advise that if these changes do not make any significant improvement in her blood sugar, that she should follow-up with Diabetes Education for further evaluation  2  Hyperlipidemia:  Cholesterol looks excellent  Continue with atorvastatin at this time  3  Hypertension:  Blood pressure normotensive in the office today  Continue with lisinopril  CC:  Type 1 diabetes follow-up    History of Present Illness     HPI:  65 y  o  female with type 1 diabetes for 39 years   She was started on an insulin pump around 1998   She is on insulin at home and takes Humalog insulin via the insulin pump  Basal rate from 12:00 a m  to 6:00 a m  0 300 units/hour, 6:00 a m  to 8:00 a m  0 700 and 8:00 a m  to 2:00 p m  0 925 units/hour, 2pm to 6:30 pm 0 725 units per hour and 6:30 p m  To midnight 0 600 units/hour   Bolus rates include an insulin to carbohydrate ratio from 12:00 a m  to 11:59 p m -1:14 with an insulin sensitivity of 1 unit for every 55 mg/dL for a target blood glucose of 100-120 with a 6 hour insulin action        Her most recent hemoglobin A1c from November 11, 2020 is 6 9   She denies any polyuria and polydipsia   She has no polyphagia, but has once a night nocturia  Patrice Abbottlisy is getting some blurry vision with some cataracts   She has no numbness or tingling of the feet   She denies chest pain or shortness of breath   She denies nephropathy, heart attack, stroke and claudication but does admit to neuropathy and retinopathy  She did have a hip replacement about 7 weeks ago  Still has occasional episodes of pain and has been taking prednisone as needed for the pain  Does admit her episodes of higher blood sugars are likely related to this      Her most recent diabetic eye exam was in June 2019  She is seen every 4 months  She has no complaints about her feet and does not follow Podiatry for regular diabetic foot care       Download of her dex com from December 23, 2020 through January 5, 2021 shows an average glucose level of 175 with standard deviation of 76  Currently she is experiencing episodes of hypoglycemia between 2:00 a m  And 5:00 a m  Lulu Ceron She does have occasional episode between 5:00 p m  And 6:00 p m  Overall she is typically high throughout the day     For her hypertension, she is treated with lisinopril 10 mg daily      For her hyperlipidemia, she is treated with atorvastatin 10 mg       For her vitamin-D deficiency, she supplements with 1000 units of vitamin D3 daily  Review of Systems   Constitutional: Negative for activity change, appetite change, fatigue and unexpected weight change  HENT: Negative for sore throat and trouble swallowing  Eyes: Negative for visual disturbance  Respiratory: Negative for chest tightness and shortness of breath  Cardiovascular: Negative for chest pain, palpitations and leg swelling  Gastrointestinal: Negative for abdominal pain, constipation, diarrhea, nausea and vomiting  Endocrine: Positive for polyuria  Negative for cold intolerance, heat intolerance, polydipsia and polyphagia  Genitourinary: Negative for frequency  Musculoskeletal: Positive for arthralgias  Skin: Negative for wound  Neurological: Negative for dizziness, weakness, numbness and headaches  Psychiatric/Behavioral: Negative for dysphoric mood and sleep disturbance  The patient is not nervous/anxious          Historical Information   Past Medical History:   Diagnosis Date    Basal cell carcinoma (BCC) of face     left cheek    Cataract     Rheumatoid arthritis ([address] )      Past Surgical History:   Procedure Laterality Date    CATARACT EXTRACTION, BILATERAL Bilateral     EXPLORATORY LAPAROTOMY      FOOT SURGERY Left     foot reconstruction in 2 phases    FOOT SURGERY Right     foot reconstruction    LIVER BIOPSY      TOTAL SHOULDER REPLACEMENT Right      Social History   Social History     Substance and Sexual Activity   Alcohol Use Yes    Alcohol/week: 5 0 standard drinks    Types: 5 Cans of beer per week     Social History     Substance and Sexual Activity   Drug Use No     Social History     Tobacco Use   Smoking Status Never Smoker   Smokeless Tobacco Never Used     Family History:   Family History   Problem Relation Age of Onset    Stroke Mother     Cancer Father         renal metastatic    Brain cancer Sister     Diabetes type I Maternal Uncle     Diabetes type I Maternal Grandmother     Osteoarthritis Brother     Osteoarthritis Sister     Rheum arthritis Sister     Osteoarthritis Sister     Osteoarthritis Sister     Osteoarthritis Sister     Osteoarthritis Brother     Osteoarthritis Brother     Bipolar disorder Son        Meds/Allergies   Current Outpatient Medications   Medication Sig Dispense Refill    atorvastatin (LIPITOR) 10 mg tablet Take 1 tablet (10 mg total) by mouth daily 90 tablet 3    Blood Glucose Monitoring Suppl (Maylin Ann) w/Device KIT by Does not apply route once for 1 dose OneTouch Verip (Patient not taking: Reported on 1/7/2020) 1 kit 0    cholecalciferol (VITAMIN D3) 1,000 units tablet Take 1,000 Units by mouth daily      famotidine (PEPCID) 20 mg tablet Take 20 mg by mouth daily prn      folic acid (FOLVITE) 1 mg tablet Take 1 mg by mouth daily      glucagon (GLUCAGON EMERGENCY) 1 MG injection Inject 1 mg under the skin once as needed for low blood sugar for up to 1 dose 1 each 4    glucose blood test strip Use as instructed Test 4x a day (Patient not taking: Reported on 10/7/2020) 400 each 3    HumaLOG 100 UNIT/ML injection INJECT UP  UNITS VIA INSULIN PUMP 30 mL 5    lisinopril (ZESTRIL) 10 mg tablet Take 10 mg by mouth daily        methotrexate 50 MG/2ML injection Inject under the skin once a week        Multiple Vitamins-Minerals (MULTIVITAMIN ADULT PO) Take by mouth daily        mupirocin (BACTROBAN) 2 % ointment Apply topically 3 (three) times a day      Omega-3 Fat Ac-Cholecalciferol (DRY EYE OMEGA BENEFITS/VIT D-3) 186-620 MG-UNIT CAPS Take by mouth daily        Probiotic Product ([employer] SENIOR PROBIOTIC PO) Take by mouth daily        RiTUXimab (RITUXAN IV) Infuse into a venous catheter Every 16 weeks, 2nd dose 2 weeks later then repeat      TURMERIC CURCUMIN PO Take 2 each by mouth daily       No current facility-administered medications for this visit  Allergies   Allergen Reactions    Bactrim [Sulfamethoxazole-Trimethoprim] GI Intolerance     Other reaction(s): GI Intolerance    Neosporin [Neomycin-Bacitracin Zn-Polymyx] Itching    Tetracycline GI Intolerance     Other reaction(s): GI Intolerance       Objective   Vitals: There were no vitals taken for this visit  Physical Exam  Vitals signs and nursing note reviewed  Constitutional:       General: She is not in acute distress  Appearance: Normal appearance  She is not diaphoretic  HENT:      Head: Normocephalic and atraumatic  Eyes:      General: No scleral icterus  Extraocular Movements: Extraocular movements intact  Conjunctiva/sclera: Conjunctivae normal       Pupils: Pupils are equal, round, and reactive to light  Neck:      Musculoskeletal: Normal range of motion  Cardiovascular:      Rate and Rhythm: Normal rate and regular rhythm  Heart sounds: No murmur     Pulmonary:      Effort: Pulmonary effort is normal  No respiratory distress  Breath sounds: Normal breath sounds  No wheezing  Musculoskeletal:      Right lower leg: No edema  Left lower leg: No edema  Lymphadenopathy:      Cervical: No cervical adenopathy  Neurological:      Mental Status: She is alert and oriented to person, place, and time  Mental status is at baseline  Sensory: No sensory deficit  Psychiatric:         Mood and Affect: Mood normal          Behavior: Behavior normal          Thought Content: Thought content normal          The history was obtained from the review of the chart, patient  Lab Results:   Lab Results   Component Value Date/Time    Hemoglobin A1C 7 0 (H) 10/02/2020 09:38 AM    Hemoglobin A1C 7 1 (H) 07/02/2020 09:40 AM    Hemoglobin A1C 7 7 03/27/2020    BUN 13 10/02/2020 09:38 AM    BUN 11 07/02/2020 09:40 AM    Potassium 3 9 10/02/2020 09:38 AM    Potassium 4 3 07/02/2020 09:40 AM    Chloride 103 10/02/2020 09:38 AM    Chloride 105 07/02/2020 09:40 AM    CO2 25 10/02/2020 09:38 AM    CO2 26 07/02/2020 09:40 AM    Creatinine 0 78 10/02/2020 09:38 AM    Creatinine 0 77 07/02/2020 09:40 AM    AST 34 10/02/2020 09:38 AM    AST 34 07/02/2020 09:40 AM    ALT 20 10/02/2020 09:38 AM    ALT 24 07/02/2020 09:40 AM    Albumin 4 3 10/02/2020 09:38 AM    Albumin 4 2 07/02/2020 09:40 AM    Globulin, Total 2 4 10/02/2020 09:38 AM    Globulin, Total 2 1 07/02/2020 09:40 AM    HDL 80 07/02/2020 09:40 AM    Triglycerides 82 07/02/2020 09:40 AM       Portions of the record may have been created with voice recognition software  Occasional wrong word or "sound a like" substitutions may have occurred due to the inherent limitations of voice recognition software  Read the chart carefully and recognize, using context, where substitutions have occurred

## 2021-02-15 ENCOUNTER — TELEPHONE (OUTPATIENT)
Dept: ENDOCRINOLOGY | Facility: CLINIC | Age: 71
End: 2021-02-15

## 2021-03-04 ENCOUNTER — TELEPHONE (OUTPATIENT)
Dept: ENDOCRINOLOGY | Facility: HOSPITAL | Age: 71
End: 2021-03-04

## 2021-03-25 LAB
ALBUMIN SERPL-MCNC: 4 G/DL (ref 3.7–4.7)
ALBUMIN/GLOB SERPL: 1.8 {RATIO} (ref 1.2–2.2)
ALP SERPL-CCNC: 85 IU/L (ref 39–117)
ALT SERPL-CCNC: 18 IU/L (ref 0–32)
AST SERPL-CCNC: 27 IU/L (ref 0–40)
BILIRUB SERPL-MCNC: 1 MG/DL (ref 0–1.2)
BUN SERPL-MCNC: 11 MG/DL (ref 8–27)
BUN/CREAT SERPL: 15 (ref 12–28)
CALCIUM SERPL-MCNC: 9.6 MG/DL (ref 8.7–10.3)
CHLORIDE SERPL-SCNC: 103 MMOL/L (ref 96–106)
CHOLEST SERPL-MCNC: 176 MG/DL (ref 100–199)
CO2 SERPL-SCNC: 26 MMOL/L (ref 20–29)
CREAT SERPL-MCNC: 0.72 MG/DL (ref 0.57–1)
GLOBULIN SER-MCNC: 2.2 G/DL (ref 1.5–4.5)
GLUCOSE SERPL-MCNC: 139 MG/DL (ref 65–99)
HBA1C MFR BLD: 7.2 % (ref 4.8–5.6)
HDLC SERPL-MCNC: 83 MG/DL
LDLC SERPL CALC-MCNC: 75 MG/DL (ref 0–99)
POTASSIUM SERPL-SCNC: 4.3 MMOL/L (ref 3.5–5.2)
PROT SERPL-MCNC: 6.2 G/DL (ref 6–8.5)
SL AMB EGFR AFRICAN AMERICAN: 97 ML/MIN/1.73
SL AMB EGFR NON AFRICAN AMERICAN: 84 ML/MIN/1.73
SL AMB VLDL CHOLESTEROL CALC: 18 MG/DL (ref 5–40)
SODIUM SERPL-SCNC: 140 MMOL/L (ref 134–144)
TRIGL SERPL-MCNC: 100 MG/DL (ref 0–149)

## 2021-03-30 DIAGNOSIS — Z23 ENCOUNTER FOR IMMUNIZATION: ICD-10-CM

## 2021-04-01 ENCOUNTER — OFFICE VISIT (OUTPATIENT)
Dept: ENDOCRINOLOGY | Facility: HOSPITAL | Age: 71
End: 2021-04-01
Payer: MEDICARE

## 2021-04-01 VITALS
SYSTOLIC BLOOD PRESSURE: 122 MMHG | HEART RATE: 76 BPM | BODY MASS INDEX: 20.99 KG/M2 | DIASTOLIC BLOOD PRESSURE: 64 MMHG | HEIGHT: 61 IN | WEIGHT: 111.2 LBS

## 2021-04-01 DIAGNOSIS — E10.649 HYPOGLYCEMIA UNAWARENESS ASSOCIATED WITH TYPE 1 DIABETES MELLITUS (HCC): ICD-10-CM

## 2021-04-01 DIAGNOSIS — E10.40 TYPE 1 DIABETES MELLITUS WITH DIABETIC NEUROPATHY (HCC): Primary | ICD-10-CM

## 2021-04-01 DIAGNOSIS — E78.2 MIXED HYPERLIPIDEMIA: ICD-10-CM

## 2021-04-01 DIAGNOSIS — Z96.41 INSULIN PUMP IN PLACE: ICD-10-CM

## 2021-04-01 DIAGNOSIS — I10 ESSENTIAL HYPERTENSION: ICD-10-CM

## 2021-04-01 DIAGNOSIS — E55.9 VITAMIN D DEFICIENCY: ICD-10-CM

## 2021-04-01 PROCEDURE — 99214 OFFICE O/P EST MOD 30 MIN: CPT | Performed by: NURSE PRACTITIONER

## 2021-04-01 PROCEDURE — 95251 CONT GLUC MNTR ANALYSIS I&R: CPT | Performed by: NURSE PRACTITIONER

## 2021-04-01 NOTE — PROGRESS NOTES
Angelina Grossman 65 y o  female MRN: [id number]    Encounter: [id number]      Assessment/Plan     Assessment: This is a 65y o -year-old female with type 1 diabetes on insulin pump therapy with hypoglycemic unawareness, hyperlipidemia and vitamin-D deficiency  Plan:  1   Type 1 diabetes:  Her most recent hemoglobin A1c is 7 2   [employer] pump and DEX com sensor were downloaded and reviewed with the patient at the time of the visit  For some of her overnight hyperglycemia, I have adjusted her midnight basal rate to 0 3  For her hyperglycemia following meals, I have adjusted her insulin to carbohydrate ratio at midnight to 14 and at 5 p m  to 13  I have asked her to not to change any of her basal rates and be cautious when correcting for both hyper and hypoglycemia  We will download another dex com and pump download in 1 week when she meets with nutrition in our office   For now, she will continue her current insulin settings  Rojelio Score will continue to utilize the dex com continuous glucose monitor and perform a download in 2 weeks for review  We will contact her with any changes, if necessary   Check hemoglobin A1c prior to next visit  2   Hyperlipidemia:  Stable  Continue atorvastatin  3   Hypertension:  Continue lisinopril   Check comprehensive metabolic panel prior to next visit  4   Vitamin-D deficiency:  Continue supplementation with 1000 units of vitamin D3 daily  CC:  Type 1 Diabetes follow-up    History of Present Illness     HPI:  65 y  o  female with type 1 diabetes for approximately 40 years   She was started on an insulin pump around 1998   She is on insulin at home and takes Humalog insulin via the insulin pump basal rate from 12:00 a m  to 6:00 a m   0 250 units/hour, 6:00 a m  to 8:00 a m  0 550 and 8:00 a m  to 2:00 p m  1 25 units/hour, 2pm to 6:30 pm 0 625 units per hour and 6:30 p m   To midnight 0 625 units/hour, bolus rates include an insulin to carbohydrate ratio from 12:00 a m  to 11:59 p m -1:15 with an insulin sensitivity of 1 unit for every 50 mg/dL for a target blood glucose of 100-120 with a 4 hour insulin action  Amadorlisy Coboss most recent hemoglobin A1c from March 24, 2021 is 7 2  She denies any polyuria and polydipsia   She has no polyphagia, but has once a night nocturia  Dia Matthews is getting some blurry vision with some cataracts   She has no numbness or tingling of the feet   She denies chest pain or shortness of breath   She denies nephropathy, heart attack, stroke and claudication but does admit to neuropathy and retinopathy  She has a habit of changing her basal settings almost daily      Her most recent diabetic eye exam was in June 2019  She is seen every 4 months  She has no complaints about her feet and does not follow Podiatry for regular diabetic foot care       Download of her dex com from March 17 through April 1, 2021 reveals an average glucose of 159 with a standard deviation of 69  She is experiencing some mild hypoglycemia overnight between 3:00 a m  and 5:00 a m  and hyperglycemia at lunchtime between 11 a m  and 1:00 p m       For her hypertension, she is treated with lisinopril 10 mg daily      For her hyperlipidemia, she is treated with atorvastatin 10 mg       For her vitamin-D deficiency, she supplements with 1000 units of vitamin D3 daily  Review of Systems   Constitutional: Negative  Negative for chills, fatigue and fever  HENT: Negative  Negative for trouble swallowing and voice change  Eyes: Negative  Negative for visual disturbance  Respiratory: Negative  Negative for chest tightness and shortness of breath  Cardiovascular: Negative  Negative for chest pain  Gastrointestinal: Negative  Negative for abdominal pain, constipation, diarrhea and vomiting  Endocrine: Positive for polyuria ( 1-2 times per night nocturia)  Negative for cold intolerance, heat intolerance, polydipsia and polyphagia  Genitourinary: Negative  Musculoskeletal: Negative  Skin: Negative  Allergic/Immunologic: Negative  Neurological: Negative  Negative for dizziness, syncope, light-headedness and headaches  Hematological: Negative  Psychiatric/Behavioral: Negative  All other systems reviewed and are negative        Historical Information   Past Medical History:   Diagnosis Date    Basal cell carcinoma (BCC) of face     left cheek    Cataract     Rheumatoid arthritis ([address] )      Past Surgical History:   Procedure Laterality Date    CATARACT EXTRACTION, BILATERAL Bilateral     EXPLORATORY LAPAROTOMY      FOOT SURGERY Left     foot reconstruction in 2 phases    FOOT SURGERY Right     foot reconstruction    LIVER BIOPSY      TOTAL SHOULDER REPLACEMENT Right      Social History   Social History     Substance and Sexual Activity   Alcohol Use Yes    Alcohol/week: 5 0 standard drinks    Types: 5 Cans of beer per week     Social History     Substance and Sexual Activity   Drug Use No     Social History     Tobacco Use   Smoking Status Never Smoker   Smokeless Tobacco Never Used     Family History:   Family History   Problem Relation Age of Onset    Stroke Mother     Cancer Father         renal metastatic    Brain cancer Sister     Diabetes type I Maternal Uncle     Diabetes type I Maternal Grandmother     Osteoarthritis Brother     Osteoarthritis Sister     Rheum arthritis Sister     Osteoarthritis Sister     Osteoarthritis Sister     Osteoarthritis Sister     Osteoarthritis Brother     Osteoarthritis Brother     Bipolar disorder Son        Meds/Allergies   Current Outpatient Medications   Medication Sig Dispense Refill    aspirin 81 mg chewable tablet Chew 81 mg daily      atorvastatin (LIPITOR) 10 mg tablet Take 1 tablet (10 mg total) by mouth daily 90 tablet 3    Blood Glucose Monitoring Suppl (Leslie Leal) w/Device KIT by Does not apply route once for 1 dose OneTouch Verip (Patient not taking: Reported on 1/7/2020) 1 kit 0    cholecalciferol (VITAMIN D3) 1,000 units tablet Take 1,000 Units by mouth daily      famotidine (PEPCID) 20 mg tablet Take 20 mg by mouth daily prn      folic acid (FOLVITE) 1 mg tablet Take 1 mg by mouth daily      glucagon (GLUCAGON EMERGENCY) 1 MG injection Inject 1 mg under the skin once as needed for low blood sugar for up to 1 dose 1 each 4    glucose blood test strip Use as instructed Test 4x a day (Patient not taking: Reported on 10/7/2020) 400 each 3    HumaLOG 100 UNIT/ML injection INJECT UP  UNITS VIA INSULIN PUMP 30 mL 5    lisinopril (ZESTRIL) 10 mg tablet Take 10 mg by mouth daily        methotrexate 50 MG/2ML injection Inject under the skin once a week        Multiple Vitamins-Minerals (MULTIVITAMIN ADULT PO) Take by mouth daily        mupirocin (BACTROBAN) 2 % ointment Apply topically 3 (three) times a day      Omega-3 Fat Ac-Cholecalciferol (DRY EYE OMEGA BENEFITS/VIT D-3) 354-065 MG-UNIT CAPS Take by mouth daily        Probiotic Product ([employer] SENIOR PROBIOTIC PO) Take by mouth daily        RiTUXimab (RITUXAN IV) Infuse into a venous catheter Every 16 weeks, 2nd dose 2 weeks later then repeat      TURMERIC CURCUMIN PO Take 2 each by mouth daily       No current facility-administered medications for this visit  Allergies   Allergen Reactions    Bactrim [Sulfamethoxazole-Trimethoprim] GI Intolerance     Other reaction(s): GI Intolerance    Neosporin [Neomycin-Bacitracin Zn-Polymyx] Itching    Tetracycline GI Intolerance     Other reaction(s): GI Intolerance       Objective   Vitals: There were no vitals taken for this visit  Physical Exam  Vitals signs reviewed  Constitutional:       Appearance: She is well-developed  HENT:      Head: Normocephalic and atraumatic  Eyes:      Conjunctiva/sclera: Conjunctivae normal       Pupils: Pupils are equal, round, and reactive to light  Neck:      Musculoskeletal: Normal range of motion and neck supple     Cardiovascular:      Rate and Rhythm: Normal rate and regular rhythm  Heart sounds: Normal heart sounds  Pulmonary:      Effort: Pulmonary effort is normal       Breath sounds: Normal breath sounds  Abdominal:      General: Bowel sounds are normal       Palpations: Abdomen is soft  Musculoskeletal: Normal range of motion  Skin:     General: Skin is warm and dry  Neurological:      Mental Status: She is alert and oriented to person, place, and time  Psychiatric:         Behavior: Behavior normal          Thought Content:  Thought content normal          Judgment: Judgment normal        Lab Results:   Lab Results   Component Value Date/Time    Hemoglobin A1C 7 2 (H) 03/24/2021 08:57 AM    Hemoglobin A1C 6 3 (H) 01/04/2021 08:41 AM    Hemoglobin A1C 7 0 (H) 10/02/2020 09:38 AM    BUN 11 03/24/2021 08:57 AM    BUN 13 10/02/2020 09:38 AM    BUN 11 07/02/2020 09:40 AM    Potassium 4 3 03/24/2021 08:57 AM    Potassium 3 9 10/02/2020 09:38 AM    Potassium 4 3 07/02/2020 09:40 AM    Chloride 103 03/24/2021 08:57 AM    Chloride 103 10/02/2020 09:38 AM    Chloride 105 07/02/2020 09:40 AM    CO2 26 03/24/2021 08:57 AM    CO2 25 10/02/2020 09:38 AM    CO2 26 07/02/2020 09:40 AM    Creatinine 0 72 03/24/2021 08:57 AM    Creatinine 0 78 10/02/2020 09:38 AM    Creatinine 0 77 07/02/2020 09:40 AM    AST 27 03/24/2021 08:57 AM    AST 34 10/02/2020 09:38 AM    AST 34 07/02/2020 09:40 AM    ALT 18 03/24/2021 08:57 AM    ALT 20 10/02/2020 09:38 AM    ALT 24 07/02/2020 09:40 AM    Albumin 4 0 03/24/2021 08:57 AM    Albumin 4 3 10/02/2020 09:38 AM    Albumin 4 2 07/02/2020 09:40 AM    Globulin, Total 2 2 03/24/2021 08:57 AM    Globulin, Total 2 4 10/02/2020 09:38 AM    Globulin, Total 2 1 07/02/2020 09:40 AM    HDL 83 03/24/2021 08:57 AM    HDL 88 01/04/2021 08:41 AM    HDL 80 07/02/2020 09:40 AM    Triglycerides 100 03/24/2021 08:57 AM    Triglycerides 105 01/04/2021 08:41 AM    Triglycerides 82 07/02/2020 09:40 AM     Portions of the record may have been created with voice recognition software  Occasional wrong word or "sound a like" substitutions may have occurred due to the inherent limitations of voice recognition software  Read the chart carefully and recognize, using context, where substitutions have occurred

## 2021-04-01 NOTE — PATIENT INSTRUCTIONS
Be mindful of diet       Stay active in stay hydrated       We made small changes to your pump settings today  We will reassess at your nutrition office visit next week       Be sure to perform a bolus consistently at meals      Continue to utilize the dex com continuous glucose monitor and perform a DEX COM download in 2 weeks for review      Contact the office with any consistent hypoglycemia      Continue lisinopril       Continue atorvastatin 10 mg daily      Continue with regular supplementation of vitamin D3 daily       Obtain a Medic Alert Bracelet

## 2021-04-02 ENCOUNTER — TELEPHONE (OUTPATIENT)
Dept: ADMINISTRATIVE | Facility: OTHER | Age: 71
End: 2021-04-02

## 2021-04-02 NOTE — LETTER
Procedure Request Form: Colonoscopy      Date Requested: 21  Patient: Enzo Mano  Patient : 1950   Referring Provider: Susie Ponds, MD        Date of Procedure ______________________________       The above patient has informed us that they have completed their   most recent Colonoscopy at your facility  Please complete   this form and attach all corresponding procedure reports/results  Comments __________________________________________________________  ____________________________________________________________________  ____________________________________________________________________  ____________________________________________________________________    Facility Completing Procedure _________________________________________    Form Completed By (print name) _______________________________________      Signature __________________________________________________________      These reports are needed for  compliance    Please fax this completed form and a copy of the procedure report to our office located at [place] [address] as soon as possible to [phone] marco a Newman: Phone [phone]    We thank you for your assistance in treating our mutual patient

## 2021-04-02 NOTE — TELEPHONE ENCOUNTER
Upon review of the In Basket request we were able to locate, review, and update the patient chart as requested for CRC: Colonoscopy  Any additional questions or concerns should be emailed to the Practice Liaisons via [email or web address] email, please do not reply via In Basket      Thank you  Machelle Reed MA

## 2021-04-02 NOTE — TELEPHONE ENCOUNTER
----- Message from [facility] sent at 4/1/2021  2:11 PM EDT -----  Regarding: Colonoscopy  04/01/21 2:12 PM    Hello, our patient Angelina Grossman has had a Colonoscopy done through [facility]  She does not remember who did it, or when it was done       Thank you,  [facility] Tobias Akers

## 2021-04-02 NOTE — TELEPHONE ENCOUNTER
Upon review of the In Basket request and the patient's chart, initial outreach has been made via fax, please see Contacts section for details       Thank you  Gabriela Morrison MA

## 2021-04-05 ENCOUNTER — OFFICE VISIT (OUTPATIENT)
Dept: CARDIOLOGY | Facility: CLINIC | Age: 71
End: 2021-04-05
Payer: MEDICARE

## 2021-04-05 VITALS
HEIGHT: 61 IN | RESPIRATION RATE: 16 BRPM | DIASTOLIC BLOOD PRESSURE: 70 MMHG | WEIGHT: 110 LBS | SYSTOLIC BLOOD PRESSURE: 116 MMHG | BODY MASS INDEX: 20.77 KG/M2 | OXYGEN SATURATION: 97 % | HEART RATE: 80 BPM

## 2021-04-05 DIAGNOSIS — I10 ESSENTIAL HYPERTENSION: ICD-10-CM

## 2021-04-05 DIAGNOSIS — E78.00 PURE HYPERCHOLESTEROLEMIA: ICD-10-CM

## 2021-04-05 DIAGNOSIS — J84.10 FIBROSIS OF LUNG (CMS/HCC): ICD-10-CM

## 2021-04-05 DIAGNOSIS — E10.40 TYPE 1 DIABETES MELLITUS WITH DIABETIC NEUROPATHY (CMS/HCC): ICD-10-CM

## 2021-04-05 DIAGNOSIS — K21.00 GASTROESOPHAGEAL REFLUX DISEASE WITH ESOPHAGITIS WITHOUT HEMORRHAGE: ICD-10-CM

## 2021-04-05 DIAGNOSIS — R07.89 OTHER CHEST PAIN: ICD-10-CM

## 2021-04-05 DIAGNOSIS — R07.89 ATYPICAL CHEST PAIN: Primary | ICD-10-CM

## 2021-04-05 DIAGNOSIS — R00.2 PALPITATIONS: ICD-10-CM

## 2021-04-05 PROCEDURE — G8754 DIAS BP LESS 90: HCPCS | Performed by: INTERNAL MEDICINE

## 2021-04-05 PROCEDURE — G8752 SYS BP LESS 140: HCPCS | Performed by: INTERNAL MEDICINE

## 2021-04-05 PROCEDURE — 99214 OFFICE O/P EST MOD 30 MIN: CPT | Performed by: INTERNAL MEDICINE

## 2021-04-05 NOTE — LETTER
"April 5, 2021     Machelle Gallardo MD  [address]  [facility] [address]    Patient: Lillie Ward  YOB: 1950  Date of Visit: 4/5/2021      Dear Dr. Gallardo:    Thank you for referring Lillie Ward to me for evaluation. Below are my notes for this consultation.    If you have questions, please do not hesitate to call me. I look forward to following your patient along with you.         Sincerely,        Madiha Wise MD        CC: No Recipients  Madiha Wise MD  4/5/2021  4:12 PM  Signed       Madiha Wise MD       Reason for visit : Follow up  HPI   Lillie Ward is a 70 y.o. female who presents to the office for cardiovascular follow up.        Dear Machelle,    On April 5, 2021 I saw myranda Ward in the office at her request.  As you know I originally saw her back in November prior to her hip surgery.  She has significant rheumatoid arthritis, diabetes, hyperlipidemia, and reflux.  I placed her at low risk for general anesthesia, and she did quite well with her hip surgery.  She now comes to me with concerns for coronary artery disease and possible atrial fibrillation.    Several times a month when her blood sugars are high, she tells me that her heart rates go quite fast.  When that happens she can get chest discomfort that can last up to 20 to 30 minutes.  It was hard for her to describe the chest discomfort.  It is not necessarily associated with shortness of breath.  She has never had any lightheadedness or syncope.  The palpitations themselves last only a minute or 2.    She has heard from several friends that there is a test \"that insurance does not pay for\" that can tell her about atrial fibrillation.    I have reviewed her medical history, surgical history allergies, social history and medications.  There are no changes since I seen her last.  The rest of her review of systems is completely negative.         Problem List:  2020-12: Diarrhea  2020-11: OA (osteoarthritis) of " hip  2020-11: Fever  2020-11: Chest discomfort  2020-11: Primary osteoarthritis of right hip  2020-11: Pre-op exam  2020-11: Right hip pain  2020-07: Essential hypertension  2020-07: Insulin pump in place  2019-10: Postmenopausal  2019-10: Cellulitis of right lower extremity  2019-04: Atypical chest pain  2018-08: Pre-op evaluation  2018-08: Cataract of both eyes  2018-06: Abscess of axilla  2018-06: Visit for screening mammogram  2018-04: Hypoglycemia unawareness associated with type 1 diabetes   mellitus (CMS/[facility])  2018-04: Type 1 diabetes mellitus with diabetic neuropathy (CMS/[facility])  2016-12: Fibrosis of lung (CMS/[facility])  2016-09: Type 1 diabetes mellitus (CMS/[facility])  2016-09: GERD (gastroesophageal reflux disease)  2016-09: Hyperlipidemia  2016-09: Rheumatoid arthritis (CMS/[facility])           Current Outpatient Medications   Medication Sig   • aspirin 81 mg enteric coated tablet Take 1 tablet (81 mg total) by mouth 2 (two) times a day.   • cholecalciferol, vitamin D3, (VITAMIN D3) 1,000 unit capsule Take 1,000 Units by mouth daily.     • docosahexaenoic acid/epa (FISH OIL ORAL) Take by mouth 2 (two) times a day. For dry eyes   • famotidine (PEPCID) 10 mg tablet Take 2 tablets (20 mg total) by mouth daily as needed. With ibuprofen   • folic acid (FOLVITE) 1 mg tablet Take 1 mg by mouth daily.   • insulin lispro (HumaLOG U-100 Insulin) 100 unit/mL cartridge inject by subcutaneous route per prescriber's instructions. Insulin dosing requires individualization.   • Lactobac no.41-Bifidobact no.7 (PROBIOTIC-10) 70 mg (3 billion cell) capsule Take by mouth daily.   • lisinopriL (PRINIVIL) 10 mg tablet Take 1 tablet by mouth once daily   • METHOTREXATE SODIUM INJ Inject 2.5 mg as directed once a week. Fridays    • multivit with minerals/lutein (MULTIVITAMIN 50 PLUS ORAL) No SIG Entered   • polyethylene glycol (MIRALAX) 17 gram packet Take 17 g by mouth daily as needed (constipation). Please obtain over the counter   • riTUXimab  (RITUXAN) 10 mg/mL chemo injection Inject 10 mg/mL as directed. Every 16 weeks   Sept 7,2020    • turmeric root extract 500 mg capsule Take 1,000 mg by mouth daily.   • vit A/vit C/vit E/zinc/copper (PRESERVISION AREDS ORAL) Take by mouth daily.   • atorvastatin (LIPITOR) 10 mg tablet Take 10 mg by mouth nightly.     • glucagon, human recombinant, 1 mg injection Infuse 1 mg into a venous catheter once.   • sennosides-docusate sodium (SENOKOT-S) 8.6-50 mg Take 1 tablet by mouth 2 (two) times a day as needed for constipation. Please obtain over the counter     No current facility-administered medications for this visit.           Allergies:  Neomycin, Neomycin-bacitracin-polymyxin, Neomycin-bacitracnzn-polymyxnb, Sulfamethoxazole-trimethoprim, and Tetracycline    Surgical History:  Past Surgical History:   Procedure Laterality Date   • COLONOSCOPY     • EYE SURGERY Bilateral     CATARACT   • FOOT SURGERY Left 2009    reconstruction   • FOOT SURGERY Right 2007    reconstruction   • JOINT REPLACEMENT Right     SHOULDER   • REPLACEMENT TOTAL KNEE Left 04/04/2017    [facility]   • SKIN BIOPSY     • TOTAL HIP ARTHROPLASTY Right 11/2020   • TOTAL SHOULDER ARTHROPLASTY Right 1990        Family History:  Family History   Problem Relation Age of Onset   • Stroke Biological Mother    • Lung cancer Biological Father    • No Known Problems Biological Sister    • Rheum arthritis Other         1 of 8 siblings   • Bipolar disorder Biological Son    • Alcohol abuse Biological Son    • Depression Biological Son         Social History:  Social History     Socioeconomic History   • Marital status:      Spouse name: None   • Number of children: 1   • Years of education: None   • Highest education level: None   Occupational History   • Occupation: Retired teacher   Social Needs   • Financial resource strain: None   • Food insecurity     Worry: None     Inability: None   • Transportation needs     Medical: None     Non-medical: None  "  Tobacco Use   • Smoking status: Never Smoker   • Smokeless tobacco: Never Used   Substance and Sexual Activity   • Alcohol use: Yes     Frequency: 2-3 times a week     Drinks per session: 1 or 2   • Drug use: No   • Sexual activity: Yes   Lifestyle   • Physical activity     Days per week: None     Minutes per session: None   • Stress: None   Relationships   • Social connections     Talks on phone: None     Gets together: None     Attends Synagogue service: None     Active member of club or organization: None     Attends meetings of clubs or organizations: None     Relationship status: None   • Intimate partner violence     Fear of current or ex partner: None     Emotionally abused: None     Physically abused: None     Forced sexual activity: None   Other Topics Concern   • None   Social History Narrative    Marital status-     Children- 1 son    Caffeine - coffee    Exercise-walking    Diet-low carb    Pets-    Taoism- none    Seat belt-yes    Smoke alarm-yes    Firearms-               Review of Systems  Rest of her review of systems is completely negative.            Objective   Vitals:    04/05/21 1538 04/05/21 1540   BP: 120/68 116/70   BP Location: Left upper arm Right upper arm   Patient Position: Sitting Sitting   Pulse: 80    Resp: 16    SpO2: 97%    Weight: 49.9 kg (110 lb)    Height: 1.549 m (5' 1\")      Physical Exam   Blood pressure is 120/68.  Heart rate is 80 and regular  She is comfortable.  Skin is warm and dry.  Conjunctiva are pink.  Affect is appropriate.  No JVD or HJR.  Carotids are unremarkable.  Chest is clear.  Regular rhythm.  Early systolic ejection murmur at the aortic area.  No diastolic sounds.  No gallops.  S1 and S2 are normal.  Abdomen is soft with good bowel sounds.  No organomegaly.  No clubbing, cyanosis, or edema.  I felt no distal pulses.  No rash.  Multiple skeletal deformities consistent with rheumatoid arthritis.          Labs:   Lab Results   Component Value Date    " WBC 7.22 11/17/2020    HGB 12.2 11/17/2020    HCT 37.7 11/17/2020     11/17/2020     11/17/2020    K 4.8 11/17/2020     (H) 11/17/2020    CREATININE 0.7 11/17/2020    BUN 9 11/17/2020    CO2 23 11/17/2020    HGBA1C 6.9 (H) 11/11/2020                  Problem List Items Addressed This Visit        Nervous    Atypical chest pain - Primary    Type 1 diabetes mellitus with diabetic neuropathy (CMS/[facility])       Respiratory    Fibrosis of lung (CMS/[facility])       Circulatory    Essential hypertension       Digestive    GERD (gastroesophageal reflux disease)       Endocrine/Metabolic    Hyperlipidemia      Other Visit Diagnoses     Other chest pain        Relevant Orders    CT HEART CORONARY CALCIUM SCORE    Palpitations              Impression:    I had a lengthy discussion with and about her symptoms.  I also told her that the study that she was describing was most likely a coronary artery calcium score.  This is not done to assess atrial fibrillation, but is done to assess coronary disease.  I am concerned that the symptoms she is having when her heart races could be angina.  She would like to proceed with coronary artery calcium score.  I anticipated to be abnormal, and she is willing to do that study is necessary if we should get an abnormal report.    At this point time she does not want any type of monitoring for her palpitations.    Her last lipids were quite good on her current dose of atorvastatin.  Her blood pressure is well controlled.  I will be in touch with you once I see the results of her coronary artery calcium score.    I thank you for allowing me to participate in the care of your patient.  If I can furnish you with any further details, please do not hesitate to contact me.     Madiha Wise MD  4/5/2021    This document was generated utilizing voice recognition technology. A reasonable attempt at proofreading has been made to minimize errors but please excuse any typographical errors  which may be present. Please call with any questions.

## 2021-04-05 NOTE — PROGRESS NOTES
"     Madiha Wise MD       Reason for visit : Follow up  HPI   Lillie Ward is a 70 y.o. female who presents to the office for cardiovascular follow up.        Dear Machelle,    On April 5, 2021 I saw myranda Ward in the office at her request.  As you know I originally saw her back in November prior to her hip surgery.  She has significant rheumatoid arthritis, diabetes, hyperlipidemia, and reflux.  I placed her at low risk for general anesthesia, and she did quite well with her hip surgery.  She now comes to me with concerns for coronary artery disease and possible atrial fibrillation.    Several times a month when her blood sugars are high, she tells me that her heart rates go quite fast.  When that happens she can get chest discomfort that can last up to 20 to 30 minutes.  It was hard for her to describe the chest discomfort.  It is not necessarily associated with shortness of breath.  She has never had any lightheadedness or syncope.  The palpitations themselves last only a minute or 2.    She has heard from several friends that there is a test \"that insurance does not pay for\" that can tell her about atrial fibrillation.    I have reviewed her medical history, surgical history allergies, social history and medications.  There are no changes since I seen her last.  The rest of her review of systems is completely negative.         Problem List:  2020-12: Diarrhea  2020-11: OA (osteoarthritis) of hip  2020-11: Fever  2020-11: Chest discomfort  2020-11: Primary osteoarthritis of right hip  2020-11: Pre-op exam  2020-11: Right hip pain  2020-07: Essential hypertension  2020-07: Insulin pump in place  2019-10: Postmenopausal  2019-10: Cellulitis of right lower extremity  2019-04: Atypical chest pain  2018-08: Pre-op evaluation  2018-08: Cataract of both eyes  2018-06: Abscess of axilla  2018-06: Visit for screening mammogram  2018-04: Hypoglycemia unawareness associated with type 1 diabetes   mellitus " (CMS/[facility])  2018-04: Type 1 diabetes mellitus with diabetic neuropathy (CMS/HCC)  2016-12: Fibrosis of lung (CMS/HCC)  2016-09: Type 1 diabetes mellitus (CMS/[facility])  2016-09: GERD (gastroesophageal reflux disease)  2016-09: Hyperlipidemia  2016-09: Rheumatoid arthritis (CMS/HCC)           Current Outpatient Medications   Medication Sig   • aspirin 81 mg enteric coated tablet Take 1 tablet (81 mg total) by mouth 2 (two) times a day.   • cholecalciferol, vitamin D3, (VITAMIN D3) 1,000 unit capsule Take 1,000 Units by mouth daily.     • docosahexaenoic acid/epa (FISH OIL ORAL) Take by mouth 2 (two) times a day. For dry eyes   • famotidine (PEPCID) 10 mg tablet Take 2 tablets (20 mg total) by mouth daily as needed. With ibuprofen   • folic acid (FOLVITE) 1 mg tablet Take 1 mg by mouth daily.   • insulin lispro (HumaLOG U-100 Insulin) 100 unit/mL cartridge inject by subcutaneous route per prescriber's instructions. Insulin dosing requires individualization.   • Lactobac no.41-Bifidobact no.7 (PROBIOTIC-10) 70 mg (3 billion cell) capsule Take by mouth daily.   • lisinopriL (PRINIVIL) 10 mg tablet Take 1 tablet by mouth once daily   • METHOTREXATE SODIUM INJ Inject 2.5 mg as directed once a week. Fridays    • multivit with minerals/lutein (MULTIVITAMIN 50 PLUS ORAL) No SIG Entered   • polyethylene glycol (MIRALAX) 17 gram packet Take 17 g by mouth daily as needed (constipation). Please obtain over the counter   • riTUXimab (RITUXAN) 10 mg/mL chemo injection Inject 10 mg/mL as directed. Every 16 weeks   Sept 7,2020    • turmeric root extract 500 mg capsule Take 1,000 mg by mouth daily.   • vit A/vit C/vit E/zinc/copper (PRESERVISION AREDS ORAL) Take by mouth daily.   • atorvastatin (LIPITOR) 10 mg tablet Take 10 mg by mouth nightly.     • glucagon, human recombinant, 1 mg injection Infuse 1 mg into a venous catheter once.   • sennosides-docusate sodium (SENOKOT-S) 8.6-50 mg Take 1 tablet by mouth 2 (two) times a day as needed  for constipation. Please obtain over the counter     No current facility-administered medications for this visit.           Allergies:  Neomycin, Neomycin-bacitracin-polymyxin, Neomycin-bacitracnzn-polymyxnb, Sulfamethoxazole-trimethoprim, and Tetracycline    Surgical History:  Past Surgical History:   Procedure Laterality Date   • COLONOSCOPY     • EYE SURGERY Bilateral     CATARACT   • FOOT SURGERY Left 2009    reconstruction   • FOOT SURGERY Right 2007    reconstruction   • JOINT REPLACEMENT Right     SHOULDER   • REPLACEMENT TOTAL KNEE Left 04/04/2017    [facility]   • SKIN BIOPSY     • TOTAL HIP ARTHROPLASTY Right 11/2020   • TOTAL SHOULDER ARTHROPLASTY Right 1990        Family History:  Family History   Problem Relation Age of Onset   • Stroke Biological Mother    • Lung cancer Biological Father    • No Known Problems Biological Sister    • Rheum arthritis Other         1 of 8 siblings   • Bipolar disorder Biological Son    • Alcohol abuse Biological Son    • Depression Biological Son         Social History:  Social History     Socioeconomic History   • Marital status:      Spouse name: None   • Number of children: 1   • Years of education: None   • Highest education level: None   Occupational History   • Occupation: Retired teacher   Social Needs   • Financial resource strain: None   • Food insecurity     Worry: None     Inability: None   • Transportation needs     Medical: None     Non-medical: None   Tobacco Use   • Smoking status: Never Smoker   • Smokeless tobacco: Never Used   Substance and Sexual Activity   • Alcohol use: Yes     Frequency: 2-3 times a week     Drinks per session: 1 or 2   • Drug use: No   • Sexual activity: Yes   Lifestyle   • Physical activity     Days per week: None     Minutes per session: None   • Stress: None   Relationships   • Social connections     Talks on phone: None     Gets together: None     Attends Zoroastrian service: None     Active member of club or organization: None  "    Attends meetings of clubs or organizations: None     Relationship status: None   • Intimate partner violence     Fear of current or ex partner: None     Emotionally abused: None     Physically abused: None     Forced sexual activity: None   Other Topics Concern   • None   Social History Narrative    Marital status-     Children- 1 son    Caffeine - coffee    Exercise-walking    Diet-low carb    Pets-    Tenriism- none    Seat belt-yes    Smoke alarm-yes    Firearms-               Review of Systems  Rest of her review of systems is completely negative.            Objective   Vitals:    04/05/21 1538 04/05/21 1540   BP: 120/68 116/70   BP Location: Left upper arm Right upper arm   Patient Position: Sitting Sitting   Pulse: 80    Resp: 16    SpO2: 97%    Weight: 49.9 kg (110 lb)    Height: 1.549 m (5' 1\")      Physical Exam   Blood pressure is 120/68.  Heart rate is 80 and regular  She is comfortable.  Skin is warm and dry.  Conjunctiva are pink.  Affect is appropriate.  No JVD or HJR.  Carotids are unremarkable.  Chest is clear.  Regular rhythm.  Early systolic ejection murmur at the aortic area.  No diastolic sounds.  No gallops.  S1 and S2 are normal.  Abdomen is soft with good bowel sounds.  No organomegaly.  No clubbing, cyanosis, or edema.  I felt no distal pulses.  No rash.  Multiple skeletal deformities consistent with rheumatoid arthritis.          Labs:   Lab Results   Component Value Date    WBC 7.22 11/17/2020    HGB 12.2 11/17/2020    HCT 37.7 11/17/2020     11/17/2020     11/17/2020    K 4.8 11/17/2020     (H) 11/17/2020    CREATININE 0.7 11/17/2020    BUN 9 11/17/2020    CO2 23 11/17/2020    HGBA1C 6.9 (H) 11/11/2020                  Problem List Items Addressed This Visit        Nervous    Atypical chest pain - Primary    Type 1 diabetes mellitus with diabetic neuropathy (CMS/HCC)       Respiratory    Fibrosis of lung (CMS/HCC)       Circulatory    Essential hypertension    "    Digestive    GERD (gastroesophageal reflux disease)       Endocrine/Metabolic    Hyperlipidemia      Other Visit Diagnoses     Other chest pain        Relevant Orders    CT HEART CORONARY CALCIUM SCORE    Palpitations              Impression:    I had a lengthy discussion with and about her symptoms.  I also told her that the study that she was describing was most likely a coronary artery calcium score.  This is not done to assess atrial fibrillation, but is done to assess coronary disease.  I am concerned that the symptoms she is having when her heart races could be angina.  She would like to proceed with coronary artery calcium score.  I anticipated to be abnormal, and she is willing to do that study is necessary if we should get an abnormal report.    At this point time she does not want any type of monitoring for her palpitations.    Her last lipids were quite good on her current dose of atorvastatin.  Her blood pressure is well controlled.  I will be in touch with you once I see the results of her coronary artery calcium score.    I thank you for allowing me to participate in the care of your patient.  If I can furnish you with any further details, please do not hesitate to contact me.     Madiha Wise MD  4/5/2021    This document was generated utilizing voice recognition technology. A reasonable attempt at proofreading has been made to minimize errors but please excuse any typographical errors which may be present. Please call with any questions.

## 2021-04-06 ENCOUNTER — OFFICE VISIT (OUTPATIENT)
Dept: DIABETES SERVICES | Facility: HOSPITAL | Age: 71
End: 2021-04-06
Payer: MEDICARE

## 2021-04-06 VITALS — WEIGHT: 111 LBS | BODY MASS INDEX: 20.96 KG/M2 | HEIGHT: 61 IN

## 2021-04-06 DIAGNOSIS — E10.649 HYPOGLYCEMIA UNAWARENESS ASSOCIATED WITH TYPE 1 DIABETES MELLITUS (HCC): ICD-10-CM

## 2021-04-06 PROCEDURE — G0108 DIAB MANAGE TRN  PER INDIV: HCPCS | Performed by: DIETITIAN, REGISTERED

## 2021-04-06 NOTE — PATIENT INSTRUCTIONS
Boluses-   - Please take insulin for carbs and blood sugars only when you are eating    I should see 3 to 4 boluses a day, that's it  Currently you are over bolusing which is causing your lows and then rebound highs    - enter blood sugars and carbs in accurately so we can see how they are working so melissa can make changes  Lows: treat lows with rule of 15    15g of fast acting carbs, wait 15min and retest with meter not just CGM  And do not suspend your pump  If you are suspending your pump for a low, use the Temp Basal feature for 30min at 50% to help

## 2021-04-06 NOTE — PROGRESS NOTES
Diabetes DSME    HPI: Met with Phani Cuevas for DSME Follow-up visit  Here today for help with her overall pump management  She is constantly up and down and adjusts rates and things on her own  Recently met with Dk Renteria, he asked that she meet with me again and not adjust any rates on her own prior to seeing me for our download and review  Download and discussion show a few things- her swings of blood sugars are completely how she manages herself  She has her dexcom high set way to low for her at 220  States it goes off a lot and then she takes repeated boluses to bring it down, which then gives her low  Then she overtreats her lows, stopped her insulin and forgets to resume it and has rebound highs  She also admits to adding in fake carbs in order to get some extra bolus at times  Discussed that all of this is causing her roller coaster and can be easily fixed  Plan is for her to start taking insulin with her meals and snacks and that is it    no more bolusing between meals for corrections  This should stop the crashes which in turn stops the highs  Discussed her sugars might be higher than before but that's ok, we need to see how her settings are working so melissa can adjust them  She will treat lows correctly without over treatments  I showed her how to use the temp basal feature so if she needs to stop her pump for any reason that would be a better way so she does not need to remember to turn it back on  We increased her dexcom high from 220 to 270 for now so that she stops taking panic boluses  She will work on seeing how to upload her dexcom and pump from home, if not she will stop in the office in a few weeks for another download for Eliot Neal so he can make proper changes  Goals:  Boluses-   - Please take insulin for carbs and blood sugars only when you are eating    I should see 3 to 4 boluses a day, that's it   Currently you are over bolusing which is causing your lows and then rebound highs    - enter blood sugars and carbs in accurately so we can see how they are working so melissa can make changes  Lows: treat lows with rule of 15    15g of fast acting carbs, wait 15min and retest with meter not just CGM  And do not suspend your pump  If you are suspending your pump for a low, use the Temp Basal feature for 30min at 50% to help  Ht Readings from Last 1 Encounters:   04/06/21 5' 1" (1 549 m)     Wt Readings from Last 3 Encounters:   04/06/21 50 3 kg (111 lb)   04/01/21 50 4 kg (111 lb 3 2 oz)   01/05/21 49 5 kg (109 lb 3 2 oz)        Body mass index is 20 97 kg/m²  Lab Results   Component Value Date    HGBA1C 7 2 (H) 03/24/2021    HGBA1C 6 3 (H) 01/04/2021    HGBA1C 7 0 (H) 10/02/2020       No results found for: CHOL  Lab Results   Component Value Date    HDL 83 03/24/2021    HDL 88 01/04/2021    HDL 80 07/02/2020     Lab Results   Component Value Date    LDLCALC 75 03/24/2021    LDLCALC 92 01/04/2021    LDLCALC 79 07/02/2020     Lab Results   Component Value Date    TRIG 100 03/24/2021    TRIG 105 01/04/2021    TRIG 82 07/02/2020     No results found for: CHOLHDL  No results found for: Adore Hennessy    Diabetes Education Record  Danielle received the following handouts: hypoglycemia      Patient response to instruction    Comprehensiongood  Motivationgood  Expected Compliancefair    Thank you for referring your patient to [facility], it was a pleasure working with them today  Please feel free to call with any questions or concerns      Shadi Torres, [address]  [address] [facility] [address]  [address] [facility] [phone]

## 2021-04-06 NOTE — Clinical Note
DM ed for pump help completed- for your review, no action needed at this time   Thanks! - Chuck Bajwa

## 2021-04-07 ENCOUNTER — TELEPHONE (OUTPATIENT)
Dept: ENDOCRINOLOGY | Facility: HOSPITAL | Age: 71
End: 2021-04-07

## 2021-04-08 NOTE — TELEPHONE ENCOUNTER
Reviewed  Danielle's dex com and [employer] pump download from March 30 through April 5, 2021  Her average glucose was 172 with a standard deviation of 63  She is experiencing some consistent hypoglycemia in the afternoon after lunch  For this, I suggest that we  adjust her insulin to carbohydrate ratio at noon to 14  0  and continue all settings as they are currently  Please provide another dex com download in another 1-2 weeks for review and further adjustment, if necessary

## 2021-04-08 NOTE — TELEPHONE ENCOUNTER
Spoke to patient  She said that last time she was in you adjusted her carb ratio to 14 0  She said that since seeing Renee Kristal her blood sugars have been great and she was able to sleep through the night for the first time in a while without her Dexcom going off

## 2021-05-28 ENCOUNTER — HOSPITAL ENCOUNTER (OUTPATIENT)
Dept: RADIOLOGY | Age: 71
Discharge: HOME | End: 2021-05-28
Attending: INTERNAL MEDICINE
Payer: MEDICARE

## 2021-05-28 DIAGNOSIS — R07.89 OTHER CHEST PAIN: ICD-10-CM

## 2021-05-28 PROCEDURE — 75571 CT HRT W/O DYE W/CA TEST: CPT

## 2021-05-30 ENCOUNTER — TELEPHONE (OUTPATIENT)
Dept: CARDIOLOGY | Facility: CLINIC | Age: 71
End: 2021-05-30

## 2021-05-30 PROBLEM — K76.9 LIVER LESION: Status: ACTIVE | Noted: 2021-05-30

## 2021-05-30 PROBLEM — I25.84 CORONARY ARTERY DISEASE DUE TO CALCIFIED CORONARY LESION: Status: ACTIVE | Noted: 2021-05-30

## 2021-05-30 PROBLEM — I25.10 CORONARY ARTERY DISEASE DUE TO CALCIFIED CORONARY LESION: Status: ACTIVE | Noted: 2021-05-30

## 2021-05-30 NOTE — TELEPHONE ENCOUNTER
On May 30, 2021 I called and to discuss her coronary artery calcium score.  I left a message for her to return my call

## 2021-06-01 ENCOUNTER — TELEPHONE (OUTPATIENT)
Dept: CARDIOLOGY | Facility: CLINIC | Age: 71
End: 2021-06-01

## 2021-06-01 DIAGNOSIS — I25.10 CORONARY ARTERY DISEASE INVOLVING NATIVE CORONARY ARTERY OF NATIVE HEART WITHOUT ANGINA PECTORIS: Primary | ICD-10-CM

## 2021-06-01 RX ORDER — ATORVASTATIN CALCIUM 20 MG/1
20 TABLET, FILM COATED ORAL DAILY
Qty: 30 TABLET | Refills: 3 | Status: SHIPPED | OUTPATIENT
Start: 2021-06-01 | End: 2021-07-20

## 2021-06-01 NOTE — TELEPHONE ENCOUNTER
On June 1, 2021 I spoke with Lillie Ward about her coronary artery calcium score.  It is elevated at 136.  It is all located in the LAD.  She understands that she does have coronary disease.  She will continue on her baby aspirin.  Her blood pressures have been under good control.  Her last LDL, however, was too high and she will be going on an increased dose of Lipitor at 20 mg daily.  Lipids and LFTs will be done in 3 months.  The goal is to get her LDL at 70 or less.    She will also be having a stress test to ensure that there is no ischemia coming from her LAD lesion.  If that is unremarkable I will see her on a yearly basis.

## 2021-06-02 ENCOUNTER — TELEPHONE (OUTPATIENT)
Dept: CARDIOLOGY | Facility: CLINIC | Age: 71
End: 2021-06-02

## 2021-06-14 ENCOUNTER — TELEPHONE (OUTPATIENT)
Dept: CARDIOLOGY | Facility: CLINIC | Age: 71
End: 2021-06-14

## 2021-06-17 ENCOUNTER — TELEPHONE (OUTPATIENT)
Dept: CARDIOLOGY | Facility: CLINIC | Age: 71
End: 2021-06-17

## 2021-06-17 ENCOUNTER — HOSPITAL ENCOUNTER (OUTPATIENT)
Dept: CARDIOLOGY | Facility: CLINIC | Age: 71
Discharge: HOME | End: 2021-06-17
Payer: MEDICARE

## 2021-06-17 VITALS
WEIGHT: 110 LBS | DIASTOLIC BLOOD PRESSURE: 80 MMHG | SYSTOLIC BLOOD PRESSURE: 120 MMHG | BODY MASS INDEX: 20.77 KG/M2 | HEIGHT: 61 IN

## 2021-06-17 DIAGNOSIS — I25.10 CORONARY ARTERY DISEASE DUE TO CALCIFIED CORONARY LESION: Primary | ICD-10-CM

## 2021-06-17 DIAGNOSIS — I25.10 CORONARY ARTERY DISEASE INVOLVING NATIVE CORONARY ARTERY OF NATIVE HEART WITHOUT ANGINA PECTORIS: ICD-10-CM

## 2021-06-17 DIAGNOSIS — I25.84 CORONARY ARTERY DISEASE DUE TO CALCIFIED CORONARY LESION: Primary | ICD-10-CM

## 2021-06-17 LAB
AORTIC ROOT ANNULUS: 2.9 CM
ASCENDING AORTA: 3 CM
AV PEAK GRADIENT: 6 MMHG
AV PEAK VELOCITY-S: 1.19 M/S
AV VALVE AREA: 2.22 CM2
BSA FOR ECHO PROCEDURE: 1.47 M2
E WAVE DECELERATION TIME: 239 MS
E/A RATIO: 1.2
E/E' RATIO: 9.6
E/LAT E' RATIO: 9.9
EDV (BP): 48.7 CM3
EF (A4C): 66.7 %
EF A2C: 67.5 %
EJECTION FRACTION: 67.1 %
ESV (BP): 16 CM3
LA ESV (BP): 38.9 CM3
LA ESV INDEX (A2C): 22.86 CM3/M2
LA ESV INDEX (BP): 26.46 CM3/M2
LA/AORTA RATIO: 1.28
LAAS-AP2: 14.6 CM2
LAAS-AP4: 16.1 CM2
LAD 2D: 3.7 CM
LALD A4C: 4.62 CM
LALD A4C: 5.07 CM
LAV-S: 33.6 CM3
LEFT ATRIUM VOLUME INDEX: 26.53 CM3/M2
LEFT ATRIUM VOLUME: 39 CM3
LEFT VENTRICLE DIASTOLIC VOLUME INDEX: 28.84 CM3/M2
LEFT VENTRICLE DIASTOLIC VOLUME: 42.4 CM3
LEFT VENTRICLE SYSTOLIC VOLUME INDEX: 9.59 CM3/M2
LEFT VENTRICLE SYSTOLIC VOLUME: 14.1 CM3
LV DIASTOLIC VOLUME: 53.8 CM3
LV ESV (APICAL 2 CHAMBER): 17.5 CM3
LVAD-AP2: 20 CM2
LVAD-AP4: 17.5 CM2
LVAS-AP2: 10.2 CM2
LVAS-AP4: 9.03 CM2
LVEDVI(A2C): 36.6 CM3/M2
LVEDVI(BP): 33.13 CM3/M2
LVESVI(A2C): 11.9 CM3/M2
LVESVI(BP): 10.88 CM3/M2
LVLD-AP2: 6.24 CM
LVLD-AP4: 5.98 CM
LVLS-AP2: 5.07 CM
LVLS-AP4: 4.82 CM
LVOT 2D: 1.9 CM
LVOT A: 2.84 CM2
LVOT PEAK VELOCITY: 0.93 M/S
MV E'TISSUE VEL-LAT: 0.09 M/S
MV E'TISSUE VEL-MED: 0.09 M/S
MV PEAK A VEL: 0.78 M/S
MV PEAK E VEL: 0.9 M/S
MV VALVE AREA P 1/2 METHOD: 3.14 CM2
PV PEAK GRADIENT: 4 MMHG
PV PV: 1.05 M/S
RVOT VMAX: 0.65 M/S
RVOT VTI: 15.6 CM
STRESS ANGINA INDEX: 0
STRESS BASELINE BP: NORMAL MMHG
STRESS BASELINE HR: 77 BPM
STRESS PERCENT HR: 94 %
STRESS POST ESTIMATED WORKLOAD: 6.8 METS
STRESS POST EXERCISE DUR MIN: 3 MIN
STRESS POST EXERCISE DUR SEC: 57 SEC
STRESS POST PEAK HR: 139 BPM
STRESS TARGET HR: 126 BPM
TR MAX PG: 33 MMHG
TRICUSPID VALVE PEAK REGURGITATION VELOCITY: 2.88 M/S
TV REST PULMONARY ARTERY PRESSURE: 35 MMHG

## 2021-06-17 PROCEDURE — 93351 STRESS TTE COMPLETE: CPT | Performed by: INTERNAL MEDICINE

## 2021-06-17 NOTE — TELEPHONE ENCOUNTER
On June 17, 2021 I spoke with and about her stress test.  There was no evidence of ischemia.  Left ventricular function is normal and there was no significant valve disease.  She tells me that her symptoms of shortness of breath have improved.    I explained to her that if those symptoms should come back she should contact me.  Occasionally stress test can be wrong and I would want to reassess her given her known coronary artery calcium in her LAD.  If she remains stable I will see her again in a year and repeat her stress test prior to that visit

## 2021-07-01 LAB
ALBUMIN SERPL-MCNC: 4.5 G/DL (ref 3.7–4.7)
ALBUMIN/CREAT UR: 12 MG/G CREAT (ref 0–29)
ALBUMIN/GLOB SERPL: 2 {RATIO} (ref 1.2–2.2)
ALP SERPL-CCNC: 75 IU/L (ref 48–121)
ALT SERPL-CCNC: 26 IU/L (ref 0–32)
AST SERPL-CCNC: 33 IU/L (ref 0–40)
BILIRUB SERPL-MCNC: 1.1 MG/DL (ref 0–1.2)
BUN SERPL-MCNC: 10 MG/DL (ref 8–27)
BUN/CREAT SERPL: 13 (ref 12–28)
CALCIUM SERPL-MCNC: 10 MG/DL (ref 8.7–10.3)
CHLORIDE SERPL-SCNC: 103 MMOL/L (ref 96–106)
CO2 SERPL-SCNC: 26 MMOL/L (ref 20–29)
CREAT SERPL-MCNC: 0.77 MG/DL (ref 0.57–1)
CREAT UR-MCNC: 111.7 MG/DL
EST. AVERAGE GLUCOSE BLD GHB EST-MCNC: 166 MG/DL
GLOBULIN SER-MCNC: 2.2 G/DL (ref 1.5–4.5)
GLUCOSE SERPL-MCNC: 163 MG/DL (ref 65–99)
HBA1C MFR BLD: 7.4 % (ref 4.8–5.6)
MICROALBUMIN UR-MCNC: 13.3 UG/ML
POTASSIUM SERPL-SCNC: 4.1 MMOL/L (ref 3.5–5.2)
PROT SERPL-MCNC: 6.7 G/DL (ref 6–8.5)
SL AMB EGFR AFRICAN AMERICAN: 89 ML/MIN/1.73
SL AMB EGFR NON AFRICAN AMERICAN: 77 ML/MIN/1.73
SODIUM SERPL-SCNC: 141 MMOL/L (ref 134–144)

## 2021-07-06 ENCOUNTER — OFFICE VISIT (OUTPATIENT)
Dept: ENDOCRINOLOGY | Facility: HOSPITAL | Age: 71
End: 2021-07-06
Payer: MEDICARE

## 2021-07-06 VITALS
SYSTOLIC BLOOD PRESSURE: 106 MMHG | HEIGHT: 61 IN | WEIGHT: 108.4 LBS | HEART RATE: 86 BPM | BODY MASS INDEX: 20.47 KG/M2 | DIASTOLIC BLOOD PRESSURE: 62 MMHG

## 2021-07-06 DIAGNOSIS — E10.40 TYPE 1 DIABETES MELLITUS WITH DIABETIC NEUROPATHY (HCC): Primary | ICD-10-CM

## 2021-07-06 PROCEDURE — 99214 OFFICE O/P EST MOD 30 MIN: CPT | Performed by: PHYSICIAN ASSISTANT

## 2021-07-06 NOTE — PATIENT INSTRUCTIONS
Continue to monitor diet and maintain physical activity      Made adjustments to pump today      Continue to utilize dexcom      Have Dexcom downloaded in 2-3 weeks for review      Follow up in 3 months

## 2021-07-06 NOTE — PROGRESS NOTES
Christos Wills 65 y o  female MRN: [id number]    Encounter: [id number]      Assessment/Plan     Assessment: This is a 65y o -year-old female with  Type 1 diabetes with hypertension and hyperlipidemia, and vitamin-D deficiency  Plan:  1  Type 1 diabetes:  Most recent hemoglobin A1c has increased to 7 4  Reviewing her A1cs over the years, she is typically in the high 6 is an low sevens  At this point since she is having frequent episodes of low blood sugar in the afternoon, and then seems to overcorrect, will be backing off on the insulin  Will also try and correct her hyperglycemia prior to bed  Basal rates at 12:00 a m  were decreased to 0 300,  At 8:00 a m  was decreased to 0 950, an added an additional basal rate at 6:00 p m  to 0 525  Also increased her insulin sensitivity to 60  Advised her to keep dose in touch with her blood sugars so we can make further adjustments if needed  She has a type 1 diabetic utilizing an insulin pump to manage her blood sugars  She is using the Dexcom continuous glucose monitoring to make adjustments to her insulin with boluses, and also to monitor for hypoglycemia as she has been 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  Follow-up in 3 months with lab work completed prior to visit  2  Hyperlipidemia:  Previous lipid panel was looking excellent  Continue with current medications  Continue following up with Cardiology  3  Hypertension:   Normotensive in the office today with stable kidney function and normal electrolytes  Continue with current medications  Would repeat CMP prior to next office visit  4  Vitamin-D deficiency:  No recent vitamin-D levels  Repeat vitamin-D prior to next office visit  Continue with supplements at this time  CC:   Type 1 diabetes follow-up    History of Present Illness     HPI:  66 y  o  female with type 1 diabetes for approximately 40 years   She was started on an insulin pump around 1998   She is on insulin at home and takes Humalog insulin via the insulin pump basal rate from 12:00 a m  to 6:00 a m   0 350 units/hour, 6:00 a m  to 8:00 a m  0 550 and 8:00 a m  to 2:00 p m  1 05 units/hour, 2pm to 12:00 a m  0 475 units per hour, bolus rates include an insulin to carbohydrate ratio from 12:00 a m  1:13 and at 5:00 p m  1:14 with an insulin sensitivity of 1 unit for every 55 mg/dL for a target blood glucose of 100-120 with a 4 hour insulin action    Her most recent hemoglobin A1c from June 30, 2021 is 7 4  She denies any polyuria and polydipsia   She has no polyphagia, but has once a night nocturia  Jannet Abdul is getting some blurry vision with some cataracts   She has no numbness or tingling of the feet   She denies chest pain or shortness of breath   She denies nephropathy, heart attack, stroke and claudication but does admit to neuropathy and retinopathy  She has a habit of changing her basal settings almost daily  Has been having frequent episodes of hypoglycemia mid afternoon      Her most recent diabetic eye exam was in June 2019  She is seen every 4 months  She has no complaints about her feet and does not follow Podiatry for regular diabetic foot care         Tentative Dexcom from June 23 through July 6, 2021 reveals an average glucose level of 169  She is within target range 50% of the time, below target range for present time, and above target range 46% of the time  Blood sugar does tend to increase early in the morning, then she will spike after breakfast, a will start trending down in will usually have a low in the afternoon and then will spike with correction, afterwards she has hyperglycemia throughout the night into early morning       For her hypertension, she is treated with lisinopril 10 mg daily      For her hyperlipidemia, she is treated with atorvastatin 10 mg       For her vitamin-D deficiency, she supplements with 1000 units of vitamin D3 daily       Review of Systems   Constitutional: Negative for activity change, appetite change, fatigue and unexpected weight change  HENT: Negative for sore throat and trouble swallowing  Eyes: Negative for visual disturbance  Respiratory: Negative for chest tightness and shortness of breath  Cardiovascular: Negative for chest pain, palpitations and leg swelling  Gastrointestinal: Negative for abdominal pain, constipation, diarrhea, nausea and vomiting  Endocrine: Positive for polyuria  Negative for cold intolerance, heat intolerance, polydipsia and polyphagia  Genitourinary: Negative for frequency  Musculoskeletal: Positive for arthralgias  Skin: Negative for wound  Neurological: Negative for dizziness, weakness, numbness and headaches  Psychiatric/Behavioral: Negative for dysphoric mood and sleep disturbance  The patient is not nervous/anxious          Historical Information   Past Medical History:   Diagnosis Date    Basal cell carcinoma (BCC) of face     left cheek    Cataract     Rheumatoid arthritis ([facility] [address] )      Past Surgical History:   Procedure Laterality Date    CATARACT EXTRACTION, BILATERAL Bilateral     EXPLORATORY LAPAROTOMY      FOOT SURGERY Left     foot reconstruction in 2 phases    FOOT SURGERY Right     foot reconstruction    LIVER BIOPSY      TOTAL SHOULDER REPLACEMENT Right      Social History   Social History     Substance and Sexual Activity   Alcohol Use Yes    Alcohol/week: 5 0 standard drinks    Types: 5 Cans of beer per week    Comment: most nights has 1 drink     Social History     Substance and Sexual Activity   Drug Use No     Social History     Tobacco Use   Smoking Status Never Smoker   Smokeless Tobacco Never Used     Family History:   Family History   Problem Relation Age of Onset    Stroke Mother     Cancer Father         renal metastatic    Brain cancer Sister     Diabetes type I Maternal Uncle     Diabetes type I Maternal Grandmother     Osteoarthritis Brother     Osteoarthritis Sister     Rheum arthritis Sister     Osteoarthritis Sister     Osteoarthritis Sister     Osteoarthritis Sister     Osteoarthritis Brother     Osteoarthritis Brother     Bipolar disorder Son        Meds/Allergies   Current Outpatient Medications   Medication Sig Dispense Refill    aspirin 81 mg chewable tablet Chew 81 mg daily      atorvastatin (LIPITOR) 10 mg tablet Take 1 tablet (10 mg total) by mouth daily (Patient taking differently: Take 20 mg by mouth daily ) 90 tablet 3    cholecalciferol (VITAMIN D3) 1,000 units tablet Take 1,000 Units by mouth daily      famotidine (PEPCID) 20 mg tablet Take 20 mg by mouth daily prn      folic acid (FOLVITE) 1 mg tablet Take 1 mg by mouth daily      glucagon (GLUCAGON EMERGENCY) 1 MG injection Inject 1 mg under the skin once as needed for low blood sugar for up to 1 dose 1 each 4    glucose blood test strip Use as instructed Test 4x a day 400 each 3    HumaLOG 100 UNIT/ML injection INJECT UP  UNITS VIA INSULIN PUMP 30 mL 5    lisinopril (ZESTRIL) 10 mg tablet Take 10 mg by mouth daily        methotrexate 50 MG/2ML injection Inject under the skin once a week        Multiple Vitamins-Minerals (MULTIVITAMIN ADULT PO) Take by mouth daily        mupirocin (BACTROBAN) 2 % ointment Apply topically 3 (three) times a day      Omega-3 Fat Ac-Cholecalciferol (DRY EYE OMEGA BENEFITS/VIT D-3) 897-940 MG-UNIT CAPS Take by mouth daily        Probiotic Product ([employer] SENIOR PROBIOTIC PO) Take by mouth daily        RiTUXimab (RITUXAN IV) Infuse into a venous catheter Every 16 weeks, 2nd dose 2 weeks later then repeat      TURMERIC CURCUMIN PO Take 2 each by mouth daily      Blood Glucose Monitoring Suppl (ONETOUCH VERIO) w/Device KIT by Does not apply route once for 1 dose OneTouch Verip (Patient not taking: Reported on 1/7/2020) 1 kit 0     No current facility-administered medications for this visit       Allergies   Allergen Reactions    Bactrim [Sulfamethoxazole-Trimethoprim] GI Intolerance     Other reaction(s): GI Intolerance    Neosporin [Neomycin-Bacitracin Zn-Polymyx] Itching    Tetracycline GI Intolerance     Other reaction(s): GI Intolerance       Objective   Vitals: Blood pressure 106/62, pulse 86, height 5' 1" (1 549 m), weight 49 2 kg (108 lb 6 4 oz)  Physical Exam  Vitals and nursing note reviewed  Constitutional:       General: She is not in acute distress  Appearance: Normal appearance  She is not diaphoretic  HENT:      Head: Normocephalic and atraumatic  Eyes:      General: No scleral icterus  Extraocular Movements: Extraocular movements intact  Conjunctiva/sclera: Conjunctivae normal       Pupils: Pupils are equal, round, and reactive to light  Cardiovascular:      Rate and Rhythm: Normal rate and regular rhythm  Heart sounds: No murmur heard  Pulmonary:      Effort: Pulmonary effort is normal  No respiratory distress  Breath sounds: Normal breath sounds  No wheezing  Musculoskeletal:      Cervical back: Normal range of motion  Right lower leg: No edema  Left lower leg: No edema  Lymphadenopathy:      Cervical: No cervical adenopathy  Neurological:      Mental Status: She is alert and oriented to person, place, and time  Mental status is at baseline  Sensory: No sensory deficit  Gait: Gait normal    Psychiatric:         Mood and Affect: Mood normal          Behavior: Behavior normal          Thought Content: Thought content normal          The history was obtained from the review of the chart, patient      Lab Results:   Lab Results   Component Value Date/Time    Hemoglobin A1C 7 4 (H) 06/30/2021 09:09 AM    Hemoglobin A1C 7 2 (H) 03/24/2021 08:57 AM    Hemoglobin A1C 6 3 (H) 01/04/2021 08:41 AM    BUN 10 06/30/2021 09:09 AM    BUN 11 03/24/2021 08:57 AM    BUN 13 10/02/2020 09:38 AM    Potassium 4 1 06/30/2021 09:09 AM    Potassium 4 3 03/24/2021 08:57 AM    Potassium 3 9 10/02/2020 09:38 AM    Chloride 103 06/30/2021 09:09 AM    Chloride 103 03/24/2021 08:57 AM    Chloride 103 10/02/2020 09:38 AM    CO2 26 06/30/2021 09:09 AM    CO2 26 03/24/2021 08:57 AM    CO2 25 10/02/2020 09:38 AM    Creatinine 0 77 06/30/2021 09:09 AM    Creatinine 0 72 03/24/2021 08:57 AM    Creatinine 0 78 10/02/2020 09:38 AM    AST 33 06/30/2021 09:09 AM    AST 27 03/24/2021 08:57 AM    AST 34 10/02/2020 09:38 AM    ALT 26 06/30/2021 09:09 AM    ALT 18 03/24/2021 08:57 AM    ALT 20 10/02/2020 09:38 AM    Albumin 4 5 06/30/2021 09:09 AM    Albumin 4 0 03/24/2021 08:57 AM    Albumin 4 3 10/02/2020 09:38 AM    Globulin, Total 2 2 06/30/2021 09:09 AM    Globulin, Total 2 2 03/24/2021 08:57 AM    Globulin, Total 2 4 10/02/2020 09:38 AM    HDL 83 03/24/2021 08:57 AM    HDL 88 01/04/2021 08:41 AM    Triglycerides 100 03/24/2021 08:57 AM    Triglycerides 105 01/04/2021 08:41 AM         Portions of the record may have been created with voice recognition software  Occasional wrong word or "sound a like" substitutions may have occurred due to the inherent limitations of voice recognition software  Read the chart carefully and recognize, using context, where substitutions have occurred

## 2021-07-07 ENCOUNTER — DOCUMENTATION (OUTPATIENT)
Dept: ENDOCRINOLOGY | Facility: HOSPITAL | Age: 71
End: 2021-07-07

## 2021-07-12 RX ORDER — LISINOPRIL 10 MG/1
TABLET ORAL
Qty: 90 TABLET | Refills: 0 | Status: SHIPPED | OUTPATIENT
Start: 2021-07-12 | End: 2021-10-19

## 2021-07-12 NOTE — TELEPHONE ENCOUNTER
Medicine Refill Request    Last Office Visit: 10/11/2019  Last Telemedicine Visit: 12/2/2020 Jordan Dumas CRNP    Next Office Visit: 7/20/2021  Next Telemedicine Visit: Visit date not found         Current Outpatient Medications:   •  aspirin 81 mg enteric coated tablet, Take 1 tablet (81 mg total) by mouth 2 (two) times a day., Disp: 60 tablet, Rfl: 0  •  atorvastatin (LIPITOR) 20 mg tablet, Take 1 tablet (20 mg total) by mouth daily., Disp: 30 tablet, Rfl: 3  •  cholecalciferol, vitamin D3, (VITAMIN D3) 1,000 unit capsule, Take 1,000 Units by mouth daily.  , Disp: , Rfl:   •  docosahexaenoic acid/epa (FISH OIL ORAL), Take by mouth 2 (two) times a day. For dry eyes, Disp: , Rfl:   •  famotidine (PEPCID) 10 mg tablet, Take 2 tablets (20 mg total) by mouth daily as needed. With ibuprofen, Disp: , Rfl:   •  folic acid (FOLVITE) 1 mg tablet, Take 1 mg by mouth daily., Disp: , Rfl:   •  glucagon, human recombinant, 1 mg injection, Infuse 1 mg into a venous catheter once., Disp: , Rfl:   •  insulin lispro (HumaLOG U-100 Insulin) 100 unit/mL cartridge, inject by subcutaneous route per prescriber's instructions. Insulin dosing requires individualization., Disp: , Rfl:   •  Lactobac no.41-Bifidobact no.7 (PROBIOTIC-10) 70 mg (3 billion cell) capsule, Take by mouth daily., Disp: , Rfl:   •  lisinopriL (PRINIVIL) 10 mg tablet, Take 1 tablet by mouth once daily, Disp: 90 tablet, Rfl: 1  •  METHOTREXATE SODIUM INJ, Inject 2.5 mg as directed once a week. Fridays , Disp: , Rfl:   •  multivit with minerals/lutein (MULTIVITAMIN 50 PLUS ORAL), No SIG Entered, Disp: , Rfl:   •  polyethylene glycol (MIRALAX) 17 gram packet, Take 17 g by mouth daily as needed (constipation). Please obtain over the counter, Disp: , Rfl:   •  riTUXimab (RITUXAN) 10 mg/mL chemo injection, Inject 10 mg/mL as directed. Every 16 weeks   Sept 7,2020 , Disp: , Rfl:   •  sennosides-docusate sodium (SENOKOT-S) 8.6-50 mg, Take 1 tablet by mouth 2 (two) times  a day as needed for constipation. Please obtain over the counter, Disp: , Rfl:   •  turmeric root extract 500 mg capsule, Take 1,000 mg by mouth daily., Disp: , Rfl:   •  vit A/vit C/vit E/zinc/copper (PRESERVISION AREDS ORAL), Take by mouth daily., Disp: , Rfl:       BP Readings from Last 3 Encounters:   06/17/21 120/80   04/05/21 116/70   11/17/20 (!) 109/52       Recent Lab results:  No results found for: CHOL, No results found for: HDL, No results found for: LDLCALC, No results found for: TRIG     Lab Results   Component Value Date    GLUCOSE 79 11/17/2020   ,   Lab Results   Component Value Date    HGBA1C 6.9 (H) 11/11/2020         Lab Results   Component Value Date    CREATININE 0.7 11/17/2020       No results found for: TSH

## 2021-07-20 ENCOUNTER — OFFICE VISIT (OUTPATIENT)
Dept: INTERNAL MEDICINE | Facility: CLINIC | Age: 71
End: 2021-07-20
Payer: MEDICARE

## 2021-07-20 VITALS
HEART RATE: 71 BPM | OXYGEN SATURATION: 96 % | TEMPERATURE: 98.1 F | BODY MASS INDEX: 21.97 KG/M2 | DIASTOLIC BLOOD PRESSURE: 64 MMHG | RESPIRATION RATE: 15 BRPM | SYSTOLIC BLOOD PRESSURE: 116 MMHG | WEIGHT: 109 LBS | HEIGHT: 59 IN

## 2021-07-20 DIAGNOSIS — R91.8 GROUND GLASS OPACITY PRESENT ON IMAGING OF LUNG: ICD-10-CM

## 2021-07-20 DIAGNOSIS — E78.00 PURE HYPERCHOLESTEROLEMIA: ICD-10-CM

## 2021-07-20 DIAGNOSIS — Z12.31 VISIT FOR SCREENING MAMMOGRAM: ICD-10-CM

## 2021-07-20 DIAGNOSIS — E10.40 TYPE 1 DIABETES MELLITUS WITH DIABETIC NEUROPATHY (CMS/HCC): ICD-10-CM

## 2021-07-20 DIAGNOSIS — K76.9 LIVER LESION, LEFT LOBE: ICD-10-CM

## 2021-07-20 DIAGNOSIS — Z78.0 POSTMENOPAUSAL: ICD-10-CM

## 2021-07-20 DIAGNOSIS — M05.79 RHEUMATOID ARTHRITIS INVOLVING MULTIPLE SITES WITH POSITIVE RHEUMATOID FACTOR (CMS/HCC): ICD-10-CM

## 2021-07-20 DIAGNOSIS — Z00.00 ENCOUNTER FOR MEDICARE ANNUAL WELLNESS EXAM: Primary | ICD-10-CM

## 2021-07-20 PROCEDURE — 200200 PR NO CHARGE: Performed by: INTERNAL MEDICINE

## 2021-07-20 PROCEDURE — G8754 DIAS BP LESS 90: HCPCS | Performed by: INTERNAL MEDICINE

## 2021-07-20 PROCEDURE — G8752 SYS BP LESS 140: HCPCS | Performed by: INTERNAL MEDICINE

## 2021-07-20 PROCEDURE — 99214 OFFICE O/P EST MOD 30 MIN: CPT | Mod: 25 | Performed by: INTERNAL MEDICINE

## 2021-07-20 RX ORDER — ROSUVASTATIN CALCIUM 5 MG/1
5 TABLET, COATED ORAL DAILY
Qty: 90 TABLET | Refills: 1 | Status: SHIPPED | OUTPATIENT
Start: 2021-07-20 | End: 2022-02-09

## 2021-07-20 RX ORDER — MUPIROCIN 20 MG/G
OINTMENT TOPICAL AS NEEDED
COMMUNITY

## 2021-07-20 ASSESSMENT — ENCOUNTER SYMPTOMS
CHILLS: 0
UNEXPECTED WEIGHT CHANGE: 0
ARTHRALGIAS: 1
NECK PAIN: 0
SEIZURES: 0
BRUISES/BLEEDS EASILY: 0
SINUS PRESSURE: 0
SORE THROAT: 0
EYE DISCHARGE: 0
WEAKNESS: 0
ABDOMINAL PAIN: 0
NERVOUS/ANXIOUS: 0
FATIGUE: 0
BACK PAIN: 0
CONSTIPATION: 0
NUMBNESS: 0
SLEEP DISTURBANCE: 0
SHORTNESS OF BREATH: 0
PALPITATIONS: 0
FREQUENCY: 0
FEVER: 0
DYSURIA: 0
DIZZINESS: 0
JOINT SWELLING: 0
DYSPHORIC MOOD: 0
DIFFICULTY URINATING: 0
COUGH: 0
DIARRHEA: 0
HEADACHES: 0

## 2021-07-20 ASSESSMENT — MINI COG
TOTAL SCORE: 5
COMPLETED: YES

## 2021-07-20 ASSESSMENT — PATIENT HEALTH QUESTIONNAIRE - PHQ9: SUM OF ALL RESPONSES TO PHQ9 QUESTIONS 1 & 2: 0

## 2021-07-20 ASSESSMENT — ACTIVITIES OF DAILY LIVING (ADL)
PATIENT'S MEMORY ADEQUATE TO SAFELY COMPLETE DAILY ACTIVITIES?: YES
ADEQUATE_TO_COMPLETE_ADL: YES

## 2021-07-20 NOTE — ASSESSMENT & PLAN NOTE
Reviewed questionnaire. No concerns.  Able to perform all ADLs without difficulty, including house chores, driving, medication management and finances.  No falls. No memory concerns. Due for mammogram and DEXA scan. UTD with colonoscopy. UTD with all vaccines, except Tdap. Advised to get at the pharmacy.  Referred to Derm for skin check

## 2021-07-20 NOTE — ASSESSMENT & PLAN NOTE
Cardiologist recently increased her atorvastatin from 10mg to 20mg. Pt has concerns atorvastatin can cause dementia. Advised there is no evidence linking atorvastatin to dementia as of yet, but will switch pt to rosuvastatin 5mg daily. Due for labs in 3-4 months.

## 2021-07-20 NOTE — PATIENT INSTRUCTIONS
Colonoscopy report from [place] around 2013    Tetanus or Tdap vaccine at the pharmacy                                 Your Personalized Prevention Plan Services (PPPS)    Preventive Services Checklist (Assumes Average Risk Unless Otherwise Noted):    Health Maintenance Topics with due status: Overdue       Topic Date Due    Mammogram Never done    DEXA Scan Never done    Varicella Vaccines Never done    Diabetic Foot Exam Never done    Urine Protein Screening Never done    Medicare Annual Wellness Visit Never done    Hepatitis C Screening Never done    DTaP, Tdap, and Td Vaccines Never done    Annual Falls Risk Screening Never done     Health Maintenance Topics with due status: Not Due       Topic Last Completion Date    Colonoscopy 12/01/2013    Pneumococcal 10/12/2016    Pneumococcal (65 years and older) 10/12/2016    Influenza Vaccine 10/06/2020    Hemoglobin A1C 11/11/2020    Annual Dilated Retinal Exam 01/12/2021     Health Maintenance Topics with due status: Completed       Topic Last Completion Date    Zoster Vaccine 06/09/2019    COVID-19 Vaccine 04/05/2021     Health Maintenance Topics with due status: Aged Out       Topic Date Due    Meningococcal ACWY Aged Out    HIB Vaccines Aged Out    IPV Vaccines Aged Out    HPV Vaccines Aged Out       You May Be Eligible for These Additional Preventive Services   (Assumes Average Risk Unless Otherwise Noted)  Diabetes Screening Any 1 risk factor: hypertension, dyslipidemia, obesity, high glucose; or Any 2 risk factors: >=64yo, overweight, family history diabetes (covered every 6 months)   Hepatitis C Screening Any 1 risk factor: 1) blood transfusion before 1992,   2) current or past injection drug use (annually for high risk; if born between 2039-0292, see above for status).   Vaccine: Hepatitis B As necessary if at-risk: hemophilia, ESRD, diabetes, living with individual infected with hep B, healthcare worker with frequent contact with blood/bodily fluids (series  covered once)   Sexually Transmitted Diseases (STDs) As necessary chlamydia, gonorrhea, syphilis, hepatitis B (covered annually)  HIV if any 1 risk factor present: 1) <14yo or >64yo and at increased risk or 2) 15-64yo and ask for it (covered annually)   Lung Cancer Screening Low dose chest CT if all 3 risk factors: 1) 55-76yo, 2) smoker or quit within last 15y, 3) >=30 pack years (covered annually).  No results found for this or any previous visit.       Cholesterol Screening Both risk factors: 1) >=19yo and 2)  increased risk coronary artery disease (covered every 5 years).     Breast Cancer Screening Covered once 35-40yo, annually >=41yo (if >=51yo, see above for status).     Glaucoma Screening Any 1 risk factor: 1) diabetes, 2) family history glaucoma, 3)  >=51yo, 4)  American >=64yo (covered annually)           Health Risk Factors with Personalized Education:  ----------------------------------------------------------------------------------------------------------------------  Controlling Your Blood Pressure  · Maintain a normal weight (body mass index between 18.5 and 24.9).  · Eat more fruit, vegetables and low-fat dairy.  · Eat less saturated fat and total fat.  · Lower your sodium (salt) intake.  Try to stay under 1500 mg per day, but if you cannot get your intake to be that low, at least lower it by 1000 mg.  · Stay active.  Try to get at least 90 to 150 minutes of exercise per week.  Try brisk walking, swimming, bicycling or dancing.  · Limit alcohol intake.  When you do consume alcohol, drink no more than 1 drink per day.  · If you have been prescribed medication, take it regularly and exactly as prescribed.  Let your PCP know if you have any problems or questions about your medication.  · Check your blood pressure at home or at the store.  Write down your readings and share them with your  PCP  ----------------------------------------------------------------------------------------------------------------------  Controlling Your Diabetes  · Stress can raise your blood sugar.  Learn what causes your stress.  Learn to lower your stress by spending time with family and friends, exercising, deep breathing, trying meditation or yoga, enjoying hobbies and getting enough rest.  Let your PCP know if you’re having problems limiting your stress.  · Maintain a healthy weight by balancing your diet and exercise.  · Choose foods that are lower in calories, saturated fat, trans fat, sugar and salt.  Eat foods with more fiber, like whole grain cereals, bread, crackers, rice or pasta.  Choose to eat fruit, vegetables, and low-fat or fat-free/skim dairy.  · Reduce the portion size of your meals.  Make half of your meal vegetables and fruit, and divide the other half between lean protein and whole grains.  · Drink water instead of juice and regular soda.  · Spend at least some time being active on most days of the week.  · Work to increase your muscle strength at least twice per week.  Try things like light weights, stretch bands, yoga, heavy gardening (digging, planting with tools) or push-ups.  · If you have been prescribed medication, take it regularly and exactly as prescribed.  Let your PCP know if you have any problems or questions about your medication.  · If you have been asked to check your blood sugar, write down your readings and share them with your PCP  ----------------------------------------------------------------------------------------------------------------------  Controlling Your Cholesterol  · Reduce the amount of saturated and trans fat in your diet.  Limit intake of red meat.  Consume only low-fat or non-fat/skim dairy.  Limit fried food.  Cook with vegetable oils.  · Reduce your intake of sugary foods, sugary drinks and alcohol.  · Eat a diet high in fruit, vegetables and whole grains.  · Get  protein from fish, poultry and a small portion of nuts.  · Stay active.  Try to get at least 90 to 150 minutes of exercise per week.  Try brisk walking, swimming, bicycling or dancing.  · Maintain a healthy weight by balancing your diet and exercise.  · If you have been prescribed medication, take it regularly and exactly as prescribed. Let your PCP know if you have any problems or questions about your medication.  · It’s important to know your cholesterol numbers.  When recommended by your PCP, get the cholesterol blood test.  ----------------------------------------------------------------------------------------------------------------------  Maintaining Strong Bones  · Try to get at least 90 to 150 minutes of weight-bearing exercise per week.  · Ensure intake of at least 1200mg of calcium per day.  Eat foods high in calcium like milk and other dairy, green vegetables, fruit, canned fish with soft and edible bones, nuts, calcium-set tofu.  Some foods are calcium-fortified, like bread, cereal, fruit juices and mineral water.  · Help your body make vitamin D by getting 10-15 minutes per day of sunlight.    · Ensure intake of at least 600IU of vitamin D per day.  Eat foods high in vitamin D like oily fish (salmon, sardines, mackerel) and eggs.  Some foods are fortified with vitamin D, like dairy and cereals.  · Avoid high amounts of caffeine and salt, since they can cause the body to loose calcium.  · Limit alcohol intake, since it is associated with weaker bones and is associated with falls and fractures.  · Limit intake of fizzy drinks.  ----------------------------------------------------------------------------------------------------------------------  Reducing Your Risk of Falls  · Tell your PCP if any of your medications make you feel tired, dizzy, lightheaded or off-balance.  · Maintain coordination, flexibility and balance by ensuring regular physical activity.  · Limit alcohol intake to 1 drink per day.   Consider avoiding all alcohol intake.  · Ensure good vision.  Visit an ophthalmologist or optometrist regularly for vision screening or to make sure your glasses / contact lens prescription is correct.  If you need glasses or contacts, wear them.  When you get new glasses or contacts, take time to get used to them.  Do not wear sunglasses or tinted lenses when indoors.  · Ensure good hearing.  Have your hearing checked if you are having trouble hearing, or family and friends think you cannot hear them.  If you need a hearing aid, be sure it fits well and wear it.  · Get enough rest.  Ensure about 7-9 hours of sleep every day.  · Get up slowly from your bed or chairs.  Do not start walking until you are sure you feel steady.  · Wear non-skid, rubber-soled, low-heeled shoes.  Do not walk in socks, or in shoes and slippers with smooth soles.  · If your PCP or therapist recommends using a cane or walker, use it regularly.  · Make your home safer.  Increase lighting throughout the house, especially at the top and bottom of stairs.  Ensure lighting is easily turned on when getting up in the middle of the night.  Make sure there are two secure rails on all stairs.  Install grab bars in the bathtub / shower and near the toilet.  Consider using a shower chair and / or a hand-held shower.  · Spread sand or salt on icy surfaces.  Beware of wet surfaces, which can be icy.  · Tell your PCP if you have fallen.

## 2021-07-20 NOTE — ASSESSMENT & PLAN NOTE
Most recent HbA1c slightly elevated at 7.4.  Continue insulin through insulin pump. F/u with endo.  UTD with eye/foot exams.

## 2021-07-20 NOTE — PROGRESS NOTES
Subjective      Patient ID: Lillie Ward is a 70 y.o. female.    HPI    Patient presents for follow up and MAW.  Reviewed all preventive screening. Due for DEXA and mammogram.  Up-to-date with colonoscopy in [place] in 2013.  Up-to-date with all vaccines.  Most recent labs showed HbA1c 7.4, slightly increased compared to previous.  Sees endocrinologist at [facility] with labs every 3 months.  Up-to-date with eye exam 2 weeks ago with Dr. Fallon in [facility], and foot exam with Dr. Sen, podiatrist in [place] 2 months ago.  All normal. Would like to switch her atorvastatin to rosuvastatin for risk of dementia.  A few of her family members already switched.  Denies any memory loss or concerns.  Reviewed MAW questionnaire.  Able to perform most ADLs without difficulty, including house chores, driving, medication management and finances. Some activities slighty limited due to RA. UTD with vaccines.  No other new/acute complaints.     The following have been reviewed and updated as appropriate in this visit:    Allergies  Meds  Problems         Past Medical History:   Diagnosis Date   • Acid reflux    • Diabetes mellitus type I (CMS/HCC)     Dr. Cerda - [facility]   • DKA, type 1 (CMS/HCC) 09/2016    due to insulin pump malfunction - hospitalized   • GERD (gastroesophageal reflux disease)    • H/O colonoscopy 12/2013    [place].  Normal   • Hyperlipidemia    • Hypoglycemia unawareness associated with type 1 diabetes mellitus (CMS/HCC)    • Macular degeneration     BEGINNING STAGES   • Pulmonary fibrosis (CMS/HCC)     CLEARED BY PULMONOLIGIST 2 YEARS AGO 2018   • Pulmonary nodule, left 07/2017   • Rheumatoid arthritis (CMS/HCC)     Dr. Lo, [place]   • Skin cancer      Past Surgical History:   Procedure Laterality Date   • COLONOSCOPY     • EYE SURGERY Bilateral     CATARACT   • FOOT SURGERY Left 2009    reconstruction   • FOOT SURGERY Right 2007    reconstruction   • JOINT  REPLACEMENT Right     SHOULDER   • LIVER SURGERY      '80's   • REPLACEMENT TOTAL KNEE Left 04/04/2017    [facility]   • SKIN BIOPSY     • TOTAL HIP ARTHROPLASTY Right 11/2020   • TOTAL SHOULDER ARTHROPLASTY Right 1990     Family History   Problem Relation Age of Onset   • Stroke Biological Mother    • Lung cancer Biological Father    • No Known Problems Biological Sister    • Rheum arthritis Other         1 of 8 siblings   • Bipolar disorder Biological Son    • Alcohol abuse Biological Son    • Depression Biological Son      Social History     Socioeconomic History   • Marital status:      Spouse name: None   • Number of children: 1   • Years of education: None   • Highest education level: None   Occupational History   • Occupation: Retired teacher   Tobacco Use   • Smoking status: Never Smoker   • Smokeless tobacco: Never Used   Vaping Use   • Vaping Use: Never used   Substance and Sexual Activity   • Alcohol use: Yes   • Drug use: No   • Sexual activity: Yes   Other Topics Concern   • None   Social History Narrative    Marital status- . was a pediatrician    Children- 1 son    Caffeine - coffee    Exercise-walking    Diet-low carb    Pets-    Presybeterian- none    Seat belt-yes    Smoke alarm-yes             Social Determinants of Health     Financial Resource Strain:    • Difficulty of Paying Living Expenses:    Food Insecurity:    • Worried About Running Out of Food in the Last Year:    • Ran Out of Food in the Last Year:    Transportation Needs:    • Lack of Transportation (Medical):    • Lack of Transportation (Non-Medical):    Physical Activity:    • Days of Exercise per Week:    • Minutes of Exercise per Session:    Stress:    • Feeling of Stress :    Social Connections:    • Frequency of Communication with Friends and Family:    • Frequency of Social Gatherings with Friends and Family:    • Attends Caoda[employer] Services:    • Active Member of Clubs or Organizations:    • Attends Club or  Organization Meetings:    • Marital Status:    Intimate Partner Violence:    • Fear of Current or Ex-Partner:    • Emotionally Abused:    • Physically Abused:    • Sexually Abused:        Review of Systems   Constitutional: Negative for chills, fatigue, fever and unexpected weight change.   HENT: Negative for congestion, hearing loss, sinus pressure and sore throat.    Eyes: Negative for discharge and visual disturbance.   Respiratory: Negative for cough and shortness of breath.    Cardiovascular: Negative for chest pain and palpitations.   Gastrointestinal: Negative for abdominal pain, constipation and diarrhea.   Endocrine: Negative for cold intolerance and heat intolerance.   Genitourinary: Negative for difficulty urinating, dysuria, frequency and pelvic pain.   Musculoskeletal: Positive for arthralgias. Negative for back pain, joint swelling and neck pain.   Skin: Negative for rash.   Allergic/Immunologic: Negative for environmental allergies and food allergies.   Neurological: Negative for dizziness, seizures, weakness, numbness and headaches.   Hematological: Does not bruise/bleed easily.   Psychiatric/Behavioral: Negative for dysphoric mood and sleep disturbance. The patient is not nervous/anxious.        Allergies   Allergen Reactions   • Neomycin Itching   • Neomycin-Bacitracin-Polymyxin Itching     neosporin   • Neomycin-Bacitracnzn-Polymyxnb Itching   • Sulfamethoxazole-Trimethoprim      Other reaction(s): GI Intolerance   • Tetracycline      Other reaction(s): GI Intolerance     Current Outpatient Medications   Medication Sig Dispense Refill   • aspirin 81 mg enteric coated tablet Take 1 tablet (81 mg total) by mouth 2 (two) times a day. 60 tablet 0   • cholecalciferol, vitamin D3, (VITAMIN D3) 1,000 unit capsule Take 1,000 Units by mouth daily.       • docosahexaenoic acid/epa (FISH OIL ORAL) Take by mouth 2 (two) times a day. For dry eyes     • famotidine (PEPCID) 10 mg tablet Take 2 tablets (20 mg  "total) by mouth daily as needed. With ibuprofen     • folic acid (FOLVITE) 1 mg tablet Take 1 mg by mouth daily.     • insulin lispro (HumaLOG U-100 Insulin) 100 unit/mL cartridge inject by subcutaneous route per prescriber's instructions. Insulin dosing requires individualization.     • Lactobac no.41-Bifidobact no.7 (PROBIOTIC-10) 70 mg (3 billion cell) capsule Take by mouth daily.     • lisinopriL (PRINIVIL) 10 mg tablet Take 1 tablet by mouth once daily 90 tablet 0   • METHOTREXATE SODIUM INJ Inject 2.5 mg as directed once a week. Fridays      • multivit with minerals/lutein (MULTIVITAMIN 50 PLUS ORAL) No SIG Entered     • polyethylene glycol (MIRALAX) 17 gram packet Take 17 g by mouth daily as needed (constipation). Please obtain over the counter     • riTUXimab (RITUXAN) 10 mg/mL chemo injection Inject 10 mg/mL as directed. Every 16 weeks   Sept 7,2020      • turmeric root extract 500 mg capsule Take 1,000 mg by mouth daily.     • vit A/vit C/vit E/zinc/copper (PRESERVISION AREDS ORAL) Take by mouth daily.     • glucagon, human recombinant, 1 mg injection Infuse 1 mg into a venous catheter once.     • mupirocin (BACTROBAN) 2 % ointment Apply topically.     • rosuvastatin (CRESTOR) 5 mg tablet Take 1 tablet (5 mg total) by mouth daily. 90 tablet 1   • sennosides-docusate sodium (SENOKOT-S) 8.6-50 mg Take 1 tablet by mouth 2 (two) times a day as needed for constipation. Please obtain over the counter (Patient not taking: Reported on 7/20/2021 )       No current facility-administered medications for this visit.       Objective   Vitals:    07/20/21 1026   BP: 116/64   Pulse: 71   Resp: 15   Temp: 36.7 °C (98.1 °F)   SpO2: 96%   Weight: 49.4 kg (109 lb)   Height: 1.499 m (4' 11\")     Body mass index is 22.02 kg/m².    Physical Exam  Constitutional:       Appearance: She is well-developed.   HENT:      Head: Normocephalic and atraumatic.      Right Ear: Tympanic membrane, ear canal and external ear normal.      " Left Ear: Tympanic membrane, ear canal and external ear normal.      Nose: Nose normal.   Eyes:      Conjunctiva/sclera: Conjunctivae normal.      Pupils: Pupils are equal, round, and reactive to light.   Neck:      Thyroid: No thyromegaly.   Cardiovascular:      Rate and Rhythm: Normal rate and regular rhythm.      Heart sounds: Normal heart sounds. No murmur heard.     Pulmonary:      Effort: Pulmonary effort is normal. No respiratory distress.      Breath sounds: Wheezing present. No rales.   Abdominal:      General: Bowel sounds are normal. There is no distension.      Palpations: Abdomen is soft.      Tenderness: There is no abdominal tenderness. There is no guarding or rebound.   Musculoskeletal:         General: Normal range of motion.      Cervical back: Normal range of motion and neck supple.   Skin:     General: Skin is warm and dry.      Findings: No rash.   Neurological:      Mental Status: She is alert and oriented to person, place, and time.      Cranial Nerves: No cranial nerve deficit.      Sensory: No sensory deficit.   Psychiatric:         Behavior: Behavior normal.         Thought Content: Thought content normal.         Judgment: Judgment normal.         Assessment/Plan   Problem List Items Addressed This Visit        Nervous    Type 1 diabetes mellitus with diabetic neuropathy (CMS/HCC)     Most recent HbA1c slightly elevated at 7.4.  Continue insulin through insulin pump. F/u with endo.  UTD with eye/foot exams.            Digestive    Liver lesion, left lobe     Will check CT abdomen to evaluate         Relevant Orders    CT ABDOMEN WITH IV CONTRAST       Genitourinary    Postmenopausal     Due for DEXA         Relevant Orders    DEXA BONE DENSITY       Endocrine/Metabolic    Hyperlipidemia     Cardiologist recently increased her atorvastatin from 10mg to 20mg. Pt has concerns atorvastatin can cause dementia. Advised there is no evidence linking atorvastatin to dementia as of yet, but will  switch pt to rosuvastatin 5mg daily. Due for labs in 3-4 months.         Relevant Medications    rosuvastatin (CRESTOR) 5 mg tablet       Immune    Rheumatoid arthritis (CMS/HCC)     Continue methotrexate weekly and rituximab every 16 weeks. Continue f/u with Dr. Lo, rheumatologist.            Other    Visit for screening mammogram     Due. Ordered          Relevant Orders    BI SCREENING MAMMOGRAM BILATERAL(TOMOSYNTHESIS)    Ground glass opacity present on imaging of lung     Seen on recent CT coronary calcium score.  Patient has wheezing on exam.  Questionable pulmonary fibrosis in the past, though cleared by pulmonary in 2018.  No records.  Will check follow-up CT chest to evaluate         Relevant Orders    CT CHEST WITHOUT IV CONTRAST    Encounter for Medicare annual wellness exam - Primary     Reviewed questionnaire. No concerns.  Able to perform all ADLs without difficulty, including house chores, driving, medication management and finances.  No falls. No memory concerns. Due for mammogram and DEXA scan. UTD with colonoscopy. UTD with all vaccines, except Tdap. Advised to get at the pharmacy.  Referred to Derm for skin check           Relevant Orders    Ambulatory referral to Dermatology          Machelle Gallardo MD    7/20/2021  Belle Ward is a 70 y.o. female who presents for a subsequent annual wellness visit.     Patient Care Team:  Machelle Gallardo MD as PCP - General  Adry Hadley MD as Referring Physician (Endocrinology)  Leventhal, Lawrence as Referring Physician    Comprehensive Medical and Social History  Patient Active Problem List   Diagnosis   • Type 1 diabetes mellitus (CMS/HCC)   • GERD (gastroesophageal reflux disease)   • Hyperlipidemia   • Fibrosis of lung (CMS/HCC)   • Rheumatoid arthritis (CMS/HCC)   • Hypoglycemia unawareness associated with type 1 diabetes mellitus (CMS/HCC)   • Abscess of axilla   • Visit for screening mammogram   • Pre-op evaluation   •  Cataract of both eyes   • Atypical chest pain   • Postmenopausal   • Cellulitis of right lower extremity   • Type 1 diabetes mellitus with diabetic neuropathy (CMS/HCC)   • Pre-op exam   • Right hip pain   • Primary osteoarthritis of right hip   • Essential hypertension   • Insulin pump in place   • OA (osteoarthritis) of hip   • Fever   • Chest discomfort   • Diarrhea   • Coronary artery disease due to calcified coronary lesion   • Liver lesion   • Ground glass opacity present on imaging of lung   • Liver lesion, left lobe   • Encounter for Medicare annual wellness exam     Past Medical History:   Diagnosis Date   • Acid reflux    • Diabetes mellitus type I (CMS/HCC)     Dr. Cerda - [facility]   • DKA, type 1 (CMS/HCC) 09/2016    due to insulin pump malfunction - hospitalized   • GERD (gastroesophageal reflux disease)    • H/O colonoscopy 12/2013    [place].  Normal   • Hyperlipidemia    • Hypoglycemia unawareness associated with type 1 diabetes mellitus (CMS/HCC)    • Macular degeneration     BEGINNING STAGES   • Pulmonary fibrosis (CMS/HCC)     CLEARED BY PULMONOLIGIST 2 YEARS AGO 2018   • Pulmonary nodule, left 07/2017   • Rheumatoid arthritis (CMS/HCC)     Dr. Lo, [place]   • Skin cancer      Past Surgical History:   Procedure Laterality Date   • COLONOSCOPY     • EYE SURGERY Bilateral     CATARACT   • FOOT SURGERY Left 2009    reconstruction   • FOOT SURGERY Right 2007    reconstruction   • JOINT REPLACEMENT Right     SHOULDER   • LIVER SURGERY      '80's   • REPLACEMENT TOTAL KNEE Left 04/04/2017    [facility]   • SKIN BIOPSY     • TOTAL HIP ARTHROPLASTY Right 11/2020   • TOTAL SHOULDER ARTHROPLASTY Right 1990     Allergies   Allergen Reactions   • Neomycin Itching   • Neomycin-Bacitracin-Polymyxin Itching     neosporin   • Neomycin-Bacitracnzn-Polymyxnb Itching   • Sulfamethoxazole-Trimethoprim      Other reaction(s): GI Intolerance   • Tetracycline      Other reaction(s): GI Intolerance      Current Outpatient Medications   Medication Sig Dispense Refill   • aspirin 81 mg enteric coated tablet Take 1 tablet (81 mg total) by mouth 2 (two) times a day. 60 tablet 0   • cholecalciferol, vitamin D3, (VITAMIN D3) 1,000 unit capsule Take 1,000 Units by mouth daily.       • docosahexaenoic acid/epa (FISH OIL ORAL) Take by mouth 2 (two) times a day. For dry eyes     • famotidine (PEPCID) 10 mg tablet Take 2 tablets (20 mg total) by mouth daily as needed. With ibuprofen     • folic acid (FOLVITE) 1 mg tablet Take 1 mg by mouth daily.     • insulin lispro (HumaLOG U-100 Insulin) 100 unit/mL cartridge inject by subcutaneous route per prescriber's instructions. Insulin dosing requires individualization.     • Lactobac no.41-Bifidobact no.7 (PROBIOTIC-10) 70 mg (3 billion cell) capsule Take by mouth daily.     • lisinopriL (PRINIVIL) 10 mg tablet Take 1 tablet by mouth once daily 90 tablet 0   • METHOTREXATE SODIUM INJ Inject 2.5 mg as directed once a week. Fridays      • multivit with minerals/lutein (MULTIVITAMIN 50 PLUS ORAL) No SIG Entered     • polyethylene glycol (MIRALAX) 17 gram packet Take 17 g by mouth daily as needed (constipation). Please obtain over the counter     • riTUXimab (RITUXAN) 10 mg/mL chemo injection Inject 10 mg/mL as directed. Every 16 weeks   Sept 7,2020      • turmeric root extract 500 mg capsule Take 1,000 mg by mouth daily.     • vit A/vit C/vit E/zinc/copper (PRESERVISION AREDS ORAL) Take by mouth daily.     • glucagon, human recombinant, 1 mg injection Infuse 1 mg into a venous catheter once.     • mupirocin (BACTROBAN) 2 % ointment Apply topically.     • rosuvastatin (CRESTOR) 5 mg tablet Take 1 tablet (5 mg total) by mouth daily. 90 tablet 1   • sennosides-docusate sodium (SENOKOT-S) 8.6-50 mg Take 1 tablet by mouth 2 (two) times a day as needed for constipation. Please obtain over the counter (Patient not taking: Reported on 7/20/2021 )       No current facility-administered  "medications for this visit.     Social History     Tobacco Use   • Smoking status: Never Smoker   • Smokeless tobacco: Never Used   Vaping Use   • Vaping Use: Never used   Substance Use Topics   • Alcohol use: Yes   • Drug use: No     Family History   Problem Relation Age of Onset   • Stroke Biological Mother    • Lung cancer Biological Father    • No Known Problems Biological Sister    • Rheum arthritis Other         1 of 8 siblings   • Bipolar disorder Biological Son    • Alcohol abuse Biological Son    • Depression Biological Son        Objective   Vitals  Vitals:    07/20/21 1026   BP: 116/64   Pulse: 71   Resp: 15   Temp: 36.7 °C (98.1 °F)   SpO2: 96%   Weight: 49.4 kg (109 lb)   Height: 1.499 m (4' 11\")     Body mass index is 22.02 kg/m².    Advanced Care Plan                                        PHQ  Will the patient answer the depression questions?: Yes   Little interest or pleasure in doing things: Not at all   Feeling down, depressed, or hopeless: Not at all   Depression Risk: 0                                             Mini Cog  Completed: Yes  Score: 5  Result: Negative      Get Up and Go  Result: Pass    STEADI Falls Risk  One or more falls in the last year: No           Has trouble stepping up onto a curb: No   Advised to use a cane or walker to get around safely: No   Often has to rush to the toilet: No   Feels unsteady when walking: No   Has lost some feeling in feet: No   Often feels sad or depressed: No   Steadies self on furniture while walking at home: No   Takes medication that makes him/her feel lightheaded or more tired than usual: No   Worried about falling: No   Takes medicine to sleep or improve mood: No   Needs to push with hands when rising from a chair: No   Falls screen completed: Yes     Hearing and Vision Screening  No exam data present  See HRA for relevant hearing screening response.    Assessment/Plan   Diagnoses and all orders for this visit:    Encounter for Medicare annual " wellness exam (Primary)  Assessment & Plan:  Reviewed questionnaire. No concerns.  Able to perform all ADLs without difficulty, including house chores, driving, medication management and finances.  No falls. No memory concerns. Due for mammogram and DEXA scan. UTD with colonoscopy. UTD with all vaccines, except Tdap. Advised to get at the pharmacy.  Referred to Derm for skin check      Orders:  -     Ambulatory referral to Dermatology; Future    Type 1 diabetes mellitus with diabetic neuropathy (CMS/[facility])  Assessment & Plan:  Most recent HbA1c slightly elevated at 7.4.  Continue insulin through insulin pump. F/u with endo.  UTD with eye/foot exams.      Rheumatoid arthritis involving multiple sites with positive rheumatoid factor (CMS/[facility])  Assessment & Plan:  Continue methotrexate weekly and rituximab every 16 weeks. Continue f/u with Dr. Lo, rheumatologist.      Ground glass opacity present on imaging of lung  Assessment & Plan:  Seen on recent CT coronary calcium score.  Patient has wheezing on exam.  Questionable pulmonary fibrosis in the past, though cleared by pulmonary in 2018.  No records.  Will check follow-up CT chest to evaluate    Orders:  -     CT CHEST WITHOUT IV CONTRAST; Future    Pure hypercholesterolemia  Assessment & Plan:  Cardiologist recently increased her atorvastatin from 10mg to 20mg. Pt has concerns atorvastatin can cause dementia. Advised there is no evidence linking atorvastatin to dementia as of yet, but will switch pt to rosuvastatin 5mg daily. Due for labs in 3-4 months.    Orders:  -     rosuvastatin (CRESTOR) 5 mg tablet; Take 1 tablet (5 mg total) by mouth daily.    Liver lesion, left lobe  Assessment & Plan:  Will check CT abdomen to evaluate    Orders:  -     CT ABDOMEN WITH IV CONTRAST; Future    Postmenopausal  Assessment & Plan:  Due for DEXA    Orders:  -     DEXA BONE DENSITY; Future    Visit for screening mammogram  Assessment & Plan:  Due. Ordered     Orders:  -      BI SCREENING MAMMOGRAM BILATERAL(TOMOSYNTHESIS); Future      See Patient Instructions (the written plan) which was given to the patient for PPPS and health risk factors with interventions.

## 2021-07-20 NOTE — ASSESSMENT & PLAN NOTE
Continue methotrexate weekly and rituximab every 16 weeks. Continue f/u with Dr. Lo, rheumatologist.

## 2021-07-20 NOTE — ASSESSMENT & PLAN NOTE
Seen on recent CT coronary calcium score.  Patient has wheezing on exam.  Questionable pulmonary fibrosis in the past, though cleared by pulmonary in 2018.  No records.  Will check follow-up CT chest to evaluate

## 2021-08-26 ENCOUNTER — TELEPHONE (OUTPATIENT)
Dept: INTERNAL MEDICINE | Facility: CLINIC | Age: 71
End: 2021-08-26

## 2021-08-26 NOTE — TELEPHONE ENCOUNTER
----- Message from Pilar Mcginnis MA sent at 8/17/2021 12:31 PM EDT -----  Regarding: FW: Referral Request  Contact: [phone]    ----- Message -----  From: Lucy Ward  Sent: 8/17/2021   9:53 AM EDT  To: [facility] Clinical Support P  Subject: Referral Request                                 DR. WHITNEY,  WOULD YOU PLEASE GIVE ME A REFERRAL OR LETTER SAYING THAT YOU APPROVE OF MY GETTING A THIRD COVID-19 VACCINE. WITH TWO IMMUNO-COMPROMISED MEDICAL CONDITIONS, I WOULD FEEL MUCH SAFER GETTING THE THIRD SHOT.  THANK YOU.  LUCY WARD   1950  [phone]

## 2021-08-26 NOTE — LETTER
August 26, 2021     Patient: Lillie Ward  YOB: 1950    To Whom it May Concern:    Lillie Ward has been under my medical care since 2016.  She is currently on 2 immunosuppressive medications and qualifies to receive the third COVID-19 vaccine.  If you have any questions or concerns, please don't hesitate to call.         Sincerely,         Machelle Gallardo MD        CC:   No Recipients

## 2021-09-23 LAB
ALBUMIN SERPL-MCNC: 4.1 G/DL (ref 3.7–4.7)
ALBUMIN SERPL-MCNC: 4.3 G/DL (ref 3.7–4.7)
ALBUMIN/GLOB SERPL: 1.8 {RATIO} (ref 1.2–2.2)
ALP SERPL-CCNC: 69 IU/L (ref 44–121)
ALP SERPL-CCNC: 72 IU/L (ref 44–121)
ALT SERPL-CCNC: 29 IU/L (ref 0–32)
ALT SERPL-CCNC: 30 IU/L (ref 0–32)
AST SERPL-CCNC: 46 IU/L (ref 0–40)
AST SERPL-CCNC: 49 IU/L (ref 0–40)
BILIRUB DIRECT SERPL-MCNC: 0.27 MG/DL (ref 0–0.4)
BILIRUB SERPL-MCNC: 1.1 MG/DL (ref 0–1.2)
BILIRUB SERPL-MCNC: 1.1 MG/DL (ref 0–1.2)
BUN SERPL-MCNC: 13 MG/DL (ref 8–27)
BUN/CREAT SERPL: 16 (ref 12–28)
CALCIUM SERPL-MCNC: 9.6 MG/DL (ref 8.7–10.3)
CHLORIDE SERPL-SCNC: 103 MMOL/L (ref 96–106)
CHOLEST SERPL-MCNC: 178 MG/DL (ref 100–199)
CHOLEST SERPL-MCNC: 185 MG/DL (ref 100–199)
CO2 SERPL-SCNC: 24 MMOL/L (ref 20–29)
CREAT SERPL-MCNC: 0.8 MG/DL (ref 0.57–1)
GLOBULIN SER-MCNC: 2.3 G/DL (ref 1.5–4.5)
GLUCOSE SERPL-MCNC: 195 MG/DL (ref 65–99)
HBA1C MFR BLD: 8.8 % (ref 4.8–5.6)
HDLC SERPL-MCNC: 89 MG/DL
HDLC SERPL-MCNC: 90 MG/DL
LDLC SERPL CALC-MCNC: 70 MG/DL (ref 0–99)
LDLC SERPL CALC-MCNC: 77 MG/DL (ref 0–99)
POTASSIUM SERPL-SCNC: 4.1 MMOL/L (ref 3.5–5.2)
PROT SERPL-MCNC: 6.4 G/DL (ref 6–8.5)
PROT SERPL-MCNC: 6.6 G/DL (ref 6–8.5)
SL AMB EGFR AFRICAN AMERICAN: 85 ML/MIN/1.73
SL AMB EGFR NON AFRICAN AMERICAN: 73 ML/MIN/1.73
SL AMB VLDL CHOLESTEROL CALC: 18 MG/DL (ref 5–40)
SODIUM SERPL-SCNC: 142 MMOL/L (ref 134–144)
TRIGL SERPL-MCNC: 105 MG/DL (ref 0–149)
TRIGL SERPL-MCNC: 108 MG/DL (ref 0–149)
TSH SERPL DL<=0.005 MIU/L-ACNC: 2.83 UIU/ML (ref 0.45–4.5)
VLDLC SERPL CALC-MCNC: 19 MG/DL (ref 5–40)

## 2021-09-24 ENCOUNTER — HOSPITAL ENCOUNTER (OUTPATIENT)
Dept: RADIOLOGY | Age: 71
Discharge: HOME | End: 2021-09-24
Attending: INTERNAL MEDICINE
Payer: MEDICARE

## 2021-09-24 DIAGNOSIS — K76.9 LIVER LESION: Primary | ICD-10-CM

## 2021-09-24 DIAGNOSIS — K76.9 LIVER LESION, LEFT LOBE: Primary | ICD-10-CM

## 2021-09-24 DIAGNOSIS — K76.9 LIVER LESION, LEFT LOBE: ICD-10-CM

## 2021-09-24 DIAGNOSIS — R91.8 GROUND GLASS OPACITY PRESENT ON IMAGING OF LUNG: ICD-10-CM

## 2021-09-24 PROCEDURE — 74178 CT ABD&PLV WO CNTR FLWD CNTR: CPT | Mod: MG

## 2021-09-24 PROCEDURE — 74170 CT ABD WO CNTRST FLWD CNTRST: CPT | Mod: MG

## 2021-09-24 PROCEDURE — 63600105 HC IODINE BASED CONTRAST: Mod: JW | Performed by: INTERNAL MEDICINE

## 2021-09-24 PROCEDURE — G1004 CDSM NDSC: HCPCS

## 2021-09-24 PROCEDURE — 71250 CT THORAX DX C-: CPT

## 2021-09-24 PROCEDURE — 25500000 HC DRUGS/INCIDENT RAD: Performed by: INTERNAL MEDICINE

## 2021-09-24 RX ADMIN — BARIUM SULFATE 900 ML: 21 SUSPENSION ORAL at 10:13

## 2021-09-24 RX ADMIN — IOHEXOL 95 ML: 350 INJECTION, SOLUTION INTRAVENOUS at 12:15

## 2021-09-28 ENCOUNTER — TELEPHONE (OUTPATIENT)
Dept: INTERNAL MEDICINE | Facility: CLINIC | Age: 71
End: 2021-09-28

## 2021-09-28 ENCOUNTER — TELEPHONE (OUTPATIENT)
Dept: CARDIOLOGY | Facility: CLINIC | Age: 71
End: 2021-09-28

## 2021-09-28 DIAGNOSIS — R93.1 ELEVATED CORONARY ARTERY CALCIUM SCORE: Primary | ICD-10-CM

## 2021-09-28 DIAGNOSIS — E78.00 PURE HYPERCHOLESTEROLEMIA: Primary | ICD-10-CM

## 2021-09-28 NOTE — TELEPHONE ENCOUNTER
Pt can see test results on the portal but does not understand  Please call  2 cat scans; dexa scan; mannogram

## 2021-09-30 NOTE — TELEPHONE ENCOUNTER
Spoke to patient regarding test results.  DEXA scan showed osteoporosis.  Advised to make sure she is taking calcium 1000 mg and vitamin D 1000 to 2000 units daily.  Increase weightbearing exercises.  Never been on treatment.  Advised to discuss with her rheumatologist regarding treatment which may be weekly Fosamax or an injectable such as Prolia or Reclast.  CT abdomen showed calcified liver lesion of the left lobe which appears benign.  Right lobe lesion is a hemangioma.  Will consider monitoring periodically.  CT chest showed tree-in-bud changes, some air trapping, atelectasis versus scarring.  Had pulmonary fibrosis in the past and saw a pulmonologist in 2018, but he has since retired.  Referred patient to Dr. Edwards in [place] or Dr. Pratt at [place] to discuss CT findings.  Provided phone numbers.

## 2021-10-04 DIAGNOSIS — E10.8 TYPE 1 DIABETES MELLITUS WITH COMPLICATION (HCC): ICD-10-CM

## 2021-10-05 ENCOUNTER — OFFICE VISIT (OUTPATIENT)
Dept: ENDOCRINOLOGY | Facility: HOSPITAL | Age: 71
End: 2021-10-05
Payer: MEDICARE

## 2021-10-05 VITALS
BODY MASS INDEX: 20.69 KG/M2 | HEART RATE: 76 BPM | SYSTOLIC BLOOD PRESSURE: 120 MMHG | DIASTOLIC BLOOD PRESSURE: 80 MMHG | HEIGHT: 61 IN | WEIGHT: 109.6 LBS

## 2021-10-05 DIAGNOSIS — E55.9 VITAMIN D DEFICIENCY: ICD-10-CM

## 2021-10-05 DIAGNOSIS — E78.2 MIXED HYPERLIPIDEMIA: ICD-10-CM

## 2021-10-05 DIAGNOSIS — Z96.41 INSULIN PUMP IN PLACE: ICD-10-CM

## 2021-10-05 DIAGNOSIS — E10.649 HYPOGLYCEMIA UNAWARENESS ASSOCIATED WITH TYPE 1 DIABETES MELLITUS (HCC): ICD-10-CM

## 2021-10-05 DIAGNOSIS — E10.40 TYPE 1 DIABETES MELLITUS WITH DIABETIC NEUROPATHY (HCC): Primary | ICD-10-CM

## 2021-10-05 DIAGNOSIS — I10 ESSENTIAL HYPERTENSION: ICD-10-CM

## 2021-10-05 PROCEDURE — 95251 CONT GLUC MNTR ANALYSIS I&R: CPT | Performed by: NURSE PRACTITIONER

## 2021-10-05 PROCEDURE — 99214 OFFICE O/P EST MOD 30 MIN: CPT | Performed by: NURSE PRACTITIONER

## 2021-10-05 RX ORDER — ROSUVASTATIN CALCIUM 5 MG/1
5 TABLET, COATED ORAL DAILY
COMMUNITY
Start: 2021-07-20

## 2021-10-07 ENCOUNTER — TELEPHONE (OUTPATIENT)
Dept: CARDIOLOGY | Facility: CLINIC | Age: 71
End: 2021-10-07

## 2021-10-07 NOTE — TELEPHONE ENCOUNTER
10/7/21 - [facility] for patient to call the office to schedule office visit for mid-June 2022 and stress echo 2 weeks prior to OV.        ----- Message from Madiha Wise MD sent at 6/17/2021  5:33 PM EDT -----  Regarding: Appointment and stress  She needs an appointment with me in a year with a stress echo 1 to 2 weeks prior to that visit

## 2021-10-12 ENCOUNTER — TELEPHONE (OUTPATIENT)
Dept: INTERNAL MEDICINE | Facility: CLINIC | Age: 71
End: 2021-10-12

## 2021-10-12 NOTE — TELEPHONE ENCOUNTER
Xiao from [facility] called.  She is trying to verify a fax that was sent 10/1/21 was received.    They need to have the Dr sign, date and fax back the request.    The request was for a cardio vascular risk assessment test.

## 2021-10-19 RX ORDER — LISINOPRIL 10 MG/1
TABLET ORAL
Qty: 90 TABLET | Refills: 1 | Status: SHIPPED | OUTPATIENT
Start: 2021-10-19 | End: 2022-04-27

## 2021-10-19 NOTE — TELEPHONE ENCOUNTER
Medicine Refill Request    Last Office Visit: 7/20/2021  Last Telemedicine Visit: 12/2/2020 Jordan Dumas CRNP    Next Office Visit: 1/18/2022  Next Telemedicine Visit: Visit date not found         Current Outpatient Medications:   •  aspirin 81 mg enteric coated tablet, Take 1 tablet (81 mg total) by mouth 2 (two) times a day., Disp: 60 tablet, Rfl: 0  •  cholecalciferol, vitamin D3, (VITAMIN D3) 1,000 unit capsule, Take 1,000 Units by mouth daily.  , Disp: , Rfl:   •  docosahexaenoic acid/epa (FISH OIL ORAL), Take by mouth 2 (two) times a day. For dry eyes, Disp: , Rfl:   •  famotidine (PEPCID) 10 mg tablet, Take 2 tablets (20 mg total) by mouth daily as needed. With ibuprofen, Disp: , Rfl:   •  folic acid (FOLVITE) 1 mg tablet, Take 1 mg by mouth daily., Disp: , Rfl:   •  glucagon, human recombinant, 1 mg injection, Infuse 1 mg into a venous catheter once., Disp: , Rfl:   •  insulin lispro (HumaLOG U-100 Insulin) 100 unit/mL cartridge, inject by subcutaneous route per prescriber's instructions. Insulin dosing requires individualization., Disp: , Rfl:   •  Lactobac no.41-Bifidobact no.7 (PROBIOTIC-10) 70 mg (3 billion cell) capsule, Take by mouth daily., Disp: , Rfl:   •  lisinopriL (PRINIVIL) 10 mg tablet, Take 1 tablet by mouth once daily, Disp: 90 tablet, Rfl: 0  •  METHOTREXATE SODIUM INJ, Inject 2.5 mg as directed once a week. Fridays , Disp: , Rfl:   •  multivit with minerals/lutein (MULTIVITAMIN 50 PLUS ORAL), No SIG Entered, Disp: , Rfl:   •  mupirocin (BACTROBAN) 2 % ointment, Apply topically., Disp: , Rfl:   •  polyethylene glycol (MIRALAX) 17 gram packet, Take 17 g by mouth daily as needed (constipation). Please obtain over the counter, Disp: , Rfl:   •  riTUXimab (RITUXAN) 10 mg/mL chemo injection, Inject 10 mg/mL as directed. Every 16 weeks   Sept 7,2020 , Disp: , Rfl:   •  rosuvastatin (CRESTOR) 5 mg tablet, Take 1 tablet (5 mg total) by mouth daily., Disp: 90 tablet, Rfl: 1  •   sennosides-docusate sodium (SENOKOT-S) 8.6-50 mg, Take 1 tablet by mouth 2 (two) times a day as needed for constipation. Please obtain over the counter (Patient not taking: Reported on 7/20/2021 ), Disp: , Rfl:   •  turmeric root extract 500 mg capsule, Take 1,000 mg by mouth daily., Disp: , Rfl:   •  vit A/vit C/vit E/zinc/copper (PRESERVISION AREDS ORAL), Take by mouth daily., Disp: , Rfl:       BP Readings from Last 3 Encounters:   07/20/21 116/64   06/17/21 120/80   04/05/21 116/70       Recent Lab results:  Lab Results   Component Value Date    CHOL 185 09/22/2021   ,   Lab Results   Component Value Date    HDL 89 09/22/2021   ,   Lab Results   Component Value Date    LDLCALC 77 09/22/2021   ,   Lab Results   Component Value Date    TRIG 108 09/22/2021        Lab Results   Component Value Date    GLUCOSE 79 11/17/2020   ,   Lab Results   Component Value Date    HGBA1C 6.9 (H) 11/11/2020         Lab Results   Component Value Date    CREATININE 0.7 11/17/2020       No results found for: TSH

## 2021-11-24 DIAGNOSIS — E10.8 TYPE 1 DIABETES MELLITUS WITH COMPLICATION (HCC): ICD-10-CM

## 2021-12-13 ENCOUNTER — TELEPHONE (OUTPATIENT)
Dept: INTERNAL MEDICINE | Facility: CLINIC | Age: 71
End: 2021-12-13
Payer: MEDICARE

## 2021-12-13 DIAGNOSIS — Z11.59 SCREENING FOR VIRAL DISEASE: Primary | ICD-10-CM

## 2021-12-13 NOTE — LETTER
[facility] Page 1 of 3   Account #: [id number] Collection Date:         Req/Control #: [id number] Collection Time:            Client / Ordering Site Information: Physician Information:   Account Name: [facility] [place]   Address 1: [address]   Address 2:    [facility] Zip: [place] [address]   Phone: [phone]    Ordering: Machelle Gallardo   Degree: MD   NPI: [id number]   UPIN:    Physician ID:          Patient Information:   Name: LUCY ESTRADA   Gender: Female   SSN: [id number]   Patient ID: [id number]    YOB: 1950 (71 years)   Phone: [phone]   Address: [address] [place] [address]    [place] [id number]            Comments:         Order Code Tests Ordered (Total: 1)    Order Code Tests Ordered      [id number] COVID-19 SARS CoV-2 Antibody (IgG)                 Specimen Source:   ([id number]) COVID-19 SARS CoV-2 Antibody (IgG):  Blood, Venous            Diagnosis Codes:   Z11.59                 Bill Type: Third-Party    Carrier Code:          Responsible Party / Guarantor Information:   Name: LUCY ESTRADA   Address: [address] [place] [address] Zip: [place] [address]   Phone: [phone]   Relation to Pt: Self   Employer Name:          ABN:     Worker's Comp: N Date of Injury:             Insurance Information:   Primary Insurance: Secondary Insurance:   Carrier Code: Not on file   Ins Co Name: MEDICARE PART A & B   Address 1:  [address]   Address 2:    [facility] Zip: [place] [address]   Policy Number: [id number]   Group #:     Carrier Code: Not on file   Ins Co Name: AARP SUPPLEMENTAL ONLY   Address 1: [address]   Address 2:    [facility] Zip: [place] [address]   Policy Number: [id number]   Group #:       Primary Policy Martin / Insured: Secondary Policy Martin / Insured:   Name: LUCY ESTRADA   Address: [address] [place] [address]     [place] [id number]   Pt Relation to Subscriber: Self    Name: LUCY ESTRADA   Address: [address] [place] [address]      [place] [id number]   Pt Relation to Subscriber: Self          Authorization - Please Sign and Date  I hereby authorize the release of medical information related to the services described hereon and authorize payment directly to [facility].      E-Signature: Machelle Gallardo MD                          12/13/2021      __  Provider Signature             Date      __________________________________                    ______________  Patient Signature                                                                    Date

## 2021-12-13 NOTE — TELEPHONE ENCOUNTER
----- Message from Pilar Mcginnis MA sent at 2021  7:31 AM EST -----  Regarding: FW: antibodies to Covid 19    ----- Message -----  From: Lillie Ward  Sent: 12/10/2021   7:56 PM EST  To: Daisy Negron [facility] Clinical Support P  Subject: antibodies to Covid 19                           Dr. Gallardo, I am very worried about not having antibodies to the Covid19 virus.  Although I have received two of the Pfizer vaccines and the booster, I have read articles that say two of the medicines I take are causing people who are on them may not have antibodies.  They are the biologic Rituxan and the weekly injection of Methotrexate. That worry and my Type 1 diabetes has me afraid to go anywhere.  At my very close first cousin's , I could not go to the reception last week.  My brother-in-law's  is Tuesday, Dec. 14th and I am reluctant about going to that reception.  If there is any possibility of you writing a prescription for me to get the antibody test asap, I would greatly appreciate it.  I know [facility] does the testing.  Thank you.  Lillie Ward  [phone]

## 2021-12-14 ENCOUNTER — TELEPHONE (OUTPATIENT)
Dept: INTERNAL MEDICINE | Facility: CLINIC | Age: 71
End: 2021-12-14
Payer: MEDICARE

## 2021-12-14 ENCOUNTER — TELEPHONE (OUTPATIENT)
Dept: ENDOCRINOLOGY | Facility: HOSPITAL | Age: 71
End: 2021-12-14

## 2021-12-14 LAB
SARS-COV-2 AB SERPL-IMP: NEGATIVE
SARS-COV-2 IGG SERPL IA-ACNC: <13 AU/ML

## 2021-12-14 NOTE — TELEPHONE ENCOUNTER
Pt called back extremely concerned because she was exposed to a relative who is now covid positive  Called infectious disease and moved pt appointment up to tomorrow 12/15 with dr son sherman  Notified pt

## 2021-12-14 NOTE — TELEPHONE ENCOUNTER
Pt states she had the covid anti-body test done  And it came back that she does not have any anti-bodies for covid  Pt is very worried and do not know what to do   she would like to know if she can get the Monoclonal Antibodies  Do you want pt to have a telemed to discuss  Pt is afraid to go anywhere

## 2021-12-14 NOTE — TELEPHONE ENCOUNTER
Spoke with Dr Gallardo  Pt was set up with Infectious Disease  Pt will see   Dr Robbie Alexis   [facility]  Wednesday 12/22 at 9:20am  Pt notified of appointment and told where to report  Pt understood and is willing to comply

## 2021-12-19 ENCOUNTER — TELEPHONE (OUTPATIENT)
Dept: INFECTIOUS DISEASES | Facility: HOSPITAL | Age: 71
End: 2021-12-19
Payer: MEDICARE

## 2021-12-19 NOTE — TELEPHONE ENCOUNTER
MONOCLONAL ANTIBODY Treatment  Phone Encounter / Casirivimab (ewk-h-MEW-i-mab)  and Imdevimab (im-DEV-i-mab) Infusion Novel Therapy Emergency Use Authorization:    Reviewed with patient:  • This treatment has unknown efficacy, meaning we have no way of knowing if this will truly help you. However, we believe it might. It is designed to block viral attachment and entry into human cells. It is investigational.  • Patient met inclusion criteria as reviewed by a triage team of clinical providers.    Patient EDUCATION MATERIALS were provided via email (and will be provided to the patient on arrival to their infusion appointment).  Sent to patient:  o FACT SHEET for Patient, Parent and Caregivers, Emergency Use Authorization (EUA) REGEN-COV (casirivimab/imdevimab) for COVID-19 ([employer] Patient Education Materials) sent link for patient to access and review. As previously mentioned, patient is aware this is a new drug / investigational and approved for emergency use.   - Primary Provider has reviewed positive COVID-19 (disease related information) with the patient; patient is aware that they are positive for the disease.  - Reviewed with patient infusion procedure:   o Patient assessed on arrival to ensure they are stable to receive the infusion (per the EUA /  no exclusion criteria)  o Treatment is given by 4 subcutaneous injections delivered in 4 different sites.  o Reviewed side effects (allergy to drug, dizziness, etc.) and provided document to review other possible outcomes; reviewed management of side effects.    Patient verbalized an understanding of the education provided. Provided consent to schedule and receive the monoclonal antibody therapy.    Reviewed infusion location, address and number to call on arrival. Instructed patient on arrival to the infusion center:  • Wear a mask  • No visitors  • Call the number provided when you are ready    Reviewed and offered an opportunity for  patient and caregiver questions.     *CDC recommends waiting 90 days after receiving monoclonal antibody therapy to be vaccinated.

## 2021-12-20 ENCOUNTER — HOSPITAL ENCOUNTER (OUTPATIENT)
Facility: CLINIC | Age: 71
Discharge: HOME | End: 2021-12-20
Attending: EMERGENCY MEDICINE
Payer: MEDICARE

## 2021-12-20 VITALS
RESPIRATION RATE: 20 BRPM | DIASTOLIC BLOOD PRESSURE: 70 MMHG | OXYGEN SATURATION: 98 % | SYSTOLIC BLOOD PRESSURE: 136 MMHG | TEMPERATURE: 98.5 F | HEART RATE: 74 BPM

## 2021-12-20 DIAGNOSIS — Z20.822 CONTACT WITH AND (SUSPECTED) EXPOSURE TO COVID-19: Primary | ICD-10-CM

## 2021-12-20 LAB — SARS-COV-2 RNA RESP QL NAA+PROBE: NEGATIVE

## 2021-12-20 PROCEDURE — U0003 INFECTIOUS AGENT DETECTION BY NUCLEIC ACID (DNA OR RNA); SEVERE ACUTE RESPIRATORY SYNDROME CORONAVIRUS 2 (SARS-COV-2) (CORONAVIRUS DISEASE [COVID-19]), AMPLIFIED PROBE TECHNIQUE, MAKING USE OF HIGH THROUGHPUT TECHNOLOGIES AS DESCRIBED BY CMS-2020-01-R: HCPCS | Performed by: NURSE PRACTITIONER

## 2021-12-20 PROCEDURE — 99213 OFFICE O/P EST LOW 20 MIN: CPT | Mod: CS | Performed by: NURSE PRACTITIONER

## 2021-12-20 ASSESSMENT — ENCOUNTER SYMPTOMS
RHINORRHEA: 0
MYALGIAS: 1
ABDOMINAL PAIN: 0
COUGH: 1
SORE THROAT: 1
DIARRHEA: 0
FATIGUE: 0
SINUS PRESSURE: 0
VOMITING: 0
HEADACHES: 1
CHEST TIGHTNESS: 0
FEVER: 0
WHEEZING: 0
SHORTNESS OF BREATH: 0
CHILLS: 1
SINUS PAIN: 0
NAUSEA: 0

## 2021-12-20 NOTE — ED PROVIDER NOTES
Emergency Medicine Note  HPI   HISTORY OF PRESENT ILLNESS     71 y.o. female with T1DM and RA who had a recent COVID exposure presents to [facility] for Monoclonal Antibody treatment. After exposure, had headache, body aches, and sore throat, but was never tested for COVID. Had appt today at 12:00, but was called in for this appt so she cancelled COVID test. Agreeable to be tested here today. COVID vaccinated + booster, but had antibody test and no antibodies to vaccine. Denies chest pain, shortness of breath, wheezing, chest tightness, dizziness. Allergies: Neomycin, Neomycin-bacitracin-polymyxin, Neomycin-bacitracnzn-polymyxnb, Sulfamethoxazole-trimethoprim, and Tetracycline              Patient History   PAST HISTORY     Reviewed from Nursing Triage:       Past Medical History:   Diagnosis Date   • Acid reflux    • Diabetes mellitus type I (CMS/HCC)     Dr. Cerda - [facility]   • DKA, type 1 (CMS/HCC) 09/2016    due to insulin pump malfunction - hospitalized   • GERD (gastroesophageal reflux disease)    • H/O colonoscopy 12/2013    [place].  Normal   • Hyperlipidemia    • Hypoglycemia unawareness associated with type 1 diabetes mellitus (CMS/HCC)    • Macular degeneration     BEGINNING STAGES   • Pulmonary fibrosis (CMS/HCC)     CLEARED BY PULMONOLIGIST 2 YEARS AGO 2018   • Pulmonary nodule, left 07/2017   • Rheumatoid arthritis (CMS/HCC)     Dr. Lo, [place]   • Skin cancer        Past Surgical History:   Procedure Laterality Date   • COLONOSCOPY     • EYE SURGERY Bilateral     CATARACT   • FOOT SURGERY Left 2009    reconstruction   • FOOT SURGERY Right 2007    reconstruction   • JOINT REPLACEMENT Right     SHOULDER   • LIVER SURGERY      '80's   • REPLACEMENT TOTAL KNEE Left 04/04/2017    [facility]   • SKIN BIOPSY     • TOTAL HIP ARTHROPLASTY Right 11/2020   • TOTAL SHOULDER ARTHROPLASTY Right 1990       Family History   Problem Relation Age of Onset   • Stroke Biological Mother    •  Lung cancer Biological Father    • No Known Problems Biological Sister    • Rheum arthritis Other         1 of 8 siblings   • Bipolar disorder Biological Son    • Alcohol abuse Biological Son    • Depression Biological Son        Social History     Tobacco Use   • Smoking status: Never Smoker   • Smokeless tobacco: Never Used   Vaping Use   • Vaping Use: Never used   Substance Use Topics   • Alcohol use: Yes   • Drug use: No         Review of Systems   REVIEW OF SYSTEMS     Review of Systems   Constitutional: Positive for chills. Negative for fatigue and fever.   HENT: Positive for sore throat. Negative for congestion, ear pain, rhinorrhea, sinus pressure, sinus pain and sneezing.    Respiratory: Positive for cough. Negative for chest tightness, shortness of breath and wheezing.    Cardiovascular: Negative for chest pain.   Gastrointestinal: Negative for abdominal pain, diarrhea, nausea and vomiting.   Musculoskeletal: Positive for myalgias.   Skin: Negative for rash.   Neurological: Positive for headaches.         VITALS     ED Vitals    None                      Physical Exam   PHYSICAL EXAM     Physical Exam  Vitals reviewed.   Constitutional:       General: She is awake. She is not in acute distress.     Appearance: Normal appearance. She is well-developed and well-groomed. She is not ill-appearing, toxic-appearing or diaphoretic.   Cardiovascular:      Rate and Rhythm: Normal rate.   Pulmonary:      Effort: Pulmonary effort is normal. No accessory muscle usage or respiratory distress.   Neurological:      Mental Status: She is alert.   Psychiatric:         Behavior: Behavior is cooperative.           PROCEDURES     Procedures     DATA     Results     None              No orders to display       Scoring tools                                 ED Course & MDM   MDM / ED COURSE and CLINICAL IMPRESSIONS     MDM  Number of Diagnoses or Management Options  Contact with and (suspected) exposure to covid-19  Diagnosis  management comments:     71 y.o. female with T1DM and RA who had a recent COVID exposure presents to [facility] for Monoclonal Antibody treatment.   Generally well-appearing. Vitals are normal. No hypoxia or respiratory distress.   Treatment explained. Questions answered. Patient agreeable to receive treatment.   Tolerated treatment well without evidence of side effects / adverse reactions.  Strict return precautions given. Patient was given opportunity to ask questions. Patient verbalized understanding and agreeable with plan. See dispo for full care plan.        Clinical Impressions as of 12/20/21 1217   Contact with and (suspected) exposure to covid-19            Jasmyne Blanco CRNP  12/20/21 1228

## 2021-12-20 NOTE — ED ATTESTATION NOTE
I was immediately available to provide supervision and direction for the care of the patient.     Reynaldo Clancy, DO  12/20/21 1150

## 2021-12-20 NOTE — DISCHARGE INSTRUCTIONS
You received a trial monoclonal antibody treatment (casirivimab & imdevimab) which you tolerated well; you did not show any evidence of side effects or allergic reactions.    Continue to monitor for symptoms of allergic reaction (rash, hives, itching, swelling) as reactions can happen hours to days after receiving the mediciation.  If you think you are experiencing an allergic reaction, take an oral antihistamine (like Benadryl) and seek medical attention immediately. Do not drive after taking Benadryl as this med may cause drowsiness.     It is recommended that you purchase a pulse oximeter. Ideally, your oxygen level should be above 93-94%. Seek immediate medical attention if your oxygen level drops below this, COVID symptoms worsen, or if you develop chest pain, difficulty breathing, shortness of breath, or any other concerning symptoms.     Please schedule a follow-up appt with your PCP, Machelle Gallardo MD, within 24-48 hours of receiving treatment.    Thank you for choosing [facility]!

## 2022-01-04 ENCOUNTER — TELEPHONE (OUTPATIENT)
Dept: INTERNAL MEDICINE | Facility: CLINIC | Age: 72
End: 2022-01-04
Payer: MEDICARE

## 2022-01-04 RX ORDER — CITALOPRAM 10 MG/1
10 TABLET ORAL DAILY
Qty: 90 TABLET | Refills: 1 | Status: SHIPPED | OUTPATIENT
Start: 2022-01-04 | End: 2022-09-19

## 2022-01-04 NOTE — TELEPHONE ENCOUNTER
----- Message from Pilar Mcginnis MA sent at 2022  7:20 AM EST -----  Regarding: FW: depression    ----- Message -----  From: Lillie Ward  Sent: 1/3/2022   9:16 PM EST  To: Daisy Negron [facility] Clinical Support P  Subject: depression                                       Dear Dr. Gallardo,  I talked to my therapist today about feeling very sad due to problems with my daughter. She suggested that I take a low dose of an antidepressant to help me. Seven and a half years ago when my   by suicide, I took Celexa for about 3 months and it helped me. Would you please call in a prescription for Celexa to my pharmacy? [place] [id number]   Thank you.  Lillie Ward

## 2022-01-05 LAB
ALBUMIN SERPL-MCNC: 4.5 G/DL (ref 3.7–4.7)
ALBUMIN/GLOB SERPL: 1.9 {RATIO} (ref 1.2–2.2)
ALP SERPL-CCNC: 71 IU/L (ref 44–121)
ALT SERPL-CCNC: 26 IU/L (ref 0–32)
AST SERPL-CCNC: 34 IU/L (ref 0–40)
BASOPHILS # BLD AUTO: 0.1 X10E3/UL (ref 0–0.2)
BASOPHILS NFR BLD AUTO: 1 %
BILIRUB SERPL-MCNC: 1.5 MG/DL (ref 0–1.2)
BUN SERPL-MCNC: 11 MG/DL (ref 8–27)
BUN/CREAT SERPL: 14 (ref 12–28)
CALCIUM SERPL-MCNC: 9.9 MG/DL (ref 8.7–10.3)
CHLORIDE SERPL-SCNC: 104 MMOL/L (ref 96–106)
CO2 SERPL-SCNC: 24 MMOL/L (ref 20–29)
CREAT SERPL-MCNC: 0.79 MG/DL (ref 0.57–1)
EOSINOPHIL # BLD AUTO: 0.3 X10E3/UL (ref 0–0.4)
EOSINOPHIL NFR BLD AUTO: 5 %
ERYTHROCYTE [DISTWIDTH] IN BLOOD BY AUTOMATED COUNT: 13.1 % (ref 11.7–15.4)
GLOBULIN SER-MCNC: 2.4 G/DL (ref 1.5–4.5)
GLUCOSE SERPL-MCNC: 204 MG/DL (ref 65–99)
HBA1C MFR BLD: 7.7 % (ref 4.8–5.6)
HCT VFR BLD AUTO: 45.8 % (ref 34–46.6)
HGB BLD-MCNC: 15.3 G/DL (ref 11.1–15.9)
IMM GRANULOCYTES # BLD: 0 X10E3/UL (ref 0–0.1)
IMM GRANULOCYTES NFR BLD: 0 %
LYMPHOCYTES # BLD AUTO: 1.3 X10E3/UL (ref 0.7–3.1)
LYMPHOCYTES NFR BLD AUTO: 22 %
MCH RBC QN AUTO: 31.7 PG (ref 26.6–33)
MCHC RBC AUTO-ENTMCNC: 33.4 G/DL (ref 31.5–35.7)
MCV RBC AUTO: 95 FL (ref 79–97)
MONOCYTES # BLD AUTO: 0.7 X10E3/UL (ref 0.1–0.9)
MONOCYTES NFR BLD AUTO: 12 %
NEUTROPHILS # BLD AUTO: 3.5 X10E3/UL (ref 1.4–7)
NEUTROPHILS NFR BLD AUTO: 60 %
PLATELET # BLD AUTO: 267 X10E3/UL (ref 150–450)
POTASSIUM SERPL-SCNC: 4.2 MMOL/L (ref 3.5–5.2)
PROT SERPL-MCNC: 6.9 G/DL (ref 6–8.5)
RBC # BLD AUTO: 4.83 X10E6/UL (ref 3.77–5.28)
SL AMB EGFR AFRICAN AMERICAN: 86 ML/MIN/1.73
SL AMB EGFR NON AFRICAN AMERICAN: 74 ML/MIN/1.73
SODIUM SERPL-SCNC: 142 MMOL/L (ref 134–144)
WBC # BLD AUTO: 5.9 X10E3/UL (ref 3.4–10.8)

## 2022-01-07 ENCOUNTER — OFFICE VISIT (OUTPATIENT)
Dept: ENDOCRINOLOGY | Facility: HOSPITAL | Age: 72
End: 2022-01-07
Payer: MEDICARE

## 2022-01-07 VITALS
WEIGHT: 111 LBS | SYSTOLIC BLOOD PRESSURE: 120 MMHG | DIASTOLIC BLOOD PRESSURE: 74 MMHG | BODY MASS INDEX: 20.96 KG/M2 | HEIGHT: 61 IN | HEART RATE: 78 BPM

## 2022-01-07 DIAGNOSIS — Z96.41 INSULIN PUMP IN PLACE: ICD-10-CM

## 2022-01-07 DIAGNOSIS — E78.2 MIXED HYPERLIPIDEMIA: ICD-10-CM

## 2022-01-07 DIAGNOSIS — E10.40 TYPE 1 DIABETES MELLITUS WITH DIABETIC NEUROPATHY (HCC): Primary | ICD-10-CM

## 2022-01-07 DIAGNOSIS — E10.649 HYPOGLYCEMIA UNAWARENESS ASSOCIATED WITH TYPE 1 DIABETES MELLITUS (HCC): ICD-10-CM

## 2022-01-07 DIAGNOSIS — I10 ESSENTIAL HYPERTENSION: ICD-10-CM

## 2022-01-07 PROCEDURE — 99215 OFFICE O/P EST HI 40 MIN: CPT | Performed by: INTERNAL MEDICINE

## 2022-01-07 PROCEDURE — 95251 CONT GLUC MNTR ANALYSIS I&R: CPT | Performed by: INTERNAL MEDICINE

## 2022-01-07 RX ORDER — CITALOPRAM 10 MG/1
10 TABLET ORAL DAILY
COMMUNITY

## 2022-01-07 NOTE — PATIENT INSTRUCTIONS
hgba1c is 7 7%  this is still too high but improved  Let's the bolus rate insulin to carb ratio to 12 am to 4 pm 1 unit per 12 grams carb and 4 pm to 12 am 1 unit per 11 grams carb  Let's change the insulin sensitivity to 1 unit per 55 blood sugar points  Try not to give insulin until 3 hours after the one before  Continue to use the dexcom  Consider changing to the Tandem Tslim insulin pump  Follow up in 3 months with blood work

## 2022-01-07 NOTE — PROGRESS NOTES
1/7/2022    Assessment/Plan      Diagnoses and all orders for this visit:    Type 1 diabetes mellitus with diabetic neuropathy ([facility] [address] )  -     HEMOGLOBIN A1C W/ EAG ESTIMATION Lab Collect; Future  -     Comprehensive metabolic panel Lab Collect; Future  -     Lipid Panel with Direct LDL reflex Lab Collect; Future    Hypoglycemic unawareness associated with type 1 diabetes mellitus  -     HEMOGLOBIN A1C W/ EAG ESTIMATION Lab Collect; Future  -     Comprehensive metabolic panel Lab Collect; Future  -     Lipid Panel with Direct LDL reflex Lab Collect; Future    Essential hypertension  -     HEMOGLOBIN A1C W/ EAG ESTIMATION Lab Collect; Future  -     Comprehensive metabolic panel Lab Collect; Future  -     Lipid Panel with Direct LDL reflex Lab Collect; Future    Mixed hyperlipidemia  -     HEMOGLOBIN A1C W/ EAG ESTIMATION Lab Collect; Future  -     Comprehensive metabolic panel Lab Collect; Future  -     Lipid Panel with Direct LDL reflex Lab Collect; Future    Insulin pump in place  -     HEMOGLOBIN A1C W/ EAG ESTIMATION Lab Collect; Future  -     Comprehensive metabolic panel Lab Collect; Future  -     Lipid Panel with Direct LDL reflex Lab Collect; Future    Other orders  -     citalopram (CeleXA) 10 mg tablet; Take 10 mg by mouth daily        Assessment/Plan:  1  Type 1 diabetes  Hemoglobin A1c is 7 7%  This is improved from last visit but still higher than it should be  Her Dexcom download demonstrates high blood sugars after supper so I will adjust her bolus rate/insulin to carbohydrate ratio from 12:00 a m  to 4:00 p m  to 1 unit per 12 g carbohydrate and from 4:00 p m  to 12:00 a m  to 1 unit per 11 g carbohydrate    She has been overriding her insulin pump when her sugars are high so I will adjust her insulin sensitivity to 1 unit per 55 blood sugar points and I have encouraged her not to give insulin and till least 3 hours after the previous insulin dosage to prevent insulin stacking and hypoglycemic symptoms  She will continue all other basal rates and bolus rates in her pump in continue to use the Dexcom continuous glucose monitoring system  She is coming due for an insulin pump upgrade soon, I have asked her to consider the tandem T slim 2 insulin pump with integrated Dexcom G6 continuous glucose monitoring system so that she could use control IQ  This would help adjust her basal rates when her blood sugars are high or low  2  Hypoglycemic unawareness  She will continue to utilize the DEX com continuous glucose monitoring system  3  Hypertension  She is normotensive in the office on her current dose of lisinopril  4  Hyperlipidemia  She will continue same rosuvastatin 5 mg daily  I will check a lipid profile with her next visit  I have asked her to follow up in 3 months with preceding hemoglobin A1c, CMP, and lipid panel  CC: Diabetes type 1, blood pressure, lipid follow-up    History of Present Illness     HPI: Christel Izaguirre is a 66y o  year old female with type 1 diabetes with neuropathy and hypoglycemic unawareness for 42 years, hypertension, hyperlipidemia for follow-up visit  She is on insulin at home and takes Humalog insulin via the insulin pump  She denies any polyuria, polydipsia, polyphagia, and blurry vision  She has nocturia once or twice a night  She denies numbness or tingling of the feet  She denies chest pain or shortness of breath  She denies nephropathy, heart attack, stroke and claudication but does admit to neuropathy and retinopathy and hypoglycemic unawareness  She has a Medtronic 630 G insulin pump with DEX com G6 continuous glucose monitoring system  She has been on insulin pump therapy since 1998  She had a pump replacement for a casing crack in summer 2020  Basal rates:  12:00 a m  to 3:30 a m  0 275 units/hour, 3:30 a m  to 7:00 a m  0 475 units/hour, 7:00 a m  to 8:00 a m  0 7 units/hour, 8:00 a m  to 3:00 p m  0 8 units/hour, 3:00 p m  to 6:00 p m  0 575 units/hour, 6:00 p m  to 12:00 a m  0 55 units/hour  Bolus rates:  12:00 a m  to 5:00 p m  1 unit per 12 g carbohydrate, 5 p m  to 12:00 a m  1 unit per 13 g carbohydrate  Insulin sensitivity factor:  1 unit for every 60 blood sugar points for a target blood glucose of 100-120  Insulin action:  6 hours  Total daily insulin dosage of 31 2+/- 4 3 units at 42% basal and 58% bolus  Hypoglycemic episodes: Yes several times per week  The patient's last eye exam was in July 2021, she is seen every 4 months  Has appointment next wek The patient's last foot exam was in October 2019 at endocrine office visit  She does see Podiatry now, last visit October 2021  Last A1C was   Lab Results   Component Value Date    HGBA1C 7 7 (H) 01/04/2022     Blood Sugar/Glucometer/Pump/CGM review:  She uses a Dexcom continuous glucose monitoring system to test her blood sugars frequently throughout the day  DEX com download from 12/25/2021 through 01/07/2022 was reviewed the office today  Average glucose is 173 mg/dL with a standard deviation of 72 mg/dL  48% of blood sugars are in range, 32% high, 16% very high, 3% low, and less than 1% very low  Overall, her Dexcom demonstrates elevated blood sugars after supper in the evening which then drop overnight with some hypoglycemia between 3 and 6:00 a m  due to overtreatment with a riding her insulin pump for high blood sugars and then rebound high starting around 10:00 a m  for several hours  She has hypertension and takes lisinopril 10 mg daily  She denies headache or stroke-like symptoms  She has hyperlipidemia and takes rosuvastatin 5 mg daily  She denies chest pain or shortness of breath  Review of Systems   Constitutional: Negative for fatigue and unexpected weight change  HENT: Negative for trouble swallowing  Eyes: Negative for visual disturbance  Respiratory: Negative for chest tightness and shortness of breath      Cardiovascular: Negative for chest pain  Gastrointestinal: Negative for abdominal pain, constipation, diarrhea and nausea  Endocrine: Negative for polydipsia, polyphagia and polyuria  Nocturia 1-2 times a night  Skin: Negative for wound  Neurological: Negative for dizziness, weakness, light-headedness, numbness and headaches  Psychiatric/Behavioral: Negative for sleep disturbance         Historical Information   Past Medical History:   Diagnosis Date    Basal cell carcinoma (BCC) of face     left cheek    Cataract     Rheumatoid arthritis ([facility] [place] [address] )      Past Surgical History:   Procedure Laterality Date    CATARACT EXTRACTION, BILATERAL Bilateral     EXPLORATORY LAPAROTOMY      FOOT SURGERY Left     foot reconstruction in 2 phases    FOOT SURGERY Right     foot reconstruction    LIVER BIOPSY      TOTAL SHOULDER REPLACEMENT Right      Social History   Social History     Substance and Sexual Activity   Alcohol Use Yes    Alcohol/week: 5 0 standard drinks    Types: 5 Cans of beer per week    Comment: most nights has 1 drink     Social History     Substance and Sexual Activity   Drug Use No     Social History     Tobacco Use   Smoking Status Never Smoker   Smokeless Tobacco Never Used     Family History:   Family History   Problem Relation Age of Onset    Stroke Mother     Cancer Father         renal metastatic    Brain cancer Sister     Diabetes type I Maternal Uncle     Diabetes type I Maternal Grandmother     Osteoarthritis Brother     Prostate cancer Brother     Osteoarthritis Sister     Breast cancer Sister     Rheum arthritis Sister     Osteoarthritis Sister     Osteoarthritis Sister     Osteoarthritis Sister     Osteoarthritis Brother     Prostate cancer Brother     Osteoarthritis Brother     Bipolar disorder Son     Bipolar disorder Daughter        Meds/Allergies   Current Outpatient Medications   Medication Sig Dispense Refill    aspirin 81 mg chewable tablet Chew 81 mg daily      Calcium Carbonate (CALCIUM 600 PO) Take by mouth daily      cholecalciferol (VITAMIN D3) 1,000 units tablet Take 1,000 Units by mouth daily      citalopram (CeleXA) 10 mg tablet Take 10 mg by mouth daily      famotidine (PEPCID) 20 mg tablet Take 20 mg by mouth daily prn      folic acid (FOLVITE) 1 mg tablet Take 1 mg by mouth daily      glucagon (GLUCAGON EMERGENCY) 1 MG injection Inject 1 mg under the skin once as needed for low blood sugar for up to 1 dose 1 each 4    HumaLOG 100 UNIT/ML injection INJECT UP  UNITS DAILY VIA PUMP 30 mL 0    lisinopril (ZESTRIL) 10 mg tablet Take 10 mg by mouth daily        methotrexate 50 MG/2ML injection Inject under the skin once a week        Multiple Vitamins-Minerals (MULTIVITAMIN ADULT PO) Take by mouth daily        mupirocin (BACTROBAN) 2 % ointment Apply topically 3 (three) times a day      Omega-3 Fat Ac-Cholecalciferol (DRY EYE OMEGA BENEFITS/VIT D-3) 184-775 MG-UNIT CAPS Take by mouth daily        Probiotic Product ([employer] SENIOR PROBIOTIC PO) Take by mouth daily        RiTUXimab (RITUXAN IV) Infuse into a venous catheter Every 16 weeks, 2nd dose 2 weeks later then repeat      rosuvastatin (CRESTOR) 5 mg tablet Take 5 mg by mouth daily      TURMERIC CURCUMIN PO Take 2 each by mouth daily      Blood Glucose Monitoring Suppl (ONETOUCH VERIO) w/Device KIT by Does not apply route once for 1 dose OneTouch Verjose carlos (Patient not taking: Reported on 1/7/2020) 1 kit 0     No current facility-administered medications for this visit  Allergies   Allergen Reactions    Bactrim [Sulfamethoxazole-Trimethoprim] GI Intolerance     Other reaction(s): GI Intolerance    Neosporin [Neomycin-Bacitracin Zn-Polymyx] Itching    Tetracycline GI Intolerance     Other reaction(s): GI Intolerance       Objective   Vitals: Blood pressure 120/74, pulse 78, height 5' 1" (1 549 m), weight 50 3 kg (111 lb)    Invasive Devices  Report    None                 Physical Exam  Vitals reviewed  Constitutional:       Appearance: Normal appearance  She is well-developed  HENT:      Head: Normocephalic and atraumatic  Eyes:      Conjunctiva/sclera: Conjunctivae normal    Neck:      Thyroid: No thyromegaly  Vascular: No carotid bruit  Comments: Thyroid normal in size  Cardiovascular:      Rate and Rhythm: Normal rate and regular rhythm  Pulses: Pulses are weak  Dorsalis pedis pulses are 1+ on the right side and 1+ on the left side  Posterior tibial pulses are 1+ on the right side and 1+ on the left side  Heart sounds: Normal heart sounds  No murmur heard  Comments: 1+ dorsalis pedis and posterior tibialis pulses bilaterally  Pulmonary:      Effort: Pulmonary effort is normal       Breath sounds: Normal breath sounds  No wheezing  Abdominal:      Palpations: Abdomen is soft  Musculoskeletal:         General: No deformity  Normal range of motion  Cervical back: Normal range of motion and neck supple  Right lower leg: No edema  Left lower leg: No edema  Comments: Callus medial left 1st MP joint  No ulcerations of the feet  Feet:      Right foot:      Skin integrity: No ulcer, skin breakdown, erythema, warmth, callus or dry skin  Left foot:      Skin integrity: Callus present  No ulcer, skin breakdown, erythema, warmth or dry skin  Lymphadenopathy:      Cervical: No cervical adenopathy  Skin:     General: Skin is warm and dry  Findings: No rash  Neurological:      Mental Status: She is alert and oriented to person, place, and time  Deep Tendon Reflexes: Reflexes are normal and symmetric  Comments: Vibration sensation diminished to the 1st toe DIP joint bilaterally  Microfilament sensation intact bilaterally except to the right 1st MP joint  Patient's shoes and socks removed  Right Foot/Ankle   Right Foot Inspection  Skin Exam: skin normal and skin intact   No dry skin, no warmth, no callus, no erythema, no maceration, no abnormal color, no pre-ulcer, no ulcer and no callus  Toe Exam: ROM and strength within normal limits and right toe deformity  No swelling    Sensory   Vibration: diminished  Monofilament testing: intact    Vascular  Capillary refills: < 3 seconds  The right DP pulse is 1+  The right PT pulse is 1+  Left Foot/Ankle  Left Foot Inspection  Skin Exam: skin normal, skin intact and callus  No dry skin, no warmth, no erythema, no maceration, normal color, no pre-ulcer and no ulcer  Toe Exam: ROM and strength within normal limits and left toe deformity  No swelling  Sensory   Vibration: diminished  Monofilament testing: intact    Vascular  Capillary refills: < 3 seconds  The left DP pulse is 1+  The left PT pulse is 1+  Assign Risk Category  Deformity present  Loss of protective sensation  Weak pulses  Risk: 2        The history was obtained from the review of the chart and from the patient      Lab Results:    Most recent Alc is  Lab Results   Component Value Date    HGBA1C 7 7 (H) 01/04/2022           Blood work done on 01/04/2022 showed she MP with a glucose of 204 random but was otherwise normal   Lab Results   Component Value Date    CREATININE 0 79 01/04/2022    CREATININE 0 80 09/22/2021    CREATININE 0 77 06/30/2021    BUN 11 01/04/2022    K 4 2 01/04/2022     01/04/2022    CO2 24 01/04/2022       Lab Results   Component Value Date    HDL 90 09/22/2021    TRIG 105 09/22/2021       Lab Results   Component Value Date    ALT 26 01/04/2022    AST 34 01/04/2022       Lab Results   Component Value Date    TSH 2 830 09/22/2021       CBC is normal       Future Appointments   Date Time Provider Srikanth Flores   4/13/2022 10:15 AM DESHAWN [facility]alona [facility]

## 2022-01-13 DIAGNOSIS — E10.8 TYPE 1 DIABETES MELLITUS WITH COMPLICATION (HCC): ICD-10-CM

## 2022-01-14 ENCOUNTER — TELEPHONE (OUTPATIENT)
Dept: ENDOCRINOLOGY | Facility: HOSPITAL | Age: 72
End: 2022-01-14

## 2022-01-14 NOTE — TELEPHONE ENCOUNTER
Patient called bc she is thinking of getting the tandem pump but she is not sure if will be able to maneuver the pump  Do you have a sample she can try?

## 2022-01-18 ENCOUNTER — OFFICE VISIT (OUTPATIENT)
Dept: INTERNAL MEDICINE | Facility: CLINIC | Age: 72
End: 2022-01-18
Payer: MEDICARE

## 2022-01-18 VITALS
HEIGHT: 59 IN | WEIGHT: 113 LBS | SYSTOLIC BLOOD PRESSURE: 136 MMHG | BODY MASS INDEX: 22.78 KG/M2 | HEART RATE: 70 BPM | OXYGEN SATURATION: 97 % | DIASTOLIC BLOOD PRESSURE: 62 MMHG | RESPIRATION RATE: 16 BRPM

## 2022-01-18 DIAGNOSIS — D84.9 IMMUNODEFICIENCY, UNSPECIFIED: ICD-10-CM

## 2022-01-18 DIAGNOSIS — F32.9 REACTIVE DEPRESSION: ICD-10-CM

## 2022-01-18 DIAGNOSIS — M05.79 RHEUMATOID ARTHRITIS INVOLVING MULTIPLE SITES WITH POSITIVE RHEUMATOID FACTOR (CMS/HCC): ICD-10-CM

## 2022-01-18 DIAGNOSIS — R91.8 GROUND GLASS OPACITY PRESENT ON IMAGING OF LUNG: ICD-10-CM

## 2022-01-18 DIAGNOSIS — R06.2 WHEEZING: ICD-10-CM

## 2022-01-18 DIAGNOSIS — E10.40 TYPE 1 DIABETES MELLITUS WITH DIABETIC NEUROPATHY (CMS/HCC): Primary | ICD-10-CM

## 2022-01-18 DIAGNOSIS — I10 ESSENTIAL HYPERTENSION: ICD-10-CM

## 2022-01-18 PROCEDURE — G0009 ADMIN PNEUMOCOCCAL VACCINE: HCPCS | Performed by: INTERNAL MEDICINE

## 2022-01-18 PROCEDURE — G8752 SYS BP LESS 140: HCPCS | Performed by: INTERNAL MEDICINE

## 2022-01-18 PROCEDURE — 90732 PPSV23 VACC 2 YRS+ SUBQ/IM: CPT | Performed by: INTERNAL MEDICINE

## 2022-01-18 PROCEDURE — 99214 OFFICE O/P EST MOD 30 MIN: CPT | Mod: 25 | Performed by: INTERNAL MEDICINE

## 2022-01-18 PROCEDURE — G8754 DIAS BP LESS 90: HCPCS | Performed by: INTERNAL MEDICINE

## 2022-01-18 ASSESSMENT — ENCOUNTER SYMPTOMS
FEVER: 0
WHEEZING: 1
ABDOMINAL PAIN: 0
UNEXPECTED WEIGHT CHANGE: 0
SHORTNESS OF BREATH: 0
DIFFICULTY URINATING: 0

## 2022-01-18 NOTE — PROGRESS NOTES
Subjective      Patient ID: Lillie Ward is a 71 y.o. female.    HPI    Patient presents with concerns about covid-19 infection.  Received 3 doses of the Pfizer COVID-vaccine, last dose 8/27/2021.  Antibody testing 4 months later was negative.  Patient had a COVID exposure and received prophylactic monoclonal antibody infusion.  Her son has stage III prostate cancer.  He lives out of state and patient is hoping to be able to visit him next month.  Concerned about her immunity status against COVID.  ID did not recommend getting another prophylactic antibody infusion.  Rheumatologist has not made any recommendations relating to COVID-19.  Found a company that will give monoclonal antibody infusion to immune suppressed patients.  Patient denies any symptoms at this time.  Has not done out anywhere.  Feels sad and depressed about that.  Also having issues with her daughter with whom they are not able to have contact.  She has tried sending her a care package but it was not accepted.  Seeing a therapist about it.  Started the citalopram 10 mg daily earlier this month.  Seeing endocrinologist for her type 1 diabetes.  Will be getting a new insulin pump.  Has not made a follow-up appointment with pulmonologist to discuss the CT abnormalities in the fall.  No other new/acute complaints.    The following have been reviewed and updated as appropriate in this visit:     Allergies  Meds  Problems         Past Medical History:   Diagnosis Date   • Acid reflux    • Diabetes mellitus type I (CMS/HCC)     Dr. Cerda - [facility]   • DKA, type 1 (CMS/HCC) 09/2016    due to insulin pump malfunction - hospitalized   • GERD (gastroesophageal reflux disease)    • H/O colonoscopy 12/2013    Jagjit.  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 Lo, [place]   • Skin cancer      Past Surgical History:   Procedure Laterality Date   • COLONOSCOPY     • EYE SURGERY Bilateral     CATARACT   • FOOT SURGERY Left 2009    reconstruction   • FOOT SURGERY Right 2007    reconstruction   • JOINT REPLACEMENT Right     SHOULDER   • LIVER SURGERY      '80's   • REPLACEMENT TOTAL KNEE Left 04/04/2017    [facility]   • SKIN BIOPSY     • TOTAL HIP ARTHROPLASTY Right 11/2020   • TOTAL SHOULDER ARTHROPLASTY Right 1990     Family History   Problem Relation Age of Onset   • Stroke Biological Mother    • Lung cancer Biological Father    • No Known Problems Biological Sister    • Rheum arthritis Other         1 of 8 siblings   • Prostate cancer Biological Son    • Pancreatitis Biological Son    • Alcohol abuse Biological Son    • Bipolar disorder Biological Son    • Bipolar disorder Biological Daughter         no diagnosis   • Depression Biological Daughter    • Anxiety disorder Biological Daughter      Social History     Socioeconomic History   • Marital status:      Spouse name: None   • Number of children: 1   • Years of education: None   • Highest education level: None   Occupational History   • Occupation: Retired teacher   Tobacco Use   • Smoking status: Never Smoker   • Smokeless tobacco: Never Used   Vaping Use   • Vaping Use: Never used   Substance and Sexual Activity   • Alcohol use: Yes   • Drug use: No   • Sexual activity: Yes   Other Topics Concern   • None   Social History Narrative    Marital status- . was a pediatrician    Children- 1 son    Caffeine - coffee    Exercise-walking    Diet-low carb    Pets-    Scientologist- none    Seat belt-yes    Smoke alarm-yes             Social Determinants of Health     Financial Resource Strain: Not on file   Food Insecurity: Not on file   Transportation Needs: Not on file   Physical Activity: Not on file   Stress: Not on file    Social Connections: Not on file   Intimate Partner Violence: Not on file   Housing Stability: Not on file       Review of Systems   Constitutional: Negative for fever and unexpected weight change.   Respiratory: Positive for wheezing. Negative for shortness of breath.    Cardiovascular: Negative for chest pain.   Gastrointestinal: Negative for abdominal pain.   Genitourinary: Negative for difficulty urinating.       Allergies   Allergen Reactions   • Neomycin Itching   • Neomycin-Bacitracin-Polymyxin Itching     neosporin   • Neomycin-Bacitracnzn-Polymyxnb Itching   • Sulfamethoxazole-Trimethoprim      Other reaction(s): GI Intolerance   • Tetracycline      Other reaction(s): GI Intolerance     Current Outpatient Medications   Medication Sig Dispense Refill   • aspirin 81 mg enteric coated tablet Take 1 tablet (81 mg total) by mouth 2 (two) times a day. 60 tablet 0   • cholecalciferol, vitamin D3, (VITAMIN D3) 1,000 unit capsule Take 1,000 Units by mouth daily.       • citalopram 10 mg tablet Take 1 tablet (10 mg total) by mouth daily. 90 tablet 1   • docosahexaenoic acid/epa (FISH OIL ORAL) Take by mouth 2 (two) times a day. For dry eyes     • famotidine (PEPCID) 10 mg tablet Take 2 tablets (20 mg total) by mouth daily as needed. With ibuprofen     • folic acid (FOLVITE) 1 mg tablet Take 1 mg by mouth daily.     • glucagon, human recombinant, 1 mg injection Infuse 1 mg into a venous catheter once.     • insulin lispro (HumaLOG U-100 Insulin) 100 unit/mL cartridge inject by subcutaneous route per prescriber's instructions. Insulin dosing requires individualization.     • Lactobac no.41-Bifidobact no.7 (PROBIOTIC-10) 70 mg (3 billion cell) capsule Take by mouth daily.     • lisinopriL 10 mg tablet Take 1 tablet by mouth once daily 90 tablet 1   • METHOTREXATE SODIUM INJ Inject 2.5 mg as directed once a week. Fridays      • multivit with minerals/lutein (MULTIVITAMIN 50 PLUS ORAL) No SIG Entered     • mupirocin  "(BACTROBAN) 2 % ointment Apply topically.     • riTUXimab (RITUXAN) 10 mg/mL chemo injection Inject 10 mg/mL as directed. Every 16 weeks   Sept 7,2020      • rosuvastatin (CRESTOR) 5 mg tablet Take 1 tablet (5 mg total) by mouth daily. 90 tablet 1   • vit A/vit C/vit E/zinc/copper (PRESERVISION AREDS ORAL) Take by mouth daily.     • turmeric root extract 500 mg capsule Take 1,000 mg by mouth daily.       No current facility-administered medications for this visit.       Objective   Vitals:    01/18/22 1056   BP: 136/62   Pulse: 70   Resp: 16   SpO2: 97%   Weight: 51.3 kg (113 lb)   Height: 1.499 m (4' 11\")     Body mass index is 22.82 kg/m².    Physical Exam  Constitutional:       Appearance: Normal appearance. She is well-developed.   HENT:      Head: Normocephalic and atraumatic.   Eyes:      General: Lids are normal.   Cardiovascular:      Rate and Rhythm: Normal rate and regular rhythm.      Heart sounds: Normal heart sounds, S1 normal and S2 normal. No murmur heard.  Pulmonary:      Effort: Pulmonary effort is normal.      Breath sounds: Wheezing (Scattered inspiratory wheezes and squeaks in all lung fields) present. No decreased breath sounds, rhonchi or rales.   Abdominal:      General: Bowel sounds are normal.      Palpations: Abdomen is soft.      Tenderness: There is no abdominal tenderness.   Musculoskeletal:      Cervical back: Neck supple.   Skin:     General: Skin is warm and dry.   Neurological:      Mental Status: She is alert and oriented to person, place, and time.      Cranial Nerves: No cranial nerve deficit.      Gait: Gait normal.   Psychiatric:         Behavior: Behavior normal.         Thought Content: Thought content normal.         Judgment: Judgment normal.         Assessment/Plan   Problem List Items Addressed This Visit        Nervous    Type 1 diabetes mellitus with diabetic neuropathy (CMS/HCC) - Primary     Most recent HbA1c down to 7.7 from 8.8 in the fall.  Continue working with " endocrinologist.  Will be getting a new insulin pump            Respiratory    Wheezing     Wheezes and squeaks on exam.  Has not seen the pulmonologist for her abnormal CT chest showing tree-in-bud and groundglass opacities.  Provided phone number to Dr. Edwards in [place] and Dr. Pratt at [facility]         Relevant Orders    Ambulatory referral to Pulmonology    Ambulatory referral to Pulmonology       Circulatory    Essential hypertension     At goal with lisinopril            Immune    Rheumatoid arthritis (CMS/HCC)     Continue methotrexate and rituximab         Immunodeficiency, unspecified (CMS/[facility])     Patient concerned about COVID infection given she did not respond to her 3 vaccine doses.  Would like monoclonal antibody infusion.  Discussed the risks and benefits of infusion.  Currently not indicated as she did not have any recent exposures.  No positive test.  Also discussed that the monoclonal antibody infusion is generally not effective against the omicron variant which is the predominant variant for infections.  Patient brought in paperwork and stated she check with the company and one of the infusions has efficacy against the omicron variant.  Needs the form faxed over with the doctor signature in order for her to receive it.  Patient understands that she is not clinically indicated to receive it, but feels there have been other immunosuppressed people who have received it in her similar circumstance.  Would like protection for her to visit her son out of state.  Discussed that an infusion may not protect her for her future travels.  Patient will think about above issues.  Will fax form over but patient should use caution and make an informed decision regarding the infusion            Other    Ground glass opacity present on imaging of lung     Initially seen on CT coronary calcium score.  Confirmed on CT chest.  Was referred to pulmonologist in the fall.  Did not go yet.  Provided phone numbers  again         Relevant Orders    Ambulatory referral to Pulmonology    Ambulatory referral to Pulmonology    Reactive depression     Depression related to her relationship with her daughter as well as the COVID pandemic and her immunosuppressed state without antibodies despite vaccination.  Continue working with therapist.  Continue Celexa 10 mg daily               Machelle Gallardo MD    1/23/2022

## 2022-01-23 PROBLEM — R06.2 WHEEZING: Status: ACTIVE | Noted: 2022-01-23

## 2022-01-23 PROBLEM — D84.9 IMMUNODEFICIENCY, UNSPECIFIED: Status: ACTIVE | Noted: 2022-01-23

## 2022-01-23 PROBLEM — F32.9 REACTIVE DEPRESSION: Status: ACTIVE | Noted: 2022-01-23

## 2022-01-23 NOTE — ASSESSMENT & PLAN NOTE
Initially seen on CT coronary calcium score.  Confirmed on CT chest.  Was referred to pulmonologist in the fall.  Did not go yet.  Provided phone numbers again

## 2022-01-23 NOTE — ASSESSMENT & PLAN NOTE
Most recent HbA1c down to 7.7 from 8.8 in the fall.  Continue working with endocrinologist.  Will be getting a new insulin pump

## 2022-01-23 NOTE — ASSESSMENT & PLAN NOTE
Depression related to her relationship with her daughter as well as the COVID pandemic and her immunosuppressed state without antibodies despite vaccination.  Continue working with therapist.  Continue Celexa 10 mg daily

## 2022-01-23 NOTE — ASSESSMENT & PLAN NOTE
Patient concerned about COVID infection given she did not respond to her 3 vaccine doses.  Would like monoclonal antibody infusion.  Discussed the risks and benefits of infusion.  Currently not indicated as she did not have any recent exposures.  No positive test.  Also discussed that the monoclonal antibody infusion is generally not effective against the omicron variant which is the predominant variant for infections.  Patient brought in paperwork and stated she check with the company and one of the infusions has efficacy against the omicron variant.  Needs the form faxed over with the doctor signature in order for her to receive it.  Patient understands that she is not clinically indicated to receive it, but feels there have been other immunosuppressed people who have received it in her similar circumstance.  Would like protection for her to visit her son out of state.  Discussed that an infusion may not protect her for her future travels.  Patient will think about above issues.  Will fax form over but patient should use caution and make an informed decision regarding the infusion

## 2022-01-23 NOTE — ASSESSMENT & PLAN NOTE
Wheezes and squeaks on exam.  Has not seen the pulmonologist for her abnormal CT chest showing tree-in-bud and groundglass opacities.  Provided phone number to Dr. Edwards in [place] and Dr. Pratt at [facility]

## 2022-01-26 ENCOUNTER — TELEPHONE (OUTPATIENT)
Dept: OTHER | Facility: OTHER | Age: 72
End: 2022-01-26

## 2022-01-26 ENCOUNTER — OFFICE VISIT (OUTPATIENT)
Dept: DIABETES SERVICES | Facility: HOSPITAL | Age: 72
End: 2022-01-26
Payer: MEDICARE

## 2022-01-26 DIAGNOSIS — E10.649 HYPOGLYCEMIA UNAWARENESS ASSOCIATED WITH TYPE 1 DIABETES MELLITUS (HCC): Primary | ICD-10-CM

## 2022-01-26 PROCEDURE — G0108 DIAB MANAGE TRN  PER INDIV: HCPCS | Performed by: DIETITIAN, REGISTERED

## 2022-01-26 NOTE — TELEPHONE ENCOUNTER
Patient is calling about "Millie" she is a patient of [address] [place] [address]  She states that it is a preventative medication  Will call back if we can find any information on this

## 2022-01-26 NOTE — PROGRESS NOTES
Diabetes DSME    HPI: Met with Debi Sanchez for DSME Follow-up visit  She is out of warranty with her current Medtronic pump, is interested possibly moving to a tandem pump as she currently wears a Dexcom and they offer an integrated system  She wanted to see plan with the live pump before making a decision  This is a good idea because a and struggles with use of her hands  She practiced using the touch screen, no issues  She completed the process of filling a syringe and filling a cartridge, she had no issues with being able to do those fine motor skills  We also had her practice using an auto soft infusion set, this was a little more of a challenge but she was able to do it a few times  I gave her a sample infusion set, cartridge, syringe, and saline to take home to continue to be able to practice these skills before she makes her decision  Once she decides what she wants, she will reach out to tandem to place an order and I will train her on her new insulin pump when it arrives  Training completed  Ht Readings from Last 1 Encounters:   01/07/22 5' 1" (1 549 m)     Wt Readings from Last 3 Encounters:   01/07/22 50 3 kg (111 lb)   10/05/21 49 7 kg (109 lb 9 6 oz)   07/06/21 49 2 kg (108 lb 6 4 oz)        There is no height or weight on file to calculate BMI       Lab Results   Component Value Date    HGBA1C 7 7 (H) 01/04/2022    HGBA1C 8 8 (H) 09/22/2021    HGBA1C 7 4 (H) 06/30/2021       No results found for: CHOL  Lab Results   Component Value Date    HDL 90 09/22/2021    HDL 83 03/24/2021    HDL 88 01/04/2021     Lab Results   Component Value Date    LDLCALC 70 09/22/2021    LDLCALC 75 03/24/2021    LDLCALC 92 01/04/2021     Lab Results   Component Value Date    TRIG 105 09/22/2021    TRIG 100 03/24/2021    TRIG 105 01/04/2021     No results found for: CHOLHDL  No results found for: Homero Mendoza    Diabetes Education Record  Danielle received the following handouts: Tslim material      Patient response to instruction    Comprehensiongood  Motivationgood  Expected Compliancegood    Thank you for referring your patient to [facility], it was a pleasure working with them today  Please feel free to call with any questions or concerns      Lara Olson, [address] [place] [address]  [address] [facility] [address]  [address] [facility] [id number]

## 2022-01-27 ENCOUNTER — TELEPHONE (OUTPATIENT)
Dept: ENDOCRINOLOGY | Facility: HOSPITAL | Age: 72
End: 2022-01-27

## 2022-01-27 NOTE — TELEPHONE ENCOUNTER
CAROLINAI-Patient mentioned yesterday at her appointment that she is interested in RODEZ  Patient said she saw in a group online that [facility] had this medication  She said her PCP in [facility] will not write for the script at [facility]  Patient was given [facility]'s Infectious Disease office number to inquire about BRENDA

## 2022-01-28 ENCOUNTER — TELEPHONE (OUTPATIENT)
Dept: ENDOCRINOLOGY | Facility: HOSPITAL | Age: 72
End: 2022-01-28

## 2022-02-04 ENCOUNTER — TELEPHONE (OUTPATIENT)
Dept: INTERNAL MEDICINE | Facility: CLINIC | Age: 72
End: 2022-02-04
Payer: MEDICARE

## 2022-02-04 NOTE — TELEPHONE ENCOUNTER
Notified pt that she needed to have a positive covid test; pt is going to call over to [facility] to follow up

## 2022-02-07 ENCOUNTER — TELEPHONE (OUTPATIENT)
Dept: ENDOCRINOLOGY | Facility: HOSPITAL | Age: 72
End: 2022-02-07

## 2022-02-08 DIAGNOSIS — E78.00 PURE HYPERCHOLESTEROLEMIA: ICD-10-CM

## 2022-02-08 NOTE — TELEPHONE ENCOUNTER
Medicine Refill Request    Last Office: 1/18/2022   Last Consult Visit: 11/10/2020  Last Telemedicine Visit: 12/2/2020 Jordan Dumas CRNP    Next Appointment: 7/19/2022      Current Outpatient Medications:   •  aspirin 81 mg enteric coated tablet, Take 1 tablet (81 mg total) by mouth 2 (two) times a day., Disp: 60 tablet, Rfl: 0  •  cholecalciferol, vitamin D3, (VITAMIN D3) 1,000 unit capsule, Take 1,000 Units by mouth daily.  , Disp: , Rfl:   •  citalopram 10 mg tablet, Take 1 tablet (10 mg total) by mouth daily., Disp: 90 tablet, Rfl: 1  •  docosahexaenoic acid/epa (FISH OIL ORAL), Take by mouth 2 (two) times a day. For dry eyes, Disp: , Rfl:   •  famotidine (PEPCID) 10 mg tablet, Take 2 tablets (20 mg total) by mouth daily as needed. With ibuprofen, Disp: , Rfl:   •  folic acid (FOLVITE) 1 mg tablet, Take 1 mg by mouth daily., Disp: , Rfl:   •  glucagon, human recombinant, 1 mg injection, Infuse 1 mg into a venous catheter once., Disp: , Rfl:   •  insulin lispro (HumaLOG U-100 Insulin) 100 unit/mL cartridge, inject by subcutaneous route per prescriber's instructions. Insulin dosing requires individualization., Disp: , Rfl:   •  Lactobac no.41-Bifidobact no.7 (PROBIOTIC-10) 70 mg (3 billion cell) capsule, Take by mouth daily., Disp: , Rfl:   •  lisinopriL 10 mg tablet, Take 1 tablet by mouth once daily, Disp: 90 tablet, Rfl: 1  •  METHOTREXATE SODIUM INJ, Inject 2.5 mg as directed once a week. Fridays , Disp: , Rfl:   •  multivit with minerals/lutein (MULTIVITAMIN 50 PLUS ORAL), No SIG Entered, Disp: , Rfl:   •  mupirocin (BACTROBAN) 2 % ointment, Apply topically., Disp: , Rfl:   •  riTUXimab (RITUXAN) 10 mg/mL chemo injection, Inject 10 mg/mL as directed. Every 16 weeks   Sept 7,2020 , Disp: , Rfl:   •  rosuvastatin (CRESTOR) 5 mg tablet, Take 1 tablet (5 mg total) by mouth daily., Disp: 90 tablet, Rfl: 1  •  turmeric root extract 500 mg capsule, Take 1,000 mg by mouth daily., Disp: , Rfl:   •  vit A/vit C/vit  E/zinc/copper (PRESERVISION AREDS ORAL), Take by mouth daily., Disp: , Rfl:       BP Readings from Last 3 Encounters:   01/18/22 136/62   12/20/21 136/70   07/20/21 116/64       Recent Lab results:  Lab Results   Component Value Date    CHOL 185 09/22/2021   ,   Lab Results   Component Value Date    HDL 89 09/22/2021   ,   Lab Results   Component Value Date    LDLCALC 77 09/22/2021   ,   Lab Results   Component Value Date    TRIG 108 09/22/2021        Lab Results   Component Value Date    GLUCOSE 79 11/17/2020   ,   Lab Results   Component Value Date    HGBA1C 6.9 (H) 11/11/2020         Lab Results   Component Value Date    CREATININE 0.7 11/17/2020       No results found for: TSH

## 2022-02-09 RX ORDER — ROSUVASTATIN CALCIUM 5 MG/1
TABLET, COATED ORAL
Qty: 90 TABLET | Refills: 0 | Status: SHIPPED | OUTPATIENT
Start: 2022-02-09 | End: 2022-04-27

## 2022-02-11 ENCOUNTER — TELEPHONE (OUTPATIENT)
Dept: ENDOCRINOLOGY | Facility: HOSPITAL | Age: 72
End: 2022-02-11

## 2022-02-11 NOTE — TELEPHONE ENCOUNTER
Pt is receiving Tslim pump on 2/15/22  She wants to schedule appt for upgrade education  She is going away for the month of March  Do you see anywhere we can add her in for February  I believe it is supposed to be 90 minutes and as of now you don't have anything available   She cannot do 2/24

## 2022-03-15 ENCOUNTER — TELEPHONE (OUTPATIENT)
Dept: INTERNAL MEDICINE | Facility: CLINIC | Age: 72
End: 2022-03-15
Payer: MEDICARE

## 2022-03-15 NOTE — TELEPHONE ENCOUNTER
Patient is requesting a prescription for evusheld.  It needs to be faxed to [facility] at [phone].  She also needs a letter faxed with prescription saying that she qualifies for the drug.

## 2022-03-17 NOTE — TELEPHONE ENCOUNTER
Please let pt know nothing has changed on my end regarding the Evusheld.  I cannot order this medication.  Please have her book an appointment with me to discuss if she would like. Thank you

## 2022-03-24 NOTE — TELEPHONE ENCOUNTER
Spoke with pt;  pt was out and did not have a pen and paper to write Dr. Vaz's phone number on   She will call us back

## 2022-03-24 NOTE — TELEPHONE ENCOUNTER
Please let pt know it looks like [facility] is going to start giving Evusheld infusions to immune compromised patients soon.  If she hasn't received it already she can reach out to the ID doctors she's seen before. Or she can make an appointment with Dr. Vaz's group at [facility], and see if there is a wait list for her to get on. They will have the most up to date information and dates before we do.

## 2022-03-25 DIAGNOSIS — E10.8 TYPE 1 DIABETES MELLITUS WITH COMPLICATION (HCC): ICD-10-CM

## 2022-03-28 DIAGNOSIS — E10.8 TYPE 1 DIABETES MELLITUS WITH COMPLICATION (HCC): ICD-10-CM

## 2022-04-04 ENCOUNTER — TELEPHONE (OUTPATIENT)
Dept: INTERNAL MEDICINE | Facility: CLINIC | Age: 72
End: 2022-04-04
Payer: MEDICARE

## 2022-04-04 RX ORDER — AZD7442 150-150 MG
300 KIT INTRAMUSCULAR ONCE
Qty: 3 ML | Refills: 0 | Status: SHIPPED | OUTPATIENT
Start: 2022-04-04 | End: 2022-04-04

## 2022-04-04 NOTE — TELEPHONE ENCOUNTER
Patient called  infectious disease, she was told that they only give evusheld to transplant patients.  She said that [facility] in [place] can give her the shot.  She needs a written script from you faxed to [phone].

## 2022-04-04 NOTE — TELEPHONE ENCOUNTER
Usual dose is 3ml.  Please let pt now I sent in an electronic prescription to [facility] in [place].

## 2022-04-05 ENCOUNTER — OFFICE VISIT (OUTPATIENT)
Dept: DIABETES SERVICES | Facility: HOSPITAL | Age: 72
End: 2022-04-05

## 2022-04-05 VITALS — BODY MASS INDEX: 20.97 KG/M2 | WEIGHT: 111 LBS

## 2022-04-05 DIAGNOSIS — E10.40 TYPE 1 DIABETES MELLITUS WITH DIABETIC NEUROPATHY (HCC): Primary | ICD-10-CM

## 2022-04-05 PROCEDURE — TUPGRA: Performed by: DIETITIAN, REGISTERED

## 2022-04-07 NOTE — PROGRESS NOTES
Tandem T Slim Insulin Pump Training    Met with Thaddeus Mora for insulin pump training on the T:slim X2 pump  Training completed per training checklist scanned to chart  Settings transferred from current insulin pump into the new one  Training completed on changes in features, filling the cartridge and tubing, how to take boluses, advanced features, alerts and alarms, infusion set, personal profiles, and all other aspects of training checklist  Asked Thaddeus Mora to create a t-connect account at home if they don't have one already so their account can be linked to Endo for downloads and review in between visits  Pt knows that their pump has a hardware warranty but new software updates can be done whenever a new one is released  Derek Ray will be using the Dexcom G6 and Control IQ technology  We turned this on today  Answered all of their questions  Will call with questions or for more education  Observations: She did well with training  Will take some practice, but she did well in office  I had given her supplies after our pump assessment for her to use and take home to practice ahead of time so she felt comfortable  She has her Endo f/u next week, we will touch base then  Thaddeus Mora was provided with the education materials provided by T:slim  Lab Results   Component Value Date    HGBA1C 7 7 (H) 01/04/2022       Lab Results   Component Value Date    K 4 2 01/04/2022     01/04/2022    CO2 24 01/04/2022    BUN 11 01/04/2022    CREATININE 0 79 01/04/2022    GLUC 204 (H) 01/04/2022    AST 34 01/04/2022    ALT 26 01/04/2022    TP 6 9 01/04/2022    TBILI 1 5 (H) 01/04/2022       Patient response to instruction    Comprehension: good  Motivation: good  Expected Compliance: good  Response to Teachback: 100%, demonstrated understanding     Thank you for referring your patient to [facility], it was a pleasure working with them today   Please feel free to call with any questions or concerns      Salina Mcpherson, [address] [place] [phone]

## 2022-04-09 LAB
ALBUMIN SERPL-MCNC: 4.4 G/DL (ref 3.7–4.7)
ALBUMIN/GLOB SERPL: 1.8 {RATIO} (ref 1.2–2.2)
ALP SERPL-CCNC: 69 IU/L (ref 44–121)
ALT SERPL-CCNC: 21 IU/L (ref 0–32)
AST SERPL-CCNC: 31 IU/L (ref 0–40)
BILIRUB SERPL-MCNC: 0.9 MG/DL (ref 0–1.2)
BUN SERPL-MCNC: 11 MG/DL (ref 8–27)
BUN/CREAT SERPL: 14 (ref 12–28)
CALCIUM SERPL-MCNC: 9.7 MG/DL (ref 8.7–10.3)
CHLORIDE SERPL-SCNC: 104 MMOL/L (ref 96–106)
CHOLEST SERPL-MCNC: 195 MG/DL (ref 100–199)
CHOLEST/HDLC SERPL: 2.1 RATIO (ref 0–4.4)
CO2 SERPL-SCNC: 24 MMOL/L (ref 20–29)
CREAT SERPL-MCNC: 0.79 MG/DL (ref 0.57–1)
EGFR: 79 ML/MIN/1.73
GLOBULIN SER-MCNC: 2.5 G/DL (ref 1.5–4.5)
GLUCOSE SERPL-MCNC: 130 MG/DL (ref 65–99)
HBA1C MFR BLD: 7.3 % (ref 4.8–5.6)
HDLC SERPL-MCNC: 95 MG/DL
LDLC SERPL DIRECT ASSAY-MCNC: 78 MG/DL (ref 0–99)
POTASSIUM SERPL-SCNC: 4.6 MMOL/L (ref 3.5–5.2)
PROT SERPL-MCNC: 6.9 G/DL (ref 6–8.5)
SODIUM SERPL-SCNC: 144 MMOL/L (ref 134–144)
TRIGL SERPL-MCNC: 78 MG/DL (ref 0–149)

## 2022-04-13 ENCOUNTER — OFFICE VISIT (OUTPATIENT)
Dept: ENDOCRINOLOGY | Facility: HOSPITAL | Age: 72
End: 2022-04-13
Payer: MEDICARE

## 2022-04-13 VITALS
DIASTOLIC BLOOD PRESSURE: 74 MMHG | HEART RATE: 70 BPM | HEIGHT: 61 IN | SYSTOLIC BLOOD PRESSURE: 120 MMHG | BODY MASS INDEX: 20.77 KG/M2 | WEIGHT: 110 LBS

## 2022-04-13 DIAGNOSIS — E78.2 MIXED HYPERLIPIDEMIA: ICD-10-CM

## 2022-04-13 DIAGNOSIS — E10.649 HYPOGLYCEMIA UNAWARENESS ASSOCIATED WITH TYPE 1 DIABETES MELLITUS (HCC): ICD-10-CM

## 2022-04-13 DIAGNOSIS — E10.40 TYPE 1 DIABETES MELLITUS WITH DIABETIC NEUROPATHY (HCC): Primary | ICD-10-CM

## 2022-04-13 DIAGNOSIS — I10 ESSENTIAL HYPERTENSION: ICD-10-CM

## 2022-04-13 DIAGNOSIS — Z96.41 INSULIN PUMP IN PLACE: ICD-10-CM

## 2022-04-13 DIAGNOSIS — E55.9 VITAMIN D DEFICIENCY: ICD-10-CM

## 2022-04-13 PROCEDURE — 99214 OFFICE O/P EST MOD 30 MIN: CPT | Performed by: NURSE PRACTITIONER

## 2022-04-13 NOTE — PROGRESS NOTES
Arvind Perez 66 y o  female MRN: [id number]    Encounter: [id number]      Assessment/Plan     Assessment: This is a 66y o -year-old female with type 1 diabetes on insulin pump therapy with hypoglycemic unawareness, hyperlipidemia and vitamin-D deficiency  Plan:  1   Type 1 diabetes:  Her most recent hemoglobin A1c is 7 3  There was no pump or sensor download available for review in the office today  She is following up with diabetes education to troubleshoot her issues with her pump and sensor tomorrow  At this time we will download both reports for review and make any changes that are necessary   We will download another dex com and pump download in 1 week for further review adjustment   For now, she will continue her current insulin settings  Santiago Wills will continue to utilize the dex com continuous glucose monitor  We will contact her with any changes, if necessary   Check hemoglobin A1c prior to next visit  2   Hyperlipidemia:  Stable  Continue atorvastatin  3   Hypertension: She is normotensive in the office today   Continue lisinopril   Check comprehensive metabolic panel prior to next visit  4   Vitamin-D deficiency:  Continue supplementation with 1000 units of vitamin D3 daily  CC:  Type 1 Diabetes follow-up    History of Present Illness     HPI:  66 y  o  female with type 1 diabetes for approximately 40 years   She was started on an insulin pump around 1998   She is on insulin at home and takes Humalog insulin via a new tandem insulin pump  She is also utilizing a Dexcom sensor  She has been having difficulty with utilizing her new pump and sensor  There is no pump download or sensor available for review in the office today   Her most recent hemoglobin A1c from April 8, 2022 is 7 3   She denies any polyuria and polydipsia   She has no polyphagia, but has once a night nocturia  Santiago Wills is getting some blurry vision with some cataracts   She has no numbness or tingling of the feet   She denies chest pain or shortness of breath   She denies nephropathy, heart attack, stroke and claudication but does admit to neuropathy and retinopathy    She has a habit of changing her basal settings almost daily      Her most recent diabetic eye exam was performed on July 12, 2020  She is seen every 4 months  She has no complaints about her feet and does not follow Podiatry for regular diabetic foot care  For her hypertension, she is treated with lisinopril 10 mg daily      For her hyperlipidemia, she is treated with atorvastatin 10 mg       For her vitamin-D deficiency, she supplements with 1000 units of vitamin D3 daily  Review of Systems   Constitutional: Negative  Negative for chills, fatigue and fever  HENT: Negative  Negative for trouble swallowing and voice change  Eyes: Negative  Negative for visual disturbance  Respiratory: Negative  Negative for chest tightness and shortness of breath  Cardiovascular: Negative  Negative for chest pain  Gastrointestinal: Negative  Negative for abdominal pain, constipation, diarrhea and vomiting  Endocrine: Positive for polyuria (Up to twice per night nocturia)  Negative for cold intolerance, heat intolerance, polydipsia and polyphagia  Genitourinary: Negative  Musculoskeletal: Negative  Skin: Negative  Allergic/Immunologic: Negative  Neurological: Negative  Negative for dizziness, syncope, light-headedness and headaches  Hematological: Negative  Psychiatric/Behavioral: Negative  All other systems reviewed and are negative        Historical Information   Past Medical History:   Diagnosis Date    Basal cell carcinoma (BCC) of face     left cheek    Cataract     Rheumatoid arthritis ([facility] [address] )      Past Surgical History:   Procedure Laterality Date    CATARACT EXTRACTION, BILATERAL Bilateral     EXPLORATORY LAPAROTOMY      FOOT SURGERY Left     foot reconstruction in 2 phases    FOOT SURGERY Right     foot reconstruction    LIVER BIOPSY  TOTAL SHOULDER REPLACEMENT Right      Social History   Social History     Substance and Sexual Activity   Alcohol Use Yes    Alcohol/week: 5 0 standard drinks    Types: 5 Cans of beer per week    Comment: most nights has 1 drink     Social History     Substance and Sexual Activity   Drug Use No     Social History     Tobacco Use   Smoking Status Never Smoker   Smokeless Tobacco Never Used     Family History:   Family History   Problem Relation Age of Onset    Stroke Mother     Cancer Father         renal metastatic    Brain cancer Sister     Diabetes type I Maternal Uncle     Diabetes type I Maternal Grandmother     Osteoarthritis Brother     Prostate cancer Brother     Osteoarthritis Sister     Breast cancer Sister     Rheum arthritis Sister     Osteoarthritis Sister     Osteoarthritis Sister     Osteoarthritis Sister     Osteoarthritis Brother     Prostate cancer Brother     Osteoarthritis Brother     Bipolar disorder Son     Bipolar disorder Daughter        Meds/Allergies   Current Outpatient Medications   Medication Sig Dispense Refill    aspirin 81 mg chewable tablet Chew 81 mg daily      cholecalciferol (VITAMIN D3) 1,000 units tablet Take 1,000 Units by mouth daily      citalopram (CeleXA) 10 mg tablet Take 10 mg by mouth daily      famotidine (PEPCID) 20 mg tablet Take 20 mg by mouth daily prn      folic acid (FOLVITE) 1 mg tablet Take 1 mg by mouth daily      glucagon (GLUCAGON EMERGENCY) 1 MG injection Inject 1 mg under the skin once as needed for low blood sugar for up to 1 dose 1 each 4    HumaLOG 100 UNIT/ML injection Inject  100 units daily via insulin pump 30 mL 0    lisinopril (ZESTRIL) 10 mg tablet Take 10 mg by mouth daily        methotrexate 50 MG/2ML injection Inject under the skin once a week        Multiple Vitamins-Minerals (MULTIVITAMIN ADULT PO) Take by mouth daily        Omega-3 Fat Ac-Cholecalciferol (DRY EYE OMEGA BENEFITS/VIT D-3) 224-426 MG-UNIT CAPS Take by mouth daily        RiTUXimab (RITUXAN IV) Infuse into a venous catheter Every 16 weeks, 2nd dose 2 weeks later then repeat      rosuvastatin (CRESTOR) 5 mg tablet Take 5 mg by mouth daily      Calcium Carbonate (CALCIUM 600 PO) Take by mouth daily (Patient not taking: Reported on 4/13/2022 )       No current facility-administered medications for this visit  Allergies   Allergen Reactions    Bactrim [Sulfamethoxazole-Trimethoprim] GI Intolerance     Other reaction(s): GI Intolerance    Neosporin [Neomycin-Bacitracin Zn-Polymyx] Itching    Tetracycline GI Intolerance     Other reaction(s): GI Intolerance       Objective   Vitals: Blood pressure 120/74, pulse 70, height 5' 1" (1 549 m), weight 49 9 kg (110 lb)  Physical Exam  Vitals reviewed  Constitutional:       Appearance: She is well-developed  HENT:      Head: Normocephalic and atraumatic  Eyes:      Conjunctiva/sclera: Conjunctivae normal       Pupils: Pupils are equal, round, and reactive to light  Cardiovascular:      Rate and Rhythm: Normal rate and regular rhythm  Heart sounds: Normal heart sounds  Pulmonary:      Effort: Pulmonary effort is normal       Breath sounds: Normal breath sounds  Abdominal:      General: Bowel sounds are normal       Palpations: Abdomen is soft  Musculoskeletal:         General: Normal range of motion  Cervical back: Normal range of motion and neck supple  Skin:     General: Skin is warm and dry  Neurological:      Mental Status: She is alert and oriented to person, place, and time  Psychiatric:         Behavior: Behavior normal          Thought Content:  Thought content normal          Judgment: Judgment normal        Lab Results:   Lab Results   Component Value Date/Time    Hemoglobin A1C 7 3 (H) 04/08/2022 09:20 AM    Hemoglobin A1C 7 7 (H) 01/04/2022 09:59 AM    Hemoglobin A1C 8 8 (H) 09/22/2021 09:04 AM    White Blood Cell Count 5 9 01/04/2022 09:59 AM    Hemoglobin 15 3 01/04/2022 09:59 AM    HCT 45 8 01/04/2022 09:59 AM    MCV 95 01/04/2022 09:59 AM    Platelet Count 762 91/76/7317 09:59 AM    BUN 11 04/08/2022 09:20 AM    BUN 11 01/04/2022 09:59 AM    BUN 13 09/22/2021 09:04 AM    Potassium 4 6 04/08/2022 09:20 AM    Potassium 4 2 01/04/2022 09:59 AM    Potassium 4 1 09/22/2021 09:04 AM    Chloride 104 04/08/2022 09:20 AM    Chloride 104 01/04/2022 09:59 AM    Chloride 103 09/22/2021 09:04 AM    CO2 24 04/08/2022 09:20 AM    CO2 24 01/04/2022 09:59 AM    CO2 24 09/22/2021 09:04 AM    Creatinine 0 79 04/08/2022 09:20 AM    Creatinine 0 79 01/04/2022 09:59 AM    Creatinine 0 80 09/22/2021 09:04 AM    AST 31 04/08/2022 09:20 AM    AST 34 01/04/2022 09:59 AM    AST 46 (H) 09/22/2021 09:04 AM    ALT 21 04/08/2022 09:20 AM    ALT 26 01/04/2022 09:59 AM    ALT 30 09/22/2021 09:04 AM    Albumin 4 4 04/08/2022 09:20 AM    Albumin 4 5 01/04/2022 09:59 AM    Albumin 4 1 09/22/2021 09:04 AM    Globulin, Total 2 5 04/08/2022 09:20 AM    Globulin, Total 2 4 01/04/2022 09:59 AM    Globulin, Total 2 3 09/22/2021 09:04 AM    HDL 95 04/08/2022 09:20 AM    HDL 90 09/22/2021 09:04 AM    Triglycerides 78 04/08/2022 09:20 AM    Triglycerides 105 09/22/2021 09:04 AM     Portions of the record may have been created with voice recognition software  Occasional wrong word or "sound a like" substitutions may have occurred due to the inherent limitations of voice recognition software  Read the chart carefully and recognize, using context, where substitutions have occurred

## 2022-04-13 NOTE — PATIENT INSTRUCTIONS
Be mindful of diet       Stay active in stay hydrated       We did not make changes to your pump settings today  Please perform a download of your pump and sensor tonight for tomorrows meeting with Diabetes Education       Be sure to perform a bolus consistently at meals      Continue to utilize the dex com continuous glucose monitor and perform a DEX COM download in 2 weeks for review      Contact the office with any consistent hypoglycemia      Continue lisinopril       Continue atorvastatin      Continue with regular supplementation of vitamin D3 daily

## 2022-04-14 ENCOUNTER — OFFICE VISIT (OUTPATIENT)
Dept: DIABETES SERVICES | Facility: HOSPITAL | Age: 72
End: 2022-04-14

## 2022-04-14 DIAGNOSIS — E10.40 TYPE 1 DIABETES MELLITUS WITH DIABETIC NEUROPATHY (HCC): Primary | ICD-10-CM

## 2022-04-14 PROCEDURE — TSETCH: Performed by: DIETITIAN, REGISTERED

## 2022-04-14 NOTE — PROGRESS NOTES
Pump Follow Up    HPI: Met with Nancy Rodriguez for DSME Follow-up visit  F/u visit from our pump visit  She has been having issues with her pump  She is having issues with her cartridge and infusion sets  Variety of issues    having some alarms going off when she enters a blood sugar that is good but not taking a bolus, im not sure why or why she was seeing, we tried to simulate it in office but it did not happen  She has been struggling with filling cartridges and doing her tubing, this is partially learning new things and partially with her hands and dexterity  I did give her a bunch of supplies to practice with after her pump assessment and she said she did well at home with them    She did a full cartridge and site change with me in office  She did it step by step with the manual, she did struggle some  She will continue to practice  She was filling the cannula before attaching it to her body, this likely explains high sugars after site changes as she would not be getting insulin for about 1-2hrs with her low basal rates if the cannula was not filled  These infusions sets might be a challenge, we did discuss trying the TruSteel set to see if that is any easier  We did that in the office and she thinks this might be a better options for her  She will use what she has and try some true steel that I gave her, will change out her order if she likes them better  We downloaded her pump in office as they were not able to download her pump yesterday which he saw the endocrinologist because she did not have a T slim account created yet  She did that, I linked to her pump to our office and downloaded her  Reviewed download with her endocrinologist, no changes being made at this time as during this  She was not using the pump correctly, and back on shots for a period of time  She will come in for a follow-up with me in 3 weeks, download and review, and make changes as needed        Ht Readings from Last 1 Encounters: 04/13/22 5' 1" (1 549 m)     Wt Readings from Last 3 Encounters:   04/13/22 49 9 kg (110 lb)   04/05/22 50 3 kg (111 lb)   01/07/22 50 3 kg (111 lb)        There is no height or weight on file to calculate BMI  Lab Results   Component Value Date    HGBA1C 7 3 (H) 04/08/2022    HGBA1C 7 7 (H) 01/04/2022    HGBA1C 8 8 (H) 09/22/2021       No results found for: CHOL  Lab Results   Component Value Date    HDL 95 04/08/2022    HDL 90 09/22/2021    HDL 83 03/24/2021     Lab Results   Component Value Date    LDLCALC 70 09/22/2021    LDLCALC 75 03/24/2021    LDLCALC 92 01/04/2021     Lab Results   Component Value Date    TRIG 78 04/08/2022    TRIG 105 09/22/2021    TRIG 100 03/24/2021     Lab Results   Component Value Date    CHOLHDL 2 1 04/08/2022     No results found for: Tasneem Melgar    Diabetes Education Record  Danielle received the following handouts: none today      Patient response to instruction    Comprehensiongood  Motivationgood  Expected Compliancegood    Thank you for referring your patient to [facility], it was a pleasure working with them today  Please feel free to call with any questions or concerns      Elle Leon, 64 [address]  [address] [facility] [address]

## 2022-04-27 DIAGNOSIS — E78.00 PURE HYPERCHOLESTEROLEMIA: ICD-10-CM

## 2022-04-27 NOTE — TELEPHONE ENCOUNTER
Medicine Refill Request    Last Office: 1/18/2022   Last Consult Visit: 11/10/2020  Last Telemedicine Visit: 12/2/2020 Jordan Dumas CRNP    Next Appointment: 7/19/2022      Current Outpatient Medications:   •  aspirin 81 mg enteric coated tablet, Take 1 tablet (81 mg total) by mouth 2 (two) times a day., Disp: 60 tablet, Rfl: 0  •  cholecalciferol, vitamin D3, (VITAMIN D3) 1,000 unit capsule, Take 1,000 Units by mouth daily.  , Disp: , Rfl:   •  citalopram 10 mg tablet, Take 1 tablet (10 mg total) by mouth daily., Disp: 90 tablet, Rfl: 1  •  docosahexaenoic acid/epa (FISH OIL ORAL), Take by mouth 2 (two) times a day. For dry eyes, Disp: , Rfl:   •  famotidine (PEPCID) 10 mg tablet, Take 2 tablets (20 mg total) by mouth daily as needed. With ibuprofen, Disp: , Rfl:   •  folic acid (FOLVITE) 1 mg tablet, Take 1 mg by mouth daily., Disp: , Rfl:   •  glucagon, human recombinant, 1 mg injection, Infuse 1 mg into a venous catheter once., Disp: , Rfl:   •  insulin lispro (HumaLOG U-100 Insulin) 100 unit/mL cartridge, inject by subcutaneous route per prescriber's instructions. Insulin dosing requires individualization., Disp: , Rfl:   •  Lactobac no.41-Bifidobact no.7 (PROBIOTIC-10) 70 mg (3 billion cell) capsule, Take by mouth daily., Disp: , Rfl:   •  lisinopriL 10 mg tablet, Take 1 tablet by mouth once daily, Disp: 90 tablet, Rfl: 1  •  METHOTREXATE SODIUM INJ, Inject 2.5 mg as directed once a week. Fridays , Disp: , Rfl:   •  multivit with minerals/lutein (MULTIVITAMIN 50 PLUS ORAL), No SIG Entered, Disp: , Rfl:   •  mupirocin (BACTROBAN) 2 % ointment, Apply topically., Disp: , Rfl:   •  riTUXimab (RITUXAN) 10 mg/mL chemo injection, Inject 10 mg/mL as directed. Every 16 weeks   Sept 7,2020 , Disp: , Rfl:   •  rosuvastatin (CRESTOR) 5 mg tablet, Take 1 tablet by mouth once daily, Disp: 90 tablet, Rfl: 0  •  turmeric root extract 500 mg capsule, Take 1,000 mg by mouth daily., Disp: , Rfl:   •  vit A/vit C/vit  E/zinc/copper (PRESERVISION AREDS ORAL), Take by mouth daily., Disp: , Rfl:       BP Readings from Last 3 Encounters:   01/18/22 136/62   12/20/21 136/70   07/20/21 116/64       Recent Lab results:  Lab Results   Component Value Date    CHOL 185 09/22/2021   ,   Lab Results   Component Value Date    HDL 89 09/22/2021   ,   Lab Results   Component Value Date    LDLCALC 77 09/22/2021   ,   Lab Results   Component Value Date    TRIG 108 09/22/2021        Lab Results   Component Value Date    GLUCOSE 79 11/17/2020   ,   Lab Results   Component Value Date    HGBA1C 6.9 (H) 11/11/2020         Lab Results   Component Value Date    CREATININE 0.7 11/17/2020       No results found for: TSH

## 2022-04-28 RX ORDER — LISINOPRIL 10 MG/1
TABLET ORAL
Qty: 90 TABLET | Refills: 1 | Status: SHIPPED | OUTPATIENT
Start: 2022-04-28 | End: 2022-11-28

## 2022-04-28 RX ORDER — ROSUVASTATIN CALCIUM 5 MG/1
TABLET, COATED ORAL
Qty: 90 TABLET | Refills: 1 | Status: SHIPPED | OUTPATIENT
Start: 2022-04-28 | End: 2022-06-22

## 2022-05-02 ENCOUNTER — TELEPHONE (OUTPATIENT)
Dept: INTERNAL MEDICINE | Facility: CLINIC | Age: 72
End: 2022-05-02

## 2022-05-02 DIAGNOSIS — Z11.59 SCREENING FOR VIRAL DISEASE: Primary | ICD-10-CM

## 2022-05-02 NOTE — LETTER
[facility] Page 1 of 3   Account #: [id number] Collection Date:         Req/Control #: [id number] Collection Time:            Client / Ordering Site Information: Physician Information:   Account Name: [facility] [place]   Address 1: [address]   Address 2:    [facility] Zip: [place] [address]   Phone: [phone]    Ordering: Machelle Gallardo   Degree: MD   NPI: [id number]   UPIN:    Physician ID:          Patient Information:   Name: LUCY ESTRADA   Gender: Female   SSN: [id number]   Patient ID: [id number]    YOB: 1950 (71 years)   Phone: [phone]   Address: [address] [place] [address]    [place] [address]            Comments:         Order Code Tests Ordered (Total: 1)    Order Code Tests Ordered      [id number] COVID-19 SARS CoV-2 Antibody (IgG)                 Specimen Source:   (027864) COVID-19 SARS CoV-2 Antibody (IgG):  Blood, Venous            Diagnosis Codes:   Z11.59                 Bill Type: Third-Party    Carrier Code:          Responsible Party / Guarantor Information:   Name: LUCY ESTRADA   Address: [address] [place] [address] Zip: [place] [address]   Phone: [phone]   Relation to Pt: Self   Employer Name:          ABN:     Worker's Comp: N Date of Injury:             Insurance Information:   Primary Insurance: Secondary Insurance:   Carrier Code: Not on file   Ins Co Name: MEDICARE PART A & B   Address 1:  [address]   Address 2:    [facility] Zip: [place] [address]   Policy Number: [id number]   Group #:     Carrier Code: Not on file   Ins Co Name: AARP SUPPLEMENTAL ONLY   Address 1: [address]   Address 2:    [facility] Zip: [place] [address]   Policy Number: [id number]   Group #:       Primary Policy Martin / Insured: Secondary Policy Martin / Insured:   Name: LUCY ESTRADA   Address: [address] [place] [address]     [place] [id number]   Pt Relation to Subscriber: Self    Name: LUCY ESTRADA   Address: [address] [place] [address]      [place] [id number]   Pt Relation to Subscriber: Self          Authorization - Please Sign and Date  I hereby authorize the release of medical information related to the services described hereon and authorize payment directly to [facility].      E-Signature: Machelle Gallardo MD                          5/2/2022      __  Provider Signature             Date      __________________________________                    ______________  Patient Signature                                                                    Date

## 2022-05-02 NOTE — TELEPHONE ENCOUNTER
Please fax the order for covid antibodies to the [facility] in [facility], to the number requested and let the pt know when completed.

## 2022-05-05 ENCOUNTER — TELEPHONE (OUTPATIENT)
Dept: INTERNAL MEDICINE | Facility: CLINIC | Age: 72
End: 2022-05-05
Payer: MEDICARE

## 2022-05-05 ENCOUNTER — OFFICE VISIT (OUTPATIENT)
Dept: DIABETES SERVICES | Facility: HOSPITAL | Age: 72
End: 2022-05-05

## 2022-05-05 DIAGNOSIS — E10.40 TYPE 1 DIABETES MELLITUS WITH DIABETIC NEUROPATHY (HCC): Primary | ICD-10-CM

## 2022-05-05 LAB
SARS-COV-2 AB SERPL-IMP: POSITIVE
SARS-COV-2 IGG SERPL IA-ACNC: >800 AU/ML

## 2022-05-05 PROCEDURE — TSETCH: Performed by: DIETITIAN, REGISTERED

## 2022-05-05 NOTE — PROGRESS NOTES
Tandem T Slim Insulin Pump Training    Met with Eleazar Mckenzie for insulin pump training on the T:slim X2 pump  Training completed per training checklist scanned to chart  Settings transferred from current insulin pump into the new one  Training completed on changes in features, filling the cartridge and tubing, how to take boluses, advanced features, alerts and alarms, infusion set, personal profiles, and all other aspects of training checklist  Asked Eleazar Mckenzie to create a t-connect account at home if they don't have one already so their account can be linked to [employer] for downloads and review in between visits  Pt knows that their pump has a hardware warranty but new software updates can be done whenever a new one is released  Danielle  be using the Dexcom G6 and Basal IQ technology  Answered all of their questions  Will call with questions or for more education  Observations:  Met with and for her 1st follow-up and download review of her insulin pump since starting on her T slim pump  Download shows in goal range 77%, this is improved from 48% when she was using her Dexcom and Medtr[employer] pump together a few months ago  Hi 20%, low 3%  Overall blood sugars significantly improved  I answered her questions  She is accidentally turning on the sleep setting sometimes at night, but when she uses it her blood sugars are nice and steady overnight  I showed her the consistently gets good blood sugars overnight  Control Accu is giving boluses 12% of the time, seems to be compensating nicely  No changes made to her settings at this time, continue to let things run and see where she is at next office visit  She knows she can call with questions or 4 more education at any time  Eleazar Mckenzie was provided with the education materials provided by T:slim       Lab Results   Component Value Date    HGBA1C 7 3 (H) 04/08/2022       Lab Results   Component Value Date    K 4 6 04/08/2022     04/08/2022    CO2 24 04/08/2022    BUN 11 04/08/2022    CREATININE 0 79 04/08/2022    GLUC 130 (H) 04/08/2022    AST 31 04/08/2022    ALT 21 04/08/2022    TP 6 9 04/08/2022    TBILI 0 9 04/08/2022    EGFR 79 04/08/2022       Patient response to instruction    Comprehension: good  Motivation: good  Expected Compliance: good  Response to Teachback: 100%, demonstrated understanding     Thank you for referring your patient to [facility], it was a pleasure working with them today  Please feel free to call with any questions or concerns      Ted Macdonald, [address] [place] [id number]

## 2022-05-11 DIAGNOSIS — E10.8 TYPE 1 DIABETES MELLITUS WITH COMPLICATION (HCC): ICD-10-CM

## 2022-05-12 RX ORDER — INSULIN LISPRO 100 [IU]/ML
INJECTION, SOLUTION INTRAVENOUS; SUBCUTANEOUS
Qty: 30 ML | Refills: 0 | Status: SHIPPED | OUTPATIENT
Start: 2022-05-12 | End: 2022-06-29

## 2022-06-08 ENCOUNTER — HOSPITAL ENCOUNTER (OUTPATIENT)
Dept: CARDIOLOGY | Facility: CLINIC | Age: 72
Discharge: HOME | End: 2022-06-08
Payer: MEDICARE

## 2022-06-08 VITALS
DIASTOLIC BLOOD PRESSURE: 62 MMHG | BODY MASS INDEX: 22.78 KG/M2 | SYSTOLIC BLOOD PRESSURE: 136 MMHG | HEIGHT: 59 IN | WEIGHT: 113 LBS

## 2022-06-08 DIAGNOSIS — R93.1 ELEVATED CORONARY ARTERY CALCIUM SCORE: ICD-10-CM

## 2022-06-08 LAB
AORTIC ROOT ANNULUS: 2.4 CM
ASCENDING AORTA: 2.8 CM
AV PEAK GRADIENT: 9 MMHG
AV PEAK VELOCITY-S: 1.47 M/S
AV VALVE AREA: 2.46 CM2
BSA FOR ECHO PROCEDURE: 1.47 M2
E WAVE DECELERATION TIME: 244 MS
E/A RATIO: 1.1
E/E' RATIO: 14.4
E/LAT E' RATIO: 9
EDV (BP): 57.5 CM3
EF (A4C): 64.6 %
EF A2C: 62.4 %
EJECTION FRACTION: 63.5 %
ESV (BP): 21 CM3
FRACTIONAL SHORTENING: 40.9 %
INTERVENTRICULAR SEPTUM: 0.95 CM
LA ESV (BP): 54.3 CM3
LA ESV INDEX (A2C): 36.87 CM3/M2
LA ESV INDEX (BP): 36.94 CM3/M2
LA/AORTA RATIO: 1.25
LAAS-AP2: 17.9 CM2
LAAS-AP4: 17.9 CM2
LAD 2D: 3 CM
LALD A4C: 4.6 CM
LALD A4C: 4.98 CM
LAV-S: 54.2 CM3
LEFT ATRIUM VOLUME INDEX: 34.42 CM3/M2
LEFT ATRIUM VOLUME: 50.6 CM3
LEFT INTERNAL DIMENSION IN SYSTOLE: 2.28 CM (ref 2.22–3.35)
LEFT VENTRICLE DIASTOLIC VOLUME INDEX: 43.06 CM3/M2
LEFT VENTRICLE DIASTOLIC VOLUME: 63.3 CM3
LEFT VENTRICLE SYSTOLIC VOLUME INDEX: 15.31 CM3/M2
LEFT VENTRICLE SYSTOLIC VOLUME: 22.5 CM3
LEFT VENTRICULAR INTERNAL DIMENSION IN DIASTOLE: 3.86 CM (ref 3.72–5.17)
LEFT VENTRICULAR POSTERIOR WALL IN END DIASTOLE: 1.04 CM (ref 0.47–0.87)
LV DIASTOLIC VOLUME: 51.6 CM3
LV ESV (APICAL 2 CHAMBER): 19.5 CM3
LVAD-AP2: 20.4 CM2
LVAD-AP4: 23 CM2
LVAS-AP2: 11 CM2
LVAS-AP4: 11.8 CM2
LVEDVI(A2C): 35.1 CM3/M2
LVEDVI(BP): 39.12 CM3/M2
LVESVI(A2C): 13.27 CM3/M2
LVESVI(BP): 14.29 CM3/M2
LVLD-AP2: 6.75 CM
LVLD-AP4: 6.87 CM
LVLS-AP2: 5.33 CM
LVLS-AP4: 5.38 CM
LVOT 2D: 2 CM
LVOT A: 3.14 CM2
LVOT PEAK VELOCITY: 1.15 M/S
MV E'TISSUE VEL-LAT: 0.11 M/S
MV E'TISSUE VEL-MED: 0.07 M/S
MV PEAK A VEL: 0.84 M/S
MV PEAK E VEL: 0.95 M/S
MV VALVE AREA P 1/2 METHOD: 3.1 CM2
POSTERIOR WALL: 1.04 CM
PV PEAK GRADIENT: 4 MMHG
PV PV: 0.99 M/S
RVOT VMAX: 0.6 M/S
STRESS ANGINA INDEX: 0
STRESS BASELINE BP: NORMAL MMHG
STRESS BASELINE HR: 82 BPM
STRESS PERCENT HR: 91 %
STRESS POST ESTIMATED WORKLOAD: 7 METS
STRESS POST EXERCISE DUR MIN: 4 MIN
STRESS POST EXERCISE DUR SEC: 11 SEC
STRESS POST PEAK BP: NORMAL MMHG
STRESS POST PEAK HR: 134 BPM
STRESS TARGET HR: 125 BPM
TR MAX PG: 30 MMHG
TRICUSPID VALVE PEAK REGURGITATION VELOCITY: 2.74 M/S
Z-SCORE OF LEFT VENTRICULAR DIMENSION IN END DIASTOLE: -1.28
Z-SCORE OF LEFT VENTRICULAR DIMENSION IN END SYSTOLE: -1.4
Z-SCORE OF LEFT VENTRICULAR POSTERIOR WALL IN END DIASTOLE: 2.54

## 2022-06-08 PROCEDURE — 93351 STRESS TTE COMPLETE: CPT | Performed by: INTERNAL MEDICINE

## 2022-06-22 ENCOUNTER — OFFICE VISIT (OUTPATIENT)
Dept: CARDIOLOGY | Facility: CLINIC | Age: 72
End: 2022-06-22
Payer: MEDICARE

## 2022-06-22 VITALS
BODY MASS INDEX: 22.18 KG/M2 | SYSTOLIC BLOOD PRESSURE: 110 MMHG | HEART RATE: 74 BPM | RESPIRATION RATE: 16 BRPM | WEIGHT: 110 LBS | DIASTOLIC BLOOD PRESSURE: 60 MMHG | OXYGEN SATURATION: 95 % | HEIGHT: 59 IN

## 2022-06-22 DIAGNOSIS — E78.00 PURE HYPERCHOLESTEROLEMIA: ICD-10-CM

## 2022-06-22 DIAGNOSIS — I10 ESSENTIAL HYPERTENSION: ICD-10-CM

## 2022-06-22 DIAGNOSIS — I25.84 CORONARY ARTERY DISEASE DUE TO CALCIFIED CORONARY LESION: Primary | ICD-10-CM

## 2022-06-22 DIAGNOSIS — I25.10 CORONARY ARTERY DISEASE DUE TO CALCIFIED CORONARY LESION: Primary | ICD-10-CM

## 2022-06-22 DIAGNOSIS — R93.1 ELEVATED CORONARY ARTERY CALCIUM SCORE: ICD-10-CM

## 2022-06-22 DIAGNOSIS — R06.2 WHEEZING: ICD-10-CM

## 2022-06-22 DIAGNOSIS — E10.40 TYPE 1 DIABETES MELLITUS WITH DIABETIC NEUROPATHY (CMS/HCC): ICD-10-CM

## 2022-06-22 PROBLEM — R50.9 FEVER: Status: RESOLVED | Noted: 2020-11-16 | Resolved: 2022-06-22

## 2022-06-22 PROBLEM — M25.551 RIGHT HIP PAIN: Status: RESOLVED | Noted: 2020-11-10 | Resolved: 2022-06-22

## 2022-06-22 PROBLEM — L02.419 ABSCESS OF AXILLA: Status: RESOLVED | Noted: 2018-06-26 | Resolved: 2022-06-22

## 2022-06-22 PROBLEM — H90.3 SENSORINEURAL HEARING LOSS, BILATERAL: Status: ACTIVE | Noted: 2022-06-22

## 2022-06-22 PROBLEM — L03.115 CELLULITIS OF RIGHT LOWER EXTREMITY: Status: RESOLVED | Noted: 2019-10-11 | Resolved: 2022-06-22

## 2022-06-22 PROBLEM — R19.7 DIARRHEA: Status: RESOLVED | Noted: 2020-12-02 | Resolved: 2022-06-22

## 2022-06-22 PROBLEM — H61.20 IMPACTED CERUMEN: Status: ACTIVE | Noted: 2022-06-22

## 2022-06-22 PROCEDURE — G8754 DIAS BP LESS 90: HCPCS | Performed by: INTERNAL MEDICINE

## 2022-06-22 PROCEDURE — 99214 OFFICE O/P EST MOD 30 MIN: CPT | Performed by: INTERNAL MEDICINE

## 2022-06-22 PROCEDURE — G8752 SYS BP LESS 140: HCPCS | Performed by: INTERNAL MEDICINE

## 2022-06-22 RX ORDER — ROSUVASTATIN CALCIUM 10 MG/1
10 TABLET, COATED ORAL DAILY
Qty: 90 TABLET | Refills: 3 | Status: SHIPPED | OUTPATIENT
Start: 2022-06-22 | End: 2023-09-06 | Stop reason: SDUPTHER

## 2022-06-22 NOTE — LETTER
June 22, 2022     Machelle Gallardo MD  [address]  [facility] [address]    Patient: Lillie Ward  YOB: 1950  Date of Visit: 6/22/2022      Dear Dr. Gallardo:    Thank you for referring Lillie Ward to me for evaluation. Below are my notes for this consultation.    If you have questions, please do not hesitate to call me. I look forward to following your patient along with you.         Sincerely,        Madiha Wise MD        CC: No Recipients  Madiha Wise MD  6/22/2022  6:24 PM  Signed       Madiha Wise MD       Reason for visit : Follow up  HPI   Lillie Ward is a 71 y.o. female who presents to the office for cardiovascular follow up.      Dear Machelle,    On June 22, 2022 I saw Lillie Wadr in the office for her follow-up.  She has a longstanding history of rheumatoid arthritis, diabetes, hyperlipidemia, and reflux.  In April 2021 she was found to have a mildly elevated coronary artery calcium score of 136.  All of this was in the LAD.  She also had an abnormality in the left lobe of her liver.  At that time she had a stress echo which was negative for ischemia.  She had normal left ventricular function and no significant valve disease.  She is now back for reevaluation    She tells me in general she is done well.  She denies chest pain, shortness of breath, palpitations, lightheadedness or syncope.  She rides her stationary bike 3 times per week without difficulty.    I have reviewed her medications, allergies, family history, social history, medical history and surgical history.  None of this is changed other than the above-mentioned.  The rest of her review of systems is completely negative       Problem List:  2022-06: Sensorineural hearing loss, bilateral  2022-06: Impacted cerumen  2022-06: Elevated coronary artery calcium score  2022-01: Wheezing  2022-01: Reactive depression  2022-01: Immunodeficiency, unspecified (CMS/HCC)  2021-07: Ground glass opacity present on imaging  of lung  2021-07: Liver lesion, left lobe  2021-07: Encounter for Medicare annual wellness exam  2021-05: Coronary artery disease due to calcified coronary lesion  2021-05: Liver lesion  2020-12: Diarrhea  2020-11: OA (osteoarthritis) of hip  2020-11: Fever  2020-11: Chest discomfort  2020-11: Primary osteoarthritis of right hip  2020-11: Pre-op exam  2020-11: Right hip pain  2020-07: Essential hypertension  2020-07: Insulin pump in place  2019-10: Postmenopausal  2019-10: Cellulitis of right lower extremity  2019-04: Atypical chest pain  2018-08: Pre-op evaluation  2018-08: Cataract of both eyes  2018-06: Abscess of axilla  2018-06: Visit for screening mammogram  2018-04: Hypoglycemia unawareness associated with type 1 diabetes   mellitus (CMS/[facility])  2018-04: Type 1 diabetes mellitus with diabetic neuropathy (CMS/[facility])  2016-12: Fibrosis of lung (CMS/[facility])  2016-09: Type 1 diabetes mellitus (CMS/[facility])  2016-09: GERD (gastroesophageal reflux disease)  2016-09: Hyperlipidemia  2016-09: Rheumatoid arthritis (CMS/[facility])           Current Outpatient Medications   Medication Sig   • aspirin 81 mg enteric coated tablet Take 1 tablet (81 mg total) by mouth 2 (two) times a day. (Patient taking differently: Take 81 mg by mouth daily.)   • cholecalciferol, vitamin D3, 25 mcg (1,000 unit) capsule Take 1,000 Units by mouth daily.     • citalopram 10 mg tablet Take 1 tablet (10 mg total) by mouth daily.   • docosahexaenoic acid/epa (FISH OIL ORAL) Take by mouth 2 (two) times a day. For dry eyes   • famotidine (PEPCID) 10 mg tablet Take 2 tablets (20 mg total) by mouth daily as needed. With ibuprofen   • folic acid (FOLVITE) 1 mg tablet Take 1 mg by mouth daily.   • insulin lispro U-100 (HumaLOG) 100 unit/mL subcutaneous cartridge inject by subcutaneous route per prescriber's instructions. Insulin dosing requires individualization.   • Lactobac no.41-Bifidobact no.7 70 mg (3 billion cell) capsule Take by mouth daily.   • lisinopriL  (PRINIVIL) 10 mg tablet Take 1 tablet by mouth once daily   • METHOTREXATE SODIUM INJ Inject 2.5 mg as directed once a week. Fridays    • multivit with minerals/lutein (MULTIVITAMIN 50 PLUS ORAL) No SIG Entered   • riTUXimab (RITUXAN) 10 mg/mL injection Inject 10 mg/mL as directed. Every 16 weeks   Sept 7,2020    • rosuvastatin (CRESTOR) 10 mg tablet Take 1 tablet (10 mg total) by mouth daily.   • glucagon, human recombinant, 1 mg injection Infuse 1 mg into a venous catheter once.   • mupirocin (BACTROBAN) 2 % ointment Apply topically.   • turmeric root extract 500 mg capsule Take 1,000 mg by mouth daily.   • vit A/vit C/vit E/zinc/copper (PRESERVISION AREDS ORAL) Take by mouth 2 (two) times a day.     No current facility-administered medications for this visit.          Allergies:  Neomycin, Neomycin-bacitracin-polymyxin, Neomycin-bacitracnzn-polymyxnb, Sulfamethoxazole-trimethoprim, Tetracycline, and Bacitracin    Surgical History:  Past Surgical History:   Procedure Laterality Date   • COLONOSCOPY     • EYE SURGERY Bilateral     CATARACT   • FOOT SURGERY Left 2009    reconstruction   • FOOT SURGERY Right 2007    reconstruction   • JOINT REPLACEMENT Right     SHOULDER   • LIVER SURGERY      '80's   • REPLACEMENT TOTAL KNEE Left 04/04/2017    [facility]   • SKIN BIOPSY     • TOTAL HIP ARTHROPLASTY Right 11/2020   • TOTAL SHOULDER ARTHROPLASTY Right 1990        Family History:  Family History   Problem Relation Age of Onset   • Stroke Biological Mother    • Lung cancer Biological Father    • No Known Problems Biological Sister    • Rheum arthritis Other         1 of 8 siblings   • Prostate cancer Biological Son    • Pancreatitis Biological Son    • Alcohol abuse Biological Son    • Bipolar disorder Biological Son    • Bipolar disorder Biological Daughter         no diagnosis   • Depression Biological Daughter    • Anxiety disorder Biological Daughter         Social History:  Social History     Socioeconomic History   •  "Marital status:      Spouse name: None   • Number of children: 1   • Years of education: None   • Highest education level: None   Occupational History   • Occupation: Retired teacher   Tobacco Use   • Smoking status: Never Smoker   • Smokeless tobacco: Never Used   Vaping Use   • Vaping Use: Never used   Substance and Sexual Activity   • Alcohol use: Yes   • Drug use: No   • Sexual activity: Yes   Social History Narrative    Marital status- . was a pediatrician    Children- 1 son    Caffeine - coffee    Exercise-walking    Diet-low carb    Pets-    Buddhism- none    Seat belt-yes    Smoke alarm-yes                   Review of Systems  The rest of her review of systems is completely negative.          Objective   Vitals:    06/22/22 1304   BP: 110/60   BP Location: Left upper arm   Patient Position: Sitting   Pulse: 74   Resp: 16   SpO2: 95%   Weight: 49.9 kg (110 lb)   Height: 1.499 m (4' 11\")     Physical Exam  Blood pressure is 110/60.  Heart rate is 74 and regular.  She is comfortable.  Skin is warm and dry.  Conjunctiva are pink.  Affect is appropriate.  No JVD or HJR.  Carotids are unremarkable.  Chest has mild end expiratory wheezing bilaterally  Regular rhythm.  Normal S1 and S2.  No obvious murmurs or gallops.  Abdomen is soft with good bowel sounds.  No obvious organomegaly.  No clubbing, cyanosis, or edema.  No rash.  Multiple deformities related to her rheumatoid arthritis.       Labs:   Lab Results   Component Value Date    WBC 7.22 11/17/2020    HGB 12.2 11/17/2020    HCT 37.7 11/17/2020     11/17/2020    CHOL 185 09/22/2021    TRIG 108 09/22/2021    HDL 89 09/22/2021    LDLCALC 77 09/22/2021    ALT 29 09/22/2021    AST 49 (H) 09/22/2021     11/17/2020    K 4.8 11/17/2020     (H) 11/17/2020    CREATININE 0.7 11/17/2020    BUN 9 11/17/2020    CO2 23 11/17/2020    HGBA1C 6.9 (H) 11/11/2020           Cardiac Imaging    ECHOCARDIOGRAM STRESS TEST " 06/08/2022    Interpretation Summary  1.  Exercise ECG test was terminated because of mild shortness of breath.  He had no chest pain.  2.  Target heart rate was achieved.  3.  Exercise capacity was reasonable.  4.  There were no arrhythmias.  5.  Normal blood pressure response to exercise.  6.  No EKG changes consistent with ischemia.  7.  No echocardiographic changes consistent with ischemia      Baseline echo:  1.  Normal left ventricular function with an estimated ejection fraction of 60 to 65%.  2.  No regional wall motion abnormalities at rest.  3.  Thickened aortic valve leaflets without any abnormality by Doppler.  4.  Mild tricuspid insufficiency with normal pulmonary pressures.  5.  Essentially unchanged from the prior stress echo of 6/17/2021             Problem List Items Addressed This Visit        Nervous    Type 1 diabetes mellitus with diabetic neuropathy (CMS/HCC)    Relevant Medications    rosuvastatin (CRESTOR) 10 mg tablet       Respiratory    Wheezing       Circulatory    Essential hypertension    Relevant Orders    ECG 12 lead    Coronary artery disease due to calcified coronary lesion - Primary    Relevant Medications    rosuvastatin (CRESTOR) 10 mg tablet    Other Relevant Orders    ECG 12 lead    Elevated coronary artery calcium score    Relevant Medications    rosuvastatin (CRESTOR) 10 mg tablet    Other Relevant Orders    ECG 12 lead    Hepatic function panel    Echocardiogram stress test      Other Visit Diagnoses     Pure hypercholesterolemia        Relevant Medications    rosuvastatin (CRESTOR) 10 mg tablet    Other Relevant Orders    Lipid panel    Hepatic function panel          Impression:  From a cardiac standpoint and is clinically stable.  She has no angina and her stress test is completely unchanged.  There was no evidence of ischemia.  She has normal left-ventricular function and no significant valve disease.    Her LDL however is not where it should be.  I would like to see it  at 70 or less.  I have increased her Crestor to 10 mg daily and lipids and LFTs will be done in 3 months.  If she continues to do well, I will see her again in a year with a stress echo prior to that visit.    I thank you for allowing me to participate in the care of your patient.  If I can furnish you with any further details, please do not hesitate to contact me.     Madiha Wise MD  6/22/2022    This document was generated utilizing voice recognition technology. A reasonable attempt at proofreading has been made to minimize errors but please excuse any typographical errors which may be present. Please call with any questions.

## 2022-06-22 NOTE — PROGRESS NOTES
Madiha Wise MD       Reason for visit : Follow up  HPI   Lillie Ward is a 71 y.o. female who presents to the office for cardiovascular follow up.      Dear Machelle,    On June 22, 2022 I saw Lillie Ward in the office for her follow-up.  She has a longstanding history of rheumatoid arthritis, diabetes, hyperlipidemia, and reflux.  In April 2021 she was found to have a mildly elevated coronary artery calcium score of 136.  All of this was in the LAD.  She also had an abnormality in the left lobe of her liver.  At that time she had a stress echo which was negative for ischemia.  She had normal left ventricular function and no significant valve disease.  She is now back for reevaluation    She tells me in general she is done well.  She denies chest pain, shortness of breath, palpitations, lightheadedness or syncope.  She rides her stationary bike 3 times per week without difficulty.    I have reviewed her medications, allergies, family history, social history, medical history and surgical history.  None of this is changed other than the above-mentioned.  The rest of her review of systems is completely negative       Problem List:  2022-06: Sensorineural hearing loss, bilateral  2022-06: Impacted cerumen  2022-06: Elevated coronary artery calcium score  2022-01: Wheezing  2022-01: Reactive depression  2022-01: Immunodeficiency, unspecified (CMS/HCC)  2021-07: Ground glass opacity present on imaging of lung  2021-07: Liver lesion, left lobe  2021-07: Encounter for Medicare annual wellness exam  2021-05: Coronary artery disease due to calcified coronary lesion  2021-05: Liver lesion  2020-12: Diarrhea  2020-11: OA (osteoarthritis) of hip  2020-11: Fever  2020-11: Chest discomfort  2020-11: Primary osteoarthritis of right hip  2020-11: Pre-op exam  2020-11: Right hip pain  2020-07: Essential hypertension  2020-07: Insulin pump in place  2019-10: Postmenopausal  2019-10: Cellulitis of right lower extremity  2019-04:  Atypical chest pain  2018-08: Pre-op evaluation  2018-08: Cataract of both eyes  2018-06: Abscess of axilla  2018-06: Visit for screening mammogram  2018-04: Hypoglycemia unawareness associated with type 1 diabetes   mellitus (CMS/[facility])  2018-04: Type 1 diabetes mellitus with diabetic neuropathy (CMS/[facility])  2016-12: Fibrosis of lung (CMS/[facility])  2016-09: Type 1 diabetes mellitus (CMS/[facility])  2016-09: GERD (gastroesophageal reflux disease)  2016-09: Hyperlipidemia  2016-09: Rheumatoid arthritis (CMS/[facility])           Current Outpatient Medications   Medication Sig   • aspirin 81 mg enteric coated tablet Take 1 tablet (81 mg total) by mouth 2 (two) times a day. (Patient taking differently: Take 81 mg by mouth daily.)   • cholecalciferol, vitamin D3, 25 mcg (1,000 unit) capsule Take 1,000 Units by mouth daily.     • citalopram 10 mg tablet Take 1 tablet (10 mg total) by mouth daily.   • docosahexaenoic acid/epa (FISH OIL ORAL) Take by mouth 2 (two) times a day. For dry eyes   • famotidine (PEPCID) 10 mg tablet Take 2 tablets (20 mg total) by mouth daily as needed. With ibuprofen   • folic acid (FOLVITE) 1 mg tablet Take 1 mg by mouth daily.   • insulin lispro U-100 (HumaLOG) 100 unit/mL subcutaneous cartridge inject by subcutaneous route per prescriber's instructions. Insulin dosing requires individualization.   • Lactobac no.41-Bifidobact no.7 70 mg (3 billion cell) capsule Take by mouth daily.   • lisinopriL (PRINIVIL) 10 mg tablet Take 1 tablet by mouth once daily   • METHOTREXATE SODIUM INJ Inject 2.5 mg as directed once a week. Fridays    • multivit with minerals/lutein (MULTIVITAMIN 50 PLUS ORAL) No SIG Entered   • riTUXimab (RITUXAN) 10 mg/mL injection Inject 10 mg/mL as directed. Every 16 weeks   Sept 7,2020    • rosuvastatin (CRESTOR) 10 mg tablet Take 1 tablet (10 mg total) by mouth daily.   • glucagon, human recombinant, 1 mg injection Infuse 1 mg into a venous catheter once.   • mupirocin (BACTROBAN) 2 % ointment  Apply topically.   • turmeric root extract 500 mg capsule Take 1,000 mg by mouth daily.   • vit A/vit C/vit E/zinc/copper (PRESERVISION AREDS ORAL) Take by mouth 2 (two) times a day.     No current facility-administered medications for this visit.          Allergies:  Neomycin, Neomycin-bacitracin-polymyxin, Neomycin-bacitracnzn-polymyxnb, Sulfamethoxazole-trimethoprim, Tetracycline, and Bacitracin    Surgical History:  Past Surgical History:   Procedure Laterality Date   • COLONOSCOPY     • EYE SURGERY Bilateral     CATARACT   • FOOT SURGERY Left 2009    reconstruction   • FOOT SURGERY Right 2007    reconstruction   • JOINT REPLACEMENT Right     SHOULDER   • LIVER SURGERY      '80's   • REPLACEMENT TOTAL KNEE Left 04/04/2017    [facility]   • SKIN BIOPSY     • TOTAL HIP ARTHROPLASTY Right 11/2020   • TOTAL SHOULDER ARTHROPLASTY Right 1990        Family History:  Family History   Problem Relation Age of Onset   • Stroke Biological Mother    • Lung cancer Biological Father    • No Known Problems Biological Sister    • Rheum arthritis Other         1 of 8 siblings   • Prostate cancer Biological Son    • Pancreatitis Biological Son    • Alcohol abuse Biological Son    • Bipolar disorder Biological Son    • Bipolar disorder Biological Daughter         no diagnosis   • Depression Biological Daughter    • Anxiety disorder Biological Daughter         Social History:  Social History     Socioeconomic History   • Marital status:      Spouse name: None   • Number of children: 1   • Years of education: None   • Highest education level: None   Occupational History   • Occupation: Retired teacher   Tobacco Use   • Smoking status: Never Smoker   • Smokeless tobacco: Never Used   Vaping Use   • Vaping Use: Never used   Substance and Sexual Activity   • Alcohol use: Yes   • Drug use: No   • Sexual activity: Yes   Social History Narrative    Marital status- . was a pediatrician    Children- 1 son    Caffeine - coffee  "   Exercise-walking    Diet-low carb    Pets-    Quaker- none    Seat belt-yes    Smoke alarm-yes                   Review of Systems  The rest of her review of systems is completely negative.          Objective   Vitals:    06/22/22 1304   BP: 110/60   BP Location: Left upper arm   Patient Position: Sitting   Pulse: 74   Resp: 16   SpO2: 95%   Weight: 49.9 kg (110 lb)   Height: 1.499 m (4' 11\")     Physical Exam  Blood pressure is 110/60.  Heart rate is 74 and regular.  She is comfortable.  Skin is warm and dry.  Conjunctiva are pink.  Affect is appropriate.  No JVD or HJR.  Carotids are unremarkable.  Chest has mild end expiratory wheezing bilaterally  Regular rhythm.  Normal S1 and S2.  No obvious murmurs or gallops.  Abdomen is soft with good bowel sounds.  No obvious organomegaly.  No clubbing, cyanosis, or edema.  No rash.  Multiple deformities related to her rheumatoid arthritis.       Labs:   Lab Results   Component Value Date    WBC 7.22 11/17/2020    HGB 12.2 11/17/2020    HCT 37.7 11/17/2020     11/17/2020    CHOL 185 09/22/2021    TRIG 108 09/22/2021    HDL 89 09/22/2021    LDLCALC 77 09/22/2021    ALT 29 09/22/2021    AST 49 (H) 09/22/2021     11/17/2020    K 4.8 11/17/2020     (H) 11/17/2020    CREATININE 0.7 11/17/2020    BUN 9 11/17/2020    CO2 23 11/17/2020    HGBA1C 6.9 (H) 11/11/2020           Cardiac Imaging    ECHOCARDIOGRAM STRESS TEST 06/08/2022    Interpretation Summary  1.  Exercise ECG test was terminated because of mild shortness of breath.  He had no chest pain.  2.  Target heart rate was achieved.  3.  Exercise capacity was reasonable.  4.  There were no arrhythmias.  5.  Normal blood pressure response to exercise.  6.  No EKG changes consistent with ischemia.  7.  No echocardiographic changes consistent with ischemia      Baseline echo:  1.  Normal left ventricular function with an estimated ejection fraction of 60 to 65%.  2.  No regional wall motion abnormalities " at rest.  3.  Thickened aortic valve leaflets without any abnormality by Doppler.  4.  Mild tricuspid insufficiency with normal pulmonary pressures.  5.  Essentially unchanged from the prior stress echo of 6/17/2021             Problem List Items Addressed This Visit        Nervous    Type 1 diabetes mellitus with diabetic neuropathy (CMS/HCC)    Relevant Medications    rosuvastatin (CRESTOR) 10 mg tablet       Respiratory    Wheezing       Circulatory    Essential hypertension    Relevant Orders    ECG 12 lead    Coronary artery disease due to calcified coronary lesion - Primary    Relevant Medications    rosuvastatin (CRESTOR) 10 mg tablet    Other Relevant Orders    ECG 12 lead    Elevated coronary artery calcium score    Relevant Medications    rosuvastatin (CRESTOR) 10 mg tablet    Other Relevant Orders    ECG 12 lead    Hepatic function panel    Echocardiogram stress test      Other Visit Diagnoses     Pure hypercholesterolemia        Relevant Medications    rosuvastatin (CRESTOR) 10 mg tablet    Other Relevant Orders    Lipid panel    Hepatic function panel          Impression:  From a cardiac standpoint and is clinically stable.  She has no angina and her stress test is completely unchanged.  There was no evidence of ischemia.  She has normal left-ventricular function and no significant valve disease.    Her LDL however is not where it should be.  I would like to see it at 70 or less.  I have increased her Crestor to 10 mg daily and lipids and LFTs will be done in 3 months.  If she continues to do well, I will see her again in a year with a stress echo prior to that visit.    I thank you for allowing me to participate in the care of your patient.  If I can furnish you with any further details, please do not hesitate to contact me.     Madiha Wise MD  6/22/2022    This document was generated utilizing voice recognition technology. A reasonable attempt at proofreading has been made to minimize errors  but please excuse any typographical errors which may be present. Please call with any questions.

## 2022-06-29 DIAGNOSIS — E10.8 TYPE 1 DIABETES MELLITUS WITH COMPLICATION (HCC): ICD-10-CM

## 2022-06-29 RX ORDER — INSULIN LISPRO 100 [IU]/ML
INJECTION, SOLUTION INTRAVENOUS; SUBCUTANEOUS
Qty: 30 ML | Refills: 0 | Status: SHIPPED | OUTPATIENT
Start: 2022-06-29 | End: 2022-08-09

## 2022-07-12 LAB
ALBUMIN SERPL-MCNC: 4.2 G/DL (ref 3.7–4.7)
ALBUMIN/CREAT UR: 24 MG/G CREAT (ref 0–29)
ALBUMIN/GLOB SERPL: 2.1 {RATIO} (ref 1.2–2.2)
ALP SERPL-CCNC: 69 IU/L (ref 44–121)
ALT SERPL-CCNC: 22 IU/L (ref 0–32)
AST SERPL-CCNC: 32 IU/L (ref 0–40)
BASOPHILS # BLD AUTO: 0.1 X10E3/UL (ref 0–0.2)
BASOPHILS NFR BLD AUTO: 1 %
BILIRUB SERPL-MCNC: 1.1 MG/DL (ref 0–1.2)
BUN SERPL-MCNC: 15 MG/DL (ref 8–27)
BUN/CREAT SERPL: 20 (ref 12–28)
CALCIUM SERPL-MCNC: 9.9 MG/DL (ref 8.7–10.3)
CHLORIDE SERPL-SCNC: 103 MMOL/L (ref 96–106)
CO2 SERPL-SCNC: 27 MMOL/L (ref 20–29)
CREAT SERPL-MCNC: 0.74 MG/DL (ref 0.57–1)
CREAT UR-MCNC: 73.2 MG/DL
EGFR: 85 ML/MIN/1.73
EOSINOPHIL # BLD AUTO: 0.5 X10E3/UL (ref 0–0.4)
EOSINOPHIL NFR BLD AUTO: 9 %
ERYTHROCYTE [DISTWIDTH] IN BLOOD BY AUTOMATED COUNT: 13.2 % (ref 11.7–15.4)
GLOBULIN SER-MCNC: 2 G/DL (ref 1.5–4.5)
GLUCOSE SERPL-MCNC: 135 MG/DL (ref 65–99)
HBA1C MFR BLD: 6.5 % (ref 4.8–5.6)
HCT VFR BLD AUTO: 43.1 % (ref 34–46.6)
HGB BLD-MCNC: 14.5 G/DL (ref 11.1–15.9)
IMM GRANULOCYTES # BLD: 0 X10E3/UL (ref 0–0.1)
IMM GRANULOCYTES NFR BLD: 0 %
LYMPHOCYTES # BLD AUTO: 1.3 X10E3/UL (ref 0.7–3.1)
LYMPHOCYTES NFR BLD AUTO: 25 %
MCH RBC QN AUTO: 31.3 PG (ref 26.6–33)
MCHC RBC AUTO-ENTMCNC: 33.6 G/DL (ref 31.5–35.7)
MCV RBC AUTO: 93 FL (ref 79–97)
MICROALBUMIN UR-MCNC: 17.4 UG/ML
MONOCYTES # BLD AUTO: 0.6 X10E3/UL (ref 0.1–0.9)
MONOCYTES NFR BLD AUTO: 11 %
NEUTROPHILS # BLD AUTO: 2.8 X10E3/UL (ref 1.4–7)
NEUTROPHILS NFR BLD AUTO: 54 %
PLATELET # BLD AUTO: 244 X10E3/UL (ref 150–450)
POTASSIUM SERPL-SCNC: 4.7 MMOL/L (ref 3.5–5.2)
PROT SERPL-MCNC: 6.2 G/DL (ref 6–8.5)
RBC # BLD AUTO: 4.63 X10E6/UL (ref 3.77–5.28)
SODIUM SERPL-SCNC: 142 MMOL/L (ref 134–144)
T4 FREE SERPL-MCNC: 0.93 NG/DL (ref 0.82–1.77)
TSH SERPL DL<=0.005 MIU/L-ACNC: 3.44 UIU/ML (ref 0.45–4.5)
WBC # BLD AUTO: 5.2 X10E3/UL (ref 3.4–10.8)

## 2022-07-15 ENCOUNTER — TELEMEDICINE (OUTPATIENT)
Dept: ENDOCRINOLOGY | Facility: HOSPITAL | Age: 72
End: 2022-07-15
Payer: MEDICARE

## 2022-07-15 DIAGNOSIS — Z96.41 INSULIN PUMP IN PLACE: ICD-10-CM

## 2022-07-15 DIAGNOSIS — E55.9 VITAMIN D DEFICIENCY: ICD-10-CM

## 2022-07-15 DIAGNOSIS — E10.649 HYPOGLYCEMIA UNAWARENESS ASSOCIATED WITH TYPE 1 DIABETES MELLITUS (HCC): ICD-10-CM

## 2022-07-15 DIAGNOSIS — I10 ESSENTIAL HYPERTENSION: ICD-10-CM

## 2022-07-15 DIAGNOSIS — E78.2 MIXED HYPERLIPIDEMIA: ICD-10-CM

## 2022-07-15 DIAGNOSIS — E10.40 TYPE 1 DIABETES MELLITUS WITH DIABETIC NEUROPATHY (HCC): Primary | ICD-10-CM

## 2022-07-15 PROCEDURE — 99214 OFFICE O/P EST MOD 30 MIN: CPT | Performed by: NURSE PRACTITIONER

## 2022-07-15 PROCEDURE — 95251 CONT GLUC MNTR ANALYSIS I&R: CPT | Performed by: NURSE PRACTITIONER

## 2022-07-15 NOTE — PROGRESS NOTES
Virtual Regular Visit    Verification of patient location:    Patient is located in the following state in which I hold an active license [place]    Reason for visit is type 1 diabetes on insulin pump therapy  Encounter provider DESHAWN Arora    Provider located at [facility] [address] [address] [place] [id number]      Recent Visits  No visits were found meeting these conditions  Showing recent visits within past 7 days and meeting all other requirements  Today's Visits  Date Type Provider Dept   07/15/22 Telemedicine DESHAWN Arora  [facility]   Showing today's visits and meeting all other requirements  Future Appointments  No visits were found meeting these conditions  Showing future appointments within next 150 days and meeting all other requirements       The patient was identified by name and date of birth  Caroline Escobar was informed that this is a telemedicine visit and that the visit is being conducted through [facility] and patient was informed this is a secure, HIPAA-complaint platform  She agrees to proceed     My office door was closed  No one else was in the room  She acknowledged consent and understanding of privacy and security of the video platform  The patient has agreed to participate and understands they can discontinue the visit at any time  Patient is aware this is a billable service  Subjective  Caroline Escobar is a 66 y o  female 66 y  o  female with type 1 diabetes for approximately 40 years   She was started on an insulin pump around 1998   She is on insulin at home and takes Humalog insulin via a new tandem insulin pump  Download of her Dexcom sensor and tandem pump show an average glucose of 157  In target range 68% of the time with 3% low blood sugar  She appears to be having low blood sugar prior to bedtime at approximately midnight    She states that she will often times shut off her pump and have some juice before bed   Her most recent hemoglobin A1c from July 11, 2022 is 6 5  She denies any polyuria and polydipsia   She has no polyphagia, but has once a night nocturia  Aurelia Gamino is getting some blurry vision with some cataracts   She has no numbness or tingling of the feet   She denies chest pain or shortness of breath   She denies nephropathy, heart attack, stroke and claudication but does admit to neuropathy and retinopathy    She has a habit of changing her basal settings almost daily      Her most recent diabetic eye exam was performed on July 12, 2020  She is seen every 4 months  She has no complaints about her feet and does not follow Podiatry for regular diabetic foot care       For her hypertension, she is treated with lisinopril 10 mg daily      For her hyperlipidemia, she is treated with atorvastatin 10 mg       For her vitamin-D deficiency, she supplements with 1000 units of vitamin D3 daily        HPI     Past Medical History:   Diagnosis Date    Basal cell carcinoma (BCC) of face     left cheek    Cataract     Rheumatoid arthritis ([facility] [address] )        Past Surgical History:   Procedure Laterality Date    CATARACT EXTRACTION, BILATERAL Bilateral     EXPLORATORY LAPAROTOMY      FOOT SURGERY Left     foot reconstruction in 2 phases    FOOT SURGERY Right     foot reconstruction    LIVER BIOPSY      TOTAL SHOULDER REPLACEMENT Right        Current Outpatient Medications   Medication Sig Dispense Refill    aspirin 81 mg chewable tablet Chew 81 mg daily      cholecalciferol (VITAMIN D3) 1,000 units tablet Take 1,000 Units by mouth daily      citalopram (CeleXA) 10 mg tablet Take 10 mg by mouth daily      famotidine (PEPCID) 20 mg tablet Take 20 mg by mouth daily prn      folic acid (FOLVITE) 1 mg tablet Take 1 mg by mouth daily      glucagon (GLUCAGON EMERGENCY) 1 MG injection Inject 1 mg under the skin once as needed for low blood sugar for up to 1 dose 1 each 4    HumaLOG 100 UNIT/ML injection Inject  100 units daily via insulin pump 30 mL 0    lisinopril (ZESTRIL) 10 mg tablet Take 10 mg by mouth daily        methotrexate 50 MG/2ML injection Inject under the skin once a week        Multiple Vitamins-Minerals (MULTIVITAMIN ADULT PO) Take by mouth daily        Omega-3 Fat Ac-Cholecalciferol (DRY EYE OMEGA BENEFITS/VIT D-3) 890-366 MG-UNIT CAPS Take by mouth daily        RiTUXimab (RITUXAN IV) Infuse into a venous catheter Every 16 weeks, 2nd dose 2 weeks later then repeat      rosuvastatin (CRESTOR) 5 mg tablet Take 5 mg by mouth daily      Calcium Carbonate (CALCIUM 600 PO) Take by mouth daily (Patient not taking: No sig reported)      HumaLOG 100 UNIT/ML injection INJECT UP  UNITS DAILY VIA PUMP (Patient not taking: Reported on 7/15/2022) 30 mL 0     No current facility-administered medications for this visit  Allergies   Allergen Reactions    Bactrim [Sulfamethoxazole-Trimethoprim] GI Intolerance     Other reaction(s): GI Intolerance    Neosporin [Neomycin-Bacitracin Zn-Polymyx] Itching    Tetracycline GI Intolerance     Other reaction(s): GI Intolerance       Review of Systems   Constitutional: Negative  Negative for chills, fatigue and fever  HENT: Negative  Negative for trouble swallowing and voice change  Eyes: Negative  Negative for visual disturbance  Respiratory: Negative  Negative for chest tightness and shortness of breath  Cardiovascular: Negative  Negative for chest pain  Gastrointestinal: Negative  Negative for abdominal pain, constipation, diarrhea and vomiting  Endocrine: Positive for polyuria (1-2 times per night nocturia)  Negative for cold intolerance, heat intolerance, polydipsia and polyphagia  Genitourinary: Negative  Musculoskeletal: Negative  Skin: Negative  Allergic/Immunologic: Negative  Neurological: Negative    Negative for dizziness, syncope, light-headedness and headaches  Hematological: Negative  Psychiatric/Behavioral: Negative  All other systems reviewed and are negative  Video Exam    There were no vitals filed for this visit  Physical Exam     Physical Exam   Constitutional: She is oriented to person, place, and time  He appears well-developed and well-nourished  No distress  HENT:   Head: Normocephalic and atraumatic  Neck: Normal range of motion  Pulmonary/Chest: Effort normal    Musculoskeletal: Normal range of motion  Neurological: She is alert and oriented to person, place, and time  Skin: She is not diaphoretic  Psychiatric: She has a normal mood and affect  Her behavior is normal      Plan:  1   Type 1 diabetes:  Her most recent hemoglobin A1c is 6  5   he is experiencing some low blood sugars before bedtime and will often times shut off her pump and drink some juice  For this, I have asked her to adjust her insulin to carbohydrate ratio to 1:12 at dinnertime   We will download another dex com and pump download in 1 week for further review adjustment   For now, she will continue her current insulin settings  Diomedes Patel will continue to utilize the dex com continuous glucose monitor  We will contact her with any changes, if necessary   Check hemoglobin A1c prior to next visit  2   Hyperlipidemia:  Continue atorvastatin  Check fasting lipid panel prior to next visit  3   Hypertension: Continue lisinopril   Check comprehensive metabolic panel prior to next visit  4   Vitamin-D deficiency:  Continue supplementation with 1000 units of vitamin D3 daily        I spent 30 minutes directly with the patient during this visit    VIRTUAL VISIT Shastakslopez verbally agrees to participate in [employer]   Pt is aware that [employer] could be limited without vital signs or the ability to perform a full hands-on physical Claire Calderon understands she or the provider may request at any time to terminate the video visit and request the patient to seek care or treatment in person

## 2022-07-15 NOTE — PATIENT INSTRUCTIONS
Be mindful of diet  Stay active and stay hydrated  We made a small adjustment to your insulin to carbohydrate ratio at dinner to 1:12  Be sure to perform a bolus consistently at meals  Continue to utilize the dex com continuous glucose monitor and perform a DEX COM download in 2 weeks for review  Contact the office with any consistent hypoglycemia  Continue lisinopril  Continue atorvastatin  Continue with regular supplementation of vitamin D3 daily

## 2022-07-19 ENCOUNTER — OFFICE VISIT (OUTPATIENT)
Dept: INTERNAL MEDICINE | Facility: CLINIC | Age: 72
End: 2022-07-19
Payer: MEDICARE

## 2022-07-19 VITALS
SYSTOLIC BLOOD PRESSURE: 102 MMHG | HEIGHT: 60 IN | RESPIRATION RATE: 15 BRPM | DIASTOLIC BLOOD PRESSURE: 62 MMHG | OXYGEN SATURATION: 97 % | HEART RATE: 74 BPM | BODY MASS INDEX: 21.79 KG/M2 | TEMPERATURE: 97.9 F | WEIGHT: 111 LBS

## 2022-07-19 DIAGNOSIS — Z12.31 VISIT FOR SCREENING MAMMOGRAM: ICD-10-CM

## 2022-07-19 DIAGNOSIS — I10 ESSENTIAL HYPERTENSION: ICD-10-CM

## 2022-07-19 DIAGNOSIS — R91.8 GROUND GLASS OPACITY PRESENT ON IMAGING OF LUNG: ICD-10-CM

## 2022-07-19 DIAGNOSIS — E10.40 TYPE 1 DIABETES MELLITUS WITH DIABETIC NEUROPATHY (CMS/HCC): Primary | ICD-10-CM

## 2022-07-19 DIAGNOSIS — M05.79 RHEUMATOID ARTHRITIS INVOLVING MULTIPLE SITES WITH POSITIVE RHEUMATOID FACTOR (CMS/HCC): ICD-10-CM

## 2022-07-19 PROCEDURE — G8754 DIAS BP LESS 90: HCPCS | Performed by: INTERNAL MEDICINE

## 2022-07-19 PROCEDURE — G8752 SYS BP LESS 140: HCPCS | Performed by: INTERNAL MEDICINE

## 2022-07-19 PROCEDURE — 99214 OFFICE O/P EST MOD 30 MIN: CPT | Performed by: INTERNAL MEDICINE

## 2022-07-19 RX ORDER — AZD7442 150-150 MG
300 KIT INTRAMUSCULAR ONCE
Qty: 3 ML | Refills: 0 | Status: SHIPPED | OUTPATIENT
Start: 2022-07-19 | End: 2022-07-19

## 2022-07-19 ASSESSMENT — ENCOUNTER SYMPTOMS
FEVER: 0
WEAKNESS: 0
NERVOUS/ANXIOUS: 1
UNEXPECTED WEIGHT CHANGE: 0
NUMBNESS: 0
COUGH: 0
SLEEP DISTURBANCE: 0
SHORTNESS OF BREATH: 0
DYSPHORIC MOOD: 0
ARTHRALGIAS: 1
PALPITATIONS: 0
DIFFICULTY URINATING: 0
ABDOMINAL PAIN: 0

## 2022-07-19 NOTE — PROGRESS NOTES
Subjective      Patient ID: Lillie Ward is a 71 y.o. female.    HPI    Patient presents for follow up.  Has a new insulin pump which has been working well for her diabetes. Last HbA1c was 6.5.  Does have trouble changing out her insulin cartridge due to her RA.  Has to change every 3 days.  Ran out of insulin after going 4 days instead of 3 recently, and sugars were as high as 400's.  Had dry heaves and then chest pain and went to the ER.  Neg for ACS.  Has an appt with Pulm on 8/3/22 with Dr. Richards.  Has an appt with ophtho on 8/9 [facility] Dr. Fallon, switched from Dr. Browne after she retired.  Just saw the podiatrist last week.  Has a lesion of the left foot which was being shaved down by Dr. Sen.  Recently found that the lesion is a piece of hardware protruding out from her previous reconstruction surgeries.  Will need surgery to correct it.  Dr. Sen was her previous podiatrist who was shaving it.  Seeing her rheumatologist twice a year.  Stable on Rituxan and methotrexate.  Up-to-date with her COVID vaccines.  Received a letter from [facility]'s pharmacy to schedule her Evusheld infusion in Sept. No other new/acute concerns.    The following have been reviewed and updated as appropriate in this visit:     Allergies  Meds  Problems           Past Medical History:   Diagnosis Date   • Acid reflux    • Diabetes mellitus type I (CMS/HCC)     Dr. Cerda - [facility]   • DKA, type 1 (CMS/HCC) 09/2016    due to insulin pump malfunction - hospitalized   • GERD (gastroesophageal reflux disease)    • H/O colonoscopy 12/2013    [place].  Normal   • Hyperlipidemia    • Hypoglycemia unawareness associated with type 1 diabetes mellitus (CMS/HCC)    • Macular degeneration     BEGINNING STAGES   • Pulmonary fibrosis (CMS/[facility])     CLEARED BY PULMONOLIGIST 2 YEARS AGO 2018   • Pulmonary nodule, left 07/2017   • Rheumatoid arthritis (CMS/HCC)     Dr. Lo [place]   • Skin cancer      Past Surgical History:    Procedure Laterality Date   • COLONOSCOPY     • EYE SURGERY Bilateral     CATARACT   • FOOT SURGERY Left 2009    reconstruction   • FOOT SURGERY Right 2007    reconstruction   • JOINT REPLACEMENT Right     SHOULDER   • LIVER SURGERY      '80's   • REPLACEMENT TOTAL KNEE Left 04/04/2017    [facility]   • SKIN BIOPSY     • TOTAL HIP ARTHROPLASTY Right 11/2020   • TOTAL SHOULDER ARTHROPLASTY Right 1990     Family History   Problem Relation Age of Onset   • Stroke Biological Mother    • Lung cancer Biological Father    • No Known Problems Biological Sister    • Rheum arthritis Other         1 of 8 siblings   • Prostate cancer Biological Son    • Pancreatitis Biological Son    • Alcohol abuse Biological Son    • Bipolar disorder Biological Son    • Bipolar disorder Biological Daughter         no diagnosis   • Depression Biological Daughter    • Anxiety disorder Biological Daughter      Social History     Socioeconomic History   • Marital status:      Spouse name: None   • Number of children: 1   • Years of education: None   • Highest education level: None   Occupational History   • Occupation: Retired teacher   Tobacco Use   • Smoking status: Never Smoker   • Smokeless tobacco: Never Used   Vaping Use   • Vaping Use: Never used   Substance and Sexual Activity   • Alcohol use: Yes   • Drug use: No   • Sexual activity: Yes   Social History Narrative    Marital status- . was a pediatrician    Children- 1 son    Caffeine - coffee    Exercise-walking    Diet-low carb    Pets-    Mandaen- none    Seat belt-yes    Smoke alarm-yes               Review of Systems   Constitutional: Negative for fever and unexpected weight change.   HENT: Positive for congestion.    Respiratory: Negative for cough and shortness of breath.    Cardiovascular: Negative for chest pain and palpitations.   Gastrointestinal: Negative for abdominal pain.   Genitourinary: Negative for difficulty urinating.   Musculoskeletal: Positive for  arthralgias.   Neurological: Negative for weakness and numbness.   Psychiatric/Behavioral: Negative for dysphoric mood and sleep disturbance. The patient is nervous/anxious.        Allergies   Allergen Reactions   • Neomycin Itching   • Neomycin-Bacitracin-Polymyxin Itching     neosporin   • Neomycin-Bacitracnzn-Polymyxnb Itching   • Sulfamethoxazole-Trimethoprim      Other reaction(s): GI Intolerance   • Tetracycline      Other reaction(s): GI Intolerance   • Bacitracin Rash     Current Outpatient Medications   Medication Sig Dispense Refill   • tixagevimab-cilgavimab (SUJITUSHTWIN, EUA,) 150 mg/1.5 mL- 150 mg/1.5 mL solution injection Inject 3 mL (300 mg total) into the shoulder, thigh, or buttocks once for 1 dose. 3 mL 0   • aspirin 81 mg enteric coated tablet Take 1 tablet (81 mg total) by mouth 2 (two) times a day. (Patient taking differently: Take 81 mg by mouth daily.) 60 tablet 0   • cholecalciferol, vitamin D3, 25 mcg (1,000 unit) capsule Take 1,000 Units by mouth daily.       • citalopram 10 mg tablet Take 1 tablet (10 mg total) by mouth daily. 90 tablet 1   • docosahexaenoic acid/epa (FISH OIL ORAL) Take by mouth 2 (two) times a day. For dry eyes     • famotidine (PEPCID) 10 mg tablet Take 2 tablets (20 mg total) by mouth daily as needed. With ibuprofen     • folic acid (FOLVITE) 1 mg tablet Take 1 mg by mouth daily.     • glucagon, human recombinant, 1 mg injection Infuse 1 mg into a venous catheter once.     • insulin lispro U-100 (HumaLOG) 100 unit/mL subcutaneous cartridge inject by subcutaneous route per prescriber's instructions. Insulin dosing requires individualization.     • Lactobac no.41-Bifidobact no.7 70 mg (3 billion cell) capsule Take by mouth daily.     • lisinopriL (PRINIVIL) 10 mg tablet Take 1 tablet by mouth once daily 90 tablet 1   • METHOTREXATE SODIUM INJ Inject 2.5 mg as directed once a week. Fridays      • multivit with minerals/lutein (MULTIVITAMIN 50 PLUS ORAL) No SIG Entered     •  mupirocin (BACTROBAN) 2 % ointment Apply topically.     • riTUXimab (RITUXAN) 10 mg/mL injection Inject 10 mg/mL as directed. Every 16 weeks   Sept 7,2020      • rosuvastatin (CRESTOR) 10 mg tablet Take 1 tablet (10 mg total) by mouth daily. 90 tablet 3   • turmeric root extract 500 mg capsule Take 1,000 mg by mouth daily.     • vit A/vit C/vit E/zinc/copper (PRESERVISION AREDS ORAL) Take by mouth 2 (two) times a day.       No current facility-administered medications for this visit.       Objective   Vitals:    07/19/22 0955   BP: 102/62   Pulse: 74   Resp: 15   Temp: 36.6 °C (97.9 °F)   SpO2: 97%   Weight: 50.3 kg (111 lb)   Height: 1.524 m (5')     Body mass index is 21.68 kg/m².    Physical Exam  Constitutional:       Appearance: Normal appearance. She is well-developed.   HENT:      Head: Normocephalic and atraumatic.   Eyes:      General: Lids are normal.   Cardiovascular:      Rate and Rhythm: Normal rate and regular rhythm.      Heart sounds: Normal heart sounds, S1 normal and S2 normal. No murmur heard.  Pulmonary:      Effort: Pulmonary effort is normal.      Breath sounds: Wheezing (Right lung base) and rales (Left lung base) present. No decreased breath sounds or rhonchi.   Abdominal:      General: Bowel sounds are normal.      Palpations: Abdomen is soft.      Tenderness: There is no abdominal tenderness.   Musculoskeletal:      Cervical back: Neck supple.   Skin:     General: Skin is warm and dry.   Neurological:      General: No focal deficit present.      Mental Status: She is alert and oriented to person, place, and time.      Cranial Nerves: No cranial nerve deficit.      Gait: Gait normal.   Psychiatric:         Mood and Affect: Mood normal.         Behavior: Behavior normal.         Thought Content: Thought content normal.         Judgment: Judgment normal.         Assessment/Plan   Problem List Items Addressed This Visit        Nervous    Type 1 diabetes mellitus with diabetic neuropathy  (CMS/[facility]) - Primary     HbA1c 6.5, down from 7.3.  Has a new insulin pump.  Trying to get used to it.  She will try to get assistance from partner to change cartridges every 3 days or discussed with endocrinologist for a more concentrated insulin formulation if compatible.  Up-to-date with foot and eye exams              Circulatory    Essential hypertension     Well controlled with lisinopril              Immune    Rheumatoid arthritis (CMS/[facility])     Continue follow-up twice a year with Dr. Leventhal.  Stable on Rituxan and methotrexate              Other    Visit for screening mammogram    Relevant Orders    BI SCREENING MAMMOGRAM BILATERAL(TOMOSYNTHESIS)    Ground glass opacity present on imaging of lung     Has an appt with ravindra Holly in a couple week.                 Machelle Gallardo MD    7/19/2022

## 2022-07-19 NOTE — ASSESSMENT & PLAN NOTE
Has an appt with ravindra Holly in a couple week.   Subjective:     CC: f/u cyst, finger pain    HPI:   Diego presents today with     Cyst of skin  This is a chronic condition. For the last few months he has had a cyst of the lateral right ankle. It would come and go so he saw [facility]. [facility] has drained it 3 times and it keeps coming back. It is starting to get more painful and he would like to get it removed. However with [facility] it is going to cost $6000 out of pocket and he would like a 2nd opinion, especially because he was told they'll have to cut through a ligament to remove the cyst.     Pain of finger of left hand  This is a new condition.  Onset: 3 weeks ago  Location: left ring DIP  Duration: constant  Quality: dull ache most of time, sharp if bumped  Modifying factors: worse - bumping it, using DIP; better - nothing  Precipitating trauma: none he can recall  Associated symptoms: +swelling  Home treatments: nothing    He states it has been re-occurring issue. It will resolve on its own but generally less than 3 weeks.      Past Medical History:   Diagnosis Date   • Alpha 1-antitrypsin PiMS phenotype    • Chickenpox    • [place] measles    • Mumps        Social History     Tobacco Use   • Smoking status: Never Smoker   • Smokeless tobacco: Never Used   Substance Use Topics   • Alcohol use: Yes     Alcohol/week: 8.4 oz     Types: 14 Cans of beer per week   • Drug use: Yes     Types: Inhaled, Marijuana     Comment: marijuana daily       Current Outpatient Medications Ordered in Epic   Medication Sig Dispense Refill   • omeprazole (PRILOSEC) 20 MG delayed-release capsule Take 20 mg by mouth every day.     • cyclobenzaprine (FLEXERIL) 5 MG tablet Take 1-2 Tabs by mouth 3 times a day as needed. 30 Tab 0   • triamcinolone acetonide (KENALOG) 0.025 % Cream Apply thin layer to affected area(s) twice daily for NO MORE THAN 7 DAYS 15 g 0   • albuterol 108 (90 Base) MCG/ACT Aero Soln inhalation aerosol Inhale 2 Puffs by mouth every four hours as needed for Shortness of Breath.  "1 Inhaler 2   • ibuprofen (MOTRIN) 200 MG Tab Take 800 mg by mouth every 8 hours as needed.       No current Epic-ordered facility-administered medications on file.        Allergies:  Patient has no known allergies.    Health Maintenance: Completed    ROS:  Gen: no fevers/chills  Pulm: no sob  CV: no chest pain    Objective:     Exam:  /78 (BP Location: Right arm, Patient Position: Sitting, BP Cuff Size: Adult)   Pulse 64   Temp 36.6 °C (97.9 °F) (Temporal)   Resp 16   Ht 1.854 m (6' 1\")   Wt 88.6 kg (195 lb 6.4 oz)   SpO2 97%   BMI 25.78 kg/m²  Body mass index is 25.78 kg/m².    Gen: Alert and oriented, No apparent distress.  Neck: Neck is supple without lymphadenopathy.  Lungs: Normal effort, CTA bilaterally, no wheezes, rhonchi, or rales  CV: Regular rate and rhythm. No murmurs, rubs, or gallops.  Ext: No clubbing, cyanosis, edema. Left ring DIP mildly edematous. Full ROM of the joint without pain. No erythema. +TTP over medial DIP joint.     Assessment & Plan:     37 y.o. male with the following -     1. Cyst of skin  This is a chronic condition, unchanged.  For several months he has had a cyst of the lateral right ankle.  It comes and goes he has been following with [facility].  They have attempted to drain it 3 times but it keeps coming back.  He states that they got an MRI and there is also a deeper cysts that in order to have removed they do have to cut through the ligament.  He states it would cost him $6000 out of pocket for the procedure so he would like a second opinion if possible.  We will place a referral to [facility] orthopedics so he can get a second opinion as long as they are covered by his insurance.  - REFERRAL TO ORTHOPEDICS    2. Pain of finger of left hand  This is a new condition.  For 3 weeks he has had pain of his left ring finger DIP joint.  It is there constantly is a dull ache but then it becomes sharp if he bumps the area.  He is able to use the whole finger without any " worsening of pain.  There is no trigger finger symptoms.  He cannot recall any precipitating trauma there is some associated swelling near the joint.  He states this is a reoccurring issue and it has occurred in the past but never lasted as long as 3 weeks.  His exam is benign except for some tenderness to the medial aspect of the DIP joint.  We will get an x-ray to evaluate further.  - DX-FINGER(S) 2+ LEFT; Future    Return for abdominal pain.    Please note that this dictation was created using voice recognition software. I have made every reasonable attempt to correct obvious errors, but I expect that there are errors of grammar and possibly content that I did not discover before finalizing the note.

## 2022-07-19 NOTE — ASSESSMENT & PLAN NOTE
HbA1c 6.5, down from 7.3.  Has a new insulin pump.  Trying to get used to it.  She will try to get assistance from partner to change cartridges every 3 days or discussed with endocrinologist for a more concentrated insulin formulation if compatible.  Up-to-date with foot and eye exams

## 2022-08-04 ENCOUNTER — TRANSCRIBE ORDERS (OUTPATIENT)
Dept: SCHEDULING | Age: 72
End: 2022-08-04

## 2022-08-04 DIAGNOSIS — M06.9 RHEUMATOID ARTHRITIS, UNSPECIFIED: ICD-10-CM

## 2022-08-04 DIAGNOSIS — R06.2 WHEEZING: Primary | ICD-10-CM

## 2022-08-08 ENCOUNTER — HOSPITAL ENCOUNTER (OUTPATIENT)
Dept: RADIOLOGY | Age: 72
Discharge: HOME | End: 2022-08-08
Attending: INTERNAL MEDICINE
Payer: MEDICARE

## 2022-08-08 DIAGNOSIS — M06.9 RHEUMATOID ARTHRITIS, UNSPECIFIED: ICD-10-CM

## 2022-08-08 DIAGNOSIS — R06.2 WHEEZING: ICD-10-CM

## 2022-08-08 PROCEDURE — 71250 CT THORAX DX C-: CPT

## 2022-08-09 DIAGNOSIS — E10.8 TYPE 1 DIABETES MELLITUS WITH COMPLICATION (HCC): ICD-10-CM

## 2022-08-09 RX ORDER — INSULIN LISPRO 100 [IU]/ML
INJECTION, SOLUTION INTRAVENOUS; SUBCUTANEOUS
Qty: 30 ML | Refills: 0 | Status: SHIPPED | OUTPATIENT
Start: 2022-08-09 | End: 2022-09-26

## 2022-08-15 ENCOUNTER — TELEPHONE (OUTPATIENT)
Dept: SCHEDULING | Facility: CLINIC | Age: 72
End: 2022-08-15
Payer: MEDICARE

## 2022-08-15 NOTE — TELEPHONE ENCOUNTER
Mariah from [facility] calling to get last ECG tracing and reading faxed to [phone]    Mariah [phone]

## 2022-08-16 NOTE — TELEPHONE ENCOUNTER
Please refax ekg tracing  to [phone].    Mariah states she did not receive the one faxed yesterday. ty

## 2022-08-19 ENCOUNTER — TELEPHONE (OUTPATIENT)
Dept: ENDOCRINOLOGY | Facility: HOSPITAL | Age: 72
End: 2022-08-19

## 2022-08-19 NOTE — TELEPHONE ENCOUNTER
Received call from [address] from podiatry doctor requesting office note from office visit 4/13/22 and tele-visit 7/15/22  Faxed to Randi Barron, at [phone] today on 8/19/22 for surgery scheduled on Monday 8/22/22  Also told MA"S that Randi Barron will call back for insulin pump info     Phone #  [phone]

## 2022-09-19 RX ORDER — CITALOPRAM 10 MG/1
TABLET ORAL
Qty: 90 TABLET | Refills: 1 | Status: SHIPPED | OUTPATIENT
Start: 2022-09-19 | End: 2024-09-12

## 2022-09-19 NOTE — TELEPHONE ENCOUNTER
Medicine Refill Request    Last Office: 7/19/2022   Last Consult Visit: 11/10/2020  Last Telemedicine Visit: 12/2/2020 Jordan Dumas CRNP    Next Appointment: 9/21/2022      Current Outpatient Medications:     aspirin 81 mg enteric coated tablet, Take 1 tablet (81 mg total) by mouth 2 (two) times a day. (Patient taking differently: Take 81 mg by mouth daily.), Disp: 60 tablet, Rfl: 0    cholecalciferol, vitamin D3, 25 mcg (1,000 unit) capsule, Take 1,000 Units by mouth daily.  , Disp: , Rfl:     citalopram 10 mg tablet, Take 1 tablet (10 mg total) by mouth daily., Disp: 90 tablet, Rfl: 1    docosahexaenoic acid/epa (FISH OIL ORAL), Take by mouth 2 (two) times a day. For dry eyes, Disp: , Rfl:     famotidine (PEPCID) 10 mg tablet, Take 2 tablets (20 mg total) by mouth daily as needed. With ibuprofen, Disp: , Rfl:     folic acid (FOLVITE) 1 mg tablet, Take 1 mg by mouth daily., Disp: , Rfl:     glucagon, human recombinant, 1 mg injection, Infuse 1 mg into a venous catheter once., Disp: , Rfl:     insulin lispro U-100 (HumaLOG) 100 unit/mL subcutaneous cartridge, inject by subcutaneous route per prescriber's instructions. Insulin dosing requires individualization., Disp: , Rfl:     Lactobac no.41-Bifidobact no.7 70 mg (3 billion cell) capsule, Take by mouth daily., Disp: , Rfl:     lisinopriL (PRINIVIL) 10 mg tablet, Take 1 tablet by mouth once daily, Disp: 90 tablet, Rfl: 1    METHOTREXATE SODIUM INJ, Inject 2.5 mg as directed once a week. Fridays , Disp: , Rfl:     multivit with minerals/lutein (MULTIVITAMIN 50 PLUS ORAL), No SIG Entered, Disp: , Rfl:     mupirocin (BACTROBAN) 2 % ointment, Apply topically., Disp: , Rfl:     riTUXimab (RITUXAN) 10 mg/mL injection, Inject 10 mg/mL as directed. Every 16 weeks   Sept 7,2020 , Disp: , Rfl:     rosuvastatin (CRESTOR) 10 mg tablet, Take 1 tablet (10 mg total) by mouth daily., Disp: 90 tablet, Rfl: 3    turmeric root extract 500 mg capsule, Take 1,000 mg  by mouth daily., Disp: , Rfl:     vit A/vit C/vit E/zinc/copper (PRESERVISION AREDS ORAL), Take by mouth 2 (two) times a day., Disp: , Rfl:       BP Readings from Last 3 Encounters:   07/19/22 102/62   06/22/22 110/60   06/08/22 136/62       Recent Lab results:  Lab Results   Component Value Date    CHOL 185 09/22/2021   ,   Lab Results   Component Value Date    HDL 89 09/22/2021   ,   Lab Results   Component Value Date    LDLCALC 77 09/22/2021   ,   Lab Results   Component Value Date    TRIG 108 09/22/2021        Lab Results   Component Value Date    GLUCOSE 79 11/17/2020   ,   Lab Results   Component Value Date    HGBA1C 6.9 (H) 11/11/2020         Lab Results   Component Value Date    CREATININE 0.7 11/17/2020       No results found for: TSH

## 2022-09-21 ENCOUNTER — CONSULT (OUTPATIENT)
Dept: INTERNAL MEDICINE | Facility: CLINIC | Age: 72
End: 2022-09-21
Payer: MEDICARE

## 2022-09-21 VITALS
HEIGHT: 60 IN | SYSTOLIC BLOOD PRESSURE: 116 MMHG | DIASTOLIC BLOOD PRESSURE: 70 MMHG | WEIGHT: 111 LBS | BODY MASS INDEX: 21.79 KG/M2 | OXYGEN SATURATION: 96 % | HEART RATE: 74 BPM | TEMPERATURE: 98.2 F | RESPIRATION RATE: 16 BRPM

## 2022-09-21 DIAGNOSIS — Z01.818 PRE-OP EVALUATION: Primary | ICD-10-CM

## 2022-09-21 DIAGNOSIS — M79.672 LEFT FOOT PAIN: ICD-10-CM

## 2022-09-21 DIAGNOSIS — I25.84 CORONARY ARTERY DISEASE DUE TO CALCIFIED CORONARY LESION: ICD-10-CM

## 2022-09-21 DIAGNOSIS — Z96.41 INSULIN PUMP IN PLACE: ICD-10-CM

## 2022-09-21 DIAGNOSIS — I10 ESSENTIAL HYPERTENSION: ICD-10-CM

## 2022-09-21 DIAGNOSIS — R06.2 WHEEZING: ICD-10-CM

## 2022-09-21 DIAGNOSIS — E10.40 TYPE 1 DIABETES MELLITUS WITH DIABETIC NEUROPATHY (CMS/HCC): ICD-10-CM

## 2022-09-21 DIAGNOSIS — M05.79 RHEUMATOID ARTHRITIS INVOLVING MULTIPLE SITES WITH POSITIVE RHEUMATOID FACTOR (CMS/HCC): ICD-10-CM

## 2022-09-21 DIAGNOSIS — I25.10 CORONARY ARTERY DISEASE DUE TO CALCIFIED CORONARY LESION: ICD-10-CM

## 2022-09-21 PROCEDURE — G8752 SYS BP LESS 140: HCPCS | Performed by: INTERNAL MEDICINE

## 2022-09-21 PROCEDURE — 90694 VACC AIIV4 NO PRSRV 0.5ML IM: CPT | Performed by: INTERNAL MEDICINE

## 2022-09-21 PROCEDURE — G0008 ADMIN INFLUENZA VIRUS VAC: HCPCS | Performed by: INTERNAL MEDICINE

## 2022-09-21 PROCEDURE — 99214 OFFICE O/P EST MOD 30 MIN: CPT | Mod: 25 | Performed by: INTERNAL MEDICINE

## 2022-09-21 PROCEDURE — G8754 DIAS BP LESS 90: HCPCS | Performed by: INTERNAL MEDICINE

## 2022-09-21 RX ORDER — ALBUTEROL SULFATE 90 UG/1
INHALANT RESPIRATORY (INHALATION)
COMMUNITY
Start: 2022-09-06

## 2022-09-21 ASSESSMENT — ENCOUNTER SYMPTOMS
DIZZINESS: 0
ABDOMINAL PAIN: 0
HEADACHES: 0
UNEXPECTED WEIGHT CHANGE: 0
SHORTNESS OF BREATH: 0
WHEEZING: 1
COUGH: 0
FEVER: 0
ARTHRALGIAS: 1
DIARRHEA: 0
CONSTIPATION: 0
MYALGIAS: 1
PALPITATIONS: 0
DIFFICULTY URINATING: 0

## 2022-09-21 NOTE — ASSESSMENT & PLAN NOTE
Continue rituxan. Off methotrexate.  Stable.  Recommended getting the new bivalent covid vaccine. Flu shot today.

## 2022-09-21 NOTE — ASSESSMENT & PLAN NOTE
RCRI score 0.  Saw cardiologist and had echo stress test which was negative for acute ischemia.  Can proceed with surgery under conscious sedation as planned.

## 2022-09-21 NOTE — ASSESSMENT & PLAN NOTE
Advised to hold ASA starting today. Restart after surgery once cleared by podiatry for bleeding risk. Continue lisinopril and crestor

## 2022-09-21 NOTE — PROGRESS NOTES
Subjective      Patient ID: Lillie Ward is a 72 y.o. female.    HPI    Patient presents for pre-op evaluation for left foot hardware removal by Dr. Le, podiatrist on 9/26/22 at [facility]. Outpatient procedure.  Saw Dr. Wise, cardiologist in June and had neg stress test.  Was recommended to see the pulmonologist.  Had CT chest.  Will have f/u with Dr. Yanez, pulmonologist.  Was prescribed an inhaler to use as needed for her wheezing.  Has sleep study scheduled for next week.  Saw rheumatologist and her methotrexate was discontinued.  Last dose was over a week ago.  Hasn't noticed any significant changes in symptoms. Still getting Rituxan injections every 16 weeks. Next dose 10/13/22. No CP, palpitations, SOB, LH/dizziness at rest or with exertion.  Has occasional low glucose. Sugars have been well controlled on the new insulin pump. No other new/acute concerns.       The following have been reviewed and updated as appropriate in this visit:     Allergies  Meds  Problems           Past Medical History:   Diagnosis Date    Acid reflux     Diabetes mellitus type I (CMS/HCC)     [facility]    DKA, type 1 (CMS/HCC) 09/2016    due to insulin pump malfunction - hospitalized    GERD (gastroesophageal reflux disease)     H/O colonoscopy 12/2013    [place].  Normal    Hyperlipidemia     Hypoglycemia unawareness associated with type 1 diabetes mellitus (CMS/HCC)     Macular degeneration     BEGINNING STAGES    Pulmonary fibrosis (CMS/HCC)     CLEARED BY PULMONOLIGIST 2 YEARS AGO 2018    Pulmonary nodule, left 07/2017    Rheumatoid arthritis (CMS/HCC)     Dr. Lo, [place]    Skin cancer      Past Surgical History:   Procedure Laterality Date    COLONOSCOPY      EYE SURGERY Bilateral     CATARACT    FOOT SURGERY Left 2009    reconstruction    FOOT SURGERY Right 2007    reconstruction    JOINT REPLACEMENT Right     SHOULDER    LIVER SURGERY      '80's     REPLACEMENT TOTAL KNEE Left 04/04/2017    [place]    SKIN BIOPSY      TOTAL HIP ARTHROPLASTY Right 11/2020    TOTAL SHOULDER ARTHROPLASTY Right 1990     Family History   Problem Relation Age of Onset    Stroke Biological Mother     Lung cancer Biological Father     No Known Problems Biological Sister     Rheum arthritis Other         1 of 8 siblings    Prostate cancer Biological Son     Pancreatitis Biological Son     Alcohol abuse Biological Son     Bipolar disorder Biological Son     Bipolar disorder Biological Daughter         no diagnosis    Depression Biological Daughter     Anxiety disorder Biological Daughter      Social History     Socioeconomic History    Marital status:      Spouse name: None    Number of children: 1    Years of education: None    Highest education level: None   Occupational History    Occupation: Retired teacher   Tobacco Use    Smoking status: Never Smoker    Smokeless tobacco: Never Used   Vaping Use    Vaping Use: Never used   Substance and Sexual Activity    Alcohol use: Yes    Drug use: No    Sexual activity: Yes   Social History Narrative    Marital status- . was a pediatrician    Children- 1 son    Caffeine - coffee    Exercise-walking    Diet-low carb    Pets-    Islam- none    Seat belt-yes    Smoke alarm-yes               Review of Systems   Constitutional: Negative for fever and unexpected weight change.   Respiratory: Positive for wheezing. Negative for cough and shortness of breath.    Cardiovascular: Negative for chest pain and palpitations.   Gastrointestinal: Negative for abdominal pain, constipation and diarrhea.   Genitourinary: Negative for difficulty urinating.   Musculoskeletal: Positive for arthralgias and myalgias.   Neurological: Negative for dizziness and headaches.       Allergies   Allergen Reactions    Neomycin Itching    Neomycin-Bacitracin-Polymyxin Itching     neosporin    Neomycin-Bacitracnzn-Polymyxnb  Itching    Sulfamethoxazole-Trimethoprim      Other reaction(s): GI Intolerance    Tetracycline      Other reaction(s): GI Intolerance    Bacitracin Rash     Current Outpatient Medications   Medication Sig Dispense Refill    citalopram (celeXA) 10 mg tablet Take 1 tablet by mouth once daily 90 tablet 1    docosahexaenoic acid/epa (FISH OIL ORAL) Take by mouth 2 (two) times a day. For dry eyes      famotidine (PEPCID) 10 mg tablet Take 2 tablets (20 mg total) by mouth daily as needed. With ibuprofen      insulin lispro U-100 (HumaLOG) 100 unit/mL subcutaneous cartridge inject by subcutaneous route per prescriber's instructions. Insulin dosing requires individualization.      riTUXimab (RITUXAN) 10 mg/mL injection Inject 10 mg/mL as directed. Every 16 weeks   10/13/2022 next       rosuvastatin (CRESTOR) 10 mg tablet Take 1 tablet (10 mg total) by mouth daily. 90 tablet 3    albuterol HFA (VENTOLIN HFA) 90 mcg/actuation inhaler INHALE 2 PUFFS BY MOUTH EVERY 4 HOURS AS NEEDED FOR SHORTNESS OF BREATH , COUGH, OR WHEEZING      aspirin 81 mg enteric coated tablet Take 1 tablet (81 mg total) by mouth 2 (two) times a day. (Patient taking differently: Take 81 mg by mouth daily.) 60 tablet 0    cholecalciferol, vitamin D3, 25 mcg (1,000 unit) capsule Take 1,000 Units by mouth daily.        folic acid (FOLVITE) 1 mg tablet Take 1 mg by mouth daily.      glucagon, human recombinant, 1 mg injection Infuse 1 mg into a venous catheter once.      Lactobac no.41-Bifidobact no.7 70 mg (3 billion cell) capsule Take by mouth daily.      lisinopriL (PRINIVIL) 10 mg tablet Take 1 tablet by mouth once daily 90 tablet 1    multivit with minerals/lutein (MULTIVITAMIN 50 PLUS ORAL) No SIG Entered      mupirocin (BACTROBAN) 2 % ointment Apply topically.      vit A/vit C/vit E/zinc/copper (PRESERVISION AREDS ORAL) Take by mouth 2 (two) times a day.       No current facility-administered medications for this visit.        Objective   Vitals:    09/21/22 1458   BP: 116/70   Pulse: 74   Resp: 16   Temp: 36.8 °C (98.2 °F)   SpO2: 96%   Weight: 50.3 kg (111 lb)   Height: 1.524 m (5')     Body mass index is 21.68 kg/m².    Physical Exam  Constitutional:       Appearance: Normal appearance. She is well-developed.   HENT:      Head: Normocephalic and atraumatic.      Mouth/Throat:      Mouth: Mucous membranes are moist.      Pharynx: Oropharynx is clear. No posterior oropharyngeal erythema.   Eyes:      General: Lids are normal.      Extraocular Movements: Extraocular movements intact.      Pupils: Pupils are equal, round, and reactive to light.   Cardiovascular:      Rate and Rhythm: Normal rate and regular rhythm.      Heart sounds: Normal heart sounds, S1 normal and S2 normal. No murmur heard.  Pulmonary:      Effort: Pulmonary effort is normal.      Breath sounds: Normal breath sounds. No decreased breath sounds, rhonchi or rales.   Abdominal:      General: Bowel sounds are normal.      Palpations: Abdomen is soft.      Tenderness: There is no abdominal tenderness.   Musculoskeletal:         General: Normal range of motion.      Cervical back: Neck supple.      Comments: Small elongated callus vs hardware protruding medially at the L.1st MTP region. Non-inflamed   Skin:     General: Skin is warm and dry.   Neurological:      Mental Status: She is alert and oriented to person, place, and time.      Cranial Nerves: No cranial nerve deficit.      Gait: Gait normal.   Psychiatric:         Behavior: Behavior normal.         Thought Content: Thought content normal.         Judgment: Judgment normal.         Assessment/Plan   Problem List Items Addressed This Visit        Nervous    Type 1 diabetes mellitus with diabetic neuropathy (CMS/HCC)     Advised to check with endocrinologist regarding insulin pump instructions perioperatively              Respiratory    Wheezing     Persistent wheezing. CT neg for acute process.  Advised to use  albuterol before surgery              Circulatory    Essential hypertension     Advised to continue lisinopril perioperatively on usual schedule.           Coronary artery disease due to calcified coronary lesion     Advised to hold ASA starting today. Restart after surgery once cleared by podiatry for bleeding risk. Continue lisinopril and crestor              Musculoskeletal    Left foot pain     Surgery scheduled for hardware removal from [facility] on 9/26/22. Clearance form filled out to be faxed. Will attach Echo results.              Endocrine/Metabolic    Insulin pump in place     Advised to check with endocrinologist regarding insulin pump during surgery              Rheumatologic    Rheumatoid arthritis (CMS/HCC)     Continue rituxan. Off methotrexate.  Stable.  Recommended getting the new bivalent covid vaccine. Flu shot today.                Other    Pre-op evaluation - Primary     RCRI score 0.  Saw cardiologist and had echo stress test which was negative for acute ischemia.  Can proceed with surgery under conscious sedation as planned.                  Machelle Gallardo MD    9/21/2022

## 2022-09-22 ENCOUNTER — TELEPHONE (OUTPATIENT)
Dept: ENDOCRINOLOGY | Facility: HOSPITAL | Age: 72
End: 2022-09-22

## 2022-09-22 NOTE — TELEPHONE ENCOUNTER
Patient is having surgery on Monday, 9-26-22  It's a short procedure to reset a pin in her foot  Wants to know what to do with her insulin pump

## 2022-09-23 NOTE — TELEPHONE ENCOUNTER
Can we let Dangelo Restrepo know she can use her Insulin pump as usual up until surgery  They may ask her to remove it briefly for surgery, then she can resume after surgery  While she is NPO overnight for surgery, her basal rates should be ok  She will not have to bolus insulin unit she starts to eat after the procedure

## 2022-09-24 DIAGNOSIS — E10.8 TYPE 1 DIABETES MELLITUS WITH COMPLICATION (HCC): ICD-10-CM

## 2022-09-26 RX ORDER — INSULIN LISPRO 100 [IU]/ML
INJECTION, SOLUTION INTRAVENOUS; SUBCUTANEOUS
Qty: 30 ML | Refills: 0 | Status: SHIPPED | OUTPATIENT
Start: 2022-09-26

## 2022-10-12 LAB
ALBUMIN SERPL-MCNC: 4 G/DL (ref 3.7–4.7)
ALBUMIN/GLOB SERPL: 1.7 {RATIO} (ref 1.2–2.2)
ALP SERPL-CCNC: 76 IU/L (ref 44–121)
ALT SERPL-CCNC: 29 IU/L (ref 0–32)
AST SERPL-CCNC: 40 IU/L (ref 0–40)
BASOPHILS # BLD AUTO: 0.1 X10E3/UL (ref 0–0.2)
BASOPHILS NFR BLD AUTO: 1 %
BILIRUB SERPL-MCNC: 1 MG/DL (ref 0–1.2)
BUN SERPL-MCNC: 7 MG/DL (ref 8–27)
BUN/CREAT SERPL: 9 (ref 12–28)
CALCIUM SERPL-MCNC: 9.4 MG/DL (ref 8.7–10.3)
CHLORIDE SERPL-SCNC: 106 MMOL/L (ref 96–106)
CHOLEST SERPL-MCNC: 169 MG/DL (ref 100–199)
CO2 SERPL-SCNC: 25 MMOL/L (ref 20–29)
CREAT SERPL-MCNC: 0.76 MG/DL (ref 0.57–1)
EGFR: 82 ML/MIN/1.73
EOSINOPHIL # BLD AUTO: 0.4 X10E3/UL (ref 0–0.4)
EOSINOPHIL NFR BLD AUTO: 8 %
ERYTHROCYTE [DISTWIDTH] IN BLOOD BY AUTOMATED COUNT: 12.8 % (ref 11.7–15.4)
GLOBULIN SER-MCNC: 2.4 G/DL (ref 1.5–4.5)
GLUCOSE SERPL-MCNC: 116 MG/DL (ref 70–99)
HBA1C MFR BLD: 6.7 % (ref 4.8–5.6)
HCT VFR BLD AUTO: 43.5 % (ref 34–46.6)
HDLC SERPL-MCNC: 78 MG/DL
HGB BLD-MCNC: 14.8 G/DL (ref 11.1–15.9)
IMM GRANULOCYTES # BLD: 0 X10E3/UL (ref 0–0.1)
IMM GRANULOCYTES NFR BLD: 0 %
LDLC SERPL CALC-MCNC: 75 MG/DL (ref 0–99)
LYMPHOCYTES # BLD AUTO: 1.5 X10E3/UL (ref 0.7–3.1)
LYMPHOCYTES NFR BLD AUTO: 29 %
MCH RBC QN AUTO: 31.3 PG (ref 26.6–33)
MCHC RBC AUTO-ENTMCNC: 34 G/DL (ref 31.5–35.7)
MCV RBC AUTO: 92 FL (ref 79–97)
MONOCYTES # BLD AUTO: 0.6 X10E3/UL (ref 0.1–0.9)
MONOCYTES NFR BLD AUTO: 12 %
NEUTROPHILS # BLD AUTO: 2.6 X10E3/UL (ref 1.4–7)
NEUTROPHILS NFR BLD AUTO: 50 %
PLATELET # BLD AUTO: 261 X10E3/UL (ref 150–450)
POTASSIUM SERPL-SCNC: 4.6 MMOL/L (ref 3.5–5.2)
PROT SERPL-MCNC: 6.4 G/DL (ref 6–8.5)
RBC # BLD AUTO: 4.73 X10E6/UL (ref 3.77–5.28)
SL AMB VLDL CHOLESTEROL CALC: 16 MG/DL (ref 5–40)
SODIUM SERPL-SCNC: 147 MMOL/L (ref 134–144)
TRIGL SERPL-MCNC: 86 MG/DL (ref 0–149)
WBC # BLD AUTO: 5.1 X10E3/UL (ref 3.4–10.8)

## 2022-10-13 NOTE — PATIENT INSTRUCTIONS
Be mindful of diet  Stay active in stay hydrated  Please check your blood sugars 4 times daily, enter all information in the pump, and for the record to the office in 2 weeks for review and adjustment, if necessary  Remember to bolus for all meals and correct hypoglycemia gradually using the Rule of 15's  Please make sure lunch is covered with insulin  Continue lisinopril  Continue atorvastatin 10 mg daily  Continue with regular supplementation of vitamin D3 daily  Continue to follow up for DEX COM sensor trial and for personal use  Obtain a Medic Alert Bracelet  72

## 2022-10-14 ENCOUNTER — OFFICE VISIT (OUTPATIENT)
Dept: ENDOCRINOLOGY | Facility: HOSPITAL | Age: 72
End: 2022-10-14
Payer: MEDICARE

## 2022-10-14 VITALS
WEIGHT: 112.6 LBS | HEART RATE: 76 BPM | HEIGHT: 61 IN | BODY MASS INDEX: 21.26 KG/M2 | DIASTOLIC BLOOD PRESSURE: 74 MMHG | SYSTOLIC BLOOD PRESSURE: 116 MMHG

## 2022-10-14 DIAGNOSIS — E10.649 HYPOGLYCEMIA UNAWARENESS ASSOCIATED WITH TYPE 1 DIABETES MELLITUS (HCC): ICD-10-CM

## 2022-10-14 DIAGNOSIS — E78.2 MIXED HYPERLIPIDEMIA: ICD-10-CM

## 2022-10-14 DIAGNOSIS — E55.9 VITAMIN D DEFICIENCY: ICD-10-CM

## 2022-10-14 DIAGNOSIS — Z96.41 INSULIN PUMP IN PLACE: ICD-10-CM

## 2022-10-14 DIAGNOSIS — E10.40 TYPE 1 DIABETES MELLITUS WITH DIABETIC NEUROPATHY (HCC): Primary | ICD-10-CM

## 2022-10-14 DIAGNOSIS — E87.0 HYPERNATREMIA: ICD-10-CM

## 2022-10-14 DIAGNOSIS — I10 ESSENTIAL HYPERTENSION: ICD-10-CM

## 2022-10-14 PROCEDURE — 95251 CONT GLUC MNTR ANALYSIS I&R: CPT | Performed by: NURSE PRACTITIONER

## 2022-10-14 PROCEDURE — 99214 OFFICE O/P EST MOD 30 MIN: CPT | Performed by: NURSE PRACTITIONER

## 2022-10-14 RX ORDER — AZD7442 150-150 MG
KIT INTRAMUSCULAR
COMMUNITY

## 2022-10-14 NOTE — PROGRESS NOTES
Dena Hem 74 y o  female MRN: [id number]    Encounter: [id number]      Assessment/Plan     Assessment: This is a 74y o -year-old female with type 1 diabetes on insulin pump therapy  Plan:  1   Type 1 diabetes:  Her most recent hemoglobin A1c is 6 7   he is experiencing some low blood sugars before bedtime and will often times shut off her pump and drink some juice  For this, I have asked her to basal rate at midnight to 0 1 and at 3:00 a m  to 0 4  I have also adjusted her basal rate at 6:00 p m  to 0 4  We  adjusted her insulin to carbohydrate ratio to 1:11 at 7:00 a m  Loni Harmon will download another dex com and pump download in 1 week for further review adjustment   For now, she will continue her current insulin settings  Sammy Wilkins will continue to utilize the dex com continuous glucose monitor   We will contact her with any changes, if necessary   Check hemoglobin A1c prior to next visit  2   Hyperlipidemia:  Continue atorvastatin  Check fasting lipid panel prior to next visit  3   Hypertension: Continue lisinopril   Check comprehensive metabolic panel prior to next visit  4   Vitamin-D deficiency:  Continue supplementation with 1000 units of vitamin D3 daily  CC: Type 1 Diabetes Follow Up    History of Present Illness     HPI:  74 y o  female with type 1 diabetes for approximately 40 years  She was started on an insulin pump around 1998   She is on insulin at home and takes Humalog insulin via a new tandem insulin pump  Download of her Dexcom sensor and tandem pump show an average glucose of 156  In target range 69 % of the time with 3% low blood sugar  She appears to be having low blood sugar just after bedtime  She is also experiencing hyperglycemia after her 1st meal around 11:00 a m  Crisitna Fong She states that she will often times shut off her pump and have some juice before bed   Her most recent hemoglobin A1c from October 11, 2022 is 6 7   She denies any polyuria and polydipsia  Sammy Wilkins has no polyphagia, but has once a night nocturia  Petrona Ochoa is getting some blurry vision with some cataracts   She has no numbness or tingling of the feet   She denies chest pain or shortness of breath   She denies nephropathy, heart attack, stroke and claudication but does admit to neuropathy and retinopathy    She has a habit of changing her basal settings almost daily      Her most recent diabetic eye exam was performed on July 12, 2020  She is seen every 4 months  She has no complaints about her feet and does not follow Podiatry for regular diabetic foot care       For her hypertension, she is treated with lisinopril 10 mg daily      For her hyperlipidemia, she is treated with atorvastatin 10 mg       For her vitamin-D deficiency, she supplements with 1000 units of vitamin D3 daily  Review of Systems   Constitutional: Negative  Negative for chills, fatigue and fever  HENT: Negative  Negative for trouble swallowing and voice change  Eyes: Negative  Negative for photophobia, pain, discharge, redness, itching and visual disturbance  Respiratory: Negative  Negative for chest tightness and shortness of breath  Cardiovascular: Negative  Negative for chest pain  Gastrointestinal: Negative  Negative for abdominal pain, constipation, diarrhea and vomiting  Endocrine: Positive for polyuria (1-2 times per night nocturia)  Negative for cold intolerance, heat intolerance, polydipsia and polyphagia  Genitourinary: Negative  Musculoskeletal: Negative  Skin: Negative  Allergic/Immunologic: Negative  Neurological: Negative  Negative for dizziness, syncope, light-headedness and headaches  Hematological: Negative  Psychiatric/Behavioral: Negative  All other systems reviewed and are negative        Historical Information   Past Medical History:   Diagnosis Date   • Basal cell carcinoma (BCC) of face     left cheek   • Cataract    • Rheumatoid arthritis [facility])      Past Surgical History:   Procedure Laterality Date   • CATARACT EXTRACTION, BILATERAL Bilateral    • EXPLORATORY LAPAROTOMY     • FOOT SURGERY Left     foot reconstruction in 2 phases   • FOOT SURGERY Right     foot reconstruction   • LIVER BIOPSY     • TOTAL SHOULDER REPLACEMENT Right      Social History   Social History     Substance and Sexual Activity   Alcohol Use Yes   • Alcohol/week: 5 0 standard drinks   • Types: 5 Cans of beer per week    Comment: most nights has 1 drink     Social History     Substance and Sexual Activity   Drug Use No     Social History     Tobacco Use   Smoking Status Never Smoker   Smokeless Tobacco Never Used     Family History:   Family History   Problem Relation Age of Onset   • Stroke Mother    • Cancer Father         renal metastatic   • Brain cancer Sister    • Diabetes type I Maternal Uncle    • Diabetes type I Maternal Grandmother    • Osteoarthritis Brother    • Prostate cancer Brother    • Osteoarthritis Sister    • Breast cancer Sister    • Rheum arthritis Sister    • Osteoarthritis Sister    • Osteoarthritis Sister    • Osteoarthritis Sister    • Thyroid disease unspecified Sister    • Osteoarthritis Brother    • Prostate cancer Brother    • Osteoarthritis Brother    • Bipolar disorder Son    • Bipolar disorder Daughter        Meds/Allergies   Current Outpatient Medications   Medication Sig Dispense Refill   • aspirin 81 mg chewable tablet Chew 81 mg daily     • cholecalciferol (VITAMIN D3) 1,000 units tablet Take 1,000 Units by mouth daily     • citalopram (CeleXA) 10 mg tablet Take 10 mg by mouth daily     • famotidine (PEPCID) 20 mg tablet Take 20 mg by mouth daily prn     • glucagon (GLUCAGON EMERGENCY) 1 MG injection Inject 1 mg under the skin once as needed for low blood sugar for up to 1 dose 1 each 4   • HumaLOG 100 UNIT/ML injection Inject  100 units daily via insulin pump 30 mL 0   • HumaLOG 100 UNIT/ML injection INJECT UP  UNITS VIA PUMP DAILY 30 mL 0   • lisinopril (ZESTRIL) 10 mg tablet Take 10 mg by mouth daily       • Multiple Vitamins-Minerals (MULTIVITAMIN ADULT PO) Take by mouth daily       • Omega-3 Fat Ac-Cholecalciferol (DRY EYE OMEGA BENEFITS/VIT D-3) 105-697 MG-UNIT CAPS Take by mouth daily       • RiTUXimab (RITUXAN IV) Infuse into a venous catheter Every 16 weeks, 2nd dose 2 weeks later then repeat     • rosuvastatin (CRESTOR) 10 MG tablet Take 10 mg by mouth daily     • tixagevimab-cilgavimab (Evusheld) 150 & 150 MG/1 5ML SOLN      • Calcium Carbonate (CALCIUM 600 PO) Take by mouth daily (Patient not taking: No sig reported)     • folic acid (FOLVITE) 1 mg tablet Take 1 mg by mouth daily (Patient not taking: Reported on 10/14/2022)     • methotrexate 50 MG/2ML injection Inject under the skin once a week   (Patient not taking: Reported on 10/14/2022)       No current facility-administered medications for this visit  Allergies   Allergen Reactions   • Bactrim [Sulfamethoxazole-Trimethoprim] GI Intolerance     Other reaction(s): GI Intolerance   • Neosporin [Neomycin-Bacitracin Zn-Polymyx] Itching   • Tetracycline GI Intolerance     Other reaction(s): GI Intolerance       Objective   Vitals: Blood pressure 116/74, pulse 76, height 5' 1" (1 549 m), weight 51 1 kg (112 lb 9 6 oz)  Physical Exam  Vitals reviewed  Constitutional:       Appearance: She is well-developed  HENT:      Head: Normocephalic and atraumatic  Eyes:      Conjunctiva/sclera: Conjunctivae normal       Pupils: Pupils are equal, round, and reactive to light  Cardiovascular:      Rate and Rhythm: Normal rate and regular rhythm  Heart sounds: Normal heart sounds  Pulmonary:      Effort: Pulmonary effort is normal       Breath sounds: Normal breath sounds  Abdominal:      General: Bowel sounds are normal       Palpations: Abdomen is soft  Musculoskeletal:         General: Normal range of motion  Cervical back: Normal range of motion and neck supple  Skin:     General: Skin is warm and dry  Neurological:      Mental Status: She is alert and oriented to person, place, and time  Psychiatric:         Behavior: Behavior normal          Thought Content:  Thought content normal          Judgment: Judgment normal        Lab Results:   Lab Results   Component Value Date/Time    Hemoglobin A1C 6 7 (H) 10/11/2022 09:21 AM    Hemoglobin A1C 6 5 (H) 07/11/2022 09:33 AM    Hemoglobin A1C 7 3 (H) 04/08/2022 09:20 AM    White Blood Cell Count 5 1 10/11/2022 09:21 AM    White Blood Cell Count 5 2 07/11/2022 09:33 AM    White Blood Cell Count 5 9 01/04/2022 09:59 AM    Hemoglobin 14 8 10/11/2022 09:21 AM    Hemoglobin 14 5 07/11/2022 09:33 AM    Hemoglobin 15 3 01/04/2022 09:59 AM    HCT 43 5 10/11/2022 09:21 AM    HCT 43 1 07/11/2022 09:33 AM    HCT 45 8 01/04/2022 09:59 AM    MCV 92 10/11/2022 09:21 AM    MCV 93 07/11/2022 09:33 AM    MCV 95 01/04/2022 09:59 AM    Platelet Count 844 15/99/8123 09:21 AM    Platelet Count 485 00/15/9075 09:33 AM    Platelet Count 138 12/06/1960 09:59 AM    BUN 7 (L) 10/11/2022 09:21 AM    BUN 15 07/11/2022 09:33 AM    BUN 11 04/08/2022 09:20 AM    Potassium 4 6 10/11/2022 09:21 AM    Potassium 4 7 07/11/2022 09:33 AM    Potassium 4 6 04/08/2022 09:20 AM    Chloride 106 10/11/2022 09:21 AM    Chloride 103 07/11/2022 09:33 AM    Chloride 104 04/08/2022 09:20 AM    CO2 25 10/11/2022 09:21 AM    CO2 27 07/11/2022 09:33 AM    CO2 24 04/08/2022 09:20 AM    Creatinine 0 76 10/11/2022 09:21 AM    Creatinine 0 74 07/11/2022 09:33 AM    Creatinine 0 79 04/08/2022 09:20 AM    AST 40 10/11/2022 09:21 AM    AST 32 07/11/2022 09:33 AM    AST 31 04/08/2022 09:20 AM    ALT 29 10/11/2022 09:21 AM    ALT 22 07/11/2022 09:33 AM    ALT 21 04/08/2022 09:20 AM    Albumin 4 0 10/11/2022 09:21 AM    Albumin 4 2 07/11/2022 09:33 AM    Albumin 4 4 04/08/2022 09:20 AM    Globulin, Total 2 4 10/11/2022 09:21 AM    Globulin, Total 2 0 07/11/2022 09:33 AM    Globulin, Total 2 5 04/08/2022 09:20 AM    HDL 78 10/11/2022 09:21 AM    HDL 95 04/08/2022 09:20 AM    Triglycerides 86 10/11/2022 09:21 AM    Triglycerides 78 04/08/2022 09:20 AM     Portions of the record may have been created with voice recognition software  Occasional wrong word or "sound a like" substitutions may have occurred due to the inherent limitations of voice recognition software  Read the chart carefully and recognize, using context, where substitutions have occurred

## 2022-10-14 NOTE — PATIENT INSTRUCTIONS
Be mindful of diet  Stay active and stay hydrated  We made a small adjustment to your insulin to carbohydrate ratio at 7AM to 1:11  We also made small changes to basal rates at 3:30 AM and 3:00 PM       Be sure to perform a bolus consistently at meals  Continue to utilize the dex com continuous glucose monitor and perform a DEX COM download in 2 weeks for review  Contact the office with any consistent hypoglycemia  Continue lisinopril  Continue atorvastatin  Continue with regular supplementation of vitamin D3 daily

## 2022-10-20 LAB
ALBUMIN SERPL-MCNC: 4.5 G/DL (ref 3.7–4.7)
ALBUMIN/GLOB SERPL: 2.4 {RATIO} (ref 1.2–2.2)
ALP SERPL-CCNC: 74 IU/L (ref 44–121)
ALT SERPL-CCNC: 32 IU/L (ref 0–32)
AST SERPL-CCNC: 37 IU/L (ref 0–40)
BILIRUB SERPL-MCNC: 1 MG/DL (ref 0–1.2)
BUN SERPL-MCNC: 10 MG/DL (ref 8–27)
BUN/CREAT SERPL: 15 (ref 12–28)
CALCIUM SERPL-MCNC: 9.5 MG/DL (ref 8.7–10.3)
CHLORIDE SERPL-SCNC: 104 MMOL/L (ref 96–106)
CO2 SERPL-SCNC: 25 MMOL/L (ref 20–29)
CREAT SERPL-MCNC: 0.67 MG/DL (ref 0.57–1)
EGFR: 92 ML/MIN/1.73
GLOBULIN SER-MCNC: 1.9 G/DL (ref 1.5–4.5)
GLUCOSE SERPL-MCNC: 278 MG/DL (ref 70–99)
POTASSIUM SERPL-SCNC: 4.5 MMOL/L (ref 3.5–5.2)
PROT SERPL-MCNC: 6.4 G/DL (ref 6–8.5)
SODIUM SERPL-SCNC: 141 MMOL/L (ref 134–144)

## 2022-11-06 DIAGNOSIS — E10.8 TYPE 1 DIABETES MELLITUS WITH COMPLICATION (HCC): ICD-10-CM

## 2022-11-08 RX ORDER — INSULIN LISPRO 100 [IU]/ML
INJECTION, SOLUTION INTRAVENOUS; SUBCUTANEOUS
Qty: 30 ML | Refills: 0 | Status: SHIPPED | OUTPATIENT
Start: 2022-11-08

## 2022-11-28 RX ORDER — LISINOPRIL 10 MG/1
TABLET ORAL
Qty: 90 TABLET | Refills: 0 | Status: SHIPPED | OUTPATIENT
Start: 2022-11-28 | End: 2023-02-28

## 2022-11-28 NOTE — TELEPHONE ENCOUNTER
Medicine Refill Request    Last Office: 7/19/2022   Last Consult Visit: 9/21/2022  Last Telemedicine Visit: 12/2/2020 Jordan Dumas CRNP    Next Appointment: 1/23/2023      Current Outpatient Medications:     albuterol HFA (VENTOLIN HFA) 90 mcg/actuation inhaler, INHALE 2 PUFFS BY MOUTH EVERY 4 HOURS AS NEEDED FOR SHORTNESS OF BREATH , COUGH, OR WHEEZING, Disp: , Rfl:     aspirin 81 mg enteric coated tablet, Take 1 tablet (81 mg total) by mouth 2 (two) times a day. (Patient taking differently: Take 81 mg by mouth daily.), Disp: 60 tablet, Rfl: 0    cholecalciferol, vitamin D3, 25 mcg (1,000 unit) capsule, Take 1,000 Units by mouth daily.  , Disp: , Rfl:     citalopram (celeXA) 10 mg tablet, Take 1 tablet by mouth once daily, Disp: 90 tablet, Rfl: 1    docosahexaenoic acid/epa (FISH OIL ORAL), Take by mouth 2 (two) times a day. For dry eyes, Disp: , Rfl:     famotidine (PEPCID) 10 mg tablet, Take 2 tablets (20 mg total) by mouth daily as needed. With ibuprofen, Disp: , Rfl:     folic acid (FOLVITE) 1 mg tablet, Take 1 mg by mouth daily., Disp: , Rfl:     glucagon, human recombinant, 1 mg injection, Infuse 1 mg into a venous catheter once., Disp: , Rfl:     insulin lispro U-100 (HumaLOG) 100 unit/mL subcutaneous cartridge, inject by subcutaneous route per prescriber's instructions. Insulin dosing requires individualization., Disp: , Rfl:     Lactobac no.41-Bifidobact no.7 70 mg (3 billion cell) capsule, Take by mouth daily., Disp: , Rfl:     lisinopriL (PRINIVIL) 10 mg tablet, Take 1 tablet by mouth once daily, Disp: 90 tablet, Rfl: 1    multivit with minerals/lutein (MULTIVITAMIN 50 PLUS ORAL), No SIG Entered, Disp: , Rfl:     mupirocin (BACTROBAN) 2 % ointment, Apply topically., Disp: , Rfl:     riTUXimab (RITUXAN) 10 mg/mL injection, Inject 10 mg/mL as directed. Every 16 weeks   10/13/2022 next , Disp: , Rfl:     rosuvastatin (CRESTOR) 10 mg tablet, Take 1 tablet (10 mg total) by mouth daily.,  Disp: 90 tablet, Rfl: 3    vit A/vit C/vit E/zinc/copper (PRESERVISION AREDS ORAL), Take by mouth 2 (two) times a day., Disp: , Rfl:       BP Readings from Last 3 Encounters:   09/21/22 116/70   07/19/22 102/62   06/22/22 110/60       Recent Lab results:  Lab Results   Component Value Date    CHOL 185 09/22/2021   ,   Lab Results   Component Value Date    HDL 89 09/22/2021   ,   Lab Results   Component Value Date    LDLCALC 77 09/22/2021   ,   Lab Results   Component Value Date    TRIG 108 09/22/2021        Lab Results   Component Value Date    GLUCOSE 79 11/17/2020   ,   Lab Results   Component Value Date    HGBA1C 6.9 (H) 11/11/2020         Lab Results   Component Value Date    CREATININE 0.7 11/17/2020       No results found for: TSH

## 2022-12-06 ENCOUNTER — TELEMEDICINE (OUTPATIENT)
Dept: INTERNAL MEDICINE | Facility: CLINIC | Age: 72
End: 2022-12-06
Payer: MEDICARE

## 2022-12-06 DIAGNOSIS — J06.9 ACUTE URI: Primary | ICD-10-CM

## 2022-12-06 PROCEDURE — 99213 OFFICE O/P EST LOW 20 MIN: CPT | Mod: 95 | Performed by: NURSE PRACTITIONER

## 2022-12-06 ASSESSMENT — ENCOUNTER SYMPTOMS
SINUS PAIN: 0
DIARRHEA: 1
FEVER: 0
ABDOMINAL PAIN: 0
EYE REDNESS: 0
WHEEZING: 1
FATIGUE: 1
CHEST TIGHTNESS: 0
HEADACHES: 1
COUGH: 1
EYE ITCHING: 0
SINUS PRESSURE: 0
NAUSEA: 1
SHORTNESS OF BREATH: 0
EYE PAIN: 0
CHILLS: 0
EYE DISCHARGE: 0
SORE THROAT: 1
VOMITING: 1

## 2022-12-06 NOTE — ASSESSMENT & PLAN NOTE
Reviewed patient's symptoms with her and medications that was provided for her at urgent care.  Discussed with her that the medications are appropriate for her symptoms and that since she had diarrhea and has not been able to eat she was probably becoming dehydrated.  Discussed with her that since she is not able to tolerate Gatorade that she should try Pedialyte.  She is not able to tolerate fluids and her nausea gets any worse I recommended that she go to the emergency room to be evaluated for possible IV hydration.  Was receptive to this information and will try Pedialyte first.

## 2022-12-06 NOTE — PROGRESS NOTES
Verification of Patient Location:  The patient affirms they are currently located in the following state: [place]    Request for Consent:    Audio and Video Encounter   Kathryn, my name is NOAH Lechuga.  Before we proceed, can you please verify your identification by telling me your full name and date of birth?  Can you tell me who is in the room with you?    You and I are about to have a telemedicine check-in or visit because you have requested it.  This is a live video-conference.  I am a real person, speaking to you in real time.  There is no one else with me on the video-conference. I am not recording this conversation and I am asking you not to record it.  This telemedicine visit will be billed to your health insurance or you, if you are self-insured.  You understand you will be responsible for any copayments or coinsurances that apply to your telemedicine visit.  Communication platform used for this encounter:  [employer] Video Visit (Epic Video Client)       Before starting our telemedicine visit, I am required to get your consent for this virtual check-in or visit by telemedicine. Do you consent?      Patient Response to Request for Consent:  Yes      Visit Documentation:  Subjective     Patient ID: Lillie Ward is a 72 y.o. female.  1950      [facility] flu and covid culture. He gave her doxycycline because she was wheezing. She also got antinausea medication due to burping and nausea. States she is a type 1 diabetic and she is not eating. Sugars are high. States she had a cup of green tea and water and juice 100% no sugar added. Not able to drink gatorade due to taste. Willing to try pedialyte. Took pepcid with no relief. Chest x ray was neg.       The following have been reviewed and updated as appropriate in this visit:   Allergies  Meds  Problems       Review of Systems   Constitutional: Positive for fatigue. Negative for chills and fever.   HENT:  Positive for sore throat. Negative for ear pain, postnasal drip, sinus pressure and sinus pain.    Eyes: Negative for pain, discharge, redness and itching.   Respiratory: Positive for cough and wheezing. Negative for chest tightness and shortness of breath.    Gastrointestinal: Positive for diarrhea (resolved), nausea and vomiting (dry heaves). Negative for abdominal pain.   Neurological: Positive for headaches.         Assessment/Plan   Diagnoses and all orders for this visit:    Acute URI (Primary)  Assessment & Plan:  Reviewed patient's symptoms with her and medications that was provided for her at urgent care.  Discussed with her that the medications are appropriate for her symptoms and that since she had diarrhea and has not been able to eat she was probably becoming dehydrated.  Discussed with her that since she is not able to tolerate Gatorade that she should try Pedialyte.  She is not able to tolerate fluids and her nausea gets any worse I recommended that she go to the emergency room to be evaluated for possible IV hydration.  Was receptive to this information and will try Pedialyte first.        Time Spent:  I spent 18 minutes on this date of service performing the following activities: obtaining history reviewing medications reviewing past medical history reviewing allergies formulating a plan and documentation..

## 2022-12-09 ENCOUNTER — APPOINTMENT (EMERGENCY)
Dept: RADIOLOGY | Facility: HOSPITAL | Age: 72
End: 2022-12-09
Attending: EMERGENCY MEDICINE
Payer: MEDICARE

## 2022-12-09 ENCOUNTER — HOSPITAL ENCOUNTER (EMERGENCY)
Facility: HOSPITAL | Age: 72
Discharge: HOME | End: 2022-12-09
Attending: EMERGENCY MEDICINE | Admitting: EMERGENCY MEDICINE
Payer: MEDICARE

## 2022-12-09 ENCOUNTER — OFFICE VISIT (OUTPATIENT)
Dept: INTERNAL MEDICINE | Facility: CLINIC | Age: 72
End: 2022-12-09
Payer: MEDICARE

## 2022-12-09 VITALS
HEART RATE: 84 BPM | RESPIRATION RATE: 20 BRPM | OXYGEN SATURATION: 94 % | DIASTOLIC BLOOD PRESSURE: 59 MMHG | TEMPERATURE: 98 F | WEIGHT: 104 LBS | SYSTOLIC BLOOD PRESSURE: 132 MMHG | BODY MASS INDEX: 19.63 KG/M2 | HEIGHT: 61 IN

## 2022-12-09 VITALS
SYSTOLIC BLOOD PRESSURE: 138 MMHG | TEMPERATURE: 98.9 F | HEART RATE: 97 BPM | BODY MASS INDEX: 21.2 KG/M2 | DIASTOLIC BLOOD PRESSURE: 62 MMHG | WEIGHT: 108 LBS | HEIGHT: 60 IN

## 2022-12-09 DIAGNOSIS — R11.2 NAUSEA AND VOMITING, UNSPECIFIED VOMITING TYPE: ICD-10-CM

## 2022-12-09 DIAGNOSIS — R10.13 EPIGASTRIC PAIN: ICD-10-CM

## 2022-12-09 DIAGNOSIS — E10.69 TYPE 1 DIABETES MELLITUS WITH OTHER SPECIFIED COMPLICATION: ICD-10-CM

## 2022-12-09 DIAGNOSIS — E86.0 DEHYDRATION: Primary | ICD-10-CM

## 2022-12-09 DIAGNOSIS — R11.0 NAUSEA: Primary | ICD-10-CM

## 2022-12-09 PROBLEM — H90.3 BILATERAL SENSORINEURAL HEARING LOSS: Status: ACTIVE | Noted: 2022-12-09

## 2022-12-09 PROBLEM — H61.23 BILATERAL IMPACTED CERUMEN: Status: ACTIVE | Noted: 2022-12-09

## 2022-12-09 PROBLEM — G47.30 SLEEP APNEA: Status: ACTIVE | Noted: 2022-12-09

## 2022-12-09 LAB
ALBUMIN SERPL-MCNC: 3.7 G/DL (ref 3.4–5)
ALP SERPL-CCNC: 59 IU/L (ref 35–126)
ALT SERPL-CCNC: 23 IU/L (ref 11–54)
ANION GAP SERPL CALC-SCNC: 10 MEQ/L (ref 3–15)
AST SERPL-CCNC: 45 IU/L (ref 15–41)
BASOPHILS # BLD: 0.01 K/UL (ref 0.01–0.1)
BASOPHILS NFR BLD: 0.2 %
BILIRUB DIRECT SERPL-MCNC: 0.2 MG/DL
BILIRUB SERPL-MCNC: 0.8 MG/DL (ref 0.3–1.2)
BILIRUB UR QL STRIP.AUTO: NEGATIVE MG/DL
BUN SERPL-MCNC: 9 MG/DL (ref 8–20)
CALCIUM SERPL-MCNC: 9.3 MG/DL (ref 8.9–10.3)
CHLORIDE SERPL-SCNC: 98 MEQ/L (ref 98–109)
CLARITY UR REFRACT.AUTO: CLEAR
CO2 SERPL-SCNC: 29 MEQ/L (ref 22–32)
COLOR UR AUTO: YELLOW
CREAT SERPL-MCNC: 0.7 MG/DL (ref 0.6–1.1)
DIFFERENTIAL METHOD BLD: ABNORMAL
EOSINOPHIL # BLD: 0.06 K/UL (ref 0.04–0.36)
EOSINOPHIL NFR BLD: 1.5 %
ERYTHROCYTE [DISTWIDTH] IN BLOOD BY AUTOMATED COUNT: 13.4 % (ref 11.7–14.4)
GFR SERPL CREATININE-BSD FRML MDRD: >60 ML/MIN/1.73M*2
GLUCOSE BLD-MCNC: 101 MG/DL (ref 70–99)
GLUCOSE SERPL-MCNC: 104 MG/DL (ref 70–99)
GLUCOSE UR STRIP.AUTO-MCNC: NEGATIVE MG/DL
HCT VFR BLDCO AUTO: 39.5 % (ref 35–45)
HGB BLD-MCNC: 12.9 G/DL (ref 11.8–15.7)
HGB UR QL STRIP.AUTO: NEGATIVE
IMM GRANULOCYTES # BLD AUTO: 0.01 K/UL (ref 0–0.08)
IMM GRANULOCYTES NFR BLD AUTO: 0.2 %
KETONES UR STRIP.AUTO-MCNC: 2 MG/DL
LEUKOCYTE ESTERASE UR QL STRIP.AUTO: NEGATIVE
LIPASE SERPL-CCNC: 32 U/L (ref 20–51)
LYMPHOCYTES # BLD: 0.86 K/UL (ref 1.2–3.5)
LYMPHOCYTES NFR BLD: 21 %
MAGNESIUM SERPL-MCNC: 1.8 MG/DL (ref 1.8–2.5)
MCH RBC QN AUTO: 30.3 PG (ref 28–33.2)
MCHC RBC AUTO-ENTMCNC: 32.7 G/DL (ref 32.2–35.5)
MCV RBC AUTO: 92.7 FL (ref 83–98)
MONOCYTES # BLD: 0.65 K/UL (ref 0.28–0.8)
MONOCYTES NFR BLD: 15.9 %
NEUTROPHILS # BLD: 2.51 K/UL (ref 1.7–7)
NEUTS SEG NFR BLD: 61.2 %
NITRITE UR QL STRIP.AUTO: NEGATIVE
NRBC BLD-RTO: 0 %
PDW BLD AUTO: 10.3 FL (ref 9.4–12.3)
PH UR STRIP.AUTO: 6 [PH]
PLATELET # BLD AUTO: 182 K/UL (ref 150–369)
POCT TEST: ABNORMAL
POTASSIUM SERPL-SCNC: 4 MEQ/L (ref 3.6–5.1)
PROT SERPL-MCNC: 7.1 G/DL (ref 6–8.2)
PROT UR QL STRIP.AUTO: NEGATIVE
RBC # BLD AUTO: 4.26 M/UL (ref 3.93–5.22)
SODIUM SERPL-SCNC: 137 MEQ/L (ref 136–144)
SP GR UR REFRACT.AUTO: 1.01
UROBILINOGEN UR STRIP-ACNC: 0.2 EU/DL
WBC # BLD AUTO: 4.1 K/UL (ref 3.8–10.5)

## 2022-12-09 PROCEDURE — G8754 DIAS BP LESS 90: HCPCS | Performed by: FAMILY MEDICINE

## 2022-12-09 PROCEDURE — 85025 COMPLETE CBC W/AUTO DIFF WBC: CPT | Performed by: EMERGENCY MEDICINE

## 2022-12-09 PROCEDURE — 36415 COLL VENOUS BLD VENIPUNCTURE: CPT

## 2022-12-09 PROCEDURE — 3E0337Z INTRODUCTION OF ELECTROLYTIC AND WATER BALANCE SUBSTANCE INTO PERIPHERAL VEIN, PERCUTANEOUS APPROACH: ICD-10-PCS | Performed by: EMERGENCY MEDICINE

## 2022-12-09 PROCEDURE — G8752 SYS BP LESS 140: HCPCS | Performed by: FAMILY MEDICINE

## 2022-12-09 PROCEDURE — 83690 ASSAY OF LIPASE: CPT | Performed by: PHYSICIAN ASSISTANT

## 2022-12-09 PROCEDURE — 99213 OFFICE O/P EST LOW 20 MIN: CPT | Performed by: FAMILY MEDICINE

## 2022-12-09 PROCEDURE — 80053 COMPREHEN METABOLIC PANEL: CPT

## 2022-12-09 PROCEDURE — 81003 URINALYSIS AUTO W/O SCOPE: CPT | Performed by: EMERGENCY MEDICINE

## 2022-12-09 PROCEDURE — 96376 TX/PRO/DX INJ SAME DRUG ADON: CPT

## 2022-12-09 PROCEDURE — 96361 HYDRATE IV INFUSION ADD-ON: CPT

## 2022-12-09 PROCEDURE — 82248 BILIRUBIN DIRECT: CPT | Performed by: PHYSICIAN ASSISTANT

## 2022-12-09 PROCEDURE — 83735 ASSAY OF MAGNESIUM: CPT | Performed by: PHYSICIAN ASSISTANT

## 2022-12-09 PROCEDURE — 74177 CT ABD & PELVIS W/CONTRAST: CPT | Mod: MG

## 2022-12-09 PROCEDURE — 82310 ASSAY OF CALCIUM: CPT

## 2022-12-09 PROCEDURE — 63600000 HC DRUGS/DETAIL CODE: Performed by: PHYSICIAN ASSISTANT

## 2022-12-09 PROCEDURE — 80048 BASIC METABOLIC PNL TOTAL CA: CPT | Performed by: EMERGENCY MEDICINE

## 2022-12-09 PROCEDURE — 63600105 HC IODINE BASED CONTRAST: Performed by: EMERGENCY MEDICINE

## 2022-12-09 PROCEDURE — G1004 CDSM NDSC: HCPCS

## 2022-12-09 PROCEDURE — 3E033GC INTRODUCTION OF OTHER THERAPEUTIC SUBSTANCE INTO PERIPHERAL VEIN, PERCUTANEOUS APPROACH: ICD-10-PCS | Performed by: EMERGENCY MEDICINE

## 2022-12-09 PROCEDURE — 25800000 HC PHARMACY IV SOLUTIONS: Performed by: PHYSICIAN ASSISTANT

## 2022-12-09 PROCEDURE — 96374 THER/PROPH/DIAG INJ IV PUSH: CPT

## 2022-12-09 PROCEDURE — 99284 EMERGENCY DEPT VISIT MOD MDM: CPT | Mod: 25

## 2022-12-09 RX ORDER — ONDANSETRON HYDROCHLORIDE 2 MG/ML
4 INJECTION, SOLUTION INTRAVENOUS ONCE
Status: COMPLETED | OUTPATIENT
Start: 2022-12-09 | End: 2022-12-09

## 2022-12-09 RX ORDER — ALBUTEROL SULFATE 90 UG/1
INHALANT RESPIRATORY (INHALATION) EVERY 4 HOURS
COMMUNITY
End: 2023-01-23

## 2022-12-09 RX ORDER — ONDANSETRON 4 MG/1
8 TABLET, ORALLY DISINTEGRATING ORAL EVERY 8 HOURS PRN
Qty: 24 TABLET | Refills: 0 | Status: SHIPPED | OUTPATIENT
Start: 2022-12-09 | End: 2023-01-23

## 2022-12-09 RX ORDER — CITALOPRAM 10 MG/1
TABLET ORAL
COMMUNITY
End: 2023-01-23

## 2022-12-09 RX ORDER — LISINOPRIL AND HYDROCHLOROTHIAZIDE 10; 12.5 MG/1; MG/1
TABLET ORAL
COMMUNITY
End: 2023-01-23

## 2022-12-09 RX ORDER — ROSUVASTATIN CALCIUM 10 MG/1
TABLET, COATED ORAL
COMMUNITY
End: 2023-01-23

## 2022-12-09 RX ORDER — DOXYCYCLINE 100 MG/1
CAPSULE ORAL
COMMUNITY
Start: 2022-12-05 | End: 2024-03-07

## 2022-12-09 RX ADMIN — SODIUM CHLORIDE 1000 ML: 9 INJECTION, SOLUTION INTRAVENOUS at 18:48

## 2022-12-09 RX ADMIN — ONDANSETRON HYDROCHLORIDE 4 MG: 2 INJECTION, SOLUTION INTRAMUSCULAR; INTRAVENOUS at 18:49

## 2022-12-09 RX ADMIN — ONDANSETRON HYDROCHLORIDE 4 MG: 2 INJECTION, SOLUTION INTRAMUSCULAR; INTRAVENOUS at 22:21

## 2022-12-09 RX ADMIN — IOHEXOL 100 ML: 350 INJECTION, SOLUTION INTRAVENOUS at 21:10

## 2022-12-09 ASSESSMENT — ENCOUNTER SYMPTOMS
CHEST TIGHTNESS: 0
HEADACHES: 0
ABDOMINAL PAIN: 0
NAUSEA: 1
ABDOMINAL PAIN: 1
ACTIVITY CHANGE: 1
COUGH: 0
SHORTNESS OF BREATH: 0
NAUSEA: 1
FATIGUE: 1
SHORTNESS OF BREATH: 0
UNEXPECTED WEIGHT CHANGE: 0
APPETITE CHANGE: 1
APPETITE CHANGE: 1
COUGH: 0
VOMITING: 1
RHINORRHEA: 0
DYSURIA: 0
VOMITING: 0
HEMATURIA: 0
FREQUENCY: 0
FREQUENCY: 0
FEVER: 0
DIAPHORESIS: 0
DIARRHEA: 0
SORE THROAT: 0
FEVER: 0
WHEEZING: 0
BLOOD IN STOOL: 0
DIZZINESS: 0
FLANK PAIN: 0
PALPITATIONS: 0
CHILLS: 0
DIFFICULTY URINATING: 0

## 2022-12-09 NOTE — ED PROVIDER NOTES
Emergency Medicine Note  HPI   HISTORY OF PRESENT ILLNESS     72 year old female with a history of T1DM, pulmonary fibrosis, rheumatoid arthritis, hypertension, hyperlipidemia presents to the ER for nausea. Pt reports nausea for months. Over the last 3 weeks it has been so bad that she hasnt been able to eat, other than a couple bites of soup, a tangerine, etc. She says that sometimes she dry heaves and spits up clear but otherwise doesn't vomit. She denies any abdominal pain. She hasnt spoken to her doctor about this. She has lost 5 pounds. She denies any fevers, chills, abdominal pain, stool changes, sob, cp, cough.             Patient History   PAST HISTORY     Reviewed from Nursing Triage:       Past Medical History:   Diagnosis Date   • Acid reflux    • Diabetes mellitus type I (CMS/HCC)     Dr. Cerda - [facility]   • DKA, type 1 (CMS/HCC) 09/2016    due to insulin pump malfunction - hospitalized   • GERD (gastroesophageal reflux disease)    • [facility]  Normal   • Hyperlipidemia    • Hypoglycemia unawareness associated with type 1 diabetes mellitus (CMS/HCC)    • Macular degeneration     BEGINNING STAGES   • Pulmonary fibrosis (CMS/HCC)     CLEARED BY PULMONOLIGIST 2 YEARS AGO 2018   • Pulmonary nodule, left 07/2017   • Rheumatoid arthritis (CMS/HCC)     Dr. Lo, [place]   • Skin cancer        Past Surgical History:   Procedure Laterality Date   • COLONOSCOPY     • EYE SURGERY Bilateral     CATARACT   • FOOT SURGERY Left 2009    reconstruction   • FOOT SURGERY Right 2007    reconstruction   • JOINT REPLACEMENT Right     SHOULDER   • LIVER SURGERY      '80's   • REPLACEMENT TOTAL KNEE Left 04/04/2017    [facility]   • SKIN BIOPSY     • TOTAL HIP ARTHROPLASTY Right 11/2020   • TOTAL SHOULDER ARTHROPLASTY Right 1990       Family History   Problem Relation Age of Onset   • Stroke Biological Mother    • Lung cancer Biological Father    • No Known Problems Biological Sister    •  Rheum arthritis Other         1 of 8 siblings   • Prostate cancer Biological Son    • Pancreatitis Biological Son    • Alcohol abuse Biological Son    • Bipolar disorder Biological Son    • Bipolar disorder Biological Daughter         no diagnosis   • Depression Biological Daughter    • Anxiety disorder Biological Daughter        Social History     Tobacco Use   • Smoking status: Never   • Smokeless tobacco: Never   Vaping Use   • Vaping Use: Never used   Substance Use Topics   • Alcohol use: Yes   • Drug use: No         Review of Systems   REVIEW OF SYSTEMS     Review of Systems   Constitutional: Positive for appetite change. Negative for fever.   Respiratory: Negative for cough and shortness of breath.    Cardiovascular: Negative for chest pain.   Gastrointestinal: Positive for nausea. Negative for abdominal pain, diarrhea and vomiting.   Genitourinary: Negative for difficulty urinating, flank pain, frequency and urgency.   Neurological: Negative for dizziness and headaches.         VITALS     ED Vitals    Date/Time Temp Pulse Resp BP SpO2 [facility]   12/09/22 2047 -- 84 20 132/59 94 % NR   12/09/22 1950 -- 76 20 157/65 97 % NRM   12/09/22 1851 -- 80 20 158/72 96 % NR   12/09/22 1248 36.7 °C (98 °F) 85 16 127/71 96 % AMT        Pulse Ox %: 96 % (12/09/22 1914)  Pulse Ox Interpretation: Normal (12/09/22 1914)           Physical Exam   PHYSICAL EXAM     Physical Exam  Constitutional:       General: She is not in acute distress.     Appearance: Normal appearance. She is not ill-appearing, toxic-appearing or diaphoretic.   HENT:      Head: Normocephalic and atraumatic.   Cardiovascular:      Rate and Rhythm: Normal rate and regular rhythm.   Pulmonary:      Effort: Pulmonary effort is normal.   Abdominal:      General: There is no distension.      Palpations: Abdomen is soft.      Tenderness: There is no abdominal tenderness. There is no guarding or rebound.   Musculoskeletal:      Cervical back: Normal range of motion  and neck supple.   Skin:     General: Skin is warm and dry.   Neurological:      Mental Status: She is alert.           PROCEDURES     Procedures     DATA     Results     Procedure Component Value Units Date/Time    UA with reflex culture [[id number]]  (Abnormal) Collected: 12/09/22 1946    Specimen: Urine, Clean Catch Updated: 12/09/22 2004    Narrative:      The following orders were created for panel order UA with reflex culture.  Procedure                               Abnormality         Status                     ---------                               -----------         ------                     UA Reflex to Culture (Ma...[[id number]]  Abnormal            Final result                 Please view results for these tests on the individual orders.    UA Reflex to Culture (Macroscopic) [[id number]]  (Abnormal) Collected: 12/09/22 1946    Specimen: Urine, Clean Catch Updated: 12/09/22 2004     Color, Urine Yellow     Clarity, Urine Clear     Specific Gravity, Urine 1.015     pH, Urine 6.0     Leukocyte Esterase Negative     Comment: Results can be falsely negative due to high specific gravity, some antibiotics, glucose >3 g/dl, or WBC other than neutrophils.        Nitrite, Urine Negative     Protein, Urine Negative     Glucose, Urine Negative mg/dL      Ketones, Urine +2 mg/dL      Comment: Free sulfhydryl drugs such as Mesna, Capoten, and Acetylcysteine (Mucomyst) may cause false positive ketonuria.        Urobilinogen, Urine 0.2 EU/dL      Bilirubin, Urine Negative mg/dL      Blood, Urine Negative     Comment: The sensitivity of the occult blood test is equivalent to approximately 4 intact RBC/HPF.       CBC and differential [[id number]]  (Abnormal) Collected: 12/09/22 1932    Specimen: Blood, Venous Updated: 12/09/22 1942     WBC 4.10 K/uL      RBC 4.26 M/uL      Hemoglobin 12.9 g/dL      Hematocrit 39.5 %      MCV 92.7 fL      MCH 30.3 pg      MCHC 32.7 g/dL      RDW 13.4 %      Platelets 182 K/uL      MPV 10.3  fL      Differential Type Auto     nRBC 0.0 %      Immature Granulocytes 0.2 %      Neutrophils 61.2 %      Lymphocytes 21.0 %      Monocytes 15.9 %      Eosinophils 1.5 %      Basophils 0.2 %      Immature Granulocytes, Absolute 0.01 K/uL      Neutrophils, Absolute 2.51 K/uL      Lymphocytes, Absolute 0.86 K/uL      Monocytes, Absolute 0.65 K/uL      Eosinophils, Absolute 0.06 K/uL      Basophils, Absolute 0.01 K/uL     Hepatic function panel [[id number]]  (Abnormal) Collected: 12/09/22 1309    Specimen: Blood, Venous Updated: 12/09/22 1825     Albumin 3.7 g/dL      Bilirubin, Total 0.8 mg/dL      Bilirubin, Direct 0.2 mg/dL      Alkaline Phosphatase 59 IU/L      AST (SGOT) 45 IU/L      ALT (SGPT) 23 IU/L      Total Protein 7.1 g/dL      Comment: Test performed on plasma which typically contains approximately 0.4 g/dL more protein than serum.       Lipase [[id number]]  (Normal) Collected: 12/09/22 1309    Specimen: Blood, Venous Updated: 12/09/22 1824     Lipase 32 U/L     Magnesium [[id number]]  (Normal) Collected: 12/09/22 1309    Specimen: Blood, Venous Updated: 12/09/22 1824     Magnesium 1.8 mg/dL     Basic metabolic panel [[id number]]  (Abnormal) Collected: 12/09/22 1309    Specimen: Blood, Venous Updated: 12/09/22 1350     Sodium 137 mEQ/L      Potassium 4.0 mEQ/L      Comment: Results obtained on plasma. Plasma Potassium values may be up to 0.4 mEQ/L less than serum values. The differences may be greater for patients with high platelet or white cell counts.        Chloride 98 mEQ/L      CO2 29 mEQ/L      BUN 9 mg/dL      Creatinine 0.7 mg/dL      Glucose 104 mg/dL      Calcium 9.3 mg/dL      eGFR >60.0 mL/min/1.73m*2      Anion Gap 10 mEQ/L     Extra Tubes [[id number]] Collected: 12/09/22 1309    Specimen: Blood, Venous Updated: 12/09/22 1324    Narrative:      The following orders were created for panel order Extra Tubes.  Procedure                               Abnormality         Status                      ---------                               -----------         ------                     Extra Tube Lt Green[[id number]]                              In process                   Please view results for these tests on the individual orders.    Extra Tube Lt Green [[id number]] Collected: 12/09/22 1309    Specimen: Blood, Venous Updated: 12/09/22 1324          Imaging Results          CT ABDOMEN PELVIS WITH IV CONTRAST (Final result)  Result time 12/09/22 21:26:30    Final result                 Impression:    IMPRESSION: No acute inflammatory or obstructive process of the abdomen or  pelvis.                     Narrative:    CLINICAL HISTORY: Nausea and vomiting.  Weight loss.    COMMENT: CT examination of the abdomen and pelvis was performed using contiguous  2.5 mm transaxial sections.  Reformats were made in the coronal and sagittal  planes by the technologist. Images were acquired after the uneventful  administration of 100 cc of Omnipaque 350 intravenously.    CT DOSE:  One or more dose reduction techniques (e.g. automated exposure  control, adjustment of the mA and/or kV according to patient size, use of  iterative reconstruction technique) utilized for this examination.    Comparison studies: CT abdomen and pelvis 9/24/2021.    Lung bases: Scarring and/or partial atelectasis in the middle lobe and lingula.    Lower chest soft tissue: Small hiatal hernia.    Liver: Stable coarsely calcified lesion measuring 2.8 cm just anterior and to  the right of the intrahepatic inferior vena cava.  Mild fatty infiltration along  the falciform ligament.  Stable hemangioma in the right posterior hepatic  segment.    Spleen: Unremarkable.    Adrenals: Unremarkable.    Pancreas: Unremarkable.    Kidneys: Unremarkable.    Gallbladder:  Unremarkable.    Retroperitoneal structures: Atherosclerotic calcifications of the abdominal  aorta and iliac arteries without evidence of aneurysm.    Bowel and mesentery: No dilated loops of large  or small bowel to suggest  obstruction.  No free intraperitoneal air.  Small umbilical hernia containing  fat.  Small fat-containing right inguinal hernia.    Ascites: None.    Lymph nodes: None enlarged.    Reproductive Organs: Unremarkable.    Bladder: Unremarkable.    Bones: Right total hip arthroplasty.  Sacroiliac joint degenerative changes  bilaterally.  Multilevel lumbar spine degenerative changes.                                No orders to display       Scoring tools                                  ED Course & MDM   MDM / ED COURSE / CLINICAL IMPRESSION / DISPO     MDM    ED Course as of 12/09/22 2143   Fri Dec 09, 2022   1932 Patient and family updated on plan to obtain CT [AJ]   2134 Normal CT scan  [AC]   2140 Pt got some relief after zofran. She was able to eat some of a cheese quesadilla. Will d/c with GI follow up and send home with rx for zofran.  [AC]      ED Course User Index  [AC] Natalia Carballo PA C  [AJ] Kristen Grey MD     Clinical Impression      Nausea     _________________     ED Disposition   Discharge                   Natalia Carballo PA C  12/09/22 2143

## 2022-12-09 NOTE — PROGRESS NOTES
Internal Medicine Note       Reason for visit:   Chief Complaint   Patient presents with   • Nausea     For about 2 weeks, cannot eat, dry heaving, burping        HPI   Lillie Ward is a 72 y.o. female who presents with 2 week h/o nausea, vomiting, reports being unable to eat at all.  She also c/o increase belching and epigastric pain.  No fevers, no chest pain, no bloody stools.  No UTI symptoms.      She has Type 1 DM, wears a glucose pump and has a CGM which registered a BS=44 yesterday.      She has a cough and was recently started on doxycycline but N/V started prior to taking antibiotic.      She states she has never felt this bad.  She feels very weak.        Past Medical History:   Diagnosis Date   • Acid reflux    • Diabetes mellitus type I (CMS/HCC)     Dr. Cerda - [facility]   • DKA, type 1 (CMS/HCC) 09/2016    due to insulin pump malfunction - hospitalized   • GERD (gastroesophageal reflux disease)    • H/O colonoscopy 12/2013    [place].  Normal   • Hyperlipidemia    • Hypoglycemia unawareness associated with type 1 diabetes mellitus (CMS/HCC)    • Macular degeneration     BEGINNING STAGES   • Pulmonary fibrosis (CMS/HCC)     CLEARED BY PULMONOLIGIST 2 YEARS AGO 2018   • Pulmonary nodule, left 07/2017   • Rheumatoid arthritis (CMS/HCC)     Dr. 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    Children- 1 son    Caffeine - coffee    Exercise-walking    Diet-low carb    Pets-     [employer]- none    Seat belt-yes    Smoke alarm-yes             Family History   Problem Relation Age of Onset   • Stroke Biological Mother    • Lung cancer Biological Father    • No Known Problems Biological Sister    • Rheum arthritis Other         1 of 8 siblings   • Prostate cancer Biological Son    • Pancreatitis Biological Son    • Alcohol abuse Biological Son    • Bipolar disorder Biological Son    • Bipolar disorder Biological Daughter         no diagnosis   • Depression Biological Daughter    • Anxiety disorder Biological Daughter      Neomycin, Neomycin-bacitracin-polymyxin, Neomycin-bacitracnzn-polymyxnb, Sulfamethoxazole-trimethoprim, Tetracycline, and Bacitracin  Current Outpatient Medications   Medication Sig Dispense Refill   • albuterol HFA (VENTOLIN HFA) 90 mcg/actuation inhaler INHALE 2 PUFFS BY MOUTH EVERY 4 HOURS AS NEEDED FOR SHORTNESS OF BREATH , COUGH, OR WHEEZING     • cholecalciferol, vitamin D3, 25 mcg (1,000 unit) capsule Take 1,000 Units by mouth daily.       • citalopram (celeXA) 10 mg tablet Take 1 tablet by mouth once daily 90 tablet 1   • docosahexaenoic acid/epa (FISH OIL ORAL) Take by mouth 2 (two) times a day. For dry eyes     • famotidine (PEPCID) 10 mg tablet Take 2 tablets (20 mg total) by mouth daily as needed. With ibuprofen     • folic acid (FOLVITE) 1 mg tablet Take 1 mg by mouth daily.     • glucagon, human recombinant, 1 mg injection Infuse 1 mg into a venous catheter once.     • insulin lispro U-100 (HumaLOG) 100 unit/mL subcutaneous cartridge inject by subcutaneous route per prescriber's instructions. Insulin dosing requires individualization.     • Lactobac no.41-Bifidobact no.7 70 mg (3 billion cell) capsule Take by mouth daily.     • lisinopriL (PRINIVIL) 10 mg tablet Take 1 tablet by mouth once daily 90 tablet 0   • multivit with minerals/lutein (MULTIVITAMIN 50 PLUS ORAL) No SIG Entered     • mupirocin (BACTROBAN) 2 % ointment Apply topically.     • riTUXimab  (RITUXAN) 10 mg/mL injection Inject 10 mg/mL as directed. Every 16 weeks   10/13/2022 next      • rosuvastatin (CRESTOR) 10 mg tablet Take 1 tablet (10 mg total) by mouth daily. 90 tablet 3   • vit A/vit C/vit E/zinc/copper (PRESERVISION AREDS ORAL) Take by mouth 2 (two) times a day.     • aspirin 81 mg enteric coated tablet Take 1 tablet (81 mg total) by mouth 2 (two) times a day. (Patient taking differently: Take 81 mg by mouth daily.) 60 tablet 0   • doxycycline hyclate (VIBRAMYCIN) 100 mg capsule TAKE 1 CAPSULE BY MOUTH EVERY 12 HOURS       No current facility-administered medications for this visit.       Review of Systems   Constitutional: Positive for activity change, appetite change and fatigue. Negative for chills, diaphoresis, fever and unexpected weight change.   HENT: Negative for congestion, rhinorrhea and sore throat.    Respiratory: Negative for cough, chest tightness, shortness of breath and wheezing.    Cardiovascular: Negative for chest pain, palpitations and leg swelling.   Gastrointestinal: Positive for abdominal pain, nausea and vomiting. Negative for blood in stool.   Genitourinary: Negative for dysuria, frequency, hematuria and urgency.       Objective   Vitals:    12/09/22 1126   BP: 138/62   Pulse: 97   Temp: 37.2 °C (98.9 °F)       Physical Exam  Constitutional:       General: She is not in acute distress.     Appearance: Normal appearance. She is not ill-appearing or diaphoretic.      Comments: Appears frail, tremulous   HENT:      Head: Normocephalic and atraumatic.      Mouth/Throat:      Mouth: Mucous membranes are dry.      Pharynx: No oropharyngeal exudate or posterior oropharyngeal erythema.      Comments: Dry tongue  Eyes:      General: No scleral icterus.        Right eye: No discharge.         Left eye: No discharge.   Cardiovascular:      Rate and Rhythm: Normal rate and regular rhythm.      Pulses: Normal pulses.      Heart sounds: Normal heart sounds. No murmur  heard.  Pulmonary:      Effort: Pulmonary effort is normal.      Breath sounds: Wheezing present.   Abdominal:      Tenderness: There is abdominal tenderness (epigastric with palpation). There is no right CVA tenderness, left CVA tenderness or guarding.      Comments: Negative viera's sign    Diminished bowel sounds    Abdominal scars over/around liver due to previous liver mass excision about 20 years ago   Musculoskeletal:      Right lower leg: No edema.      Left lower leg: No edema.   Skin:     General: Skin is dry.      Coloration: Skin is pale.   Neurological:      Mental Status: She is alert.         Lab Results   Component Value Date    WBC 7.22 11/17/2020    HGB 12.2 11/17/2020     11/17/2020    CHOL 185 09/22/2021    TRIG 108 09/22/2021    HDL 89 09/22/2021    ALT 29 09/22/2021    AST 49 (H) 09/22/2021     11/17/2020    K 4.8 11/17/2020    CREATININE 0.7 11/17/2020    HGBA1C 6.9 (H) 11/11/2020       No results found for: IRON, TIBC, FERRITIN              Assessment   Problem List Items Addressed This Visit        Endocrine/Metabolic    Type 1 diabetes mellitus (CMS/HCC)   Other Visit Diagnoses     Dehydration    -  Primary    Epigastric pain        Nausea and vomiting, unspecified vomiting type          Given fact patient is unable to eat, has N/V, is taking insulin and reports feeling worse than she ever has, along with exam finding of epigastric pain, she needs labs and possibly imaging.  I asked her to report to the ER for evaluation and she agreed.  She stated her NP sister and RN sister told her the same thing.      I spent 23 minutes on visit,  reviewing chart, performing exam, discussing above, forming a plan, entering orders, and documentation.        Rosi Richardson DO  12/9/2022  11:56 AM

## 2022-12-10 NOTE — ED ATTESTATION NOTE
I have personally seen and examined the patient.  I reviewed and agree with the physician assistant’s assessment and plan of care, except as where stated below.  My examination, assessment, and plan of care of Lillie Ward is as follows:     Patient is a 72-year-old female who presents the emergency department for evaluation of nausea, decreased p.o. intake.  She has a past medical history of type 1 diabetes.  States that her diabetes is usually extremely well controlled with insulin pump and continuous glucose monitor.  For the past 3 weeks she has been very nauseous, dry heaving.  She really has not been able to tolerate much of anything.,  Top of the point where today, she ate only a small handful of goldfish in total.  Saw her PCP today and was referred to the emergency department for further evaluation.    On exam, patient is awake, alert, overall well-appearing.  Heart rate is normal.  Respirations are nonlabored, lungs are clear.  Abdomen is soft, nontender.    On arrival, patient is mildly hypertensive.  Vitals are otherwise reassuring.  Lab work was sent from triage.  Glucose is not significantly elevated.  She has no signs of any acidosis.  She is given IV fluids, antiemetics.  With the weight loss, will check imaging for further evaluation         Kristen Grey MD  12/09/22 1945

## 2022-12-12 ENCOUNTER — TELEPHONE (OUTPATIENT)
Dept: INTERNAL MEDICINE | Facility: CLINIC | Age: 72
End: 2022-12-12
Payer: MEDICARE

## 2022-12-12 NOTE — TELEPHONE ENCOUNTER
VINIM for patient to perform ED f/u visit . The patient presented to the ED at [facility] for nausea.  The patient was instructed to f/u with GI and Dr. Gallardo in the ED.  Asked patient to call back. Patient also has an appt with Dr. Gallardo in January 2023.

## 2022-12-13 NOTE — TELEPHONE ENCOUNTER
The patient called back stating she is still feeling bad but does have a GI appt tomorrow at [facility].  I did try to reach the patient again and LVM to see if there is anything we can do for her.

## 2022-12-14 ENCOUNTER — DOCUMENTATION (OUTPATIENT)
Dept: ENDOCRINOLOGY | Facility: HOSPITAL | Age: 72
End: 2022-12-14

## 2022-12-27 DIAGNOSIS — E10.8 TYPE 1 DIABETES MELLITUS WITH COMPLICATION (HCC): ICD-10-CM

## 2022-12-27 RX ORDER — INSULIN LISPRO 100 [IU]/ML
INJECTION, SOLUTION INTRAVENOUS; SUBCUTANEOUS
Qty: 30 ML | Refills: 0 | Status: SHIPPED | OUTPATIENT
Start: 2022-12-27

## 2023-01-13 LAB
ALBUMIN SERPL-MCNC: 4.2 G/DL (ref 3.7–4.7)
ALBUMIN/GLOB SERPL: 1.8 {RATIO} (ref 1.2–2.2)
ALP SERPL-CCNC: 74 IU/L (ref 44–121)
ALT SERPL-CCNC: 15 IU/L (ref 0–32)
AST SERPL-CCNC: 26 IU/L (ref 0–40)
BASOPHILS # BLD AUTO: 0.1 X10E3/UL (ref 0–0.2)
BASOPHILS NFR BLD AUTO: 1 %
BILIRUB SERPL-MCNC: 0.7 MG/DL (ref 0–1.2)
BUN SERPL-MCNC: 10 MG/DL (ref 8–27)
BUN/CREAT SERPL: 14 (ref 12–28)
CALCIUM SERPL-MCNC: 9.8 MG/DL (ref 8.7–10.3)
CHLORIDE SERPL-SCNC: 101 MMOL/L (ref 96–106)
CO2 SERPL-SCNC: 24 MMOL/L (ref 20–29)
CREAT SERPL-MCNC: 0.72 MG/DL (ref 0.57–1)
EGFR: 88 ML/MIN/1.73
EOSINOPHIL # BLD AUTO: 0.3 X10E3/UL (ref 0–0.4)
EOSINOPHIL NFR BLD AUTO: 6 %
ERYTHROCYTE [DISTWIDTH] IN BLOOD BY AUTOMATED COUNT: 13.7 % (ref 11.7–15.4)
GLOBULIN SER-MCNC: 2.4 G/DL (ref 1.5–4.5)
GLUCOSE SERPL-MCNC: 140 MG/DL (ref 70–99)
HBA1C MFR BLD: 6.5 % (ref 4.8–5.6)
HCT VFR BLD AUTO: 42.7 % (ref 34–46.6)
HGB BLD-MCNC: 14.2 G/DL (ref 11.1–15.9)
IMM GRANULOCYTES # BLD: 0 X10E3/UL (ref 0–0.1)
IMM GRANULOCYTES NFR BLD: 0 %
LYMPHOCYTES # BLD AUTO: 1.6 X10E3/UL (ref 0.7–3.1)
LYMPHOCYTES NFR BLD AUTO: 27 %
MCH RBC QN AUTO: 30.6 PG (ref 26.6–33)
MCHC RBC AUTO-ENTMCNC: 33.3 G/DL (ref 31.5–35.7)
MCV RBC AUTO: 92 FL (ref 79–97)
MONOCYTES # BLD AUTO: 0.7 X10E3/UL (ref 0.1–0.9)
MONOCYTES NFR BLD AUTO: 11 %
NEUTROPHILS # BLD AUTO: 3.2 X10E3/UL (ref 1.4–7)
NEUTROPHILS NFR BLD AUTO: 55 %
PLATELET # BLD AUTO: 310 X10E3/UL (ref 150–450)
POTASSIUM SERPL-SCNC: 4.3 MMOL/L (ref 3.5–5.2)
PROT SERPL-MCNC: 6.6 G/DL (ref 6–8.5)
RBC # BLD AUTO: 4.64 X10E6/UL (ref 3.77–5.28)
SODIUM SERPL-SCNC: 142 MMOL/L (ref 134–144)
TSH SERPL DL<=0.005 MIU/L-ACNC: 4.03 UIU/ML (ref 0.45–4.5)
WBC # BLD AUTO: 5.8 X10E3/UL (ref 3.4–10.8)

## 2023-01-17 ENCOUNTER — OFFICE VISIT (OUTPATIENT)
Dept: ENDOCRINOLOGY | Facility: HOSPITAL | Age: 73
End: 2023-01-17

## 2023-01-17 VITALS
HEART RATE: 70 BPM | DIASTOLIC BLOOD PRESSURE: 80 MMHG | WEIGHT: 109.6 LBS | BODY MASS INDEX: 20.69 KG/M2 | SYSTOLIC BLOOD PRESSURE: 122 MMHG | HEIGHT: 61 IN

## 2023-01-17 DIAGNOSIS — E78.2 MIXED HYPERLIPIDEMIA: ICD-10-CM

## 2023-01-17 DIAGNOSIS — I10 ESSENTIAL HYPERTENSION: ICD-10-CM

## 2023-01-17 DIAGNOSIS — Z96.41 INSULIN PUMP IN PLACE: ICD-10-CM

## 2023-01-17 DIAGNOSIS — E55.9 VITAMIN D DEFICIENCY: ICD-10-CM

## 2023-01-17 DIAGNOSIS — E10.40 TYPE 1 DIABETES MELLITUS WITH DIABETIC NEUROPATHY (HCC): Primary | ICD-10-CM

## 2023-01-17 DIAGNOSIS — E10.649 HYPOGLYCEMIA UNAWARENESS ASSOCIATED WITH TYPE 1 DIABETES MELLITUS (HCC): ICD-10-CM

## 2023-01-17 RX ORDER — METOCLOPRAMIDE 5 MG/1
5 TABLET ORAL 3 TIMES DAILY
COMMUNITY
Start: 2022-12-14

## 2023-01-17 RX ORDER — PANTOPRAZOLE SODIUM 40 MG/1
40 TABLET, DELAYED RELEASE ORAL DAILY
COMMUNITY
Start: 2022-12-14

## 2023-01-17 NOTE — PATIENT INSTRUCTIONS
Be mindful of diet  Stay active and stay hydrated  Be sure to perform a bolus consistently at meals  Continue to utilize the dex com continuous glucose monitor and perform a DEX COM download in 2 weeks for review  Contact the office with any consistent hypoglycemia  Continue lisinopril  Continue atorvastatin  Continue with regular supplementation of vitamin D3 daily

## 2023-01-17 NOTE — PROGRESS NOTES
Sajan Dear 74 y o  female MRN: [id number]    Encounter: [id number]      Assessment/Plan     Assessment: This is a 74y o -year-old female with type 1 diabetes on insulin pump therapy      Plan:  1   Type 1 diabetes:  Her most recent hemoglobin A1c is 6  5   Review of her pump and sensor download reveals some hypoglycemia in the afternoon and evening  For this, I have adjusted her insulin to carbohydrate ratio at 7 AM and at 6 PM to 1: 12   Otherwise, her settings will remain the same  Discussed the proper process for correcting her blood sugars instead of adding small amounts of carbs   She will continue to utilize the dex com continuous glucose monitor   We will contact her with any changes, if necessary   Check hemoglobin A1c prior to next visit  2   Hyperlipidemia: Stable   Continue atorvastatin    3   Hypertension: He is normotensive in the office today   Continue lisinopril   Check comprehensive metabolic panel prior to next visit  4   Vitamin-D deficiency:  Continue supplementation with 1000 units of vitamin D3 daily  CC: Type 1 Diabetes Follow Up    History of Present Illness     HPI:  74 y  o  female with type 1 diabetes for approximately 40 years  She was started on an insulin pump around 1998   She is on insulin at home and takes Humalog insulin via a new tandem insulin pump   Download of her Dexcom sensor and tandem pump January 4 through January 17, 2023 reveals an average glucose of 156   In target range 65 % of the time with 2% low blood sugar   She appears to be having low blood sugar just after bedtime  She is also experiencing low glycemia in the afternoon and evening   Her most recent hemoglobin A1c from January 12, 2023 is 6 5   She denies any polyuria and polydipsia   She has no polyphagia, but has once a night nocturia  Rosaline Hanson is getting some blurry vision with some cataracts   She has no numbness or tingling of the feet   She denies chest pain or shortness of breath   She denies nephropathy, heart attack, stroke and claudication but does admit to neuropathy and retinopathy    She has a habit of changing her basal settings almost daily      Her most recent diabetic eye exam was performed on July 12, 2020  She is seen every 4 months  She has no complaints about her feet and does not follow Podiatry for regular diabetic foot care       For her hypertension, she is treated with lisinopril 10 mg daily      For her hyperlipidemia, she is treated with atorvastatin 10 mg       For her vitamin-D deficiency, she supplements with 1000 units of vitamin D3 daily  Review of Systems   Constitutional: Negative  Negative for chills, fatigue and fever  HENT: Negative  Negative for trouble swallowing and voice change  Eyes: Negative  Negative for photophobia, pain, discharge, redness, itching and visual disturbance  Respiratory: Negative  Negative for chest tightness and shortness of breath  Cardiovascular: Negative  Negative for chest pain  Gastrointestinal: Negative  Negative for abdominal pain, constipation, diarrhea and vomiting  Endocrine: Positive for polyuria (1-2 times per night nocturia)  Negative for cold intolerance, heat intolerance, polydipsia and polyphagia  Genitourinary: Negative  Musculoskeletal: Negative  Skin: Negative  Allergic/Immunologic: Negative  Neurological: Negative  Negative for dizziness, syncope, light-headedness and headaches  Hematological: Negative  Psychiatric/Behavioral: Negative  All other systems reviewed and are negative        Historical Information   Past Medical History:   Diagnosis Date   • Basal cell carcinoma (BCC) of face     left cheek   • Cataract    • Rheumatoid arthritis ([facility] [address] )      Past Surgical History:   Procedure Laterality Date   • CATARACT EXTRACTION, BILATERAL Bilateral    • EXPLORATORY LAPAROTOMY     • FOOT SURGERY Left     foot reconstruction in 2 phases   • FOOT SURGERY Right     foot reconstruction   • LIVER BIOPSY     • TOTAL SHOULDER REPLACEMENT Right      Social History   Social History     Substance and Sexual Activity   Alcohol Use Yes   • Alcohol/week: 5 0 standard drinks   • Types: 5 Cans of beer per week    Comment: most nights has 1 drink     Social History     Substance and Sexual Activity   Drug Use No     Social History     Tobacco Use   Smoking Status Never   Smokeless Tobacco Never     Family History:   Family History   Problem Relation Age of Onset   • Stroke Mother    • Cancer Father         renal metastatic   • Brain cancer Sister    • Diabetes type I Maternal Uncle    • Diabetes type I Maternal Grandmother    • Osteoarthritis Brother    • Prostate cancer Brother    • Osteoarthritis Sister    • Breast cancer Sister    • Rheum arthritis Sister    • Osteoarthritis Sister    • Osteoarthritis Sister    • Osteoarthritis Sister    • Thyroid disease unspecified Sister    • Osteoarthritis Brother    • Prostate cancer Brother    • Osteoarthritis Brother    • Bipolar disorder Son    • Bipolar disorder Daughter        Meds/Allergies   Current Outpatient Medications   Medication Sig Dispense Refill   • aspirin 81 mg chewable tablet Chew 81 mg daily     • Calcium Carbonate (CALCIUM 600 PO) Take by mouth daily (Patient not taking: No sig reported)     • cholecalciferol (VITAMIN D3) 1,000 units tablet Take 1,000 Units by mouth daily     • citalopram (CeleXA) 10 mg tablet Take 10 mg by mouth daily     • famotidine (PEPCID) 20 mg tablet Take 20 mg by mouth daily prn     • folic acid (FOLVITE) 1 mg tablet Take 1 mg by mouth daily (Patient not taking: Reported on 10/14/2022)     • glucagon (GLUCAGON EMERGENCY) 1 MG injection Inject 1 mg under the skin once as needed for low blood sugar for up to 1 dose 1 each 4   • HumaLOG 100 UNIT/ML injection Inject  100 units daily via insulin pump 30 mL 0   • HumaLOG 100 UNIT/ML injection INJECT UP  UNITS VIA PUMP DAILY 30 mL 0   • lisinopril (ZESTRIL) 10 mg tablet Take 10 mg by mouth daily       • methotrexate 50 MG/2ML injection Inject under the skin once a week   (Patient not taking: Reported on 10/14/2022)     • Multiple Vitamins-Minerals (MULTIVITAMIN ADULT PO) Take by mouth daily       • Omega-3 Fat Ac-Cholecalciferol (DRY EYE OMEGA BENEFITS/VIT D-3) 453-476 MG-UNIT CAPS Take by mouth daily       • RiTUXimab (RITUXAN IV) Infuse into a venous catheter Every 16 weeks, 2nd dose 2 weeks later then repeat     • rosuvastatin (CRESTOR) 10 MG tablet Take 10 mg by mouth daily     • tixagevimab-cilgavimab (Evusheld) 150 & 150 MG/1 5ML SOLN        No current facility-administered medications for this visit  Allergies   Allergen Reactions   • Bactrim [Sulfamethoxazole-Trimethoprim] GI Intolerance     Other reaction(s): GI Intolerance   • Neosporin [Neomycin-Bacitracin Zn-Polymyx] Itching   • Tetracycline GI Intolerance     Other reaction(s): GI Intolerance       Objective   Vitals: There were no vitals taken for this visit  Physical Exam  Vitals reviewed  Constitutional:       Appearance: She is well-developed  HENT:      Head: Normocephalic and atraumatic  Eyes:      Conjunctiva/sclera: Conjunctivae normal       Pupils: Pupils are equal, round, and reactive to light  Cardiovascular:      Rate and Rhythm: Normal rate and regular rhythm  Heart sounds: Normal heart sounds  Pulmonary:      Effort: Pulmonary effort is normal       Breath sounds: Normal breath sounds  Abdominal:      General: Bowel sounds are normal       Palpations: Abdomen is soft  Musculoskeletal:         General: Normal range of motion  Cervical back: Normal range of motion and neck supple  Skin:     General: Skin is warm and dry  Neurological:      Mental Status: She is alert and oriented to person, place, and time  Psychiatric:         Behavior: Behavior normal          Thought Content:  Thought content normal          Judgment: Judgment normal        Lab Results:   Lab Results Component Value Date/Time    Hemoglobin A1C 6 5 (H) 01/12/2023 09:27 AM    Hemoglobin A1C 6 7 (H) 10/11/2022 09:21 AM    Hemoglobin A1C 6 5 (H) 07/11/2022 09:33 AM    White Blood Cell Count 5 8 01/12/2023 09:27 AM    White Blood Cell Count 5 1 10/11/2022 09:21 AM    White Blood Cell Count 5 2 07/11/2022 09:33 AM    Hemoglobin 14 2 01/12/2023 09:27 AM    Hemoglobin 14 8 10/11/2022 09:21 AM    Hemoglobin 14 5 07/11/2022 09:33 AM    HCT 42 7 01/12/2023 09:27 AM    HCT 43 5 10/11/2022 09:21 AM    HCT 43 1 07/11/2022 09:33 AM    MCV 92 01/12/2023 09:27 AM    MCV 92 10/11/2022 09:21 AM    MCV 93 07/11/2022 09:33 AM    Platelet Count 126 75/34/1782 09:27 AM    Platelet Count 552 43/36/9086 09:21 AM    Platelet Count 584 15/66/7354 09:33 AM    BUN 10 01/12/2023 09:27 AM    BUN 10 10/19/2022 11:30 AM    BUN 7 (L) 10/11/2022 09:21 AM    Potassium 4 3 01/12/2023 09:27 AM    Potassium 4 5 10/19/2022 11:30 AM    Potassium 4 6 10/11/2022 09:21 AM    Chloride 101 01/12/2023 09:27 AM    Chloride 104 10/19/2022 11:30 AM    Chloride 106 10/11/2022 09:21 AM    CO2 24 01/12/2023 09:27 AM    CO2 25 10/19/2022 11:30 AM    CO2 25 10/11/2022 09:21 AM    Creatinine 0 72 01/12/2023 09:27 AM    Creatinine 0 67 10/19/2022 11:30 AM    Creatinine 0 76 10/11/2022 09:21 AM    AST 26 01/12/2023 09:27 AM    AST 37 10/19/2022 11:30 AM    AST 40 10/11/2022 09:21 AM    ALT 15 01/12/2023 09:27 AM    ALT 32 10/19/2022 11:30 AM    ALT 29 10/11/2022 09:21 AM    Albumin 4 2 01/12/2023 09:27 AM    Albumin 4 5 10/19/2022 11:30 AM    Albumin 4 0 10/11/2022 09:21 AM    Globulin, Total 2 4 01/12/2023 09:27 AM    Globulin, Total 1 9 10/19/2022 11:30 AM    Globulin, Total 2 4 10/11/2022 09:21 AM    HDL 78 10/11/2022 09:21 AM    HDL 95 04/08/2022 09:20 AM    Triglycerides 86 10/11/2022 09:21 AM    Triglycerides 78 04/08/2022 09:20 AM     Portions of the record may have been created with voice recognition software   Occasional wrong word or "sound a like" substitutions may have occurred due to the inherent limitations of voice recognition software  Read the chart carefully and recognize, using context, where substitutions have occurred

## 2023-01-19 NOTE — PLAN OF CARE
Problem: Adult Inpatient Plan of Care  Goal: Plan of Care Review  Outcome: Progressing  Flowsheets (Taken 11/16/2020 0175)  Progress: improving  Plan of Care Reviewed With: patient  Outcome Summary: mobility limited by pain      Keep wound clean and dry.  Apply bacitracin twice daily with bandage changes.  Have sutures removed in 10 to 14 days.  This can be done at primary care provider, urgent care or back in the ER.  Watch for signs of infection including increased pain, redness or discharge.  Seek medical care should any of these occur.    Follow-up with your primary care provider as needed.    Return to the ER for any new or worsening symptoms.

## 2023-01-23 ENCOUNTER — OFFICE VISIT (OUTPATIENT)
Dept: INTERNAL MEDICINE | Facility: CLINIC | Age: 73
End: 2023-01-23
Payer: MEDICARE

## 2023-01-23 VITALS
HEART RATE: 76 BPM | HEIGHT: 61 IN | OXYGEN SATURATION: 96 % | WEIGHT: 108 LBS | TEMPERATURE: 98.3 F | BODY MASS INDEX: 20.39 KG/M2 | SYSTOLIC BLOOD PRESSURE: 110 MMHG | DIASTOLIC BLOOD PRESSURE: 66 MMHG

## 2023-01-23 DIAGNOSIS — I10 ESSENTIAL HYPERTENSION: ICD-10-CM

## 2023-01-23 DIAGNOSIS — I25.84 CORONARY ARTERY DISEASE DUE TO CALCIFIED CORONARY LESION: ICD-10-CM

## 2023-01-23 DIAGNOSIS — M05.79 RHEUMATOID ARTHRITIS INVOLVING MULTIPLE SITES WITH POSITIVE RHEUMATOID FACTOR (CMS/HCC): ICD-10-CM

## 2023-01-23 DIAGNOSIS — I25.10 CORONARY ARTERY DISEASE DUE TO CALCIFIED CORONARY LESION: ICD-10-CM

## 2023-01-23 DIAGNOSIS — R06.2 WHEEZING: ICD-10-CM

## 2023-01-23 DIAGNOSIS — K21.00 GASTROESOPHAGEAL REFLUX DISEASE WITH ESOPHAGITIS WITHOUT HEMORRHAGE: Primary | ICD-10-CM

## 2023-01-23 DIAGNOSIS — E10.69 TYPE 1 DIABETES MELLITUS WITH OTHER SPECIFIED COMPLICATION: ICD-10-CM

## 2023-01-23 PROCEDURE — G8754 DIAS BP LESS 90: HCPCS | Performed by: INTERNAL MEDICINE

## 2023-01-23 PROCEDURE — G8752 SYS BP LESS 140: HCPCS | Performed by: INTERNAL MEDICINE

## 2023-01-23 PROCEDURE — 99214 OFFICE O/P EST MOD 30 MIN: CPT | Performed by: INTERNAL MEDICINE

## 2023-01-23 RX ORDER — PANTOPRAZOLE SODIUM 40 MG/1
40 TABLET, DELAYED RELEASE ORAL DAILY
COMMUNITY
Start: 2022-12-14 | End: 2024-03-07

## 2023-01-23 RX ORDER — METOCLOPRAMIDE 5 MG/1
5 TABLET ORAL 3 TIMES DAILY
COMMUNITY
Start: 2022-12-14 | End: 2024-03-07

## 2023-01-23 ASSESSMENT — ENCOUNTER SYMPTOMS
DIFFICULTY URINATING: 0
WHEEZING: 1
SHORTNESS OF BREATH: 0
CONSTIPATION: 0
NAUSEA: 0
FEVER: 0
DIARRHEA: 0
ABDOMINAL PAIN: 0
VOMITING: 0
ARTHRALGIAS: 1
UNEXPECTED WEIGHT CHANGE: 0

## 2023-01-23 NOTE — ASSESSMENT & PLAN NOTE
Recent flareup of gastritis and possible symptoms of gastroparesis.  Treated with pantoprazole and Reglan with improvement of symptoms.  Counseled on potential risk/side effect of reglan. Advised to slowly wean off of Reglan and then the pantoprazole. F/u with GI

## 2023-01-23 NOTE — PROGRESS NOTES
Subjective      Patient ID: Lillie Ward is a 72 y.o. female.    HPI    Patient presents for follow-up.  Was in the ER about 6 weeks ago with uncontrolled nausea and vomiting which developed after having the flu.  Work-up with labs and imaging were unremarkable.  Was given Zofran with relief.  Saw Dr. Walker, GI and was prescribed pantoprazole and Reglan for possible gastroparesis which has been helping.  No previous history of gastroparesis.  Has history of GERD but was able to wean off of her PPI.  Scheduled for an EGD/colonoscopy on 5/15/2023.  Saw Dr. Couch, endocrinologist.  Last HbA1c was 6.5.  Eye appointment is scheduled for 2/9/2023.  Saw Dr. Le, podiatrist last week.  Did not receive the bivalent COVID-vaccine yet.  Up-to-date with all other vaccines.  Tried stopping methotrexate for wheezing but arthritis symptoms returned.  Back on weekly injections with the rheumatologist.  Also takes Rituxan.  No other new/acute concerns.    The following have been reviewed and updated as appropriate in this visit:     Allergies  Meds  Problems         Past Medical History:   Diagnosis Date   • Acid reflux    • Diabetes mellitus type I (CMS/HCC)     Dr. Cerda - [facility]   • DKA, type 1 (CMS/HCC) 09/2016    due to insulin pump malfunction - hospitalized   • GERD (gastroesophageal reflux disease)    • H/O colonoscopy 12/2013    [place].  Normal   • Hyperlipidemia    • Hypoglycemia unawareness associated with type 1 diabetes mellitus (CMS/HCC)    • Macular degeneration     BEGINNING STAGES   • Pulmonary fibrosis (CMS/HCC)     CLEARED BY PULMONOLIGIST 2 YEARS AGO 2018   • Pulmonary nodule, left 07/2017   • Rheumatoid arthritis (CMS/HCC)     Dr. Lo, [place]   • Skin cancer      Past Surgical History:   Procedure Laterality Date   • COLONOSCOPY     • EYE SURGERY Bilateral     CATARACT   • FOOT SURGERY Left 2009    reconstruction   • FOOT SURGERY Right 2007    reconstruction   • JOINT REPLACEMENT  Right     SHOULDER   • LIVER SURGERY      '80's   • REPLACEMENT TOTAL KNEE Left 04/04/2017    [place]   • SKIN BIOPSY     • TOTAL HIP ARTHROPLASTY Right 11/2020   • TOTAL SHOULDER ARTHROPLASTY Right 1990     Family History   Problem Relation Age of Onset   • Stroke Biological Mother    • Lung cancer Biological Father    • No Known Problems Biological Sister    • Rheum arthritis Other         1 of 8 siblings   • Prostate cancer Biological Son    • Pancreatitis Biological Son    • Alcohol abuse Biological Son    • Bipolar disorder Biological Son    • Bipolar disorder Biological Daughter         no diagnosis   • Depression Biological Daughter    • Anxiety disorder Biological Daughter      Social History     Socioeconomic History   • Marital status:      Spouse name: None   • Number of children: 1   • Years of education: None   • Highest education level: None   Occupational History   • Occupation: Retired teacher   Tobacco Use   • Smoking status: Never   • Smokeless tobacco: Never   Vaping Use   • Vaping Use: Never used   Substance and Sexual Activity   • Alcohol use: Yes   • Drug use: No   • Sexual activity: Yes   Social History Narrative    Marital status- . was a pediatrician    Children- 1 son    Caffeine - coffee    Exercise-walking    Diet-low carb    Pets-    Oriental orthodox- none    Seat belt-yes    Smoke alarm-yes             Social Determinants of Health     Food Insecurity: No Food Insecurity   • Worried About Running Out of Food in the Last Year: Never true   • Ran Out of Food in the Last Year: Never true       Review of Systems   Constitutional: Negative for fever and unexpected weight change.   Respiratory: Positive for wheezing. Negative for shortness of breath.    Cardiovascular: Negative for chest pain.   Gastrointestinal: Negative for abdominal pain, constipation, diarrhea, nausea and vomiting.   Genitourinary: Negative for difficulty urinating.   Musculoskeletal: Positive for arthralgias  (foot issues).       Allergies   Allergen Reactions   • Neomycin Itching   • Neomycin-Bacitracin-Polymyxin Itching     neosporin   • Neomycin-Bacitracnzn-Polymyxnb Itching   • Sulfamethoxazole-Trimethoprim      Other reaction(s): GI Intolerance   • Tetracycline      Other reaction(s): GI Intolerance   • Bacitracin Rash     Current Outpatient Medications   Medication Sig Dispense Refill   • albuterol HFA (VENTOLIN HFA) 90 mcg/actuation inhaler INHALE 2 PUFFS BY MOUTH EVERY 4 HOURS AS NEEDED FOR SHORTNESS OF BREATH , COUGH, OR WHEEZING     • aspirin 81 mg capsule daily.     • cholecalciferol, vitamin D3, 25 mcg (1,000 unit) capsule Take 1,000 Units by mouth daily.       • citalopram (celeXA) 10 mg tablet Take 1 tablet by mouth once daily 90 tablet 1   • docosahexaenoic acid/epa (FISH OIL ORAL) Take by mouth 2 (two) times a day. For dry eyes     • insulin lispro U-100 (HumaLOG) 100 unit/mL subcutaneous cartridge inject by subcutaneous route per prescriber's instructions. Insulin dosing requires individualization.     • lisinopriL (PRINIVIL) 10 mg tablet Take 1 tablet by mouth once daily 90 tablet 0   • methotrexate, PF, 10 mg/0.2 mL auto-injector Inject under the skin once a week. Pt is not sure the dose     • metoclopramide (REGLAN) 5 mg tablet Take 5 mg by mouth 3 (three) times a day.     • multivit with minerals/lutein (MULTIVITAMIN 50 PLUS ORAL) No SIG Entered     • mupirocin (BACTROBAN) 2 % ointment Apply topically as needed.     • pantoprazole (PROTONIX) 40 mg EC tablet Take 40 mg by mouth daily.     • riTUXimab (RITUXAN) 10 mg/mL injection Inject 10 mg/mL as directed. Every 16 weeks   10/13/2022 next      • rosuvastatin (CRESTOR) 10 mg tablet Take 1 tablet (10 mg total) by mouth daily. 90 tablet 3   • vit A/vit C/vit E/zinc/copper (PRESERVISION AREDS ORAL) Take by mouth 2 (two) times a day.     • doxycycline hyclate (VIBRAMYCIN) 100 mg capsule TAKE 1 CAPSULE BY MOUTH EVERY 12 HOURS     • folic acid (FOLVITE) 1  "mg tablet Take 1 mg by mouth daily.     • glucagon, human recombinant, 1 mg injection Infuse 1 mg into a venous catheter once.     • Lactobac no.41-Bifidobact no.7 70 mg (3 billion cell) capsule Take by mouth daily.       No current facility-administered medications for this visit.       Objective   Vitals:    01/23/23 0934   BP: 110/66   BP Location: Right upper arm   Patient Position: Sitting   Pulse: 76   Temp: 36.8 °C (98.3 °F)   SpO2: 96%   Weight: 49 kg (108 lb)   Height: 1.549 m (5' 1\")     Body mass index is 20.41 kg/m².    Physical Exam  Constitutional:       Appearance: Normal appearance. She is well-developed.   HENT:      Head: Normocephalic and atraumatic.      Right Ear: Tympanic membrane, ear canal and external ear normal.      Left Ear: Tympanic membrane, ear canal and external ear normal.      Nose: Nose normal.      Mouth/Throat:      Mouth: Mucous membranes are moist.      Pharynx: Oropharynx is clear. Uvula midline.   Eyes:      General: Lids are normal.   Cardiovascular:      Rate and Rhythm: Normal rate and regular rhythm.      Heart sounds: Normal heart sounds, S1 normal and S2 normal. No murmur heard.  Pulmonary:      Effort: Pulmonary effort is normal. No respiratory distress.      Breath sounds: Wheezing (End inspiratory squeaks all lung fields) present. No decreased breath sounds, rhonchi or rales.   Musculoskeletal:      Cervical back: Normal range of motion and neck supple.   Skin:     General: Skin is warm and dry.   Neurological:      General: No focal deficit present.      Mental Status: She is alert and oriented to person, place, and time.      Cranial Nerves: No cranial nerve deficit.      Gait: Gait normal.   Psychiatric:         Mood and Affect: Mood normal.         Behavior: Behavior normal.         Thought Content: Thought content normal.         Judgment: Judgment normal.         Assessment/Plan   Problem List Items Addressed This Visit        Respiratory    Wheezing     End " inspiratory squeaks. No SOB/RAMIREZ. Can use ventolin inhaler as needed.            Circulatory    Essential hypertension     At goal with lisinopril 10 mg daily         Coronary artery disease due to calcified coronary lesion     No angina. Continue ASA, statin.            Digestive    GERD (gastroesophageal reflux disease) - Primary     Recent flareup of gastritis and possible symptoms of gastroparesis.  Treated with pantoprazole and Reglan with improvement of symptoms.  Counseled on potential risk/side effect of reglan. Advised to slowly wean off of Reglan and then the pantoprazole. F/u with GI         Relevant Medications    pantoprazole (PROTONIX) 40 mg EC tablet    metoclopramide (REGLAN) 5 mg tablet       Endocrine/Metabolic    Type 1 diabetes mellitus (CMS/[facility])     Last HbA1c 6.5. continue insulin by pump. F/u with endo. UTD with foot/eye exams            Rheumatologic    Rheumatoid arthritis (CMS/[facility])     Continue Rituxan.  Back on the methotrexate injections weekly.  Continue follow-up with rheumatologist         Relevant Medications    methotrexate, PF, 10 mg/0.2 mL auto-injector       Machelle Gallardo MD    1/23/2023

## 2023-01-23 NOTE — PATIENT INSTRUCTIONS
Bivalent covid vaccine - Pfizer or Moderna    Reprint mammogram order from 7/2022    Wean off the Reglan then pantoprazole as able.

## 2023-01-23 NOTE — ASSESSMENT & PLAN NOTE
Continue Rituxan.  Back on the methotrexate injections weekly.  Continue follow-up with rheumatologist

## 2023-02-07 ENCOUNTER — DOCUMENTATION (OUTPATIENT)
Dept: ENDOCRINOLOGY | Facility: HOSPITAL | Age: 73
End: 2023-02-07

## 2023-02-13 LAB
LEFT EYE DIABETIC RETINOPATHY: NORMAL
RIGHT EYE DIABETIC RETINOPATHY: NORMAL

## 2023-02-21 DIAGNOSIS — E10.8 TYPE 1 DIABETES MELLITUS WITH COMPLICATION (HCC): ICD-10-CM

## 2023-02-21 RX ORDER — INSULIN LISPRO 100 [IU]/ML
INJECTION, SOLUTION INTRAVENOUS; SUBCUTANEOUS
Qty: 30 ML | Refills: 0 | Status: SHIPPED | OUTPATIENT
Start: 2023-02-21

## 2023-02-28 RX ORDER — LISINOPRIL 10 MG/1
TABLET ORAL
Qty: 90 TABLET | Refills: 1 | Status: SHIPPED | OUTPATIENT
Start: 2023-02-28 | End: 2023-09-06 | Stop reason: SDUPTHER

## 2023-04-21 ENCOUNTER — TELEPHONE (OUTPATIENT)
Dept: CARDIOLOGY | Facility: CLINIC | Age: 73
End: 2023-04-21
Payer: MEDICARE

## 2023-04-21 DIAGNOSIS — R06.09 DYSPNEA ON EXERTION: Primary | ICD-10-CM

## 2023-04-21 NOTE — TELEPHONE ENCOUNTER
----- Message from Renée Collier MA sent at 4/21/2023  8:41 AM EDT -----  Regarding: FW: Cardiac Clearance  Per Dr Wise her appointment needs to be moved up (if possible) scheduled for a colonoscopy 5/15  ----- Message -----  From: Madiha Wise MD  Sent: 4/20/2023   1:03 PM EDT  To: Renée Collier MA  Subject: RE: Cardiac Clearance                            I have not seen her since last June.  She is scheduled to see me this June with a stress echo prior.  That appointment needs to be moved up with a stress echo to be able to clear her.  Thank  ----- Message -----  From: Renée Collier MA  Sent: 4/20/2023  12:29 PM EDT  To: Madiha Wise MD  Subject: Cardiac Clearance                                5/15/2023 Lillie is scheduled for a colonoscopy/endoscopy at [facility] [place] with Dr Luis Walker and they are asking for clearance.      LVM for pt to move up her SE and OV.  Due to needing clearance, Dr Wise ok with doing both appts on the same day.  Offered pt Monday, 5/8- SE at 1 PM and OV at 2:20. When pt calls back please confirm.

## 2023-05-05 ENCOUNTER — TELEPHONE (OUTPATIENT)
Dept: CARDIOLOGY | Facility: CLINIC | Age: 73
End: 2023-05-05
Payer: MEDICARE

## 2023-05-08 ENCOUNTER — OFFICE VISIT (OUTPATIENT)
Dept: CARDIOLOGY | Facility: CLINIC | Age: 73
End: 2023-05-08
Payer: MEDICARE

## 2023-05-08 ENCOUNTER — HOSPITAL ENCOUNTER (OUTPATIENT)
Dept: CARDIOLOGY | Facility: CLINIC | Age: 73
Discharge: HOME | End: 2023-05-08
Attending: INTERNAL MEDICINE
Payer: MEDICARE

## 2023-05-08 VITALS — OXYGEN SATURATION: 97 % | HEART RATE: 81 BPM | SYSTOLIC BLOOD PRESSURE: 128 MMHG | DIASTOLIC BLOOD PRESSURE: 72 MMHG

## 2023-05-08 VITALS
HEIGHT: 61 IN | WEIGHT: 112 LBS | BODY MASS INDEX: 21.14 KG/M2 | SYSTOLIC BLOOD PRESSURE: 138 MMHG | DIASTOLIC BLOOD PRESSURE: 78 MMHG

## 2023-05-08 DIAGNOSIS — I10 ESSENTIAL HYPERTENSION: Primary | ICD-10-CM

## 2023-05-08 DIAGNOSIS — E10.40 TYPE 1 DIABETES MELLITUS WITH DIABETIC NEUROPATHY (CMS/HCC): ICD-10-CM

## 2023-05-08 DIAGNOSIS — R06.09 DYSPNEA ON EXERTION: ICD-10-CM

## 2023-05-08 DIAGNOSIS — Z01.810 PREOP CARDIOVASCULAR EXAM: ICD-10-CM

## 2023-05-08 DIAGNOSIS — R93.1 ELEVATED CORONARY ARTERY CALCIUM SCORE: ICD-10-CM

## 2023-05-08 DIAGNOSIS — M05.79 RHEUMATOID ARTHRITIS INVOLVING MULTIPLE SITES WITH POSITIVE RHEUMATOID FACTOR (CMS/HCC): ICD-10-CM

## 2023-05-08 PROBLEM — Z12.31 VISIT FOR SCREENING MAMMOGRAM: Status: RESOLVED | Noted: 2018-06-26 | Resolved: 2023-05-08

## 2023-05-08 PROBLEM — M79.672 LEFT FOOT PAIN: Status: RESOLVED | Noted: 2022-09-21 | Resolved: 2023-05-08

## 2023-05-08 PROBLEM — G47.30 SLEEP APNEA: Status: RESOLVED | Noted: 2022-12-09 | Resolved: 2023-05-08

## 2023-05-08 LAB
AORTIC ROOT ANNULUS: 2.5 CM
ASCENDING AORTA: 2.6 CM
AV PEAK GRADIENT: 11 MMHG
AV PEAK VELOCITY-S: 1.63 M/S
AV VALVE AREA: 1.62 CM2
BSA FOR ECHO PROCEDURE: 1.48 M2
E WAVE DECELERATION TIME: 156 MS
E/A RATIO: 1.1
E/E' RATIO: 12.4
E/LAT E' RATIO: 10.6
EDV (BP): 52.2 CM3
EF (A4C): 58.9 %
EF A2C: 79.6 %
EJECTION FRACTION: 72.2 %
ESV (BP): 14.5 CM3
FRACTIONAL SHORTENING: 37.69 %
INTERVENTRICULAR SEPTUM: 0.87 CM
LA ESV (BP): 28.8 CM3
LA ESV INDEX (A2C): 14.46 CM3/M2
LA ESV INDEX (BP): 19.46 CM3/M2
LA/AORTA RATIO: 1.12
LAAS-AP2: 11.1 CM2
LAAS-AP4: 15.4 CM2
LAD 2D: 2.8 CM
LALD A4C: 4.6 CM
LALD A4C: 4.84 CM
LAV-S: 21.4 CM3
LEFT ATRIUM VOLUME INDEX: 25.34 CM3/M2
LEFT ATRIUM VOLUME: 37.5 CM3
LEFT INTERNAL DIMENSION IN SYSTOLE: 2.48 CM (ref 2.23–3.37)
LEFT VENTRICLE DIASTOLIC VOLUME INDEX: 33.18 CM3/M2
LEFT VENTRICLE DIASTOLIC VOLUME: 49.1 CM3
LEFT VENTRICLE SYSTOLIC VOLUME INDEX: 13.65 CM3/M2
LEFT VENTRICLE SYSTOLIC VOLUME: 20.2 CM3
LEFT VENTRICULAR INTERNAL DIMENSION IN DIASTOLE: 3.98 CM (ref 3.75–5.2)
LEFT VENTRICULAR POSTERIOR WALL IN END DIASTOLE: 0.8 CM (ref 0.48–0.88)
LV DIASTOLIC VOLUME: 51 CM3
LV ESV (APICAL 2 CHAMBER): 10.4 CM3
LVAD-AP2: 19.7 CM2
LVAD-AP4: 18.7 CM2
LVAS-AP2: 7.37 CM2
LVAS-AP4: 10.2 CM2
LVEDVI(A2C): 34.46 CM3/M2
LVEDVI(BP): 35.27 CM3/M2
LVESVI(A2C): 7.03 CM3/M2
LVESVI(BP): 9.8 CM3/M2
LVLD-AP2: 6.44 CM
LVLD-AP4: 5.88 CM
LVLS-AP2: 4.56 CM
LVLS-AP4: 4.44 CM
LVOT 2D: 2.1 CM
LVOT A: 3.46 CM2
LVOT PEAK VELOCITY: 0.97 M/S
LVOT PG: 4 MMHG
MLH CV ECHO AVA INDEX VELOCITY RATIO: 1.1
MV E'TISSUE VEL-LAT: 0.09 M/S
MV E'TISSUE VEL-MED: 0.08 M/S
MV PEAK A VEL: 0.85 M/S
MV PEAK E VEL: 0.95 M/S
POSTERIOR WALL: 0.8 CM
RVOT VMAX: 0.63 M/S
SEPTAL TISSUE DOPPLER FREE WALL LATE DIA VELOCITY (APICAL 4 CHAMBER VIEW): 0.13 M/S
STRESS BASELINE BP: NORMAL MMHG
STRESS BASELINE HR: 83 BPM
STRESS PERCENT HR: 97 %
STRESS POST ESTIMATED WORKLOAD: 7 METS
STRESS POST EXERCISE DUR MIN: 5 MIN
STRESS POST EXERCISE DUR SEC: 38 SEC
STRESS POST PEAK BP: NORMAL MMHG
STRESS POST PEAK HR: 141 BPM
STRESS TARGET HR: 124 BPM
TR MAX PG: 27.04 MMHG
TRICUSPID VALVE PEAK REGURGITATION VELOCITY: 2.6 M/S
Z-SCORE OF LEFT VENTRICULAR DIMENSION IN END DIASTOLE: -1.03
Z-SCORE OF LEFT VENTRICULAR DIMENSION IN END SYSTOLE: -0.79
Z-SCORE OF LEFT VENTRICULAR POSTERIOR WALL IN END DIASTOLE: 1.11

## 2023-05-08 PROCEDURE — 93320 DOPPLER ECHO COMPLETE: CPT | Performed by: INTERNAL MEDICINE

## 2023-05-08 PROCEDURE — G8754 DIAS BP LESS 90: HCPCS | Performed by: INTERNAL MEDICINE

## 2023-05-08 PROCEDURE — 93351 STRESS TTE COMPLETE: CPT | Performed by: INTERNAL MEDICINE

## 2023-05-08 PROCEDURE — 93000 ELECTROCARDIOGRAM COMPLETE: CPT | Mod: XU | Performed by: INTERNAL MEDICINE

## 2023-05-08 PROCEDURE — G8752 SYS BP LESS 140: HCPCS | Performed by: INTERNAL MEDICINE

## 2023-05-08 PROCEDURE — 93325 DOPPLER ECHO COLOR FLOW MAPG: CPT | Performed by: INTERNAL MEDICINE

## 2023-05-08 PROCEDURE — 99214 OFFICE O/P EST MOD 30 MIN: CPT | Performed by: INTERNAL MEDICINE

## 2023-05-08 RX ORDER — METHOTREXATE 25 MG/ML
INJECTION, SOLUTION INTRAMUSCULAR; INTRATHECAL; INTRAVENOUS; SUBCUTANEOUS
COMMUNITY
Start: 2023-05-05 | End: 2024-03-07

## 2023-05-08 NOTE — LETTER
May 8, 2023     Machelle Gallardo MD  [address]  [facility] [address]    Patient: Lillie Ward  YOB: 1950  Date of Visit: 5/8/2023      Dear Dr. Gallardo:    Thank you for referring Lillie Ward to me for evaluation. Below are my notes for this consultation.    If you have questions, please do not hesitate to call me. I look forward to following your patient along with you.         Sincerely,        Madiha Wise MD        CC: MD Adry Delgado MD Barrasse, Madiha NELSON MD  5/8/2023  9:59 PM  Signed       Madiha Wise MD       Reason for visit : Follow up  HPI   Lillie Ward is a 72 y.o. female who presents to the office for cardiovascular follow up.      Dear Machelle,    On May 8, 2023 I saw Lillie Ward in the office for cardiovascular follow-up.  She is now 72 years old.  She has a longstanding history of hyperlipidemia, hypertension, diabetes, rheumatoid arthritis, and reflux.  She also has known coronary disease.  In 2021 she had a calcium score of 136 all of which was in the LAD.    Since I have seen her she tells me she feels well without any chest pain, shortness of breath, lightheadedness, palpitations, or syncope.  She walks her dogs approximately 20 minutes/day without difficulty.    She is in need of both an endoscopy and colonoscopy.  She is now here for preoperative clearance.  I reviewed her allergies, medications, family history, social history, medical history, and surgical history.  None of this is changed other than the above-mentioned.  The rest of her review of systems is completely negative       Problem List:  2022-12: Bilateral impacted cerumen  2022-12: Sleep apnea  2022-12: Bilateral sensorineural hearing loss  2022-12: Acute URI  2022-09: Left foot pain  2022-06: Sensorineural hearing loss, bilateral  2022-06: Impacted cerumen  2022-06: Elevated coronary artery calcium score  2022-01: Wheezing  2022-01: Reactive depression  2022-01:  Immunodeficiency, unspecified (CMS/[facility])  2021-07: Ground glass opacity present on imaging of lung  2021-07: Liver lesion, left lobe  2021-07: Encounter for Medicare annual wellness exam  2021-05: Coronary artery disease due to calcified coronary lesion  2021-05: Liver lesion  2020-12: Diarrhea  2020-11: OA (osteoarthritis) of hip  2020-11: Fever  2020-11: Chest discomfort  2020-11: Primary osteoarthritis of right hip  2020-11: Pre-op exam  2020-11: Right hip pain  2020-07: Essential hypertension  2020-07: Insulin pump in place  2019-10: Postmenopausal  2019-10: Cellulitis of right lower extremity  2019-04: Atypical chest pain  2018-08: Pre-op evaluation  2018-08: Cataract of both eyes  2018-06: Abscess of axilla  2018-06: Visit for screening mammogram  2018-04: Hypoglycemia unawareness associated with type 1 diabetes   mellitus (CMS/[facility])  2018-04: Type 1 diabetes mellitus with diabetic neuropathy (CMS/[facility])  2016-12: Fibrosis of lung (CMS/[facility])  2016-09: Type 1 diabetes mellitus (CMS/[facility])  2016-09: GERD (gastroesophageal reflux disease)  2016-09: Hyperlipidemia  2016-09: Rheumatoid arthritis (CMS/[facility])           Current Outpatient Medications   Medication Sig   • cholecalciferol, vitamin D3, 25 mcg (1,000 unit) capsule Take 1,000 Units by mouth daily.     • citalopram (celeXA) 10 mg tablet Take 1 tablet by mouth once daily   • docosahexaenoic acid/epa (FISH OIL ORAL) Take by mouth 2 (two) times a day. For dry eyes   • folic acid (FOLVITE) 1 mg tablet Take 1 mg by mouth daily.   • insulin lispro U-100 (HumaLOG) 100 unit/mL subcutaneous cartridge inject by subcutaneous route per prescriber's instructions. Insulin dosing requires individualization.   • lisinopriL (PRINIVIL) 10 mg tablet Take 1 tablet by mouth once daily   • methotrexate (TREXALL) 25 mg/mL chemo syringe    • methotrexate, PF, 10 mg/0.2 mL auto-injector Inject under the skin once a week. Pt is not sure the dose   • metoclopramide (REGLAN) 5 mg tablet Take 5  mg by mouth 3 (three) times a day.   • multivit with minerals/lutein (MULTIVITAMIN 50 PLUS ORAL) No SIG Entered   • pantoprazole (PROTONIX) 40 mg EC tablet Take 40 mg by mouth daily. Twice a week   • riTUXimab (RITUXAN) 10 mg/mL injection Inject 10 mg/mL as directed. Every 16 weeks   10/13/2022 next    • rosuvastatin (CRESTOR) 10 mg tablet Take 1 tablet (10 mg total) by mouth daily.   • vit A/vit C/vit E/zinc/copper (PRESERVISION AREDS ORAL) Take by mouth 2 (two) times a day.   • albuterol HFA (VENTOLIN HFA) 90 mcg/actuation inhaler INHALE 2 PUFFS BY MOUTH EVERY 4 HOURS AS NEEDED FOR SHORTNESS OF BREATH , COUGH, OR WHEEZING   • aspirin 81 mg capsule daily.   • doxycycline hyclate (VIBRAMYCIN) 100 mg capsule TAKE 1 CAPSULE BY MOUTH EVERY 12 HOURS   • glucagon, human recombinant, 1 mg injection Infuse 1 mg into a venous catheter once.   • Lactobac no.41-Bifidobact no.7 70 mg (3 billion cell) capsule Take by mouth daily.   • mupirocin (BACTROBAN) 2 % ointment Apply topically as needed.     No current facility-administered medications for this visit.          Allergies:  Neomycin, Neomycin-bacitracin-polymyxin, Neomycin-bacitracnzn-polymyxnb, Sulfamethoxazole-trimethoprim, Tetracycline, and Bacitracin    Surgical History:  Past Surgical History:   Procedure Laterality Date   • COLONOSCOPY     • EYE SURGERY Bilateral     CATARACT   • FOOT SURGERY Left 2009    reconstruction   • FOOT SURGERY Right 2007    reconstruction   • JOINT REPLACEMENT Right     SHOULDER   • LIVER SURGERY      '80's   • REPLACEMENT TOTAL KNEE Left 04/04/2017    [facility]   • SKIN BIOPSY     • TOTAL HIP ARTHROPLASTY Right 11/2020   • TOTAL SHOULDER ARTHROPLASTY Right 1990        Family History:  Family History   Problem Relation Age of Onset   • Stroke Biological Mother    • Lung cancer Biological Father    • No Known Problems Biological Sister    • Rheum arthritis Other         1 of 8 siblings   • Prostate cancer Biological Son    • Pancreatitis  Biological Son    • Alcohol abuse Biological Son    • Bipolar disorder Biological Son    • Bipolar disorder Biological Daughter         no diagnosis   • Depression Biological Daughter    • Anxiety disorder Biological Daughter         Social History:  Social History     Socioeconomic History   • Marital status:      Spouse name: None   • Number of children: 1   • Years of education: None   • Highest education level: None   Occupational History   • Occupation: Retired teacher   Tobacco Use   • Smoking status: Never   • Smokeless tobacco: Never   Vaping Use   • Vaping status: Never Used   Substance and Sexual Activity   • Alcohol use: Yes   • Drug use: No   • Sexual activity: Yes   Social History Narrative    Marital status- . was a pediatrician    Children- 1 son    Caffeine - coffee    Exercise-walking    Diet-low carb    Pets-    Episcopalian- none    Seat belt-yes    Smoke alarm-yes             Social Determinants of Health     Food Insecurity: No Food Insecurity (12/9/2022)    Hunger Vital Sign    • Worried About Running Out of Food in the Last Year: Never true    • Ran Out of Food in the Last Year: Never true           Review of Systems    The rest of her review of systems is completely negative       Objective   Vitals:    05/08/23 1401   BP: 128/72   BP Location: Left upper arm   Patient Position: Sitting   Pulse: 81   SpO2: 97%     Physical Exam  Blood pressure is 128/72.  Heart rate is 81 and regular.  She is comfortable.  Skin is warm and dry.  Conjunctiva are pink.  Affect is appropriate.  No JVD or HJR.  Carotids are unremarkable.  Chest is clear.  Regular rhythm.  Normal S1 and S2.  No obvious murmurs or gallops.  Abdomen is soft with good bowel sounds.  No organomegaly.  No clubbing, cyanosis, or edema.  Fair peripheral pulses.  No rash.       Labs:   Lab Results   Component Value Date    WBC 4.10 12/09/2022    HGB 12.9 12/09/2022    HCT 39.5 12/09/2022     12/09/2022    CHOL 185  09/22/2021    TRIG 108 09/22/2021    HDL 89 09/22/2021    LDLCALC 77 09/22/2021    ALT 23 12/09/2022    AST 45 (H) 12/09/2022     12/09/2022    K 4.0 12/09/2022    CL 98 12/09/2022    CREATININE 0.7 12/09/2022    BUN 9 12/09/2022    CO2 29 12/09/2022    HGBA1C 6.9 (H) 11/11/2020       ECG :  Her EKG is within normal limits    Cardiac Imaging    ECHOCARDIOGRAM STRESS TEST 05/08/2023    Interpretation Summary  1.  Exercise ECG test was terminated because of mild shortness of breath.  She had no chest pain.  2.  Target heart rate was achieved.  3.  Exercise capacity was good.  4.  Normal blood pressure response to exercise.  5.  One 6 beat run of SVT in the immediate recovery phase.  This was asymptomatic.  6.  No EKG changes consistent with ischemia.  7.  No echocardiographic changes with ischemia.    Baseline echo:  1.  Normal left ventricular function with an estimated ejection fraction of 60 to 65%.  2.  Grade 1 diastolic dysfunction.  3.  Mildly dilated left and right atria.  4.  Thickened aortic root.  5.  Mildly thickened aortic valve leaflets without any abnormality by Doppler.  6.  Mild tricuspid insufficiency with normal pulmonary artery pressures.  7.  Mild mitral regurgitation  8.  This is essentially unchanged from the study of 6/8/2022             Problem List Items Addressed This Visit        Nervous    Type 1 diabetes mellitus with diabetic neuropathy (CMS/HCC)    Relevant Orders    Echocardiogram stress test       Circulatory    Essential hypertension - Primary    Relevant Orders    ECG 12 lead (Completed)    Elevated coronary artery calcium score    Relevant Orders    Echocardiogram stress test       Rheumatologic    Rheumatoid arthritis (CMS/[facility])   Other Visit Diagnoses     Preop cardiovascular exam              Impression:    And has no cardiovascular symptoms.  She has no angina and her stress test is without ischemia.  I would place her at low risk for both upper and lower endoscopy.  Her  aspirin can be discontinued 1 week prior to the procedure and restarted when surgically safe.    Her endocrinologist is following her lipids.  Ideally I would like to see her LDL less than 70.    Her blood pressures are well controlled.    If all goes well I will see her again in 2 years with a stress echo prior to that visit.  I thank you for allowing me to participate in the care of your patient.  If I can furnish you with any further details, please do not hesitate to contact me.     Madiha Wise MD  5/8/2023    This document was generated utilizing voice recognition technology. A reasonable attempt at proofreading has been made to minimize errors but please excuse any typographical errors which may be present. Please call with any questions.

## 2023-05-09 NOTE — PROGRESS NOTES
Madiha Wise MD       Reason for visit : Follow up  HPI   Lillie Ward is a 72 y.o. female who presents to the office for cardiovascular follow up.      Dear Machelle,    On May 8, 2023 I saw Lillie Ward in the office for cardiovascular follow-up.  She is now 72 years old.  She has a longstanding history of hyperlipidemia, hypertension, diabetes, rheumatoid arthritis, and reflux.  She also has known coronary disease.  In 2021 she had a calcium score of 136 all of which was in the LAD.    Since I have seen her she tells me she feels well without any chest pain, shortness of breath, lightheadedness, palpitations, or syncope.  She walks her dogs approximately 20 minutes/day without difficulty.    She is in need of both an endoscopy and colonoscopy.  She is now here for preoperative clearance.  I reviewed her allergies, medications, family history, social history, medical history, and surgical history.  None of this is changed other than the above-mentioned.  The rest of her review of systems is completely negative       Problem List:  2022-12: Bilateral impacted cerumen  2022-12: Sleep apnea  2022-12: Bilateral sensorineural hearing loss  2022-12: Acute URI  2022-09: Left foot pain  2022-06: Sensorineural hearing loss, bilateral  2022-06: Impacted cerumen  2022-06: Elevated coronary artery calcium score  2022-01: Wheezing  2022-01: Reactive depression  2022-01: Immunodeficiency, unspecified (CMS/[facility])  2021-07: Ground glass opacity present on imaging of lung  2021-07: Liver lesion, left lobe  2021-07: Encounter for Medicare annual wellness exam  2021-05: Coronary artery disease due to calcified coronary lesion  2021-05: Liver lesion  2020-12: Diarrhea  2020-11: OA (osteoarthritis) of hip  2020-11: Fever  2020-11: Chest discomfort  2020-11: Primary osteoarthritis of right hip  2020-11: Pre-op exam  2020-11: Right hip pain  2020-07: Essential hypertension  2020-07: Insulin pump in place  2019-10:  Postmenopausal  2019-10: Cellulitis of right lower extremity  2019-04: Atypical chest pain  2018-08: Pre-op evaluation  2018-08: Cataract of both eyes  2018-06: Abscess of axilla  2018-06: Visit for screening mammogram  2018-04: Hypoglycemia unawareness associated with type 1 diabetes   mellitus (CMS/[facility])  2018-04: Type 1 diabetes mellitus with diabetic neuropathy (CMS/HCC)  2016-12: Fibrosis of lung (CMS/HCC)  2016-09: Type 1 diabetes mellitus (CMS/HCC)  2016-09: GERD (gastroesophageal reflux disease)  2016-09: Hyperlipidemia  2016-09: Rheumatoid arthritis (CMS/HCC)           Current Outpatient Medications   Medication Sig   • cholecalciferol, vitamin D3, 25 mcg (1,000 unit) capsule Take 1,000 Units by mouth daily.     • citalopram (celeXA) 10 mg tablet Take 1 tablet by mouth once daily   • docosahexaenoic acid/epa (FISH OIL ORAL) Take by mouth 2 (two) times a day. For dry eyes   • folic acid (FOLVITE) 1 mg tablet Take 1 mg by mouth daily.   • insulin lispro U-100 (HumaLOG) 100 unit/mL subcutaneous cartridge inject by subcutaneous route per prescriber's instructions. Insulin dosing requires individualization.   • lisinopriL (PRINIVIL) 10 mg tablet Take 1 tablet by mouth once daily   • methotrexate (TREXALL) 25 mg/mL chemo syringe    • methotrexate, PF, 10 mg/0.2 mL auto-injector Inject under the skin once a week. Pt is not sure the dose   • metoclopramide (REGLAN) 5 mg tablet Take 5 mg by mouth 3 (three) times a day.   • multivit with minerals/lutein (MULTIVITAMIN 50 PLUS ORAL) No SIG Entered   • pantoprazole (PROTONIX) 40 mg EC tablet Take 40 mg by mouth daily. Twice a week   • riTUXimab (RITUXAN) 10 mg/mL injection Inject 10 mg/mL as directed. Every 16 weeks   10/13/2022 next    • rosuvastatin (CRESTOR) 10 mg tablet Take 1 tablet (10 mg total) by mouth daily.   • vit A/vit C/vit E/zinc/copper (PRESERVISION AREDS ORAL) Take by mouth 2 (two) times a day.   • albuterol HFA (VENTOLIN HFA) 90 mcg/actuation inhaler  INHALE 2 PUFFS BY MOUTH EVERY 4 HOURS AS NEEDED FOR SHORTNESS OF BREATH , COUGH, OR WHEEZING   • aspirin 81 mg capsule daily.   • doxycycline hyclate (VIBRAMYCIN) 100 mg capsule TAKE 1 CAPSULE BY MOUTH EVERY 12 HOURS   • glucagon, human recombinant, 1 mg injection Infuse 1 mg into a venous catheter once.   • Lactobac no.41-Bifidobact no.7 70 mg (3 billion cell) capsule Take by mouth daily.   • mupirocin (BACTROBAN) 2 % ointment Apply topically as needed.     No current facility-administered medications for this visit.          Allergies:  Neomycin, Neomycin-bacitracin-polymyxin, Neomycin-bacitracnzn-polymyxnb, Sulfamethoxazole-trimethoprim, Tetracycline, and Bacitracin    Surgical History:  Past Surgical History:   Procedure Laterality Date   • COLONOSCOPY     • EYE SURGERY Bilateral     CATARACT   • FOOT SURGERY Left 2009    reconstruction   • FOOT SURGERY Right 2007    reconstruction   • JOINT REPLACEMENT Right     SHOULDER   • LIVER SURGERY      '80's   • REPLACEMENT TOTAL KNEE Left 04/04/2017    [facility]   • SKIN BIOPSY     • TOTAL HIP ARTHROPLASTY Right 11/2020   • TOTAL SHOULDER ARTHROPLASTY Right 1990        Family History:  Family History   Problem Relation Age of Onset   • Stroke Biological Mother    • Lung cancer Biological Father    • No Known Problems Biological Sister    • Rheum arthritis Other         1 of 8 siblings   • Prostate cancer Biological Son    • Pancreatitis Biological Son    • Alcohol abuse Biological Son    • Bipolar disorder Biological Son    • Bipolar disorder Biological Daughter         no diagnosis   • Depression Biological Daughter    • Anxiety disorder Biological Daughter         Social History:  Social History     Socioeconomic History   • Marital status:      Spouse name: None   • Number of children: 1   • Years of education: None   • Highest education level: None   Occupational History   • Occupation: Retired teacher   Tobacco Use   • Smoking status: Never   • Smokeless  tobacco: Never   Vaping Use   • Vaping status: Never Used   Substance and Sexual Activity   • Alcohol use: Yes   • Drug use: No   • Sexual activity: Yes   Social History Narrative    Marital status- . was a pediatrician    Children- 1 son    Caffeine - coffee    Exercise-walking    Diet-low carb    Pets-    Restorationism- none    Seat belt-yes    Smoke alarm-yes             Social Determinants of Health     Food Insecurity: No Food Insecurity (12/9/2022)    Hunger Vital Sign    • Worried About Running Out of Food in the Last Year: Never true    • Ran Out of Food in the Last Year: Never true           Review of Systems    The rest of her review of systems is completely negative       Objective   Vitals:    05/08/23 1401   BP: 128/72   BP Location: Left upper arm   Patient Position: Sitting   Pulse: 81   SpO2: 97%     Physical Exam  Blood pressure is 128/72.  Heart rate is 81 and regular.  She is comfortable.  Skin is warm and dry.  Conjunctiva are pink.  Affect is appropriate.  No JVD or HJR.  Carotids are unremarkable.  Chest is clear.  Regular rhythm.  Normal S1 and S2.  No obvious murmurs or gallops.  Abdomen is soft with good bowel sounds.  No organomegaly.  No clubbing, cyanosis, or edema.  Fair peripheral pulses.  No rash.       Labs:   Lab Results   Component Value Date    WBC 4.10 12/09/2022    HGB 12.9 12/09/2022    HCT 39.5 12/09/2022     12/09/2022    CHOL 185 09/22/2021    TRIG 108 09/22/2021    HDL 89 09/22/2021    LDLCALC 77 09/22/2021    ALT 23 12/09/2022    AST 45 (H) 12/09/2022     12/09/2022    K 4.0 12/09/2022    CL 98 12/09/2022    CREATININE 0.7 12/09/2022    BUN 9 12/09/2022    CO2 29 12/09/2022    HGBA1C 6.9 (H) 11/11/2020       ECG :  Her EKG is within normal limits    Cardiac Imaging    ECHOCARDIOGRAM STRESS TEST 05/08/2023    Interpretation Summary  1.  Exercise ECG test was terminated because of mild shortness of breath.  She had no chest pain.  2.  Target heart rate  was achieved.  3.  Exercise capacity was good.  4.  Normal blood pressure response to exercise.  5.  One 6 beat run of SVT in the immediate recovery phase.  This was asymptomatic.  6.  No EKG changes consistent with ischemia.  7.  No echocardiographic changes with ischemia.    Baseline echo:  1.  Normal left ventricular function with an estimated ejection fraction of 60 to 65%.  2.  Grade 1 diastolic dysfunction.  3.  Mildly dilated left and right atria.  4.  Thickened aortic root.  5.  Mildly thickened aortic valve leaflets without any abnormality by Doppler.  6.  Mild tricuspid insufficiency with normal pulmonary artery pressures.  7.  Mild mitral regurgitation  8.  This is essentially unchanged from the study of 6/8/2022             Problem List Items Addressed This Visit        Nervous    Type 1 diabetes mellitus with diabetic neuropathy (CMS/HCC)    Relevant Orders    Echocardiogram stress test       Circulatory    Essential hypertension - Primary    Relevant Orders    ECG 12 lead (Completed)    Elevated coronary artery calcium score    Relevant Orders    Echocardiogram stress test       Rheumatologic    Rheumatoid arthritis (CMS/[facility])   Other Visit Diagnoses     Preop cardiovascular exam              Impression:    And has no cardiovascular symptoms.  She has no angina and her stress test is without ischemia.  I would place her at low risk for both upper and lower endoscopy.  Her aspirin can be discontinued 1 week prior to the procedure and restarted when surgically safe.    Her endocrinologist is following her lipids.  Ideally I would like to see her LDL less than 70.    Her blood pressures are well controlled.    If all goes well I will see her again in 2 years with a stress echo prior to that visit.  I thank you for allowing me to participate in the care of your patient.  If I can furnish you with any further details, please do not hesitate to contact me.     Madiha Wise MD  5/8/2023    This document  was generated utilizing voice recognition technology. A reasonable attempt at proofreading has been made to minimize errors but please excuse any typographical errors which may be present. Please call with any questions.

## 2023-06-16 DIAGNOSIS — E10.8 TYPE 1 DIABETES MELLITUS WITH COMPLICATION (HCC): ICD-10-CM

## 2023-06-16 RX ORDER — INSULIN LISPRO 100 [IU]/ML
INJECTION, SOLUTION INTRAVENOUS; SUBCUTANEOUS
Qty: 30 ML | Refills: 0 | Status: SHIPPED | OUTPATIENT
Start: 2023-06-16

## 2023-07-19 LAB
ALBUMIN SERPL-MCNC: 4 G/DL (ref 3.8–4.8)
ALBUMIN/CREAT UR: 10 MG/G CREAT (ref 0–29)
ALBUMIN/GLOB SERPL: 1.7 {RATIO} (ref 1.2–2.2)
ALP SERPL-CCNC: 64 IU/L (ref 44–121)
ALT SERPL-CCNC: 20 IU/L (ref 0–32)
AST SERPL-CCNC: 32 IU/L (ref 0–40)
BASOPHILS # BLD AUTO: 0.1 X10E3/UL (ref 0–0.2)
BASOPHILS NFR BLD AUTO: 2 %
BILIRUB SERPL-MCNC: 1.3 MG/DL (ref 0–1.2)
BUN SERPL-MCNC: 12 MG/DL (ref 8–27)
BUN/CREAT SERPL: 16 (ref 12–28)
CALCIUM SERPL-MCNC: 10 MG/DL (ref 8.7–10.3)
CHLORIDE SERPL-SCNC: 105 MMOL/L (ref 96–106)
CHOLEST SERPL-MCNC: 173 MG/DL (ref 100–199)
CO2 SERPL-SCNC: 24 MMOL/L (ref 20–29)
CREAT SERPL-MCNC: 0.73 MG/DL (ref 0.57–1)
CREAT UR-MCNC: 136.7 MG/DL
EGFR: 86 ML/MIN/1.73
EOSINOPHIL # BLD AUTO: 0.2 X10E3/UL (ref 0–0.4)
EOSINOPHIL NFR BLD AUTO: 6 %
ERYTHROCYTE [DISTWIDTH] IN BLOOD BY AUTOMATED COUNT: 12.9 % (ref 11.7–15.4)
EST. AVERAGE GLUCOSE BLD GHB EST-MCNC: 146 MG/DL
GLOBULIN SER-MCNC: 2.3 G/DL (ref 1.5–4.5)
GLUCOSE SERPL-MCNC: 130 MG/DL (ref 70–99)
HBA1C MFR BLD: 6.7 % (ref 4.8–5.6)
HCT VFR BLD AUTO: 43.4 % (ref 34–46.6)
HDLC SERPL-MCNC: 87 MG/DL
HGB BLD-MCNC: 14.9 G/DL (ref 11.1–15.9)
IMM GRANULOCYTES # BLD: 0 X10E3/UL (ref 0–0.1)
IMM GRANULOCYTES NFR BLD: 0 %
LDLC SERPL CALC-MCNC: 72 MG/DL (ref 0–99)
LYMPHOCYTES # BLD AUTO: 1.1 X10E3/UL (ref 0.7–3.1)
LYMPHOCYTES NFR BLD AUTO: 25 %
MCH RBC QN AUTO: 31.8 PG (ref 26.6–33)
MCHC RBC AUTO-ENTMCNC: 34.3 G/DL (ref 31.5–35.7)
MCV RBC AUTO: 93 FL (ref 79–97)
MICROALBUMIN UR-MCNC: 13.1 UG/ML
MONOCYTES # BLD AUTO: 0.5 X10E3/UL (ref 0.1–0.9)
MONOCYTES NFR BLD AUTO: 11 %
NEUTROPHILS # BLD AUTO: 2.4 X10E3/UL (ref 1.4–7)
NEUTROPHILS NFR BLD AUTO: 56 %
PLATELET # BLD AUTO: 226 X10E3/UL (ref 150–450)
POTASSIUM SERPL-SCNC: 4.2 MMOL/L (ref 3.5–5.2)
PROT SERPL-MCNC: 6.3 G/DL (ref 6–8.5)
RBC # BLD AUTO: 4.69 X10E6/UL (ref 3.77–5.28)
SL AMB VLDL CHOLESTEROL CALC: 14 MG/DL (ref 5–40)
SODIUM SERPL-SCNC: 142 MMOL/L (ref 134–144)
TRIGL SERPL-MCNC: 72 MG/DL (ref 0–149)
TSH SERPL DL<=0.005 MIU/L-ACNC: 2.94 UIU/ML (ref 0.45–4.5)
WBC # BLD AUTO: 4.2 X10E3/UL (ref 3.4–10.8)

## 2023-07-21 ENCOUNTER — OFFICE VISIT (OUTPATIENT)
Dept: ENDOCRINOLOGY | Facility: HOSPITAL | Age: 73
End: 2023-07-21
Payer: MEDICARE

## 2023-07-21 VITALS
DIASTOLIC BLOOD PRESSURE: 72 MMHG | BODY MASS INDEX: 20.96 KG/M2 | WEIGHT: 111 LBS | OXYGEN SATURATION: 97 % | HEART RATE: 72 BPM | HEIGHT: 61 IN | SYSTOLIC BLOOD PRESSURE: 116 MMHG

## 2023-07-21 DIAGNOSIS — E10.8 TYPE 1 DIABETES MELLITUS WITH COMPLICATION (HCC): ICD-10-CM

## 2023-07-21 DIAGNOSIS — E10.40 TYPE 1 DIABETES MELLITUS WITH DIABETIC NEUROPATHY (HCC): Primary | ICD-10-CM

## 2023-07-21 DIAGNOSIS — E10.649 HYPOGLYCEMIA UNAWARENESS ASSOCIATED WITH TYPE 1 DIABETES MELLITUS (HCC): ICD-10-CM

## 2023-07-21 DIAGNOSIS — Z96.41 INSULIN PUMP IN PLACE: ICD-10-CM

## 2023-07-21 DIAGNOSIS — E78.2 MIXED HYPERLIPIDEMIA: ICD-10-CM

## 2023-07-21 DIAGNOSIS — I10 ESSENTIAL HYPERTENSION: ICD-10-CM

## 2023-07-21 PROCEDURE — 99214 OFFICE O/P EST MOD 30 MIN: CPT | Performed by: INTERNAL MEDICINE

## 2023-07-21 PROCEDURE — 95251 CONT GLUC MNTR ANALYSIS I&R: CPT | Performed by: INTERNAL MEDICINE

## 2023-07-21 RX ORDER — INSULIN LISPRO 100 [IU]/ML
INJECTION, SOLUTION INTRAVENOUS; SUBCUTANEOUS
Qty: 30 ML | Refills: 3 | Status: SHIPPED | OUTPATIENT
Start: 2023-07-21

## 2023-07-21 NOTE — PROGRESS NOTES
7/21/2023    Assessment/Plan      Diagnoses and all orders for this visit:    Type 1 diabetes mellitus with diabetic neuropathy ([address])  -     HEMOGLOBIN A1C W/ EAG ESTIMATION Lab Collect; Future  -     Comprehensive metabolic panel Lab Collect; Future  -     CBC and differential Lab Collect; Future    Hypoglycemic unawareness associated with type 1 diabetes mellitus  -     HEMOGLOBIN A1C W/ EAG ESTIMATION Lab Collect; Future  -     Comprehensive metabolic panel Lab Collect; Future  -     CBC and differential Lab Collect; Future    Essential hypertension  -     HEMOGLOBIN A1C W/ EAG ESTIMATION Lab Collect; Future  -     Comprehensive metabolic panel Lab Collect; Future  -     CBC and differential Lab Collect; Future    Mixed hyperlipidemia  -     HEMOGLOBIN A1C W/ EAG ESTIMATION Lab Collect; Future  -     Comprehensive metabolic panel Lab Collect; Future  -     CBC and differential Lab Collect; Future    Insulin pump in place  -     HEMOGLOBIN A1C W/ EAG ESTIMATION Lab Collect; Future  -     Comprehensive metabolic panel Lab Collect; Future  -     CBC and differential Lab Collect; Future    Type 1 diabetes mellitus with complication (HCC)  -     HumaLOG 100 UNIT/ML injection; Use via insulin pump up to 100 units daily    Other orders  -     Calcium Carbonate-Vit D-Min (CALCIUM 1200 PO); Take by mouth        Assessment/Plan:  1. Type 1 diabetes. Hemoglobin A1c is 6.7%. This does demonstrate excellent control of her diabetes but she is having some wide swings both high and low on her Dexcom download. I have asked her to adjust her basal rate on her insulin pump from 3 PM to 6 PM to 0.375 units/h to prevent low blood sugars in the afternoon. I have asked her to adjust her insulin to carbohydrate ratios from 7 AM to 3 PM to 1 unit per 13 g carbohydrate, 3 PM to 6 PM 1 unit per 11 g carbohydrate, 6 PM to 9 PM 1 unit per 11 g carbohydrate, 9 PM to 12 AM 1 unit per 14 g carbohydrate.   All other insulin pump rates will remain the same for now. She will continue to utilize the tandem T slim to insulin pump and Dexcom G6 continuous glucose monitoring system integrated with her insulin pump. 2.  Diabetic neuropathy. She denies neuropathic symptoms. Diabetic foot exams are up-to-date. 3.  Hypoglycemic unawareness. She will continue to utilize the same G6 continuous glucose monitoring system integrated with her insulin pump for safety. 4.  Hypertension. She is normotensive in the office on her current dose of lisinopril. 5.  Hyperlipidemia. She will continue same rosuvastatin 10 mg daily. I have asked her to follow-up in 3 months with preceding hemoglobin A1c, CMP, and CBC. CC: Diabetes type I, blood pressure, lipid follow-up    History of Present Illness     HPI: Dolores Dan is a 74y.o. year old female with type 1 diabetes with neuropathy and hypoglycemic unawareness for 43 years hypertension, hyperlipidemia for follow-up visit. She is on insulin at home and takes him log insulin via the insulin pump. She denies any polyuria, polydipsia, polyphagia, and blurry vision. She has 2 times per night nocturia. She denies numbness or tingling of the feet. She denies chest pain or shortness of breath. She denies nephropathy, heart attack, stroke and claudication but does admit to neuropathy and retinopathy and hypoglycemic unawareness. She utilizes a tandem t:slim 2 insulin pump with Dexcom G6 continuous glucose monitoring system. This new pump was obtained in May 2022. She has been on insulin pump therapy since 1998. She is in control IQ 95% of the time. Basal rates:  12 AM to 3:30 AM 0.1 units/h, 3:30 AM to 7 AM 0.4 units/h, 7 AM to 3 PM 0.8 units/h, 3 PM to 6 PM 0.4 units/h, 6 PM to 12 AM 0.575 units/h. Bolus rates:  12 AM to 7 AM 1 unit per 12 g carbohydrate, 7 AM to 3 PM 1 unit per 14 g carbohydrate, 3 PM to 12 AM 1 unit for 12 g carbohydrate.   Insulin sensitivity factor:  1 unit for every 55 blood sugar points for target blood glucose of 110. Insulin action:  5 hours. Total daily insulin dosage 25.67 units with 54% basal and 46% bolus. Hypoglycemic episodes: Yes several times per week. H/o of hypoglycemia causing hospitalization or intervention such as glucagon injection  or ambulance call  Yes. The patient's last eye exam was in February 2023 with no retinopathy but dry macular degeneration. The patient's last foot exam was in April 2023 at endocrine office visit. She does not follow with podiatry. Last A1C was   Lab Results   Component Value Date    HGBA1C 6.7 (H) 07/18/2023   . Blood Sugar/Glucometer/Pump/CGM review: She utilizes Dexcom G6 continuous glucose monitoring system integrated with her tandem T slim to insulin pump. Dexcom download from 7/7/2023 through 7/20/2023 was reviewed in the office today CGM was active 95% of the time. Average glucose is 157 mg/dL with a standard deviation of 54.5 milligrams per deciliter. 70% of blood sugars are in target range, 29% high, 1% low. She has hyperlipidemia and takes rosuvastatin 10 mg daily. She denies chest pain or shortness of breath. She has hypertension and takes lisinopril 10 mg daily. She denies headache or strokelike symptoms. Review of Systems   Constitutional: Positive for fatigue. Negative for unexpected weight change. Takes 15 min nap in afternoon as exhausted. HENT: Negative for trouble swallowing. Eyes: Negative for visual disturbance. Respiratory: Negative for chest tightness and shortness of breath. Cardiovascular: Negative for chest pain. Gastrointestinal: Negative for abdominal pain, constipation, diarrhea and nausea. Endocrine: Negative for polydipsia, polyphagia and polyuria. Nocturia 2 times a night. . Sodium a bit on the high side today, was high last visit. Skin: Negative for wound. Neurological: Negative for dizziness, weakness, light-headedness, numbness and headaches. Psychiatric/Behavioral: Negative for sleep disturbance.        Historical Information   Past Medical History:   Diagnosis Date   • Basal cell carcinoma (BCC) of face     left cheek   • Cataract    • Rheumatoid arthritis ([address])      Past Surgical History:   Procedure Laterality Date   • CATARACT EXTRACTION, BILATERAL Bilateral    • EXPLORATORY LAPAROTOMY     • FOOT SURGERY Left     foot reconstruction in 2 phases   • FOOT SURGERY Right     foot reconstruction   • LIVER BIOPSY     • TOTAL SHOULDER REPLACEMENT Right      Social History   Social History     Substance and Sexual Activity   Alcohol Use Yes   • Alcohol/week: 5.0 standard drinks of alcohol   • Types: 5 Cans of beer per week    Comment: most nights has 1 drink     Social History     Substance and Sexual Activity   Drug Use No     Social History     Tobacco Use   Smoking Status Never   Smokeless Tobacco Never     Family History:   Family History   Problem Relation Age of Onset   • Stroke Mother    • Cancer Father         renal metastatic   • Brain cancer Sister    • Diabetes type I Maternal Uncle    • Diabetes type I Maternal Grandmother    • Osteoarthritis Brother    • Prostate cancer Brother    • Osteoarthritis Sister    • Breast cancer Sister    • Rheum arthritis Sister    • Osteoarthritis Sister    • Osteoarthritis Sister    • Osteoarthritis Sister    • Thyroid disease unspecified Sister    • Osteoarthritis Brother    • Prostate cancer Brother    • Osteoarthritis Brother    • Bipolar disorder Son    • Bipolar disorder Daughter        Meds/Allergies   Current Outpatient Medications   Medication Sig Dispense Refill   • Calcium Carbonate-Vit D-Min (CALCIUM 1200 PO) Take by mouth     • cholecalciferol (VITAMIN D3) 1,000 units tablet Take 1,000 Units by mouth daily     • citalopram (CeleXA) 10 mg tablet Take 10 mg by mouth daily     • famotidine (PEPCID) 20 mg tablet Take 20 mg by mouth daily prn     • folic acid (FOLVITE) 1 mg tablet Take 1 mg by mouth daily • glucagon (GLUCAGON EMERGENCY) 1 MG injection Inject 1 mg under the skin once as needed for low blood sugar for up to 1 dose 1 each 4   • HumaLOG 100 UNIT/ML injection Use via insulin pump up to 100 units daily 30 mL 3   • lisinopril (ZESTRIL) 10 mg tablet Take 10 mg by mouth daily       • methotrexate 50 MG/2ML injection Inject under the skin once a week     • metoclopramide (REGLAN) 5 mg tablet Take 5 mg by mouth 3 (three) times a day PRN     • Multiple Vitamins-Minerals (MULTIVITAMIN ADULT PO) Take by mouth daily       • Omega-3 Fat Ac-Cholecalciferol (DRY EYE OMEGA BENEFITS/VIT D-3) 860-189 MG-UNIT CAPS Take by mouth daily       • pantoprazole (PROTONIX) 40 mg tablet Take 40 mg by mouth daily     • RiTUXimab (RITUXAN IV) Infuse into a venous catheter Every 16 weeks, 2nd dose 2 weeks later then repeat     • rosuvastatin (CRESTOR) 10 MG tablet Take 10 mg by mouth daily       No current facility-administered medications for this visit. Allergies   Allergen Reactions   • Bactrim [Sulfamethoxazole-Trimethoprim] GI Intolerance     Other reaction(s): GI Intolerance   • Neosporin [Neomycin-Bacitracin Zn-Polymyx] Itching   • Tetracycline GI Intolerance     Other reaction(s): GI Intolerance   • Nickel Rash       Objective   Vitals: Blood pressure 116/72, pulse 72, height 5' 1" (1.549 m), weight 50.3 kg (111 lb), SpO2 97 %. Invasive Devices     None                 Physical Exam  Vitals reviewed. Constitutional:       Appearance: Normal appearance. She is well-developed. HENT:      Head: Normocephalic and atraumatic. Eyes:      Conjunctiva/sclera: Conjunctivae normal.   Neck:      Thyroid: No thyromegaly. Vascular: No carotid bruit. Comments: Thyroid normal in size. Cardiovascular:      Rate and Rhythm: Normal rate and regular rhythm. Heart sounds: Normal heart sounds. No murmur heard. Pulmonary:      Effort: Pulmonary effort is normal.      Breath sounds: No wheezing.       Comments: End inspiratory squeek. Abdominal:      Palpations: Abdomen is soft. Musculoskeletal:         General: No deformity. Cervical back: Normal range of motion and neck supple. Right lower leg: No edema. Left lower leg: No edema. Lymphadenopathy:      Cervical: No cervical adenopathy. Skin:     General: Skin is warm and dry. Findings: No rash. Neurological:      Mental Status: She is alert and oriented to person, place, and time. Deep Tendon Reflexes: Reflexes are normal and symmetric. The history was obtained from the review of the chart and from the patient. Lab Results:    Most recent Alc is  Lab Results   Component Value Date    HGBA1C 6.7 (H) 07/18/2023           Blood work performed on 7/18/2023 showed a CMP with a glucose of 130 fasting, bilirubin 1.3, but was otherwise normal.    CBC is normal.    Urine microalbumin to creatinine ratio is 10. Lab Results   Component Value Date    CREATININE 0.73 07/18/2023    CREATININE 0.72 04/14/2023    CREATININE 0.72 01/12/2023    BUN 12 07/18/2023    K 4.2 07/18/2023     07/18/2023    CO2 24 07/18/2023     eGFR   Date Value Ref Range Status   07/18/2023 86 >59 mL/min/1.73 Final     Total cholesterol 173, LDL cholesterol 72.   Lab Results   Component Value Date    HDL 87 07/18/2023    TRIG 72 07/18/2023    CHOLHDL 2.1 04/08/2022       Lab Results   Component Value Date    ALT 20 07/18/2023    AST 32 07/18/2023       Lab Results   Component Value Date    TSH 2.940 07/18/2023    FREET4 0.93 07/11/2022       Future Appointments   Date Time Provider [address] [place] [address]   10/20/2023 10:15 AM DESHAWN Kee [facility]

## 2023-08-02 NOTE — ASSESSMENT & PLAN NOTE
Surgery scheduled for hardware removal from [facility] on 9/26/22. Clearance form filled out to be faxed. Will attach Echo results.   [Time Spent: ___ minutes] : I have spent [unfilled] minutes of time on the encounter. [>50% of the face to face encounter time was spent on counseling and/or coordination of care for ___] : Greater than 50% of the face to face encounter time was spent on counseling and/or coordination of care for [unfilled] [Fully active, able to carry on all pre-disease performance without restriction] : Status 0 - Fully active, able to carry on all pre-disease performance without restriction [Normal] : affect appropriate [de-identified] : b/l mastectomy with wide scabs noted,some outpouching at lateral surgical scars b/l, no gross masses or adenopathy palpable b/l.

## 2023-09-06 ENCOUNTER — OFFICE VISIT (OUTPATIENT)
Dept: INTERNAL MEDICINE | Facility: CLINIC | Age: 73
End: 2023-09-06
Payer: MEDICARE

## 2023-09-06 VITALS
SYSTOLIC BLOOD PRESSURE: 120 MMHG | BODY MASS INDEX: 21.97 KG/M2 | WEIGHT: 109 LBS | DIASTOLIC BLOOD PRESSURE: 80 MMHG | RESPIRATION RATE: 17 BRPM | HEIGHT: 59 IN

## 2023-09-06 DIAGNOSIS — D84.9 IMMUNODEFICIENCY, UNSPECIFIED: ICD-10-CM

## 2023-09-06 DIAGNOSIS — M81.0 AGE-RELATED OSTEOPOROSIS WITHOUT CURRENT PATHOLOGICAL FRACTURE: ICD-10-CM

## 2023-09-06 DIAGNOSIS — E10.40 TYPE 1 DIABETES MELLITUS WITH DIABETIC NEUROPATHY (CMS/HCC): Primary | ICD-10-CM

## 2023-09-06 DIAGNOSIS — M05.79 RHEUMATOID ARTHRITIS INVOLVING MULTIPLE SITES WITH POSITIVE RHEUMATOID FACTOR (CMS/HCC): ICD-10-CM

## 2023-09-06 DIAGNOSIS — I25.10 CORONARY ARTERY DISEASE DUE TO CALCIFIED CORONARY LESION: ICD-10-CM

## 2023-09-06 DIAGNOSIS — Z12.83 SKIN EXAM, SCREENING FOR CANCER: ICD-10-CM

## 2023-09-06 DIAGNOSIS — E78.00 PURE HYPERCHOLESTEROLEMIA: ICD-10-CM

## 2023-09-06 DIAGNOSIS — I25.84 CORONARY ARTERY DISEASE DUE TO CALCIFIED CORONARY LESION: ICD-10-CM

## 2023-09-06 PROCEDURE — G0008 ADMIN INFLUENZA VIRUS VAC: HCPCS | Performed by: INTERNAL MEDICINE

## 2023-09-06 PROCEDURE — G8752 SYS BP LESS 140: HCPCS | Performed by: INTERNAL MEDICINE

## 2023-09-06 PROCEDURE — G8754 DIAS BP LESS 90: HCPCS | Performed by: INTERNAL MEDICINE

## 2023-09-06 PROCEDURE — 99214 OFFICE O/P EST MOD 30 MIN: CPT | Mod: 25 | Performed by: INTERNAL MEDICINE

## 2023-09-06 PROCEDURE — 90694 VACC AIIV4 NO PRSRV 0.5ML IM: CPT | Performed by: INTERNAL MEDICINE

## 2023-09-06 RX ORDER — ROSUVASTATIN CALCIUM 10 MG/1
10 TABLET, COATED ORAL DAILY
Qty: 90 TABLET | Refills: 3 | Status: SHIPPED | OUTPATIENT
Start: 2023-09-06 | End: 2024-10-25

## 2023-09-06 RX ORDER — LISINOPRIL 10 MG/1
10 TABLET ORAL DAILY
Qty: 90 TABLET | Refills: 3 | Status: SHIPPED | OUTPATIENT
Start: 2023-09-06 | End: 2024-10-25

## 2023-09-06 ASSESSMENT — ENCOUNTER SYMPTOMS
ABDOMINAL PAIN: 0
DIFFICULTY URINATING: 0
SHORTNESS OF BREATH: 0
FEVER: 0
UNEXPECTED WEIGHT CHANGE: 0
ARTHRALGIAS: 1

## 2023-09-06 NOTE — PROGRESS NOTES
Subjective      Patient ID: Lillie Ward is a 72 y.o. female.    HPI     Patient presents for 6-month follow-up.  Seeing Baudilio regularly for her type 1 diabetes.  Most recently saw Dr. Hadley at [facility].  HbA1c was 6.7.  Taking Humalog insulin via insulin pump.  Lipid panel also well controlled.  TSH normal.  Kidney liver functions normal.  Taking lisinopril 10 mg daily for blood pressure and rosuvastatin 10 mg daily for cholesterol.  Takes citalopram 10 mg only if needed when feeling down.  Has not been taking her PPI regularly.  Has been taking Pepcid with good results.  Recently saw a new rheumatologist for RA.  Did not receive her Rituxan as scheduled on 8/17/2023.  Hoping to get it with her next follow-up with Dr. Grissom at [place].  Continues to take methotrexate and folic acid.  Last DEXA scan was in 9/2021 showing osteoporosis.  Did not discuss treatment with the rheumatologist at that time.  Has been taking calcium and vitamin D which significantly improved her nail health.  Up-to-date with mammogram in 5/2023.  Colonoscopy was last May at [facility].  Found to have a polyp.  Due again in 5 years.  Up-to-date with foot exam with Baudilio in the spring.  Up-to-date with eye exams with Dr. Fallon.  Up-to-date with all vaccines.  No other new/acute concerns.    The following have been reviewed and updated as appropriate in this visit:     Allergies  Meds  Problems         Past Medical History:   Diagnosis Date    Acid reflux     Diabetes mellitus type I (CMS/HCC)     Dr. Cerda - [facility]    DKA, type 1 (CMS/HCC) 09/2016    due to insulin pump malfunction - hospitalized    GERD (gastroesophageal reflux disease)     H/O colonoscopy 12/2013    [facility].  Normal    Hyperlipidemia     Hypoglycemia unawareness associated with type 1 diabetes mellitus (CMS/HCC)     Macular degeneration     BEGINNING STAGES    Pulmonary fibrosis (CMS/HCC)     CLEARED BY PULMONOLIGIST 2 YEARS AGO 2018    Pulmonary  nodule, left 07/2017    Rheumatoid arthritis (CMS/HCC)     Dr. Lo, [place]    Skin cancer      Past Surgical History:   Procedure Laterality Date    COLONOSCOPY      EYE SURGERY Bilateral     CATARACT    FOOT SURGERY Left 2009    reconstruction    FOOT SURGERY Right 2007    reconstruction    JOINT REPLACEMENT Right     SHOULDER    LIVER SURGERY      '80's    REPLACEMENT TOTAL KNEE Left 04/04/2017    [facility]    SKIN BIOPSY      TOTAL HIP ARTHROPLASTY Right 11/2020    TOTAL SHOULDER ARTHROPLASTY Right 1990     Family History   Problem Relation Age of Onset    Stroke Biological Mother     Lung cancer Biological Father     No Known Problems Biological Sister     Rheum arthritis Other         1 of 8 siblings    Prostate cancer Biological Son     Pancreatitis Biological Son     Alcohol abuse Biological Son     Bipolar disorder Biological Son     Bipolar disorder Biological Daughter         no diagnosis    Depression Biological Daughter     Anxiety disorder Biological Daughter      Social History     Socioeconomic History    Marital status:      Spouse name: None    Number of children: 1    Years of education: None    Highest education level: None   Occupational History    Occupation: Retired teacher   Tobacco Use    Smoking status: Never    Smokeless tobacco: Never   Vaping Use    Vaping Use: Never used   Substance and Sexual Activity    Alcohol use: Yes    Drug use: No    Sexual activity: Yes   Social History Narrative    Marital status- . was a pediatrician    Children- 1 son    Caffeine - coffee    Exercise-walking    Diet-low carb    Pets-    Islam- none    Seat belt-yes    Smoke alarm-yes             Social Determinants of Health     Food Insecurity: No Food Insecurity (12/9/2022)    Hunger Vital Sign     Worried About Running Out of Food in the Last Year: Never true     Ran Out of Food in the Last Year: Never true       Review of Systems    Constitutional: Negative for fever and unexpected weight change.   Respiratory: Negative for shortness of breath.    Cardiovascular: Negative for chest pain.   Gastrointestinal: Negative for abdominal pain.   Genitourinary: Negative for difficulty urinating.   Musculoskeletal: Positive for arthralgias.       Allergies   Allergen Reactions    Neomycin Itching    Neomycin-Bacitracin-Polymyxin Itching     neosporin    Neomycin-Bacitracnzn-Polymyxnb Itching    Sulfamethoxazole-Trimethoprim      Other reaction(s): GI Intolerance    Tetracycline      Other reaction(s): GI Intolerance    Bacitracin Rash     Current Outpatient Medications   Medication Sig Dispense Refill    cholecalciferol, vitamin D3, 25 mcg (1,000 unit) capsule Take 1,000 Units by mouth daily.        citalopram (celeXA) 10 mg tablet Take 1 tablet by mouth once daily 90 tablet 1    docosahexaenoic acid/epa (FISH OIL ORAL) Take by mouth 2 (two) times a day. For dry eyes      folic acid (FOLVITE) 1 mg tablet Take 1 mg by mouth daily.      glucagon, human recombinant, 1 mg injection Infuse 1 mg into a venous catheter once.      insulin lispro U-100 (HumaLOG) 100 unit/mL subcutaneous cartridge inject by subcutaneous route per prescriber's instructions. Insulin dosing requires individualization.      lisinopriL (PRINIVIL) 10 mg tablet Take 1 tablet (10 mg total) by mouth daily. 90 tablet 3    methotrexate (TREXALL) 25 mg/mL chemo syringe       methotrexate, PF, 10 mg/0.2 mL auto-injector Inject under the skin once a week. Pt is not sure the dose      metoclopramide (REGLAN) 5 mg tablet Take 5 mg by mouth 3 (three) times a day.      multivit with minerals/lutein (MULTIVITAMIN 50 PLUS ORAL) No SIG Entered      mupirocin (BACTROBAN) 2 % ointment Apply topically as needed.      pantoprazole (PROTONIX) 40 mg EC tablet Take 40 mg by mouth daily. Twice a week      riTUXimab (RITUXAN) 10 mg/mL injection Inject 10 mg/mL as directed. Every 16 weeks   " 10/13/2022 next       rosuvastatin (CRESTOR) 10 mg tablet Take 1 tablet (10 mg total) by mouth daily. 90 tablet 3    vit A/vit C/vit E/zinc/copper (PRESERVISION AREDS ORAL) Take by mouth 2 (two) times a day.      albuterol HFA (VENTOLIN HFA) 90 mcg/actuation inhaler INHALE 2 PUFFS BY MOUTH EVERY 4 HOURS AS NEEDED FOR SHORTNESS OF BREATH , COUGH, OR WHEEZING      doxycycline hyclate (VIBRAMYCIN) 100 mg capsule TAKE 1 CAPSULE BY MOUTH EVERY 12 HOURS      Lactobac no.41-Bifidobact no.7 70 mg (3 billion cell) capsule Take by mouth daily.       No current facility-administered medications for this visit.       Objective   Vitals:    09/06/23 0935   BP: 120/80   Resp: 17   Weight: 49.4 kg (109 lb)   Height: 1.505 m (4' 11.25\")     Body mass index is 21.83 kg/m².    Physical Exam  Constitutional:       Appearance: Normal appearance. She is well-developed.   HENT:      Head: Normocephalic and atraumatic.   Eyes:      General: Lids are normal.   Cardiovascular:      Rate and Rhythm: Normal rate and regular rhythm.      Heart sounds: Normal heart sounds, S1 normal and S2 normal. No murmur heard.  Pulmonary:      Effort: Pulmonary effort is normal.      Breath sounds: Wheezing (RLL) present. No decreased breath sounds, rhonchi or rales.   Abdominal:      General: Bowel sounds are normal.      Palpations: Abdomen is soft.      Tenderness: There is no abdominal tenderness.   Musculoskeletal:      Cervical back: Neck supple.   Skin:     General: Skin is warm and dry.   Neurological:      General: No focal deficit present.      Mental Status: She is alert and oriented to person, place, and time.      Cranial Nerves: No cranial nerve deficit.      Gait: Gait normal.   Psychiatric:         Mood and Affect: Mood normal.         Behavior: Behavior normal.         Thought Content: Thought content normal.         Judgment: Judgment normal.         Assessment/Plan   Problem List Items Addressed This Visit        Nervous    Type 1 " diabetes mellitus with diabetic neuropathy (CMS/[facility]) - Primary     Last HbA1c was 6.7, in good range.  Continue close follow-up with endocrinologist.  Continue Humalog via insulin pump.  Up-to-date with foot and eye exams         Relevant Medications    rosuvastatin (CRESTOR) 10 mg tablet       Circulatory    Coronary artery disease due to calcified coronary lesion     Slightly elevated coronary calcium score in the past.  Sees cardiology.  Continue statin and aspirin         Relevant Medications    lisinopriL (PRINIVIL) 10 mg tablet    rosuvastatin (CRESTOR) 10 mg tablet       Musculoskeletal    Age-related osteoporosis without current pathological fracture     Last DEXA scan was in 9/2021 showing osteoporosis.  Has been taking calcium and vitamin D and doing a lot more weightbearing exercises.  Did not discuss treatment with the rheumatologist.  Has a new rheumatologist now.  We will repeat DEXA scan and determine need for treatment at that time         Relevant Orders    DEXA BONE DENSITY       Immune    Immunodeficiency, unspecified (CMS/[facility])     Up-to-date with all vaccines.  Will be getting the new COVID booster later this month.  Will give flu vaccine today            Rheumatologic    Rheumatoid arthritis (CMS/[facility])     Missed her Rituxan infusion scheduled on 8/17/23 due to having to switch doctors. Will see Dr. Grissom, rheum at [place].  Continue methotrexate            Other    Skin exam, screening for cancer     Due.  Referred to Dr. Reed or Dr. Nunez.         Relevant Orders    Ambulatory referral to Dermatology    Ambulatory referral to Dermatology    Pure hypercholesterolemia     Labs in good range in 7/2023.  Continue rosuvastatin 10 mg         Relevant Medications    rosuvastatin (CRESTOR) 10 mg tablet       Machelle Gallardo MD    9/6/2023

## 2023-09-06 NOTE — ASSESSMENT & PLAN NOTE
Missed her Rituxan infusion scheduled on 8/17/23 due to having to switch doctors. Will see Dr. Grissom, rheum at [place].  Continue methotrexate

## 2023-09-07 NOTE — ASSESSMENT & PLAN NOTE
Last DEXA scan was in 9/2021 showing osteoporosis.  Has been taking calcium and vitamin D and doing a lot more weightbearing exercises.  Did not discuss treatment with the rheumatologist.  Has a new rheumatologist now.  We will repeat DEXA scan and determine need for treatment at that time

## 2023-09-07 NOTE — ASSESSMENT & PLAN NOTE
Up-to-date with all vaccines.  Will be getting the new COVID booster later this month.  Will give flu vaccine today

## 2023-09-07 NOTE — ASSESSMENT & PLAN NOTE
Slightly elevated coronary calcium score in the past.  Sees cardiology.  Continue statin and aspirin

## 2023-09-07 NOTE — ASSESSMENT & PLAN NOTE
Last HbA1c was 6.7, in good range.  Continue close follow-up with endocrinologist.  Continue Humalog via insulin pump.  Up-to-date with foot and eye exams

## 2023-09-25 LAB
LEFT EYE DIABETIC RETINOPATHY: NORMAL
RIGHT EYE DIABETIC RETINOPATHY: NORMAL

## 2023-10-24 LAB
ALBUMIN SERPL-MCNC: 4 G/DL (ref 3.8–4.8)
ALBUMIN/GLOB SERPL: 1.8 {RATIO} (ref 1.2–2.2)
ALP SERPL-CCNC: 63 IU/L (ref 44–121)
ALT SERPL-CCNC: 15 IU/L (ref 0–32)
AST SERPL-CCNC: 28 IU/L (ref 0–40)
BASOPHILS # BLD AUTO: 0.1 X10E3/UL (ref 0–0.2)
BASOPHILS NFR BLD AUTO: 1 %
BILIRUB SERPL-MCNC: 1.1 MG/DL (ref 0–1.2)
BUN SERPL-MCNC: 12 MG/DL (ref 8–27)
BUN/CREAT SERPL: 16 (ref 12–28)
CALCIUM SERPL-MCNC: 9.6 MG/DL (ref 8.7–10.3)
CHLORIDE SERPL-SCNC: 105 MMOL/L (ref 96–106)
CO2 SERPL-SCNC: 25 MMOL/L (ref 20–29)
CREAT SERPL-MCNC: 0.77 MG/DL (ref 0.57–1)
EGFR: 80 ML/MIN/1.73
EOSINOPHIL # BLD AUTO: 0.3 X10E3/UL (ref 0–0.4)
EOSINOPHIL NFR BLD AUTO: 6 %
ERYTHROCYTE [DISTWIDTH] IN BLOOD BY AUTOMATED COUNT: 13.3 % (ref 11.7–15.4)
EST. AVERAGE GLUCOSE BLD GHB EST-MCNC: 166 MG/DL
GLOBULIN SER-MCNC: 2.2 G/DL (ref 1.5–4.5)
GLUCOSE SERPL-MCNC: 147 MG/DL (ref 70–99)
HBA1C MFR BLD: 7.4 % (ref 4.8–5.6)
HCT VFR BLD AUTO: 42.7 % (ref 34–46.6)
HGB BLD-MCNC: 14.3 G/DL (ref 11.1–15.9)
IMM GRANULOCYTES # BLD: 0 X10E3/UL (ref 0–0.1)
IMM GRANULOCYTES NFR BLD: 0 %
LYMPHOCYTES # BLD AUTO: 1.2 X10E3/UL (ref 0.7–3.1)
LYMPHOCYTES NFR BLD AUTO: 26 %
MCH RBC QN AUTO: 31.3 PG (ref 26.6–33)
MCHC RBC AUTO-ENTMCNC: 33.5 G/DL (ref 31.5–35.7)
MCV RBC AUTO: 93 FL (ref 79–97)
MONOCYTES # BLD AUTO: 0.5 X10E3/UL (ref 0.1–0.9)
MONOCYTES NFR BLD AUTO: 12 %
NEUTROPHILS # BLD AUTO: 2.5 X10E3/UL (ref 1.4–7)
NEUTROPHILS NFR BLD AUTO: 55 %
PLATELET # BLD AUTO: 240 X10E3/UL (ref 150–450)
POTASSIUM SERPL-SCNC: 4.5 MMOL/L (ref 3.5–5.2)
PROT SERPL-MCNC: 6.2 G/DL (ref 6–8.5)
RBC # BLD AUTO: 4.57 X10E6/UL (ref 3.77–5.28)
SODIUM SERPL-SCNC: 144 MMOL/L (ref 134–144)
WBC # BLD AUTO: 4.6 X10E3/UL (ref 3.4–10.8)

## 2023-10-26 ENCOUNTER — OFFICE VISIT (OUTPATIENT)
Dept: ENDOCRINOLOGY | Facility: HOSPITAL | Age: 73
End: 2023-10-26
Payer: MEDICARE

## 2023-10-26 VITALS
HEIGHT: 61 IN | OXYGEN SATURATION: 96 % | WEIGHT: 108.8 LBS | DIASTOLIC BLOOD PRESSURE: 70 MMHG | SYSTOLIC BLOOD PRESSURE: 120 MMHG | BODY MASS INDEX: 20.54 KG/M2 | HEART RATE: 81 BPM

## 2023-10-26 DIAGNOSIS — E10.649 HYPOGLYCEMIA UNAWARENESS ASSOCIATED WITH TYPE 1 DIABETES MELLITUS (HCC): ICD-10-CM

## 2023-10-26 DIAGNOSIS — E78.2 MIXED HYPERLIPIDEMIA: ICD-10-CM

## 2023-10-26 DIAGNOSIS — E10.40 TYPE 1 DIABETES MELLITUS WITH DIABETIC NEUROPATHY (HCC): Primary | ICD-10-CM

## 2023-10-26 DIAGNOSIS — Z96.41 INSULIN PUMP IN PLACE: ICD-10-CM

## 2023-10-26 DIAGNOSIS — E55.9 VITAMIN D DEFICIENCY: ICD-10-CM

## 2023-10-26 DIAGNOSIS — I10 ESSENTIAL HYPERTENSION: ICD-10-CM

## 2023-10-26 PROCEDURE — 99214 OFFICE O/P EST MOD 30 MIN: CPT | Performed by: NURSE PRACTITIONER

## 2023-10-26 PROCEDURE — 95251 CONT GLUC MNTR ANALYSIS I&R: CPT | Performed by: NURSE PRACTITIONER

## 2023-10-26 NOTE — PROGRESS NOTES
Mila Ruffin 74 y.o. female MRN: [id number]    Encounter: [id number]      Assessment/Plan     Assessment: This is a 74y.o.-year-old female with type 1 diabetes on insulin pump therapy. Plan:  1. Type 1 diabetes:  Her most recent hemoglobin A1c is 7.4. Review of her pump and sensor download reveals some hypoglycemia in the afternoon around 2pm.  For this, I have adjusted her insulin to carbohydrate ratio at 6 PM  to 1:10. Otherwise, her settings will remain the same. Discussed the proper process for correcting her blood sugars instead of adding small amounts of carbs. She will continue to utilize the dex com continuous glucose monitor. Reminded her not to give herself corrections at bedtime as this is causing some nocturnal hypoglycemia which can be dangerous. Check hemoglobin A1c prior to next visit. 2.  Hyperlipidemia: Continue atorvastatin. Check fasting lipid panel prior to next visit. 3.  Hypertension: He is normotensive in the office today. Continue lisinopril. Check comprehensive metabolic panel prior to next visit. 4.  Vitamin-D deficiency:  Continue supplementation with 1000 units of vitamin D3 daily. CC: Type I diabetes follow-up    History of Present Illness     HPI:  74 y.o. female with type 1 diabetes for approximately 40 years. She was started on an insulin pump around 1998. She is on insulin at home and takes Humalog insulin via a new tandem insulin pump. Download of her Dexcom sensor and tandem pump from October 13 through October 26, 2023 reveals an average glucose of 159. In target range 67% of the time with 4% low blood sugar. She is also experiencing hypoglycemia overnight (2 am). Her most recent hemoglobin A1c from October 23, 2023 is 7.4. She denies any polyuria and polydipsia. She has no polyphagia, but has once a night nocturia. She is getting some blurry vision with some cataracts. She has no numbness or tingling of the feet.   She denies chest pain or shortness of breath. She denies nephropathy, heart attack, stroke and claudication but does admit to neuropathy and retinopathy. Her most recent diabetic eye exam was performed on July 12, 2020. She is seen every 4 months. She has no complaints about her feet and does not follow Podiatry for regular diabetic foot care. Most recent diabetic foot exam was performed at her office visit on April 19, 2023. For her hypertension, she is treated with lisinopril 10 mg daily. For her hyperlipidemia, she is treated with atorvastatin 10 mg. For her vitamin-D deficiency, she supplements with 1000 units of vitamin D3 daily. Review of Systems   Constitutional:  Positive for fatigue. Negative for chills and fever. HENT: Negative. Negative for trouble swallowing and voice change. Eyes: Negative. Negative for photophobia, pain, discharge, redness, itching and visual disturbance. Respiratory: Negative. Negative for chest tightness and shortness of breath. Cardiovascular: Negative. Negative for chest pain. Gastrointestinal: Negative. Negative for abdominal pain, constipation, diarrhea and vomiting. Endocrine: Positive for polyuria (up to twice per night). Negative for cold intolerance, heat intolerance, polydipsia and polyphagia. Genitourinary: Negative. Musculoskeletal: Negative. Skin: Negative. Allergic/Immunologic: Negative. Neurological: Negative. Negative for dizziness, syncope, light-headedness and headaches. Hematological: Negative. Psychiatric/Behavioral: Negative. All other systems reviewed and are negative.       Historical Information   Past Medical History:   Diagnosis Date    Basal cell carcinoma (BCC) of face     left cheek    Cataract     Rheumatoid arthritis ([address])      Past Surgical History:   Procedure Laterality Date    CATARACT EXTRACTION, BILATERAL Bilateral     EXPLORATORY LAPAROTOMY      FOOT SURGERY Left     foot reconstruction in 2 phases    FOOT SURGERY Right     foot reconstruction    LIVER BIOPSY      TOTAL SHOULDER REPLACEMENT Right      Social History   Social History     Substance and Sexual Activity   Alcohol Use Yes    Alcohol/week: 3.0 standard drinks of alcohol    Types: 3 Cans of beer per week    Comment: most nights has 1 drink     Social History     Substance and Sexual Activity   Drug Use No     Social History     Tobacco Use   Smoking Status Never   Smokeless Tobacco Never     Family History:   Family History   Problem Relation Age of Onset    Stroke Mother     Cancer Father         renal metastatic    Brain cancer Sister     Diabetes type I Maternal Uncle     Diabetes type I Maternal Grandmother     Osteoarthritis Brother     Prostate cancer Brother     Osteoarthritis Sister     Breast cancer Sister     Rheum arthritis Sister     Osteoarthritis Sister     Osteoarthritis Sister     Osteoarthritis Sister     Thyroid disease unspecified Sister     Osteoarthritis Brother     Prostate cancer Brother     Osteoarthritis Brother     Bipolar disorder Son     Bipolar disorder Daughter        Meds/Allergies   Current Outpatient Medications   Medication Sig Dispense Refill    Calcium Carbonate-Vit D-Min (CALCIUM 1200 PO) Take by mouth      citalopram (CeleXA) 10 mg tablet Take 10 mg by mouth daily      COLLAGEN PO Take by mouth      famotidine (PEPCID) 20 mg tablet Take 20 mg by mouth daily As needed      folic acid (FOLVITE) 1 mg tablet Take 1 mg by mouth daily      glucagon (GLUCAGON EMERGENCY) 1 MG injection Inject 1 mg under the skin once as needed for low blood sugar for up to 1 dose 1 each 4    HumaLOG 100 UNIT/ML injection Use via insulin pump up to 100 units daily 30 mL 3    lisinopril (ZESTRIL) 10 mg tablet Take 10 mg by mouth daily        methotrexate 50 MG/2ML injection Inject under the skin once a week      metoclopramide (REGLAN) 5 mg tablet Take 5 mg by mouth 3 (three) times a day As needed      Multiple Vitamins-Minerals (MULTIVITAMIN ADULT PO) Take by mouth daily        Omega-3 Fat Ac-Cholecalciferol (DRY EYE OMEGA BENEFITS/VIT D-3) 968-502 MG-UNIT CAPS Take by mouth daily        pantoprazole (PROTONIX) 40 mg tablet Take 40 mg by mouth daily As needed      RiTUXimab (RITUXAN IV) Infuse into a venous catheter Every 16 weeks, 2nd dose 2 weeks later then repeat      rosuvastatin (CRESTOR) 10 MG tablet Take 10 mg by mouth daily      cholecalciferol (VITAMIN D3) 1,000 units tablet Take 1,000 Units by mouth daily (Patient not taking: Reported on 10/26/2023)       No current facility-administered medications for this visit. Allergies   Allergen Reactions    Bactrim [Sulfamethoxazole-Trimethoprim] GI Intolerance     Other reaction(s): GI Intolerance    Neosporin [Neomycin-Bacitracin Zn-Polymyx] Itching    Tetracycline GI Intolerance     Other reaction(s): GI Intolerance    Nickel Rash       Objective   Vitals: Height 5' 1" (1.549 m), weight 49.4 kg (108 lb 12.8 oz). Physical Exam  Vitals reviewed. Constitutional:       Appearance: She is well-developed. HENT:      Head: Normocephalic and atraumatic. Eyes:      Conjunctiva/sclera: Conjunctivae normal.      Pupils: Pupils are equal, round, and reactive to light. Cardiovascular:      Rate and Rhythm: Normal rate and regular rhythm. Heart sounds: Normal heart sounds. Pulmonary:      Effort: Pulmonary effort is normal.      Breath sounds: Normal breath sounds. Abdominal:      General: Bowel sounds are normal.      Palpations: Abdomen is soft. Musculoskeletal:         General: Normal range of motion. Cervical back: Normal range of motion and neck supple. Skin:     General: Skin is warm and dry. Neurological:      Mental Status: She is alert and oriented to person, place, and time. Psychiatric:         Behavior: Behavior normal.         Thought Content:  Thought content normal.         Judgment: Judgment normal.         Lab Results:   Lab Results   Component Value Date/Time Hemoglobin A1C 7.4 (H) 10/23/2023 09:10 AM    Hemoglobin A1C 6.7 (H) 07/18/2023 09:17 AM    Hemoglobin A1C 6.9 (H) 04/14/2023 08:51 AM    White Blood Cell Count 4.6 10/23/2023 09:10 AM    White Blood Cell Count 4.2 07/18/2023 09:17 AM    White Blood Cell Count 4.8 04/14/2023 08:51 AM    Hemoglobin 14.3 10/23/2023 09:10 AM    Hemoglobin 14.9 07/18/2023 09:17 AM    Hemoglobin 15.0 04/14/2023 08:51 AM    HCT 42.7 10/23/2023 09:10 AM    HCT 43.4 07/18/2023 09:17 AM    HCT 44.7 04/14/2023 08:51 AM    MCV 93 10/23/2023 09:10 AM    MCV 93 07/18/2023 09:17 AM    MCV 94 04/14/2023 08:51 AM    Platelet Count 011 91/97/0339 09:10 AM    Platelet Count 910 75/10/2091 09:17 AM    Platelet Count 613 71/24/4790 08:51 AM    BUN 12 10/23/2023 09:10 AM    BUN 12 07/18/2023 09:17 AM    BUN 14 04/14/2023 08:51 AM    Potassium 4.5 10/23/2023 09:10 AM    Potassium 4.2 07/18/2023 09:17 AM    Potassium 5.0 04/14/2023 08:51 AM    Chloride 105 10/23/2023 09:10 AM    Chloride 105 07/18/2023 09:17 AM    Chloride 107 (H) 04/14/2023 08:51 AM    CO2 25 10/23/2023 09:10 AM    CO2 24 07/18/2023 09:17 AM    CO2 26 04/14/2023 08:51 AM    Creatinine 0.77 10/23/2023 09:10 AM    Creatinine 0.73 07/18/2023 09:17 AM    Creatinine 0.72 04/14/2023 08:51 AM    AST 28 10/23/2023 09:10 AM    AST 32 07/18/2023 09:17 AM    AST 38 04/14/2023 08:51 AM    ALT 15 10/23/2023 09:10 AM    ALT 20 07/18/2023 09:17 AM    ALT 26 04/14/2023 08:51 AM    Protein, Total 6.2 10/23/2023 09:10 AM    Protein, Total 6.3 07/18/2023 09:17 AM    Protein, Total 6.8 04/14/2023 08:51 AM    Albumin 4.0 10/23/2023 09:10 AM    Albumin 4.0 07/18/2023 09:17 AM    Albumin 4.5 04/14/2023 08:51 AM    Globulin, Total 2.2 10/23/2023 09:10 AM    Globulin, Total 2.3 07/18/2023 09:17 AM    Globulin, Total 2.3 04/14/2023 08:51 AM    HDL 87 07/18/2023 09:17 AM    Triglycerides 72 07/18/2023 09:17 AM     Portions of the record may have been created with voice recognition software.  Occasional wrong word or "sound a like" substitutions may have occurred due to the inherent limitations of voice recognition software. Read the chart carefully and recognize, using context, where substitutions have occurred.

## 2023-10-26 NOTE — PATIENT INSTRUCTIONS
Be mindful of diet. Stay active and stay hydrated. Be sure to perform a bolus consistently at meals. Continue to utilize the dex com continuous glucose monitor and perform a DEX COM download in 2 weeks for review. Contact the office with any consistent hypoglycemia. Continue lisinopril. Continue atorvastatin. Continue with regular supplementation of vitamin D3 daily.

## 2023-12-05 ENCOUNTER — TRANSCRIBE ORDERS (OUTPATIENT)
Dept: SCHEDULING | Age: 73
End: 2023-12-05

## 2023-12-05 DIAGNOSIS — R91.8 OTHER NONSPECIFIC ABNORMAL FINDING OF LUNG FIELD: Primary | ICD-10-CM

## 2023-12-14 ENCOUNTER — DOCUMENTATION (OUTPATIENT)
Dept: ENDOCRINOLOGY | Facility: HOSPITAL | Age: 73
End: 2023-12-14

## 2024-01-10 ENCOUNTER — HOSPITAL ENCOUNTER (OUTPATIENT)
Dept: RADIOLOGY | Age: 74
Discharge: HOME | End: 2024-01-10
Attending: INTERNAL MEDICINE
Payer: MEDICARE

## 2024-01-10 DIAGNOSIS — R91.8 OTHER NONSPECIFIC ABNORMAL FINDING OF LUNG FIELD: ICD-10-CM

## 2024-01-10 PROCEDURE — 71250 CT THORAX DX C-: CPT

## 2024-01-11 DIAGNOSIS — E10.8 TYPE 1 DIABETES MELLITUS WITH COMPLICATION (HCC): ICD-10-CM

## 2024-01-11 RX ORDER — INSULIN LISPRO 100 [IU]/ML
INJECTION, SOLUTION INTRAVENOUS; SUBCUTANEOUS
Qty: 30 ML | Refills: 0 | Status: SHIPPED | OUTPATIENT
Start: 2024-01-11

## 2024-01-19 LAB
ALBUMIN SERPL-MCNC: 4.5 G/DL (ref 3.8–4.8)
ALBUMIN/GLOB SERPL: 2 {RATIO} (ref 1.2–2.2)
ALP SERPL-CCNC: 66 IU/L (ref 44–121)
ALT SERPL-CCNC: 24 IU/L (ref 0–32)
AST SERPL-CCNC: 36 IU/L (ref 0–40)
BASOPHILS # BLD AUTO: 0.1 X10E3/UL (ref 0–0.2)
BASOPHILS NFR BLD AUTO: 1 %
BILIRUB SERPL-MCNC: 1.3 MG/DL (ref 0–1.2)
BUN SERPL-MCNC: 12 MG/DL (ref 8–27)
BUN/CREAT SERPL: 16 (ref 12–28)
CALCIUM SERPL-MCNC: 10.2 MG/DL (ref 8.7–10.3)
CHLORIDE SERPL-SCNC: 102 MMOL/L (ref 96–106)
CHOLEST SERPL-MCNC: 200 MG/DL (ref 100–199)
CO2 SERPL-SCNC: 24 MMOL/L (ref 20–29)
CREAT SERPL-MCNC: 0.75 MG/DL (ref 0.57–1)
EGFR: 83 ML/MIN/1.73
EOSINOPHIL # BLD AUTO: 0.2 X10E3/UL (ref 0–0.4)
EOSINOPHIL NFR BLD AUTO: 5 %
ERYTHROCYTE [DISTWIDTH] IN BLOOD BY AUTOMATED COUNT: 12.7 % (ref 11.7–15.4)
GLOBULIN SER-MCNC: 2.2 G/DL (ref 1.5–4.5)
GLUCOSE SERPL-MCNC: 110 MG/DL (ref 70–99)
HBA1C MFR BLD: 7.1 % (ref 4.8–5.6)
HCT VFR BLD AUTO: 47.2 % (ref 34–46.6)
HDLC SERPL-MCNC: 96 MG/DL
HGB BLD-MCNC: 15.4 G/DL (ref 11.1–15.9)
IMM GRANULOCYTES # BLD: 0 X10E3/UL (ref 0–0.1)
IMM GRANULOCYTES NFR BLD: 0 %
LDLC SERPL CALC-MCNC: 87 MG/DL (ref 0–99)
LYMPHOCYTES # BLD AUTO: 1.5 X10E3/UL (ref 0.7–3.1)
LYMPHOCYTES NFR BLD AUTO: 28 %
MCH RBC QN AUTO: 31 PG (ref 26.6–33)
MCHC RBC AUTO-ENTMCNC: 32.6 G/DL (ref 31.5–35.7)
MCV RBC AUTO: 95 FL (ref 79–97)
MONOCYTES # BLD AUTO: 0.5 X10E3/UL (ref 0.1–0.9)
MONOCYTES NFR BLD AUTO: 10 %
NEUTROPHILS # BLD AUTO: 3.1 X10E3/UL (ref 1.4–7)
NEUTROPHILS NFR BLD AUTO: 56 %
PLATELET # BLD AUTO: 271 X10E3/UL (ref 150–450)
POTASSIUM SERPL-SCNC: 4.4 MMOL/L (ref 3.5–5.2)
PROT SERPL-MCNC: 6.7 G/DL (ref 6–8.5)
RBC # BLD AUTO: 4.96 X10E6/UL (ref 3.77–5.28)
SL AMB VLDL CHOLESTEROL CALC: 17 MG/DL (ref 5–40)
SODIUM SERPL-SCNC: 142 MMOL/L (ref 134–144)
TRIGL SERPL-MCNC: 101 MG/DL (ref 0–149)
TSH SERPL DL<=0.005 MIU/L-ACNC: 2.98 UIU/ML (ref 0.45–4.5)
WBC # BLD AUTO: 5.4 X10E3/UL (ref 3.4–10.8)

## 2024-01-25 ENCOUNTER — OFFICE VISIT (OUTPATIENT)
Dept: ENDOCRINOLOGY | Facility: HOSPITAL | Age: 74
End: 2024-01-25
Payer: MEDICARE

## 2024-01-25 VITALS
HEIGHT: 61 IN | SYSTOLIC BLOOD PRESSURE: 124 MMHG | WEIGHT: 112.2 LBS | HEART RATE: 76 BPM | DIASTOLIC BLOOD PRESSURE: 80 MMHG | BODY MASS INDEX: 21.18 KG/M2

## 2024-01-25 DIAGNOSIS — Z96.41 INSULIN PUMP IN PLACE: ICD-10-CM

## 2024-01-25 DIAGNOSIS — E78.2 MIXED HYPERLIPIDEMIA: ICD-10-CM

## 2024-01-25 DIAGNOSIS — E10.649 HYPOGLYCEMIA UNAWARENESS ASSOCIATED WITH TYPE 1 DIABETES MELLITUS (HCC): ICD-10-CM

## 2024-01-25 DIAGNOSIS — E10.40 TYPE 1 DIABETES MELLITUS WITH DIABETIC NEUROPATHY (HCC): Primary | ICD-10-CM

## 2024-01-25 DIAGNOSIS — I10 ESSENTIAL HYPERTENSION: ICD-10-CM

## 2024-01-25 PROCEDURE — 99214 OFFICE O/P EST MOD 30 MIN: CPT | Performed by: NURSE PRACTITIONER

## 2024-01-25 PROCEDURE — 95251 CONT GLUC MNTR ANALYSIS I&R: CPT | Performed by: NURSE PRACTITIONER

## 2024-01-25 NOTE — PROGRESS NOTES
Danielle Rodriguez 73 y.o. female MRN: [id number]    Encounter: [id number]      Assessment/Plan     Assessment:  This is a 73 y.o.-year-old female with type 1 diabetes on insulin pump therap     Plan:  1.  Type 1 diabetes:  Her most recent hemoglobin A1c is 7.4.  Review of her pump and sensor download reveals some hypoglycemia in the afternoon around 2pm then overnight.  For this, I have adjusted her insulin to carbohydrate ratio at 7 AM to 1: 14, 3 PM to 1: 12 and 6 PM to 1: 12.  Otherwise, her settings will remain the same.  Discussed the proper process for correcting her blood sugars instead of adding small amounts of carbs.  She will continue to utilize the dex com continuous glucose monitor.  Reminded her not to give herself corrections at bedtime as this is causing some nocturnal hypoglycemia which can be dangerous.  Check hemoglobin A1c prior to next visit.  2.  Hyperlipidemia: Stable.  Continue atorvastatin.   3.  Hypertension: He is normotensive in the office today.  Continue lisinopril.  Check comprehensive metabolic panel prior to next visit.  4.  Vitamin-D deficiency:  Continue supplementation with 1000 units of vitamin D3 daily.    CC: Type 1 Diabetes follow up    History of Present Illness     HPI:  73 y.o. female with type 1 diabetes for approximately 40 years. She was started on an insulin pump around 1998.  She is on insulin at home and takes Humalog insulin via a new tandem insulin pump.  Download of her Dexcom sensor and tandem pump from January 11 through January 25, 2024 reveals an average glucose of 173.  In target range 63% of the time with <1% low blood sugar. She is also experiencing hypoglycemia in the afternoon and overnight (2 am).  Her most recent hemoglobin A1c from January 18, 2024 is 7.1. She denies any polyuria and polydipsia.  She has no polyphagia, but has once a night nocturia.  She is getting some blurry vision with some cataracts.  She has no numbness or tingling of the feet.  She  denies chest pain or shortness of breath.  She denies nephropathy, heart attack, stroke and claudication but does admit to neuropathy and retinopathy.      Her most recent diabetic eye exam was performed on July 12, 2020. She is seen every 4 months. She has no complaints about her feet and does not follow Podiatry for regular diabetic foot care.  Most recent diabetic foot exam was performed at her office visit on April 19, 2023.     For her hypertension, she is treated with lisinopril 10 mg daily.     For her hyperlipidemia, she is treated with atorvastatin 10 mg.      For her vitamin-D deficiency, she supplements with 1000 units of vitamin D3 daily.     Review of Systems   Constitutional:  Positive for fatigue. Negative for chills and fever.   HENT: Negative.  Negative for trouble swallowing and voice change.    Eyes: Negative.  Negative for photophobia, pain, discharge, redness, itching and visual disturbance.   Respiratory: Negative.  Negative for chest tightness and shortness of breath.    Cardiovascular: Negative.  Negative for chest pain.   Gastrointestinal: Negative.  Negative for abdominal pain, constipation, diarrhea and vomiting.   Endocrine: Negative for cold intolerance, heat intolerance, polydipsia, polyphagia and polyuria.   Genitourinary: Negative.    Musculoskeletal: Negative.    Skin: Negative.    Allergic/Immunologic: Negative.    Neurological: Negative.  Negative for dizziness, syncope, light-headedness and headaches.   Hematological: Negative.    Psychiatric/Behavioral: Negative.     All other systems reviewed and are negative.      Historical Information   Past Medical History:   Diagnosis Date    Basal cell carcinoma (BCC) of face     left cheek    Cataract     Rheumatoid arthritis (HCC)      Past Surgical History:   Procedure Laterality Date    CATARACT EXTRACTION, BILATERAL Bilateral     EXPLORATORY LAPAROTOMY      FOOT SURGERY Left     foot reconstruction in 2 phases    FOOT SURGERY Right      foot reconstruction    LIVER BIOPSY      TOTAL SHOULDER REPLACEMENT Right      Social History   Social History     Substance and Sexual Activity   Alcohol Use Yes    Alcohol/week: 3.0 standard drinks of alcohol    Types: 3 Cans of beer per week    Comment: most nights has 1 drink     Social History     Substance and Sexual Activity   Drug Use No     Social History     Tobacco Use   Smoking Status Never   Smokeless Tobacco Never     Family History:   Family History   Problem Relation Age of Onset    Stroke Mother     Cancer Father         renal metastatic    Brain cancer Sister     Diabetes type I Maternal Uncle     Diabetes type I Maternal Grandmother     Osteoarthritis Brother     Prostate cancer Brother     Osteoarthritis Sister     Breast cancer Sister     Rheum arthritis Sister     Osteoarthritis Sister     Osteoarthritis Sister     Osteoarthritis Sister     Thyroid disease unspecified Sister     Osteoarthritis Brother     Prostate cancer Brother     Osteoarthritis Brother     Bipolar disorder Son     Bipolar disorder Daughter        Meds/Allergies   Current Outpatient Medications   Medication Sig Dispense Refill    Calcium Carbonate-Vit D-Min (CALCIUM 1200 PO) Take by mouth      citalopram (CeleXA) 10 mg tablet Take 10 mg by mouth daily      COLLAGEN PO Take by mouth      famotidine (PEPCID) 20 mg tablet Take 20 mg by mouth daily As needed      folic acid (FOLVITE) 1 mg tablet Take 1 mg by mouth daily      glucagon (GLUCAGON EMERGENCY) 1 MG injection Inject 1 mg under the skin once as needed for low blood sugar for up to 1 dose 1 each 4    HumaLOG 100 UNIT/ML injection USE VIA INSULIN PUMP UP  UNITS DAILY 30 mL 0    lisinopril (ZESTRIL) 10 mg tablet Take 10 mg by mouth daily        methotrexate 50 MG/2ML injection Inject under the skin once a week      metoclopramide (REGLAN) 5 mg tablet Take 5 mg by mouth 3 (three) times a day As needed      Multiple Vitamins-Minerals (MULTIVITAMIN ADULT PO) Take by  "mouth daily        Omega-3 Fat Ac-Cholecalciferol (DRY EYE OMEGA BENEFITS/VIT D-3) 667-250 MG-UNIT CAPS Take by mouth daily        pantoprazole (PROTONIX) 40 mg tablet Take 40 mg by mouth daily As needed      RiTUXimab (RITUXAN IV) Infuse into a venous catheter Every 16 weeks, 2nd dose 2 weeks later then repeat      rosuvastatin (CRESTOR) 10 MG tablet Take 10 mg by mouth daily      cholecalciferol (VITAMIN D3) 1,000 units tablet Take 1,000 Units by mouth daily (Patient not taking: Reported on 10/26/2023)       No current facility-administered medications for this visit.     Allergies   Allergen Reactions    Bactrim [Sulfamethoxazole-Trimethoprim] GI Intolerance     Other reaction(s): GI Intolerance    Neosporin [Neomycin-Bacitracin Zn-Polymyx] Itching    Tetracycline GI Intolerance     Other reaction(s): GI Intolerance    Nickel Rash       Objective   Vitals: Blood pressure 124/80, pulse 76, height 5' 1\" (1.549 m), weight 50.9 kg (112 lb 3.2 oz).    Physical Exam  Vitals reviewed.   Constitutional:       Appearance: She is well-developed.   HENT:      Head: Normocephalic and atraumatic.   Eyes:      Conjunctiva/sclera: Conjunctivae normal.      Pupils: Pupils are equal, round, and reactive to light.   Cardiovascular:      Rate and Rhythm: Normal rate and regular rhythm.      Heart sounds: Normal heart sounds.   Pulmonary:      Effort: Pulmonary effort is normal.      Breath sounds: Normal breath sounds.   Abdominal:      General: Bowel sounds are normal.      Palpations: Abdomen is soft.   Musculoskeletal:         General: Normal range of motion.      Cervical back: Normal range of motion and neck supple.   Skin:     General: Skin is warm and dry.   Neurological:      Mental Status: She is alert and oriented to person, place, and time.   Psychiatric:         Behavior: Behavior normal.         Thought Content: Thought content normal.         Judgment: Judgment normal.       Lab Results:   Lab Results   Component " Value Date/Time    Hemoglobin A1C 7.1 (H) 01/18/2024 09:21 AM    Hemoglobin A1C 7.4 (H) 10/23/2023 09:10 AM    Hemoglobin A1C 6.7 (H) 07/18/2023 09:17 AM    White Blood Cell Count 5.4 01/18/2024 09:21 AM    White Blood Cell Count 4.6 10/23/2023 09:10 AM    White Blood Cell Count 4.2 07/18/2023 09:17 AM    Hemoglobin 15.4 01/18/2024 09:21 AM    Hemoglobin 14.3 10/23/2023 09:10 AM    Hemoglobin 14.9 07/18/2023 09:17 AM    HCT 47.2 (H) 01/18/2024 09:21 AM    HCT 42.7 10/23/2023 09:10 AM    HCT 43.4 07/18/2023 09:17 AM    MCV 95 01/18/2024 09:21 AM    MCV 93 10/23/2023 09:10 AM    MCV 93 07/18/2023 09:17 AM    Platelet Count 271 01/18/2024 09:21 AM    Platelet Count 240 10/23/2023 09:10 AM    Platelet Count 226 07/18/2023 09:17 AM    BUN 12 01/18/2024 09:21 AM    BUN 12 10/23/2023 09:10 AM    BUN 12 07/18/2023 09:17 AM    Potassium 4.4 01/18/2024 09:21 AM    Potassium 4.5 10/23/2023 09:10 AM    Potassium 4.2 07/18/2023 09:17 AM    Chloride 102 01/18/2024 09:21 AM    Chloride 105 10/23/2023 09:10 AM    Chloride 105 07/18/2023 09:17 AM    CO2 24 01/18/2024 09:21 AM    CO2 25 10/23/2023 09:10 AM    CO2 24 07/18/2023 09:17 AM    Creatinine 0.75 01/18/2024 09:21 AM    Creatinine 0.77 10/23/2023 09:10 AM    Creatinine 0.73 07/18/2023 09:17 AM    AST 36 01/18/2024 09:21 AM    AST 28 10/23/2023 09:10 AM    AST 32 07/18/2023 09:17 AM    ALT 24 01/18/2024 09:21 AM    ALT 15 10/23/2023 09:10 AM    ALT 20 07/18/2023 09:17 AM    Protein, Total 6.7 01/18/2024 09:21 AM    Protein, Total 6.2 10/23/2023 09:10 AM    Protein, Total 6.3 07/18/2023 09:17 AM    Albumin 4.5 01/18/2024 09:21 AM    Albumin 4.0 10/23/2023 09:10 AM    Albumin 4.0 07/18/2023 09:17 AM    Globulin, Total 2.2 01/18/2024 09:21 AM    Globulin, Total 2.2 10/23/2023 09:10 AM    Globulin, Total 2.3 07/18/2023 09:17 AM    HDL 96 01/18/2024 09:21 AM    HDL 87 07/18/2023 09:17 AM    Triglycerides 101 01/18/2024 09:21 AM    Triglycerides 72 07/18/2023 09:17 AM     Portions of  "the record may have been created with voice recognition software. Occasional wrong word or \"sound a like\" substitutions may have occurred due to the inherent limitations of voice recognition software. Read the chart carefully and recognize, using context, where substitutions have occurred.    "

## 2024-01-25 NOTE — PATIENT INSTRUCTIONS
Be mindful of diet.      Stay active and stay hydrated.     We made a few slight changes to lunch and dinner to help with low blood sugar.      Be sure to perform a bolus consistently at meals.     Continue to utilize the dex com continuous glucose monitor and perform a DEX COM download in 2 weeks for review.     Contact the office with any consistent hypoglycemia.     Continue lisinopril.      Continue atorvastatin.     Continue with regular supplementation of vitamin D3 daily.

## 2024-01-26 ENCOUNTER — TELEPHONE (OUTPATIENT)
Dept: ADMINISTRATIVE | Facility: OTHER | Age: 74
End: 2024-01-26

## 2024-01-26 NOTE — LETTER
Procedure Request Form: Colonoscopy      Date Requested: 24  Patient: Danielle M Rodriguez  Patient : [id number]   Referring Provider: Valentina Arguelles MD        Date of Procedure ______________________________       The above patient has informed us that they have completed their   most recent Colonoscopy at your facility. Please complete   this form and attach all corresponding procedure reports/results.    Comments __________________________________________________________  ____________________________________________________________________  ____________________________________________________________________  ____________________________________________________________________    Facility Completing Procedure _________________________________________    Form Completed By (print name) _______________________________________      Signature __________________________________________________________      These reports are needed for  compliance.    Please fax this completed form and a copy of the procedure report to our office located at [address] [place] [address] as soon as possible to Fax [phone] marco a Chaudhry: Phone [phone]    We thank you for your assistance in treating our mutual patient.

## 2024-01-26 NOTE — TELEPHONE ENCOUNTER
----- Message from Gloria Arnold sent at 1/25/2024  1:14 PM EST -----  Regarding: CRC  01/25/24 1:14 PM    Osvaldo, our patient Danielle Rodriguez has had a Colonoscopy within the last year at [facility]. Their number is [phone].    Thank you,  Gloria Arnold  [facility] [place]

## 2024-01-26 NOTE — TELEPHONE ENCOUNTER
Upon review of the In Basket request and the patient's chart, initial outreach has been made via fax to facility. Please see Contacts section for details.   x1    Thank you  Richa León

## 2024-01-30 NOTE — TELEPHONE ENCOUNTER
Upon review of the In Basket request we have found as a result of outreach that patient did not have the requested item(s) completed.     Any additional questions or concerns should be emailed to the Practice Liaisons via the appropriate education email address, please do not reply via In Basket.    Thank you  Richa León

## 2024-02-15 ENCOUNTER — HOSPITAL ENCOUNTER (OUTPATIENT)
Dept: RADIOLOGY | Age: 74
Discharge: HOME | End: 2024-02-15
Attending: INTERNAL MEDICINE
Payer: MEDICARE

## 2024-02-15 ENCOUNTER — DOCUMENTATION (OUTPATIENT)
Dept: ENDOCRINOLOGY | Facility: HOSPITAL | Age: 74
End: 2024-02-15

## 2024-02-15 DIAGNOSIS — R06.09 OTHER FORM OF DYSPNEA: Primary | ICD-10-CM

## 2024-02-15 DIAGNOSIS — M81.0 AGE-RELATED OSTEOPOROSIS WITHOUT CURRENT PATHOLOGICAL FRACTURE: ICD-10-CM

## 2024-02-15 PROCEDURE — 77080 DXA BONE DENSITY AXIAL: CPT

## 2024-03-01 DIAGNOSIS — E10.8 TYPE 1 DIABETES MELLITUS WITH COMPLICATION (HCC): ICD-10-CM

## 2024-03-01 RX ORDER — INSULIN LISPRO 100 [IU]/ML
INJECTION, SOLUTION INTRAVENOUS; SUBCUTANEOUS
Qty: 30 ML | Refills: 0 | Status: SHIPPED | OUTPATIENT
Start: 2024-03-01

## 2024-03-07 ENCOUNTER — OFFICE VISIT (OUTPATIENT)
Dept: INTERNAL MEDICINE | Facility: CLINIC | Age: 74
End: 2024-03-07
Payer: MEDICARE

## 2024-03-07 VITALS
DIASTOLIC BLOOD PRESSURE: 70 MMHG | BODY MASS INDEX: 22.38 KG/M2 | HEIGHT: 59 IN | HEART RATE: 77 BPM | RESPIRATION RATE: 16 BRPM | WEIGHT: 111 LBS | OXYGEN SATURATION: 97 % | SYSTOLIC BLOOD PRESSURE: 118 MMHG | TEMPERATURE: 98.2 F

## 2024-03-07 DIAGNOSIS — M81.0 AGE-RELATED OSTEOPOROSIS WITHOUT CURRENT PATHOLOGICAL FRACTURE: ICD-10-CM

## 2024-03-07 DIAGNOSIS — E10.40 TYPE 1 DIABETES MELLITUS WITH DIABETIC NEUROPATHY (CMS/HCC): ICD-10-CM

## 2024-03-07 DIAGNOSIS — M05.79 RHEUMATOID ARTHRITIS INVOLVING MULTIPLE SITES WITH POSITIVE RHEUMATOID FACTOR (CMS/HCC): Primary | ICD-10-CM

## 2024-03-07 DIAGNOSIS — Z12.31 VISIT FOR SCREENING MAMMOGRAM: ICD-10-CM

## 2024-03-07 DIAGNOSIS — E78.00 PURE HYPERCHOLESTEROLEMIA: ICD-10-CM

## 2024-03-07 PROBLEM — D84.9 IMMUNODEFICIENCY, UNSPECIFIED: Status: RESOLVED | Noted: 2022-01-23 | Resolved: 2024-03-07

## 2024-03-07 PROCEDURE — G8752 SYS BP LESS 140: HCPCS | Performed by: INTERNAL MEDICINE

## 2024-03-07 PROCEDURE — 99214 OFFICE O/P EST MOD 30 MIN: CPT | Performed by: INTERNAL MEDICINE

## 2024-03-07 PROCEDURE — G8754 DIAS BP LESS 90: HCPCS | Performed by: INTERNAL MEDICINE

## 2024-03-07 ASSESSMENT — ENCOUNTER SYMPTOMS
FEVER: 0
DIFFICULTY URINATING: 0
ARTHRALGIAS: 1
UNEXPECTED WEIGHT CHANGE: 0
ABDOMINAL PAIN: 0
WHEEZING: 0
COUGH: 0
SHORTNESS OF BREATH: 0

## 2024-03-07 ASSESSMENT — PATIENT HEALTH QUESTIONNAIRE - PHQ9: SUM OF ALL RESPONSES TO PHQ9 QUESTIONS 1 & 2: 0

## 2024-03-07 ASSESSMENT — ACTIVITIES OF DAILY LIVING (ADL)
PATIENT'S MEMORY ADEQUATE TO SAFELY COMPLETE DAILY ACTIVITIES?: YES
ADEQUATE_TO_COMPLETE_ADL: YES

## 2024-03-07 NOTE — PROGRESS NOTES
Subjective      Patient ID: Lillie Ward is a 73 y.o. female.    HPI    Patient presents for 6 mo follow up.  Had a f/u CT chest in 1/2024 with Dr. Grissom, new rheumatologist. 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  Saw rheum, Dr. Yuan last week.  Will be stopping the methotrexate.  Will continue Rituxan.  Next follow-up will be in June.  Did not talk about osteoporosis with rheumatologist.  Taking calcium and vitamin D.  Mammogram was in 5/2023.  Colonoscopy was also in 5/2023.  Up-to-date with foot and eye exams.  Has an appointment with the podiatrist in [place] next month.  Up-to-date with all vaccines.  Had an episode of significant hyperglycemia due to malfunction of her insulin pump tubing.  Develops chest pain when her sugars are very high.  Previous cardiac workup within the year was normal.  No other new/acute concerns      The following have been reviewed and updated as appropriate in this visit:     Allergies  Meds  Problems         Past Medical History:   Diagnosis Date   • Acid reflux    • Diabetes mellitus type I (CMS/HCC)     Dr. Cerda - [facility]   • DKA, type 1 (CMS/HCC) 09/2016    due to insulin pump malfunction - hospitalized   • GERD (gastroesophageal reflux disease)    • H/O colonoscopy 12/2013    [place].  Normal   • Hyperlipidemia    • Hypoglycemia unawareness associated with type 1 diabetes mellitus (CMS/HCC)    • Macular degeneration     BEGINNING STAGES   • Pulmonary fibrosis (CMS/HCC)     CLEARED BY PULMONOLIGIST 2 YEARS AGO 2018   • Pulmonary nodule, left 07/2017   • Rheumatoid arthritis (CMS/HCC)     Dr. Lo, [place]   • Skin cancer    • Squamous cell carcinoma, leg      Past Surgical History:   Procedure Laterality Date   • COLONOSCOPY     • EYE SURGERY Bilateral     CATARACT   • FOOT SURGERY Left 2009    reconstruction   • FOOT SURGERY Right 2007    reconstruction   • JOINT REPLACEMENT Right     SHOULDER   • LIVER SURGERY       '80's   • REPLACEMENT TOTAL KNEE Left 04/04/2017    [place]   • SKIN BIOPSY     • TOTAL HIP ARTHROPLASTY Right 11/2020   • TOTAL SHOULDER ARTHROPLASTY Right 1990     Family History   Problem Relation Age of Onset   • Stroke Biological Mother    • Lung cancer Biological Father    • No Known Problems Biological Sister    • Rheum arthritis Other         1 of 8 siblings   • Prostate cancer Biological Son    • Pancreatitis Biological Son    • Alcohol abuse Biological Son    • Bipolar disorder Biological Son    • Bipolar disorder Biological Daughter         no diagnosis   • Depression Biological Daughter    • Anxiety disorder Biological Daughter      Social History     Socioeconomic History   • Marital status:      Spouse name: None   • Number of children: 1   • Years of education: None   • Highest education level: None   Occupational History   • Occupation: Retired teacher   Tobacco Use   • Smoking status: Never   • Smokeless tobacco: Never   Vaping Use   • Vaping Use: Never used   Substance and Sexual Activity   • Alcohol use: Yes   • Drug use: No   • Sexual activity: Yes   Social History Narrative    Marital status- . was a pediatrician    Children- 1 son    Caffeine - coffee    Exercise-walking    Diet-low carb    Pets-    Catholic- none    Seat belt-yes    Smoke alarm-yes             Social Determinants of Health     Food Insecurity: No Food Insecurity (12/9/2022)    Hunger Vital Sign    • Worried About Running Out of Food in the Last Year: Never true    • Ran Out of Food in the Last Year: Never true       Review of Systems   Constitutional: Negative for fever and unexpected weight change.   Respiratory: Negative for cough, shortness of breath and wheezing.    Cardiovascular: Negative for chest pain (when sugars are high).   Gastrointestinal: Negative for abdominal pain.   Genitourinary: Negative for difficulty urinating.   Musculoskeletal: Positive for arthralgias.       Allergies   Allergen  Reactions   • Neomycin Itching   • Neomycin-Bacitracin-Polymyxin Itching     neosporin   • Neomycin-Bacitracnzn-Polymyxnb Itching   • Sulfamethoxazole-Trimethoprim      Other reaction(s): GI Intolerance   • Tetracycline      Other reaction(s): GI Intolerance   • Bacitracin Rash     Current Outpatient Medications   Medication Sig Dispense Refill   • albuterol HFA (VENTOLIN HFA) 90 mcg/actuation inhaler INHALE 2 PUFFS BY MOUTH EVERY 4 HOURS AS NEEDED FOR SHORTNESS OF BREATH , COUGH, OR WHEEZING     • calcium carb/vit D3/minerals (CALCIUM CARBONATE-VIT D3-MIN ORAL) Take by mouth See admin instr.     • cholecalciferol, vitamin D3, 25 mcg (1,000 unit) capsule Take 1,000 Units by mouth daily.       • citalopram (celeXA) 10 mg tablet Take 1 tablet by mouth once daily 90 tablet 1   • docosahexaenoic acid/epa (FISH OIL ORAL) Take by mouth 2 (two) times a day. For dry eyes     • folic acid (FOLVITE) 1 mg tablet Take 1 mg by mouth daily.     • glucagon (GVOKE) 1 mg/0.2 mL auto-injector subcutaneous auto-injector Inject 0.2 mL (1 mg total) under the skin once for 1 dose. 0.2 mL 3   • insulin lispro U-100 (HumaLOG) 100 unit/mL subcutaneous cartridge inject by subcutaneous route per prescriber's instructions. Insulin dosing requires individualization.     • lisinopriL (PRINIVIL) 10 mg tablet Take 1 tablet (10 mg total) by mouth daily. 90 tablet 3   • multivit with minerals/lutein (MULTIVITAMIN 50 PLUS ORAL) No SIG Entered     • mupirocin (BACTROBAN) 2 % ointment Apply topically as needed.     • riTUXimab (RITUXAN) 10 mg/mL injection Inject 10 mg/mL as directed. Every 16 weeks   10/13/2022 next      • rosuvastatin (CRESTOR) 10 mg tablet Take 1 tablet (10 mg total) by mouth daily. 90 tablet 3   • vit A/vit C/vit E/zinc/copper (PRESERVISION AREDS ORAL) Take by mouth 2 (two) times a day.     • Lactobac no.41-Bifidobact no.7 70 mg (3 billion cell) capsule Take by mouth daily.       No current facility-administered medications for this  "visit.       Objective   Vitals:    03/07/24 0911   BP: 118/70   Pulse: 77   Resp: 16   Temp: 36.8 °C (98.2 °F)   SpO2: 97%   Weight: 50.3 kg (111 lb)   Height: 1.499 m (4' 11\")     Body mass index is 22.42 kg/m².    Physical Exam  Constitutional:       Appearance: Normal appearance. She is well-developed.   HENT:      Head: Normocephalic and atraumatic.   Eyes:      General: Lids are normal.   Cardiovascular:      Rate and Rhythm: Normal rate and regular rhythm.      Heart sounds: Normal heart sounds, S1 normal and S2 normal. No murmur heard.  Pulmonary:      Effort: Pulmonary effort is normal.      Breath sounds: Wheezing (bilateral upper > lower lung fields) present. No decreased breath sounds, rhonchi or rales.   Abdominal:      General: Bowel sounds are normal.      Palpations: Abdomen is soft.      Tenderness: There is no abdominal tenderness.   Musculoskeletal:      Cervical back: Neck supple.   Skin:     General: Skin is warm and dry.   Neurological:      General: No focal deficit present.      Mental Status: She is alert and oriented to person, place, and time.      Cranial Nerves: No cranial nerve deficit.      Gait: Gait normal.   Psychiatric:         Mood and Affect: Mood normal.         Behavior: Behavior normal.         Thought Content: Thought content normal.         Judgment: Judgment normal.         Assessment/Plan   Problem List Items Addressed This Visit        Nervous    Type 1 diabetes mellitus with diabetic neuropathy (CMS/HCC)     Last HbA1c was 7.1.  Attributes to problems she had with her insulin pump.  Doing better now.  Has an appointment with podiatry for next month.  Up-to-date with eye exam.         Relevant Medications    glucagon (GVOKE) 1 mg/0.2 mL auto-injector subcutaneous auto-injector       Musculoskeletal    Age-related osteoporosis without current pathological fracture     DEXA scan last month showed persistent osteoporosis. Declines oral bisphosphonates.  Taking calcium and " vitamin D and doing weightbearing exercises.  Has not discussed treatment options with rheumatologist.  Will discuss at next appointment in June            Rheumatologic    Rheumatoid arthritis (CMS/HCC) - Primary     CT chest showed findings concerning for methotrexate related disease.  Will be stopping the methotrexate and maintained on Rituxan.  Continue close follow-up.  Next appointment is in June            Other    Visit for screening mammogram    Relevant Orders    BI SCREENING MAMMOGRAM BILATERAL(TOMOSYNTHESIS)    Pure hypercholesterolemia     Continue rosuvastatin 10 mg daily            Machelle Gallardo MD    3/7/2024

## 2024-03-07 NOTE — PATIENT INSTRUCTIONS
Talk to the rheumatologist about treatment for osteoporosis. (Prolia?)  Calcium 1000-1200mg daily and Vit -6061 IU daily    Tdap (tetanus plus whooping cough) or plain tetanus shot - at the pharmay

## 2024-03-07 NOTE — ASSESSMENT & PLAN NOTE
DEXA scan last month showed persistent osteoporosis. Declines oral bisphosphonates.  Taking calcium and vitamin D and doing weightbearing exercises.  Has not discussed treatment options with rheumatologist.  Will discuss at next appointment in June

## 2024-03-07 NOTE — ASSESSMENT & PLAN NOTE
Last HbA1c was 7.1.  Attributes to problems she had with her insulin pump.  Doing better now.  Has an appointment with podiatry for next month.  Up-to-date with eye exam.

## 2024-04-09 ENCOUNTER — TELEPHONE (OUTPATIENT)
Dept: ENDOCRINOLOGY | Facility: HOSPITAL | Age: 74
End: 2024-04-09

## 2024-04-09 NOTE — TELEPHONE ENCOUNTER
Telephone call from patient that [facility] [employer] never received the forms for for CGM supplies. She would like if they could ne re faxed over as soon as possible. She said this has to be done every year. Please advise and call the patient with any questions or concerns.     Fax number: [phone]

## 2024-04-16 DIAGNOSIS — E10.8 TYPE 1 DIABETES MELLITUS WITH COMPLICATION (HCC): ICD-10-CM

## 2024-04-16 RX ORDER — INSULIN LISPRO 100 [IU]/ML
INJECTION, SOLUTION INTRAVENOUS; SUBCUTANEOUS
Qty: 30 ML | Refills: 1 | Status: SHIPPED | OUTPATIENT
Start: 2024-04-16

## 2024-04-30 LAB
ALBUMIN SERPL-MCNC: 4.2 G/DL (ref 3.8–4.8)
ALBUMIN/GLOB SERPL: 2.2 {RATIO} (ref 1.2–2.2)
ALP SERPL-CCNC: 67 IU/L (ref 44–121)
ALT SERPL-CCNC: 19 IU/L (ref 0–32)
AST SERPL-CCNC: 32 IU/L (ref 0–40)
BASOPHILS # BLD AUTO: 0.1 X10E3/UL (ref 0–0.2)
BASOPHILS NFR BLD AUTO: 1 %
BILIRUB SERPL-MCNC: 1 MG/DL (ref 0–1.2)
BUN SERPL-MCNC: 13 MG/DL (ref 8–27)
BUN/CREAT SERPL: 17 (ref 12–28)
CALCIUM SERPL-MCNC: 9.9 MG/DL (ref 8.7–10.3)
CHLORIDE SERPL-SCNC: 103 MMOL/L (ref 96–106)
CO2 SERPL-SCNC: 24 MMOL/L (ref 20–29)
CREAT SERPL-MCNC: 0.75 MG/DL (ref 0.57–1)
EGFR: 83 ML/MIN/1.73
EOSINOPHIL # BLD AUTO: 0.4 X10E3/UL (ref 0–0.4)
EOSINOPHIL NFR BLD AUTO: 8 %
ERYTHROCYTE [DISTWIDTH] IN BLOOD BY AUTOMATED COUNT: 12.7 % (ref 11.7–15.4)
GLOBULIN SER-MCNC: 1.9 G/DL (ref 1.5–4.5)
GLUCOSE SERPL-MCNC: 172 MG/DL (ref 70–99)
HBA1C MFR BLD: 7.5 % (ref 4.8–5.6)
HCT VFR BLD AUTO: 45.5 % (ref 34–46.6)
HGB BLD-MCNC: 14.9 G/DL (ref 11.1–15.9)
IMM GRANULOCYTES # BLD: 0 X10E3/UL (ref 0–0.1)
IMM GRANULOCYTES NFR BLD: 0 %
LYMPHOCYTES # BLD AUTO: 1.3 X10E3/UL (ref 0.7–3.1)
LYMPHOCYTES NFR BLD AUTO: 24 %
MCH RBC QN AUTO: 31.2 PG (ref 26.6–33)
MCHC RBC AUTO-ENTMCNC: 32.7 G/DL (ref 31.5–35.7)
MCV RBC AUTO: 95 FL (ref 79–97)
MONOCYTES # BLD AUTO: 0.5 X10E3/UL (ref 0.1–0.9)
MONOCYTES NFR BLD AUTO: 10 %
NEUTROPHILS # BLD AUTO: 3 X10E3/UL (ref 1.4–7)
NEUTROPHILS NFR BLD AUTO: 57 %
PLATELET # BLD AUTO: 243 X10E3/UL (ref 150–450)
POTASSIUM SERPL-SCNC: 4.3 MMOL/L (ref 3.5–5.2)
PROT SERPL-MCNC: 6.1 G/DL (ref 6–8.5)
RBC # BLD AUTO: 4.78 X10E6/UL (ref 3.77–5.28)
SODIUM SERPL-SCNC: 141 MMOL/L (ref 134–144)
WBC # BLD AUTO: 5.3 X10E3/UL (ref 3.4–10.8)

## 2024-05-02 ENCOUNTER — OFFICE VISIT (OUTPATIENT)
Dept: ENDOCRINOLOGY | Facility: HOSPITAL | Age: 74
End: 2024-05-02
Payer: MEDICARE

## 2024-05-02 VITALS
BODY MASS INDEX: 20.58 KG/M2 | DIASTOLIC BLOOD PRESSURE: 60 MMHG | HEART RATE: 77 BPM | WEIGHT: 109 LBS | OXYGEN SATURATION: 96 % | HEIGHT: 61 IN | SYSTOLIC BLOOD PRESSURE: 118 MMHG

## 2024-05-02 DIAGNOSIS — E10.649 HYPOGLYCEMIA UNAWARENESS ASSOCIATED WITH TYPE 1 DIABETES MELLITUS (HCC): ICD-10-CM

## 2024-05-02 DIAGNOSIS — E78.2 MIXED HYPERLIPIDEMIA: ICD-10-CM

## 2024-05-02 DIAGNOSIS — I10 ESSENTIAL HYPERTENSION: ICD-10-CM

## 2024-05-02 DIAGNOSIS — E10.8 TYPE 1 DIABETES MELLITUS WITH COMPLICATION (HCC): Primary | ICD-10-CM

## 2024-05-02 DIAGNOSIS — E55.9 VITAMIN D DEFICIENCY: ICD-10-CM

## 2024-05-02 DIAGNOSIS — E10.40 TYPE 1 DIABETES MELLITUS WITH DIABETIC NEUROPATHY (HCC): ICD-10-CM

## 2024-05-02 DIAGNOSIS — Z96.41 INSULIN PUMP IN PLACE: ICD-10-CM

## 2024-05-02 PROCEDURE — 99214 OFFICE O/P EST MOD 30 MIN: CPT | Performed by: NURSE PRACTITIONER

## 2024-05-02 PROCEDURE — 95251 CONT GLUC MNTR ANALYSIS I&R: CPT | Performed by: NURSE PRACTITIONER

## 2024-05-02 NOTE — PATIENT INSTRUCTIONS
Be mindful of diet.      Stay active and stay hydrated.      Continue current insulin pump settings.    Please be careful not to stack insulin, as discussed.      Be sure to perform a bolus consistently at meals.     Continue to utilize the dex com continuous glucose monitor and perform a DEX COM download in 2 weeks for review.     Contact the office with any consistent hypoglycemia.     Continue lisinopril.      Continue atorvastatin.     Continue with regular supplementation of vitamin D3 daily.

## 2024-05-02 NOTE — PROGRESS NOTES
Danielle Rodriguez 73 y.o. female MRN: [id number]    Encounter: [id number]      Assessment/Plan     Assessment:  This is a 73 y.o.-year-old female with type 1 diabetes on insulin pump therapy.    Plan:  1.  Type 1 diabetes:  Her most recent hemoglobin A1c is 7.5.  Review of her pump and sensor download reveals some hypoglycemia main due to stacking insulin. For now, her settings will remain the same.  Discussed the proper process for correcting her blood sugars instead of adding small amounts of carbs.  She will continue to utilize the dex com continuous glucose monitor.  Reminded her not to give herself corrections at bedtime as this is causing some nocturnal hypoglycemia which can be dangerous.  Check hemoglobin A1c prior to next visit.  2.  Hyperlipidemia: Continue atorvastatin. Check fasting lipid panel prior to next visit.  3.  Hypertension: He is normotensive in the office today.  Continue lisinopril.  Check comprehensive metabolic panel prior to next visit.  4.  Vitamin-D deficiency:  Continue supplementation with 1000 units of vitamin D3 daily.    CC: Type I diabetes follow-up    History of Present Illness     HPI:  73 y.o. female with type 1 diabetes for approximately 40 years. She was started on an insulin pump around 1998.  She is on insulin at home and takes Humalog insulin via a new tandem insulin pump.  Download of her Dexcom sensor and tandem pump from April 19 through May 2, 2024 reveals an average glucose of 160.  In target range 66% of the time with <1.6% low blood sugar. She is also experiencing hypoglycemia from what seems to be stacking insulin throughout the day.  Her most recent hemoglobin A1c from April 29, 2024 is 7.5. She denies any polyuria and polydipsia.  She has no polyphagia, but has once a night nocturia.  She is getting some blurry vision with some cataracts.  She has no numbness or tingling of the feet.  She denies chest pain or shortness of breath.  She denies nephropathy, heart  attack, stroke and claudication but does admit to neuropathy and retinopathy.      Her most recent diabetic eye exam was performed on July 12, 2020. She is seen every 4 months. She has no complaints about her feet and does not follow Podiatry for regular diabetic foot care.  Most recent diabetic foot exam was performed at her office visit on April 19, 2023.     For her hypertension, she is treated with lisinopril 10 mg daily.     For her hyperlipidemia, she is treated with atorvastatin 10 mg.      For her vitamin-D deficiency, she supplements with 1000 units of vitamin D3 daily.     Review of Systems   Constitutional: Negative.  Negative for chills, fatigue and fever.   HENT: Negative.  Negative for trouble swallowing and voice change.    Eyes: Negative.  Negative for photophobia, pain, discharge, redness, itching and visual disturbance.   Respiratory: Negative.  Negative for chest tightness and shortness of breath.    Cardiovascular: Negative.  Negative for chest pain.   Gastrointestinal: Negative.  Negative for abdominal pain, constipation, diarrhea and vomiting.   Endocrine: Negative for cold intolerance, heat intolerance, polydipsia, polyphagia and polyuria.   Genitourinary: Negative.    Musculoskeletal: Negative.    Skin: Negative.    Allergic/Immunologic: Negative.    Neurological: Negative.  Negative for dizziness, syncope, light-headedness and headaches.   Hematological: Negative.    Psychiatric/Behavioral: Negative.     All other systems reviewed and are negative.      Historical Information   Past Medical History:   Diagnosis Date    Basal cell carcinoma (BCC) of face     left cheek    Cataract     Rheumatoid arthritis (HCC)      Past Surgical History:   Procedure Laterality Date    CATARACT EXTRACTION, BILATERAL Bilateral     EXPLORATORY LAPAROTOMY      FOOT SURGERY Left     foot reconstruction in 2 phases    FOOT SURGERY Right     foot reconstruction    LIVER BIOPSY      TOTAL SHOULDER REPLACEMENT Right       Social History   Social History     Substance and Sexual Activity   Alcohol Use Yes    Alcohol/week: 3.0 standard drinks of alcohol    Types: 3 Cans of beer per week    Comment: most nights has 1 drink     Social History     Substance and Sexual Activity   Drug Use No     Social History     Tobacco Use   Smoking Status Never   Smokeless Tobacco Never     Family History:   Family History   Problem Relation Age of Onset    Stroke Mother     Cancer Father         renal metastatic    Brain cancer Sister     Diabetes type I Maternal Uncle     Diabetes type I Maternal Grandmother     Osteoarthritis Brother     Prostate cancer Brother     Osteoarthritis Sister     Breast cancer Sister     Rheum arthritis Sister     Osteoarthritis Sister     Osteoarthritis Sister     Osteoarthritis Sister     Thyroid disease unspecified Sister     Osteoarthritis Brother     Prostate cancer Brother     Osteoarthritis Brother     Bipolar disorder Son     Bipolar disorder Daughter        Meds/Allergies   Current Outpatient Medications   Medication Sig Dispense Refill    Calcium Carbonate-Vit D-Min (CALCIUM 1200 PO) Take by mouth      famotidine (PEPCID) 20 mg tablet Take 20 mg by mouth daily As needed      glucagon (GLUCAGON EMERGENCY) 1 MG injection Inject 1 mg under the skin once as needed for low blood sugar for up to 1 dose 1 each 4    HumaLOG 100 UNIT/ML injection INJECT UP  UNITS VIA INSULIN PUMP DAILY 30 mL 1    lisinopril (ZESTRIL) 10 mg tablet Take 10 mg by mouth daily        metoclopramide (REGLAN) 5 mg tablet Take 5 mg by mouth 3 (three) times a day As needed      Multiple Vitamins-Minerals (MULTIVITAMIN ADULT PO) Take by mouth daily        Omega-3 Fat Ac-Cholecalciferol (DRY EYE OMEGA BENEFITS/VIT D-3) 667-250 MG-UNIT CAPS Take by mouth daily        pantoprazole (PROTONIX) 40 mg tablet Take 40 mg by mouth daily As needed      RiTUXimab (RITUXAN IV) Infuse into a venous catheter Every 16 weeks, 2nd dose 2 weeks later then  "repeat      rosuvastatin (CRESTOR) 10 MG tablet Take 10 mg by mouth daily      cholecalciferol (VITAMIN D3) 1,000 units tablet Take 1,000 Units by mouth daily (Patient not taking: Reported on 10/26/2023)      citalopram (CeleXA) 10 mg tablet Take 10 mg by mouth daily (Patient not taking: Reported on 5/2/2024)      COLLAGEN PO Take by mouth (Patient not taking: Reported on 5/2/2024)      folic acid (FOLVITE) 1 mg tablet Take 1 mg by mouth daily (Patient not taking: Reported on 5/2/2024)      methotrexate 50 MG/2ML injection Inject under the skin once a week (Patient not taking: Reported on 5/2/2024)       No current facility-administered medications for this visit.     Allergies   Allergen Reactions    Bactrim [Sulfamethoxazole-Trimethoprim] GI Intolerance     Other reaction(s): GI Intolerance    Neosporin [Neomycin-Bacitracin Zn-Polymyx] Itching    Tetracycline GI Intolerance     Other reaction(s): GI Intolerance    Nickel Rash       Objective   Vitals: Blood pressure 118/60, pulse 77, height 5' 1\" (1.549 m), weight 49.4 kg (109 lb), SpO2 96%.    Physical Exam  Vitals reviewed.   Constitutional:       Appearance: She is well-developed.   HENT:      Head: Normocephalic and atraumatic.   Eyes:      Conjunctiva/sclera: Conjunctivae normal.      Pupils: Pupils are equal, round, and reactive to light.   Cardiovascular:      Rate and Rhythm: Normal rate and regular rhythm.      Pulses: no weak pulses.           Dorsalis pedis pulses are 1+ on the right side and 1+ on the left side.        Posterior tibial pulses are 1+ on the right side and 1+ on the left side.      Heart sounds: Normal heart sounds.   Pulmonary:      Effort: Pulmonary effort is normal.      Breath sounds: Normal breath sounds.   Abdominal:      General: Bowel sounds are normal.      Palpations: Abdomen is soft.   Musculoskeletal:         General: Normal range of motion.      Cervical back: Normal range of motion and neck supple.   Feet:      Right foot: "      Skin integrity: No ulcer, skin breakdown, erythema, warmth, callus or dry skin.      Left foot:      Skin integrity: No ulcer, skin breakdown, erythema, warmth, callus or dry skin.   Skin:     General: Skin is warm and dry.   Neurological:      Mental Status: She is alert and oriented to person, place, and time.   Psychiatric:         Behavior: Behavior normal.         Thought Content: Thought content normal.         Judgment: Judgment normal.       Patient's shoes and socks removed.    Right Foot/Ankle   Right Foot Inspection  Skin Exam: skin normal and skin intact. No dry skin, no warmth, no callus, no erythema, no maceration, no abnormal color, no pre-ulcer, no ulcer and no callus.     Toe Exam: ROM and strength within normal limits.     Sensory   Monofilament testing: intact    Vascular  Capillary refills: < 3 seconds  The right DP pulse is 1+. The right PT pulse is 1+.     Left Foot/Ankle  Left Foot Inspection  Skin Exam: skin normal and skin intact. No dry skin, no warmth, no erythema, no maceration, normal color, no pre-ulcer, no ulcer and no callus.     Toe Exam: ROM and strength within normal limits.     Sensory   Monofilament testing: intact    Vascular  Capillary refills: < 3 seconds  The left DP pulse is 1+. The left PT pulse is 1+.     Assign Risk Category  No deformity present  No loss of protective sensation  No weak pulses  Risk: 0      Lab Results:   Lab Results   Component Value Date/Time    Hemoglobin A1C 7.5 (H) 04/29/2024 07:56 AM    Hemoglobin A1C 7.1 (H) 01/18/2024 09:21 AM    Hemoglobin A1C 7.4 (H) 10/23/2023 09:10 AM    White Blood Cell Count 5.3 04/29/2024 07:56 AM    White Blood Cell Count 5.4 01/18/2024 09:21 AM    White Blood Cell Count 4.6 10/23/2023 09:10 AM    Hemoglobin 14.9 04/29/2024 07:56 AM    Hemoglobin 15.4 01/18/2024 09:21 AM    Hemoglobin 14.3 10/23/2023 09:10 AM    HCT 45.5 04/29/2024 07:56 AM    HCT 47.2 (H) 01/18/2024 09:21 AM    HCT 42.7 10/23/2023 09:10 AM    MCV 95  "04/29/2024 07:56 AM    MCV 95 01/18/2024 09:21 AM    MCV 93 10/23/2023 09:10 AM    Platelet Count 243 04/29/2024 07:56 AM    Platelet Count 271 01/18/2024 09:21 AM    Platelet Count 240 10/23/2023 09:10 AM    BUN 13 04/29/2024 07:56 AM    BUN 12 01/18/2024 09:21 AM    BUN 12 10/23/2023 09:10 AM    Potassium 4.3 04/29/2024 07:56 AM    Potassium 4.4 01/18/2024 09:21 AM    Potassium 4.5 10/23/2023 09:10 AM    Chloride 103 04/29/2024 07:56 AM    Chloride 102 01/18/2024 09:21 AM    Chloride 105 10/23/2023 09:10 AM    CO2 24 04/29/2024 07:56 AM    CO2 24 01/18/2024 09:21 AM    CO2 25 10/23/2023 09:10 AM    Creatinine 0.75 04/29/2024 07:56 AM    Creatinine 0.75 01/18/2024 09:21 AM    Creatinine 0.77 10/23/2023 09:10 AM    AST 32 04/29/2024 07:56 AM    AST 36 01/18/2024 09:21 AM    AST 28 10/23/2023 09:10 AM    ALT 19 04/29/2024 07:56 AM    ALT 24 01/18/2024 09:21 AM    ALT 15 10/23/2023 09:10 AM    Protein, Total 6.1 04/29/2024 07:56 AM    Protein, Total 6.7 01/18/2024 09:21 AM    Protein, Total 6.2 10/23/2023 09:10 AM    Albumin 4.2 04/29/2024 07:56 AM    Albumin 4.5 01/18/2024 09:21 AM    Albumin 4.0 10/23/2023 09:10 AM    Globulin, Total 1.9 04/29/2024 07:56 AM    Globulin, Total 2.2 01/18/2024 09:21 AM    Globulin, Total 2.2 10/23/2023 09:10 AM    HDL 96 01/18/2024 09:21 AM    HDL 87 07/18/2023 09:17 AM    Triglycerides 101 01/18/2024 09:21 AM    Triglycerides 72 07/18/2023 09:17 AM     Portions of the record may have been created with voice recognition software. Occasional wrong word or \"sound a like\" substitutions may have occurred due to the inherent limitations of voice recognition software. Read the chart carefully and recognize, using context, where substitutions have occurred.    "

## 2024-05-21 ENCOUNTER — TELEPHONE (OUTPATIENT)
Age: 74
End: 2024-05-21

## 2024-05-21 NOTE — TELEPHONE ENCOUNTER
Made note of the message from the patient and will await the form and it will be filled out and sent back for the upgrade to the 7

## 2024-05-21 NOTE — TELEPHONE ENCOUNTER
Pt called relaying [facility] will be faxing over a form to update her Dexcom G6 to G7.

## 2024-07-30 ENCOUNTER — DOCUMENTATION (OUTPATIENT)
Dept: ENDOCRINOLOGY | Facility: HOSPITAL | Age: 74
End: 2024-07-30

## 2024-08-09 DIAGNOSIS — E10.8 TYPE 1 DIABETES MELLITUS WITH COMPLICATION (HCC): ICD-10-CM

## 2024-08-09 RX ORDER — INSULIN LISPRO 100 [IU]/ML
INJECTION, SOLUTION INTRAVENOUS; SUBCUTANEOUS
Qty: 30 ML | Refills: 1 | Status: SHIPPED | OUTPATIENT
Start: 2024-08-09 | End: 2024-08-14 | Stop reason: SDUPTHER

## 2024-08-09 RX ORDER — INSULIN LISPRO 100 [IU]/ML
INJECTION, SOLUTION INTRAVENOUS; SUBCUTANEOUS
Qty: 30 ML | Refills: 0 | OUTPATIENT
Start: 2024-08-09

## 2024-08-09 NOTE — TELEPHONE ENCOUNTER
Reason for call:   [x] Refill   [] Prior Auth  [] Other:     Office:   [] PCP/Provider -   [x] Specialty/Provider - Endo    Medication:  HumaLOG 100 UNIT/ML injection    Dose/Frequency:  INJECT UP  UNITS VIA INSULIN PUMP DAILY,     Quantity: 30 ml    Pharmacy: [facility] [id number] - [place], [place] - [address]      Does the patient have enough for 3 days?   [] Yes   [x] No - Send as HP to POD

## 2024-08-13 ENCOUNTER — TELEPHONE (OUTPATIENT)
Age: 74
End: 2024-08-13

## 2024-08-13 LAB
ALBUMIN SERPL-MCNC: 4.4 G/DL (ref 3.8–4.8)
ALBUMIN/CREAT UR: 17 MG/G CREAT (ref 0–29)
ALP SERPL-CCNC: 67 IU/L (ref 44–121)
ALT SERPL-CCNC: 21 IU/L (ref 0–32)
AST SERPL-CCNC: 30 IU/L (ref 0–40)
BASOPHILS # BLD AUTO: 0.1 X10E3/UL (ref 0–0.2)
BASOPHILS NFR BLD AUTO: 1 %
BILIRUB SERPL-MCNC: 1.1 MG/DL (ref 0–1.2)
BUN SERPL-MCNC: 12 MG/DL (ref 8–27)
BUN/CREAT SERPL: 18 (ref 12–28)
CALCIUM SERPL-MCNC: 10.1 MG/DL (ref 8.7–10.3)
CHLORIDE SERPL-SCNC: 103 MMOL/L (ref 96–106)
CHOLEST SERPL-MCNC: 196 MG/DL (ref 100–199)
CO2 SERPL-SCNC: 19 MMOL/L (ref 20–29)
CREAT SERPL-MCNC: 0.67 MG/DL (ref 0.57–1)
CREAT UR-MCNC: 238.2 MG/DL
EGFR: 91 ML/MIN/1.73
EOSINOPHIL # BLD AUTO: 0.3 X10E3/UL (ref 0–0.4)
EOSINOPHIL NFR BLD AUTO: 4 %
ERYTHROCYTE [DISTWIDTH] IN BLOOD BY AUTOMATED COUNT: 13 % (ref 11.7–15.4)
EST. AVERAGE GLUCOSE BLD GHB EST-MCNC: 160 MG/DL
GLOBULIN SER-MCNC: 2.3 G/DL (ref 1.5–4.5)
GLUCOSE SERPL-MCNC: 178 MG/DL (ref 70–99)
HBA1C MFR BLD: 7.2 %HB
HCT VFR BLD AUTO: 46.1 % (ref 34–46.6)
HDLC SERPL-MCNC: 95 MG/DL
HGB BLD-MCNC: 15.4 G/DL (ref 11.1–15.9)
IMM GRANULOCYTES # BLD: 0 X10E3/UL (ref 0–0.1)
IMM GRANULOCYTES NFR BLD: 0 %
LDLC SERPL CALC-MCNC: 83 MG/DL (ref 0–99)
LYMPHOCYTES # BLD AUTO: 1.7 X10E3/UL (ref 0.7–3.1)
LYMPHOCYTES NFR BLD AUTO: 24 %
MCH RBC QN AUTO: 31.2 PG (ref 26.6–33)
MCHC RBC AUTO-ENTMCNC: 33.4 G/DL (ref 31.5–35.7)
MCV RBC AUTO: 93 FL (ref 79–97)
MICROALBUMIN UR-MCNC: 41.6 UG/ML
MONOCYTES # BLD AUTO: 0.7 X10E3/UL (ref 0.1–0.9)
MONOCYTES NFR BLD AUTO: 10 %
NEUTROPHILS # BLD AUTO: 4.3 X10E3/UL (ref 1.4–7)
NEUTROPHILS NFR BLD AUTO: 61 %
PLATELET # BLD AUTO: 241 X10E3/UL (ref 150–450)
POTASSIUM SERPL-SCNC: 4.3 MMOL/L (ref 3.5–5.2)
PROT SERPL-MCNC: 6.7 G/DL (ref 6–8.5)
RBC # BLD AUTO: 4.94 X10E6/UL (ref 3.77–5.28)
SL AMB VLDL CHOLESTEROL CALC: 18 MG/DL (ref 5–40)
SODIUM SERPL-SCNC: 141 MMOL/L (ref 134–144)
T4 FREE SERPL-MCNC: 1.21 NG/DL (ref 0.82–1.77)
TRIGL SERPL-MCNC: 102 MG/DL (ref 0–149)
TSH SERPL DL<=0.005 MIU/L-ACNC: 3.62 UIU/ML (ref 0.45–4.5)
WBC # BLD AUTO: 7 X10E3/UL (ref 3.4–10.8)

## 2024-08-13 NOTE — TELEPHONE ENCOUNTER
Patient called to see if we received the labs she had done on 8/9 but I did not locate any results.     She is going to call [facility] to see if they can send the results to us.

## 2024-08-14 ENCOUNTER — OFFICE VISIT (OUTPATIENT)
Dept: ENDOCRINOLOGY | Facility: HOSPITAL | Age: 74
End: 2024-08-14
Payer: MEDICARE

## 2024-08-14 VITALS
WEIGHT: 109.8 LBS | DIASTOLIC BLOOD PRESSURE: 70 MMHG | HEIGHT: 61 IN | BODY MASS INDEX: 20.73 KG/M2 | HEART RATE: 68 BPM | SYSTOLIC BLOOD PRESSURE: 110 MMHG

## 2024-08-14 DIAGNOSIS — I10 ESSENTIAL HYPERTENSION: ICD-10-CM

## 2024-08-14 DIAGNOSIS — E10.40 TYPE 1 DIABETES MELLITUS WITH DIABETIC NEUROPATHY (HCC): ICD-10-CM

## 2024-08-14 DIAGNOSIS — Z96.41 INSULIN PUMP IN PLACE: ICD-10-CM

## 2024-08-14 DIAGNOSIS — E55.9 VITAMIN D DEFICIENCY: ICD-10-CM

## 2024-08-14 DIAGNOSIS — E10.649 HYPOGLYCEMIA UNAWARENESS ASSOCIATED WITH TYPE 1 DIABETES MELLITUS (HCC): ICD-10-CM

## 2024-08-14 DIAGNOSIS — E78.2 MIXED HYPERLIPIDEMIA: ICD-10-CM

## 2024-08-14 DIAGNOSIS — E10.8 TYPE 1 DIABETES MELLITUS WITH COMPLICATION (HCC): Primary | ICD-10-CM

## 2024-08-14 PROCEDURE — 95251 CONT GLUC MNTR ANALYSIS I&R: CPT | Performed by: NURSE PRACTITIONER

## 2024-08-14 PROCEDURE — 99214 OFFICE O/P EST MOD 30 MIN: CPT | Performed by: NURSE PRACTITIONER

## 2024-08-14 RX ORDER — CETIRIZINE HYDROCHLORIDE 10 MG/1
10 TABLET, CHEWABLE ORAL DAILY
COMMUNITY

## 2024-08-14 RX ORDER — INSULIN LISPRO 100 [IU]/ML
INJECTION, SOLUTION INTRAVENOUS; SUBCUTANEOUS
Qty: 30 ML | Refills: 4 | Status: SHIPPED | OUTPATIENT
Start: 2024-08-14

## 2024-08-14 NOTE — PROGRESS NOTES
Danielle Rodriguez 73 y.o. female MRN: [id number]    Encounter: [id number]      Assessment & Plan     Assessment:  This is a 73 y.o.-year-old female with type 1 diabetes on insulin pump therapy.     Plan:  1.  Type 1 diabetes:  Her most recent hemoglobin A1c is 7.2.  Review of her pump and sensor download reveals some hypoglycemia main due to stacking insulin. For now, her other settings will remain the same.  Discussed the proper process for correcting her blood sugars instead of adding small amounts of carbs.  She will continue to utilize the dex com continuous glucose monitor.  Reminded her not to give herself corrections at bedtime as this is causing some nocturnal hypoglycemia which can be dangerous.  Check hemoglobin A1c prior to next visit.  2.  Hyperlipidemia: Stable.  Continue atorvastatin. Check fasting lipid panel prior to next visit.  3.  Hypertension: He is normotensive in the office today.  Continue lisinopril.  Check comprehensive metabolic panel prior to next visit.  4.  Vitamin-D deficiency:  Continue supplementation with 1000 units of vitamin D3 daily.    CC: Type 1 Diabetes follow up    History of Present Illness     HPI:  73 y.o. female with type 1 diabetes for approximately 40 years. She was started on an insulin pump around 1998.  She is on insulin at home and takes Humalog insulin via a new tandem insulin pump.  Download of her Dexcom sensor and tandem pump from August 1 through August 14, 2024 reveals an average glucose of 169.  In target range 66% of the time with <0.8% low blood sugar. She is also experiencing hypoglycemia from what seems to be stacking insulin throughout the day and hyperglycemia following dinner.  Her most recent hemoglobin A1c from August 9, 2024 is 7.2. She denies any polyuria and polydipsia.  She has no polyphagia, but has once a night nocturia.  She is getting some blurry vision with some cataracts.  She has no numbness or tingling of the feet.  She denies chest pain or  shortness of breath.  She denies nephropathy, heart attack, stroke and claudication but does admit to neuropathy and retinopathy.      Her most recent diabetic eye exam was performed on July 12, 2020. She is seen every 4 months. She has no complaints about her feet and does not follow Podiatry for regular diabetic foot care.  Most recent diabetic foot exam was performed at her office visit on May 2, 2024.     For her hypertension, she is treated with lisinopril 10 mg daily.     For her hyperlipidemia, she is treated with atorvastatin 10 mg.      For her vitamin-D deficiency, she supplements with 1000 units of vitamin D3 daily.     Review of Systems   Constitutional: Negative.  Negative for chills, fatigue and fever.   HENT: Negative.  Negative for trouble swallowing and voice change.    Eyes: Negative.  Negative for photophobia, pain, discharge, redness, itching and visual disturbance.   Respiratory: Negative.  Negative for chest tightness and shortness of breath.    Cardiovascular: Negative.  Negative for chest pain.   Gastrointestinal: Negative.  Negative for abdominal pain, constipation, diarrhea and vomiting.   Endocrine: Negative for cold intolerance, heat intolerance, polydipsia, polyphagia and polyuria.   Genitourinary: Negative.    Musculoskeletal: Negative.    Skin: Negative.    Allergic/Immunologic: Negative.    Neurological: Negative.  Negative for dizziness, syncope, light-headedness and headaches.   Hematological: Negative.    Psychiatric/Behavioral: Negative.     All other systems reviewed and are negative.      Historical Information   Past Medical History:   Diagnosis Date    Basal cell carcinoma (BCC) of face     left cheek    Cataract     Rheumatoid arthritis (HCC)      Past Surgical History:   Procedure Laterality Date    CATARACT EXTRACTION, BILATERAL Bilateral     EXPLORATORY LAPAROTOMY      FOOT SURGERY Left     foot reconstruction in 2 phases    FOOT SURGERY Right     foot reconstruction     LIVER BIOPSY      TOTAL SHOULDER REPLACEMENT Right      Social History   Social History     Substance and Sexual Activity   Alcohol Use Yes    Alcohol/week: 3.0 standard drinks of alcohol    Types: 3 Cans of beer per week    Comment: most nights has 1 drink     Social History     Substance and Sexual Activity   Drug Use No     Social History     Tobacco Use   Smoking Status Never   Smokeless Tobacco Never     Family History:   Family History   Problem Relation Age of Onset    Stroke Mother     Cancer Father         renal metastatic    Brain cancer Sister     Diabetes type I Maternal Uncle     Diabetes type I Maternal Grandmother     Osteoarthritis Brother     Prostate cancer Brother     Osteoarthritis Sister     Breast cancer Sister     Rheum arthritis Sister     Osteoarthritis Sister     Osteoarthritis Sister     Osteoarthritis Sister     Thyroid disease unspecified Sister     Osteoarthritis Brother     Prostate cancer Brother     Osteoarthritis Brother     Bipolar disorder Son     Bipolar disorder Daughter        Meds/Allergies   Current Outpatient Medications   Medication Sig Dispense Refill    Calcium Carbonate-Vit D-Min (CALCIUM 1200 PO) Take by mouth      cetirizine (ZyrTEC) 10 MG chewable tablet Chew 10 mg daily      famotidine (PEPCID) 20 mg tablet Take 20 mg by mouth daily As needed      glucagon (GLUCAGON EMERGENCY) 1 MG injection Inject 1 mg under the skin once as needed for low blood sugar for up to 1 dose 1 each 4    HumaLOG 100 UNIT/ML injection INJECT UP  UNITS VIA INSULIN PUMP DAILY 30 mL 1    lisinopril (ZESTRIL) 10 mg tablet Take 10 mg by mouth daily        metoclopramide (REGLAN) 5 mg tablet Take 5 mg by mouth 3 (three) times a day As needed      Multiple Vitamins-Minerals (MULTIVITAMIN ADULT PO) Take by mouth daily        Omega-3 Fat Ac-Cholecalciferol (DRY EYE OMEGA BENEFITS/VIT D-3) 667-250 MG-UNIT CAPS Take by mouth daily        pantoprazole (PROTONIX) 40 mg tablet Take 40 mg by mouth  "daily As needed      RiTUXimab (RITUXAN IV) Infuse into a venous catheter Every 16 weeks, 2nd dose 2 weeks later then repeat      rosuvastatin (CRESTOR) 10 MG tablet Take 10 mg by mouth daily      cholecalciferol (VITAMIN D3) 1,000 units tablet Take 1,000 Units by mouth daily (Patient not taking: Reported on 10/26/2023)      citalopram (CeleXA) 10 mg tablet Take 10 mg by mouth daily (Patient not taking: Reported on 5/2/2024)      COLLAGEN PO Take by mouth (Patient not taking: Reported on 5/2/2024)      folic acid (FOLVITE) 1 mg tablet Take 1 mg by mouth daily (Patient not taking: Reported on 5/2/2024)      methotrexate 50 MG/2ML injection Inject under the skin once a week (Patient not taking: Reported on 5/2/2024)       No current facility-administered medications for this visit.     Allergies   Allergen Reactions    Bactrim [Sulfamethoxazole-Trimethoprim] GI Intolerance     Other reaction(s): GI Intolerance    Neosporin [Neomycin-Bacitracin Zn-Polymyx] Itching    Tetracycline GI Intolerance     Other reaction(s): GI Intolerance    Nickel Rash       Objective   Vitals: Blood pressure 110/70, pulse 68, height 5' 1\" (1.549 m), weight 49.8 kg (109 lb 12.8 oz).    Physical Exam  Vitals reviewed.   Constitutional:       Appearance: She is well-developed.   HENT:      Head: Normocephalic and atraumatic.   Eyes:      Conjunctiva/sclera: Conjunctivae normal.      Pupils: Pupils are equal, round, and reactive to light.   Cardiovascular:      Rate and Rhythm: Normal rate and regular rhythm.      Heart sounds: Normal heart sounds.   Pulmonary:      Effort: Pulmonary effort is normal.      Breath sounds: Normal breath sounds.   Abdominal:      General: Bowel sounds are normal.      Palpations: Abdomen is soft.   Musculoskeletal:         General: Normal range of motion.      Cervical back: Normal range of motion and neck supple.   Skin:     General: Skin is warm and dry.   Neurological:      Mental Status: She is alert and " oriented to person, place, and time.   Psychiatric:         Behavior: Behavior normal.         Thought Content: Thought content normal.         Judgment: Judgment normal.         Lab Results:   Lab Results   Component Value Date/Time    Hemoglobin A1C 7.2 (H) 08/09/2024 08:36 AM    Hemoglobin A1C 7.5 (H) 04/29/2024 07:56 AM    Hemoglobin A1C 7.1 (H) 01/18/2024 09:21 AM    White Blood Cell Count 7.0 08/09/2024 08:36 AM    White Blood Cell Count 5.3 04/29/2024 07:56 AM    White Blood Cell Count 5.4 01/18/2024 09:21 AM    Hemoglobin 15.4 08/09/2024 08:36 AM    Hemoglobin 14.9 04/29/2024 07:56 AM    Hemoglobin 15.4 01/18/2024 09:21 AM    HCT 46.1 08/09/2024 08:36 AM    HCT 45.5 04/29/2024 07:56 AM    HCT 47.2 (H) 01/18/2024 09:21 AM    MCV 93 08/09/2024 08:36 AM    MCV 95 04/29/2024 07:56 AM    MCV 95 01/18/2024 09:21 AM    Platelet Count 241 08/09/2024 08:36 AM    Platelet Count 243 04/29/2024 07:56 AM    Platelet Count 271 01/18/2024 09:21 AM    BUN 12 08/09/2024 08:36 AM    BUN 13 04/29/2024 07:56 AM    BUN 12 01/18/2024 09:21 AM    Potassium 4.3 08/09/2024 08:36 AM    Potassium 4.3 04/29/2024 07:56 AM    Potassium 4.4 01/18/2024 09:21 AM    Chloride 103 08/09/2024 08:36 AM    Chloride 103 04/29/2024 07:56 AM    Chloride 102 01/18/2024 09:21 AM    CO2 19 (L) 08/09/2024 08:36 AM    CO2 24 04/29/2024 07:56 AM    CO2 24 01/18/2024 09:21 AM    Creatinine 0.67 08/09/2024 08:36 AM    Creatinine 0.75 04/29/2024 07:56 AM    Creatinine 0.75 01/18/2024 09:21 AM    AST 30 08/09/2024 08:36 AM    AST 32 04/29/2024 07:56 AM    AST 36 01/18/2024 09:21 AM    ALT 21 08/09/2024 08:36 AM    ALT 19 04/29/2024 07:56 AM    ALT 24 01/18/2024 09:21 AM    Protein, Total 6.7 08/09/2024 08:36 AM    Protein, Total 6.1 04/29/2024 07:56 AM    Protein, Total 6.7 01/18/2024 09:21 AM    Albumin 4.4 08/09/2024 08:36 AM    Albumin 4.2 04/29/2024 07:56 AM    Albumin 4.5 01/18/2024 09:21 AM    Globulin, Total 2.3 08/09/2024 08:36 AM    Globulin, Total  "1.9 04/29/2024 07:56 AM    Globulin, Total 2.2 01/18/2024 09:21 AM    HDL 95 08/09/2024 08:36 AM    HDL 96 01/18/2024 09:21 AM    Triglycerides 102 08/09/2024 08:36 AM    Triglycerides 101 01/18/2024 09:21 AM       Portions of the record may have been created with voice recognition software. Occasional wrong word or \"sound a like\" substitutions may have occurred due to the inherent limitations of voice recognition software. Read the chart carefully and recognize, using context, where substitutions have occurred.    "

## 2024-08-19 ENCOUNTER — DOCUMENTATION (OUTPATIENT)
Dept: ENDOCRINOLOGY | Facility: HOSPITAL | Age: 74
End: 2024-08-19

## 2024-09-11 ENCOUNTER — TELEPHONE (OUTPATIENT)
Dept: ENDOCRINOLOGY | Facility: HOSPITAL | Age: 74
End: 2024-09-11

## 2024-09-11 NOTE — TELEPHONE ENCOUNTER
Patient needs clearance from a diabetes standpoint for tooth extractions. Please see form on your desk.

## 2024-09-12 ENCOUNTER — OFFICE VISIT (OUTPATIENT)
Dept: INTERNAL MEDICINE | Facility: CLINIC | Age: 74
End: 2024-09-12
Payer: MEDICARE

## 2024-09-12 VITALS
OXYGEN SATURATION: 98 % | BODY MASS INDEX: 22.18 KG/M2 | TEMPERATURE: 98.1 F | DIASTOLIC BLOOD PRESSURE: 70 MMHG | WEIGHT: 110 LBS | SYSTOLIC BLOOD PRESSURE: 132 MMHG | HEART RATE: 71 BPM | HEIGHT: 59 IN | RESPIRATION RATE: 16 BRPM

## 2024-09-12 DIAGNOSIS — E10.40 TYPE 1 DIABETES MELLITUS WITH DIABETIC NEUROPATHY (CMS/HCC): Primary | ICD-10-CM

## 2024-09-12 DIAGNOSIS — M05.79 RHEUMATOID ARTHRITIS INVOLVING MULTIPLE SITES WITH POSITIVE RHEUMATOID FACTOR (CMS/HCC): ICD-10-CM

## 2024-09-12 DIAGNOSIS — E78.00 PURE HYPERCHOLESTEROLEMIA: ICD-10-CM

## 2024-09-12 DIAGNOSIS — Z12.31 VISIT FOR SCREENING MAMMOGRAM: ICD-10-CM

## 2024-09-12 DIAGNOSIS — R93.1 ELEVATED CORONARY ARTERY CALCIUM SCORE: ICD-10-CM

## 2024-09-12 DIAGNOSIS — M81.0 AGE-RELATED OSTEOPOROSIS WITHOUT CURRENT PATHOLOGICAL FRACTURE: ICD-10-CM

## 2024-09-12 PROCEDURE — G8754 DIAS BP LESS 90: HCPCS | Performed by: INTERNAL MEDICINE

## 2024-09-12 PROCEDURE — G8752 SYS BP LESS 140: HCPCS | Performed by: INTERNAL MEDICINE

## 2024-09-12 PROCEDURE — G2211 COMPLEX E/M VISIT ADD ON: HCPCS | Performed by: INTERNAL MEDICINE

## 2024-09-12 PROCEDURE — 99214 OFFICE O/P EST MOD 30 MIN: CPT | Performed by: INTERNAL MEDICINE

## 2024-09-12 ASSESSMENT — ENCOUNTER SYMPTOMS
FEVER: 0
DIFFICULTY URINATING: 0
SHORTNESS OF BREATH: 0
ABDOMINAL PAIN: 0
UNEXPECTED WEIGHT CHANGE: 0

## 2024-09-12 NOTE — PROGRESS NOTES
Subjective      Patient ID: Lillie Ward is a 74 y.o. female.    HPI    Patient presents for 6-month follow-up.  Sees rheumatology, Dr. Grissom in [place] for RA.  Next Rituxan dose is due on Monday.  Has osteoporosis based on left forearm and left hip shown on DEXA scan in 2/2024.  Planning to have extensive dental work with eventual dental implants.  Holding off on treatment for osteoporosis yet.  Taking calcium and vitamin D.  Has been off her Celexa about 3 months ago.  Still sad about her situation with her children but doing better overall.  Continues to see her counselor regularly.    Reviewed lab results.  HbA1c was 7.2 but she attributes to having a problem with her insulin pump.  Has continuous glucose monitoring now and her numbers have been much better.  Up-to-date with flu and COVID vaccines last week.  Planning to go on a trip to [place] next week.  Up-to-date with eye exam with Dr. Fallon.  Last visit was in August.  Sees her every 6 months.  Endocrinologist checks her feet every 6 months also.  Last was in 5/2024.  Saw pulmonology for follow-up of pulmonary fibrosis.  No concerns.  Next set of labs are due in November.  No other new/acute concerns.    The following have been reviewed and updated as appropriate in this visit:     Allergies  Meds  Problems         Past Medical History:   Diagnosis Date    Acid reflux     Diabetes mellitus type I (CMS/HCC)     Dr. Cerda - [facility]    DKA, type 1 (CMS/HCC) 09/2016    due to insulin pump malfunction - hospitalized    GERD (gastroesophageal reflux disease)     H/O colonoscopy 12/2013    [place].  Normal    Hyperlipidemia     Hypoglycemia unawareness associated with type 1 diabetes mellitus (CMS/[facility])     Macular degeneration     BEGINNING STAGES    Pulmonary fibrosis (CMS/[facility])     CLEARED BY PULMONOLIGIST 2 YEARS AGO 2018    Pulmonary nodule, left 07/2017    Rheumatoid arthritis (CMS/[facility])     Dr. Lo, [place]    Skin cancer      Squamous cell carcinoma, leg      Past Surgical History:   Procedure Laterality Date    COLONOSCOPY      EYE SURGERY Bilateral     CATARACT    FOOT SURGERY Left 2009    reconstruction    FOOT SURGERY Right 2007    reconstruction    JOINT REPLACEMENT Right     SHOULDER    LIVER SURGERY      '80's    REPLACEMENT TOTAL KNEE Left 04/04/2017    [place]    SKIN BIOPSY      TOTAL HIP ARTHROPLASTY Right 11/2020    TOTAL SHOULDER ARTHROPLASTY Right 1990     Family History   Problem Relation Age of Onset    Stroke Biological Mother     Lung cancer Biological Father     No Known Problems Biological Sister     Rheum arthritis Other         1 of 8 siblings    Prostate cancer Biological Son     Pancreatitis Biological Son     Alcohol abuse Biological Son     Bipolar disorder Biological Son     Bipolar disorder Biological Daughter         no diagnosis    Depression Biological Daughter     Anxiety disorder Biological Daughter      Social History     Socioeconomic History    Marital status:      Spouse name: None    Number of children: 1    Years of education: None    Highest education level: None   Occupational History    Occupation: Retired teacher   Tobacco Use    Smoking status: Never    Smokeless tobacco: Never   Vaping Use    Vaping Use: Never used   Substance and Sexual Activity    Alcohol use: Yes    Drug use: No    Sexual activity: Yes   Social History Narrative    Marital status- . was a pediatrician    Children- 1 son    Caffeine - coffee    Exercise-walking    Diet-low carb    Pets-    Baptism- none    Seat belt-yes    Smoke alarm-yes             Social Determinants of Health     Food Insecurity: No Food Insecurity (12/9/2022)    Hunger Vital Sign     Worried About Running Out of Food in the Last Year: Never true     Ran Out of Food in the Last Year: Never true       Review of Systems   Constitutional:  Negative for fever and unexpected weight change.   Respiratory:  Negative for shortness of breath.     Cardiovascular:  Negative for chest pain.   Gastrointestinal:  Negative for abdominal pain.   Genitourinary:  Negative for difficulty urinating.   Musculoskeletal:  Positive for arthralgias.       Allergies   Allergen Reactions    Neomycin Itching    Neomycin-Bacitracin-Polymyxin Itching     neosporin    Neomycin-Bacitracnzn-Polymyxnb Itching    Sulfamethoxazole-Trimethoprim      Other reaction(s): GI Intolerance    Tetracycline      Other reaction(s): GI Intolerance    Bacitracin Rash     Current Outpatient Medications   Medication Sig Dispense Refill    albuterol HFA (VENTOLIN HFA) 90 mcg/actuation inhaler INHALE 2 PUFFS BY MOUTH EVERY 4 HOURS AS NEEDED FOR SHORTNESS OF BREATH , COUGH, OR WHEEZING      calcium carb/vit D3/minerals (CALCIUM CARBONATE-VIT D3-MIN ORAL) Take by mouth See admin instr.      cholecalciferol, vitamin D3, 25 mcg (1,000 unit) capsule Take 1,000 Units by mouth daily.        docosahexaenoic acid/epa (FISH OIL ORAL) Take by mouth 2 (two) times a day. For dry eyes      glucagon (GVOKE) 1 mg/0.2 mL auto-injector subcutaneous auto-injector Inject 0.2 mL (1 mg total) under the skin once for 1 dose. 0.2 mL 3    insulin lispro U-100 (HumaLOG) 100 unit/mL subcutaneous cartridge inject by subcutaneous route per prescriber's instructions. Insulin dosing requires individualization.      lisinopriL (PRINIVIL) 10 mg tablet Take 1 tablet (10 mg total) by mouth daily. 90 tablet 3    multivit with minerals/lutein (MULTIVITAMIN 50 PLUS ORAL) No SIG Entered      riTUXimab (RITUXAN) 10 mg/mL injection Inject 10 mg/mL as directed. Every 16 weeks   10/13/2022 next       rosuvastatin (CRESTOR) 10 mg tablet Take 1 tablet (10 mg total) by mouth daily. 90 tablet 3    vit A/vit C/vit E/zinc/copper (PRESERVISION AREDS ORAL) Take by mouth 2 (two) times a day.      folic acid (FOLVITE) 1 mg tablet Take 1 mg by mouth daily.      Lactobac no.41-Bifidobact no.7 70 mg (3 billion cell) capsule Take by mouth daily.       "mupirocin (BACTROBAN) 2 % ointment Apply topically as needed.       No current facility-administered medications for this visit.       Objective   Vitals:    09/12/24 0927   BP: 132/70   Pulse: 71   Resp: 16   Temp: 36.7 °C (98.1 °F)   SpO2: 98%   Weight: 49.9 kg (110 lb)   Height: 1.499 m (4' 11\")     Body mass index is 22.22 kg/m².    Physical Exam  Constitutional:       Appearance: Normal appearance. She is well-developed.   HENT:      Head: Normocephalic and atraumatic.   Eyes:      General: Lids are normal.   Cardiovascular:      Rate and Rhythm: Normal rate and regular rhythm.      Heart sounds: Normal heart sounds, S1 normal and S2 normal. No murmur heard.  Pulmonary:      Effort: Pulmonary effort is normal.      Breath sounds: Normal breath sounds. No decreased breath sounds, rhonchi or rales.   Abdominal:      General: Bowel sounds are normal.      Palpations: Abdomen is soft.      Tenderness: There is no abdominal tenderness.   Musculoskeletal:         General: Deformity (Hands, RA) present.      Cervical back: Neck supple.   Skin:     General: Skin is warm and dry.   Neurological:      General: No focal deficit present.      Mental Status: She is alert and oriented to person, place, and time.      Cranial Nerves: No cranial nerve deficit.      Gait: Gait normal.   Psychiatric:         Mood and Affect: Mood normal.         Behavior: Behavior normal.         Thought Content: Thought content normal.         Judgment: Judgment normal.         Assessment/Plan   Problem List Items Addressed This Visit          Nervous    Type 1 diabetes mellitus with diabetic neuropathy (CMS/HCC) - Primary     Last HbA1c was 7.2.  Attributes to problems with her insulin pump.  Continue close follow-up with endocrinology.  Up-to-date with foot and eye exams            Circulatory    RESOLVED: Elevated coronary artery calcium score       Musculoskeletal    Age-related osteoporosis without current pathological fracture     DEXA " scan in 2/2024 showed osteo porosis.  Undergoing dental work at this time including dental implants.  Taking calcium and vitamin D.  Holding off on treatment with rheumatologist until she completes dental work            Rheumatologic    Rheumatoid arthritis (CMS/[facility])     Continue close follow-up with Dr. Grissom, rheumatologist.  Methotrexate discontinued due to CT lung findings.  Continuing Rituxan            Other    Visit for screening mammogram    Relevant Orders    BI SCREENING MAMMOGRAM BILATERAL(TOMOSYNTHESIS)    Pure hypercholesterolemia     Stable on rosuvastatin          I attest that this visit supports the complexity inherent to evaluation and management associated with medical care services that serve as the continuing focal point for all needed health care services and/or medical care services that are part of ongoing care related to this patient's single, serious condition or a complex condition.     Machelle Gallardo MD    9/13/2024

## 2024-09-13 PROBLEM — R93.1 ELEVATED CORONARY ARTERY CALCIUM SCORE: Status: RESOLVED | Noted: 2022-06-22 | Resolved: 2024-09-13

## 2024-09-13 ASSESSMENT — ENCOUNTER SYMPTOMS: ARTHRALGIAS: 1

## 2024-09-13 NOTE — ASSESSMENT & PLAN NOTE
Last HbA1c was 7.2.  Attributes to problems with her insulin pump.  Continue close follow-up with endocrinology.  Up-to-date with foot and eye exams

## 2024-09-13 NOTE — ASSESSMENT & PLAN NOTE
Continue close follow-up with Dr. Grissom, rheumatologist.  Methotrexate discontinued due to CT lung findings.  Continuing Rituxan

## 2024-09-13 NOTE — TELEPHONE ENCOUNTER
Bryson has completed the form. I have faxed it with her medication list, A1c, and Cmp to [phone].

## 2024-09-13 NOTE — ASSESSMENT & PLAN NOTE
DEXA scan in 2/2024 showed osteo porosis.  Undergoing dental work at this time including dental implants.  Taking calcium and vitamin D.  Holding off on treatment with rheumatologist until she completes dental work

## 2024-10-24 DIAGNOSIS — E78.00 PURE HYPERCHOLESTEROLEMIA: ICD-10-CM

## 2024-10-24 DIAGNOSIS — E10.40 TYPE 1 DIABETES MELLITUS WITH DIABETIC NEUROPATHY (CMS/HCC): ICD-10-CM

## 2024-10-24 DIAGNOSIS — I25.84 CORONARY ARTERY DISEASE DUE TO CALCIFIED CORONARY LESION: ICD-10-CM

## 2024-10-24 DIAGNOSIS — I25.10 CORONARY ARTERY DISEASE DUE TO CALCIFIED CORONARY LESION: ICD-10-CM

## 2024-10-24 NOTE — TELEPHONE ENCOUNTER
Medicine Refill Request    Last Office Visit: 9/12/2024   Last Consult Visit: 9/21/2022  Last Telemedicine Visit: 12/6/2022 Tati Cardenas CRNP    Next Appointment: 3/12/2025      Current Outpatient Medications:     albuterol HFA (VENTOLIN HFA) 90 mcg/actuation inhaler, INHALE 2 PUFFS BY MOUTH EVERY 4 HOURS AS NEEDED FOR SHORTNESS OF BREATH , COUGH, OR WHEEZING, Disp: , Rfl:     calcium carb/vit D3/minerals (CALCIUM CARBONATE-VIT D3-MIN ORAL), Take by mouth See admin instr., Disp: , Rfl:     cholecalciferol, vitamin D3, 25 mcg (1,000 unit) capsule, Take 1,000 Units by mouth daily.  , Disp: , Rfl:     docosahexaenoic acid/epa (FISH OIL ORAL), Take by mouth 2 (two) times a day. For dry eyes, Disp: , Rfl:     folic acid (FOLVITE) 1 mg tablet, Take 1 mg by mouth daily., Disp: , Rfl:     glucagon (GVOKE) 1 mg/0.2 mL auto-injector subcutaneous auto-injector, Inject 0.2 mL (1 mg total) under the skin once for 1 dose., Disp: 0.2 mL, Rfl: 3    insulin lispro U-100 (HumaLOG) 100 unit/mL subcutaneous cartridge, inject by subcutaneous route per prescriber's instructions. Insulin dosing requires individualization., Disp: , Rfl:     Lactobac no.41-Bifidobact no.7 70 mg (3 billion cell) capsule, Take by mouth daily., Disp: , Rfl:     lisinopriL (PRINIVIL) 10 mg tablet, Take 1 tablet (10 mg total) by mouth daily., Disp: 90 tablet, Rfl: 3    multivit with minerals/lutein (MULTIVITAMIN 50 PLUS ORAL), No SIG Entered, Disp: , Rfl:     mupirocin (BACTROBAN) 2 % ointment, Apply topically as needed., Disp: , Rfl:     riTUXimab (RITUXAN) 10 mg/mL injection, Inject 10 mg/mL as directed. Every 16 weeks   10/13/2022 next , Disp: , Rfl:     rosuvastatin (CRESTOR) 10 mg tablet, Take 1 tablet (10 mg total) by mouth daily., Disp: 90 tablet, Rfl: 3    vit A/vit C/vit E/zinc/copper (PRESERVISION AREDS ORAL), Take by mouth 2 (two) times a day., Disp: , Rfl:       BP Readings from Last 3 Encounters:   09/12/24 132/70   03/07/24 118/70  "  09/06/23 120/80       Recent Lab results:  Lab Results   Component Value Date    CHOL 185 09/22/2021   ,   Lab Results   Component Value Date    HDL 89 09/22/2021   ,   Lab Results   Component Value Date    LDLCALC 77 09/22/2021   ,   Lab Results   Component Value Date    TRIG 108 09/22/2021        Lab Results   Component Value Date    GLUCOSE 104 (H) 12/09/2022   ,   Lab Results   Component Value Date    HGBA1C 6.9 (H) 11/11/2020         Lab Results   Component Value Date    CREATININE 0.7 12/09/2022       No results found for: \"TSH\"        Lab Results   Component Value Date    HGBA1C 6.9 (H) 11/11/2020       "

## 2024-10-25 RX ORDER — ROSUVASTATIN CALCIUM 10 MG/1
10 TABLET, COATED ORAL DAILY
Qty: 90 TABLET | Refills: 1 | Status: SHIPPED | OUTPATIENT
Start: 2024-10-25

## 2024-10-25 RX ORDER — LISINOPRIL 10 MG/1
10 TABLET ORAL DAILY
Qty: 90 TABLET | Refills: 1 | Status: SHIPPED | OUTPATIENT
Start: 2024-10-25

## 2024-11-13 ENCOUNTER — TELEPHONE (OUTPATIENT)
Dept: INTERNAL MEDICINE | Facility: CLINIC | Age: 74
End: 2024-11-13
Payer: MEDICARE

## 2024-11-13 RX ORDER — CITALOPRAM 10 MG/1
10 TABLET ORAL DAILY
Qty: 90 TABLET | Refills: 1 | Status: SHIPPED | OUTPATIENT
Start: 2024-11-13

## 2024-11-13 NOTE — TELEPHONE ENCOUNTER
Lillie BALLARD [facility] Clinical Support P (supporting You)2 days ago     AH  Dr. Gallardo,  I stopped taking the citalopram, but I am having a very difficult time with my mentally ill son and feel I need to resume taking it. Please call [facility] in [place] to refill the prescription for me.      #[phone].     Thank you.  Lillie Ward    1950

## 2024-11-16 LAB
ALBUMIN SERPL-MCNC: 4.3 G/DL (ref 3.8–4.8)
ALP SERPL-CCNC: 67 IU/L (ref 44–121)
ALT SERPL-CCNC: 20 IU/L (ref 0–32)
AST SERPL-CCNC: 29 IU/L (ref 0–40)
BASOPHILS # BLD AUTO: 0.1 X10E3/UL (ref 0–0.2)
BASOPHILS NFR BLD AUTO: 1 %
BILIRUB SERPL-MCNC: 1.1 MG/DL (ref 0–1.2)
BUN SERPL-MCNC: 15 MG/DL (ref 8–27)
BUN/CREAT SERPL: 19 (ref 12–28)
CALCIUM SERPL-MCNC: 10.2 MG/DL (ref 8.7–10.3)
CHLORIDE SERPL-SCNC: 105 MMOL/L (ref 96–106)
CO2 SERPL-SCNC: 23 MMOL/L (ref 20–29)
CREAT SERPL-MCNC: 0.77 MG/DL (ref 0.57–1)
EGFR: 80 ML/MIN/1.73
EOSINOPHIL # BLD AUTO: 0.4 X10E3/UL (ref 0–0.4)
EOSINOPHIL NFR BLD AUTO: 7 %
ERYTHROCYTE [DISTWIDTH] IN BLOOD BY AUTOMATED COUNT: 12.9 % (ref 11.7–15.4)
GLOBULIN SER-MCNC: 2.2 G/DL (ref 1.5–4.5)
GLUCOSE SERPL-MCNC: 150 MG/DL (ref 70–99)
HBA1C MFR BLD: 7.5 % (ref 4.8–5.6)
HCT VFR BLD AUTO: 44.5 % (ref 34–46.6)
HGB BLD-MCNC: 14.3 G/DL (ref 11.1–15.9)
IMM GRANULOCYTES # BLD: 0 X10E3/UL (ref 0–0.1)
IMM GRANULOCYTES NFR BLD: 0 %
LYMPHOCYTES # BLD AUTO: 1.3 X10E3/UL (ref 0.7–3.1)
LYMPHOCYTES NFR BLD AUTO: 25 %
MCH RBC QN AUTO: 30.9 PG (ref 26.6–33)
MCHC RBC AUTO-ENTMCNC: 32.1 G/DL (ref 31.5–35.7)
MCV RBC AUTO: 96 FL (ref 79–97)
MONOCYTES # BLD AUTO: 0.5 X10E3/UL (ref 0.1–0.9)
MONOCYTES NFR BLD AUTO: 10 %
NEUTROPHILS # BLD AUTO: 2.9 X10E3/UL (ref 1.4–7)
NEUTROPHILS NFR BLD AUTO: 57 %
PLATELET # BLD AUTO: 279 X10E3/UL (ref 150–450)
POTASSIUM SERPL-SCNC: 4.3 MMOL/L (ref 3.5–5.2)
PROT SERPL-MCNC: 6.5 G/DL (ref 6–8.5)
RBC # BLD AUTO: 4.63 X10E6/UL (ref 3.77–5.28)
SODIUM SERPL-SCNC: 143 MMOL/L (ref 134–144)
WBC # BLD AUTO: 5.2 X10E3/UL (ref 3.4–10.8)

## 2024-11-19 ENCOUNTER — RESULTS FOLLOW-UP (OUTPATIENT)
Dept: ENDOCRINOLOGY | Facility: HOSPITAL | Age: 74
End: 2024-11-19

## 2024-11-20 ENCOUNTER — OFFICE VISIT (OUTPATIENT)
Dept: ENDOCRINOLOGY | Facility: HOSPITAL | Age: 74
End: 2024-11-20
Payer: MEDICARE

## 2024-11-20 VITALS
RESPIRATION RATE: 96 BRPM | HEART RATE: 68 BPM | WEIGHT: 108.8 LBS | BODY MASS INDEX: 20.54 KG/M2 | DIASTOLIC BLOOD PRESSURE: 68 MMHG | SYSTOLIC BLOOD PRESSURE: 120 MMHG | HEIGHT: 61 IN

## 2024-11-20 DIAGNOSIS — E10.40 TYPE 1 DIABETES MELLITUS WITH DIABETIC NEUROPATHY (HCC): ICD-10-CM

## 2024-11-20 DIAGNOSIS — E55.9 VITAMIN D DEFICIENCY: ICD-10-CM

## 2024-11-20 DIAGNOSIS — I10 ESSENTIAL HYPERTENSION: ICD-10-CM

## 2024-11-20 DIAGNOSIS — E10.649 HYPOGLYCEMIA UNAWARENESS ASSOCIATED WITH TYPE 1 DIABETES MELLITUS (HCC): ICD-10-CM

## 2024-11-20 DIAGNOSIS — E10.8 TYPE 1 DIABETES MELLITUS WITH COMPLICATION (HCC): Primary | ICD-10-CM

## 2024-11-20 DIAGNOSIS — Z96.41 INSULIN PUMP IN PLACE: ICD-10-CM

## 2024-11-20 DIAGNOSIS — E78.2 MIXED HYPERLIPIDEMIA: ICD-10-CM

## 2024-11-20 PROCEDURE — 99214 OFFICE O/P EST MOD 30 MIN: CPT | Performed by: NURSE PRACTITIONER

## 2024-11-20 PROCEDURE — 95251 CONT GLUC MNTR ANALYSIS I&R: CPT | Performed by: NURSE PRACTITIONER

## 2024-11-20 RX ORDER — ALBUTEROL SULFATE 90 UG/1
INHALANT RESPIRATORY (INHALATION)
COMMUNITY

## 2024-11-20 RX ORDER — AMOXICILLIN 500 MG/1
TABLET, FILM COATED ORAL
COMMUNITY
Start: 2024-10-25

## 2024-11-20 NOTE — PATIENT INSTRUCTIONS
Be mindful of diet.      Stay active and stay hydrated.      We made some changes to help your blood sugars insulin pump settings to help with your high blood sugar after dinner and over night.     Please be careful not to stack insulin, as discussed.      Be sure to perform a bolus consistently at meals.     Continue to utilize the dex com continuous glucose monitor and perform a DEX COM download in 2 weeks for review.     Contact the office with any consistent hypoglycemia.     Continue lisinopril.      Continue atorvastatin.     Continue with regular supplementation of vitamin D3 daily.

## 2024-11-20 NOTE — PROGRESS NOTES
Danielle Rodriguez 74 y.o. female MRN: [id number]    Encounter: [id number]      Assessment & Plan     Assessment:  This is a 74 y.o.-year-old female with type 1 diabetes on insulin pump therapy.     Plan:  1.  Type 1 diabetes:  Her most recent hemoglobin A1c is 7.3.  Review of her pump and sensor download reveals some hyperglycemia following dinner.  For this, have adjusted her insulin to carbohydrate ratio at 3 PM and 6 PM to 1: 10.  For her higher blood sugars in the very early morning hours, I have adjusted her basal rate to 0.5 at 3 AM.   Discussed the proper process for correcting her blood sugars instead of adding small amounts of carbs.  She will continue to utilize the dex com continuous glucose monitor.  Reminded her not to give herself corrections at bedtime as this is causing some nocturnal hypoglycemia which can be dangerous.  Check hemoglobin A1c prior to next visit.  2.  Hyperlipidemia: Continue atorvastatin. Check fasting lipid panel prior to next visit.  3.  Hypertension: He is normotensive in the office today.  Continue lisinopril.  Check comprehensive metabolic panel prior to next visit.  4.  Vitamin-D deficiency:  Continue supplementation with 1000 units of vitamin D3 daily.  Check 25-hydroxy vitamin D level prior to next visit.    CC: Diabetes    History of Present Illness     HPI:  73 y.o. female with type 1 diabetes for approximately 40 years. She was started on an insulin pump around 1998.  She is on insulin at home and takes Humalog insulin via a new tandem insulin pump.  Download of her Dexcom sensor and tandem pump from November 7 through November 20, 2024 reveals an average glucose of 174.  In target range 62% of the time with <0.6% low blood sugar. She is also experiencing hypoglycemia from what seems to be stacking insulin throughout the day and hyperglycemia following dinner.  Her most recent hemoglobin A1c from August 9, 2024 is 7.3. She denies any polyuria and polydipsia.  She has no  polyphagia, but has once a night nocturia.  She is getting some blurry vision with some cataracts.  She has no numbness or tingling of the feet.  She denies chest pain or shortness of breath.  She denies nephropathy, heart attack, stroke and claudication but does admit to neuropathy and retinopathy.  She has been stressed with her son's cirrhosis and prostate cancer illness.     Her most recent diabetic eye exam was performed on July 12, 2020. She is seen every 4 months. She has no complaints about her feet and does not follow Podiatry for regular diabetic foot care.  Most recent diabetic foot exam was performed at her office visit on May 2, 2024.     For her hypertension, she is treated with lisinopril 10 mg daily.     For her hyperlipidemia, she is treated with atorvastatin 10 mg.      For her vitamin-D deficiency, she supplements with 1000 units of vitamin D3 daily.        Review of Systems   Constitutional: Negative.  Negative for chills, fatigue and fever.   HENT: Negative.  Negative for trouble swallowing and voice change.    Eyes: Negative.  Negative for photophobia, pain, discharge, redness, itching and visual disturbance.   Respiratory: Negative.  Negative for chest tightness and shortness of breath.    Cardiovascular: Negative.  Negative for chest pain.   Gastrointestinal: Negative.  Negative for abdominal pain, constipation, diarrhea and vomiting.   Endocrine: Negative for cold intolerance, heat intolerance, polydipsia, polyphagia and polyuria.   Genitourinary: Negative.    Musculoskeletal: Negative.    Skin: Negative.    Allergic/Immunologic: Negative.    Neurological: Negative.  Negative for dizziness, syncope, light-headedness and headaches.   Hematological: Negative.    Psychiatric/Behavioral: Negative.     All other systems reviewed and are negative.      Historical Information   Past Medical History:   Diagnosis Date    Basal cell carcinoma (BCC) of face     left cheek    Cataract     Rheumatoid  arthritis (HCC)      Past Surgical History:   Procedure Laterality Date    CATARACT EXTRACTION, BILATERAL Bilateral     EXPLORATORY LAPAROTOMY      FOOT SURGERY Left     foot reconstruction in 2 phases    FOOT SURGERY Right     foot reconstruction    LIVER BIOPSY      TOTAL SHOULDER REPLACEMENT Right      Social History   Social History     Substance and Sexual Activity   Alcohol Use Yes    Alcohol/week: 3.0 standard drinks of alcohol    Types: 3 Cans of beer per week    Comment: most nights has 1 drink     Social History     Substance and Sexual Activity   Drug Use No     Social History     Tobacco Use   Smoking Status Never    Passive exposure: Never   Smokeless Tobacco Never     Family History:   Family History   Problem Relation Age of Onset    Stroke Mother     Cancer Father         renal metastatic    Brain cancer Sister     Diabetes type I Maternal Uncle     Diabetes type I Maternal Grandmother     Osteoarthritis Brother     Prostate cancer Brother     Osteoarthritis Sister     Breast cancer Sister     Rheum arthritis Sister     Osteoarthritis Sister     Osteoarthritis Sister     Osteoarthritis Sister     Thyroid disease unspecified Sister     Osteoarthritis Brother     Prostate cancer Brother     Osteoarthritis Brother     Bipolar disorder Son     Bipolar disorder Daughter        Meds/Allergies   Current Outpatient Medications   Medication Sig Dispense Refill    amoxicillin (AMOXIL) 500 MG tablet TAKE 4 TABLETS BY MOUTH 1 HOUR BEFORE DENTAL VISIT, THEN STARTING THE NEXT DAY TAKE 1 TABLET THREE TIMES DAILY FOR 7 DAYS (Patient taking differently: as needed)      Calcium Carbonate-Vit D-Min (CALCIUM 1200 PO) Take by mouth      cholecalciferol (VITAMIN D3) 1,000 units tablet Take 1,000 Units by mouth daily      famotidine (PEPCID) 20 mg tablet Take 20 mg by mouth daily As needed (Patient taking differently: Take 20 mg by mouth as needed As needed)      folic acid (FOLVITE) 1 mg tablet Take 1 mg by mouth daily    "   HumaLOG 100 UNIT/ML injection INJECT UP  UNITS VIA INSULIN PUMP DAILY 30 mL 4    lisinopril (ZESTRIL) 10 mg tablet Take 10 mg by mouth daily        Multiple Vitamins-Minerals (MULTIVITAMIN ADULT PO) Take by mouth daily        Omega-3 Fat Ac-Cholecalciferol (DRY EYE OMEGA BENEFITS/VIT D-3) 667-250 MG-UNIT CAPS Take by mouth daily        RiTUXimab (RITUXAN IV) Infuse into a venous catheter Every 16 weeks, 2nd dose 2 weeks later then repeat      rosuvastatin (CRESTOR) 10 MG tablet Take 10 mg by mouth daily      albuterol (PROVENTIL HFA,VENTOLIN HFA) 90 mcg/act inhaler 6 (six) times a day (Patient not taking: Reported on 11/20/2024)      cetirizine (ZyrTEC) 10 MG chewable tablet Chew 10 mg daily (Patient not taking: Reported on 11/20/2024)      citalopram (CeleXA) 10 mg tablet Take 10 mg by mouth daily (Patient not taking: Reported on 5/2/2024)      COLLAGEN PO Take by mouth (Patient not taking: Reported on 5/2/2024)      glucagon (GLUCAGON EMERGENCY) 1 MG injection Inject 1 mg under the skin once as needed for low blood sugar for up to 1 dose (Patient not taking: Reported on 11/20/2024) 1 each 4    methotrexate 50 MG/2ML injection Inject under the skin once a week (Patient not taking: Reported on 5/2/2024)      metoclopramide (REGLAN) 5 mg tablet Take 5 mg by mouth 3 (three) times a day As needed (Patient not taking: Reported on 11/20/2024)      pantoprazole (PROTONIX) 40 mg tablet Take 40 mg by mouth daily As needed (Patient not taking: Reported on 11/20/2024)       No current facility-administered medications for this visit.     Allergies   Allergen Reactions    Bactrim [Sulfamethoxazole-Trimethoprim] GI Intolerance     Other reaction(s): GI Intolerance    Neosporin [Neomycin-Bacitracin Zn-Polymyx] Itching    Tetracycline GI Intolerance     Other reaction(s): GI Intolerance    Nickel Rash       Objective   Vitals: Blood pressure 120/68, pulse 68, resp. rate (!) 96, height 5' 1\" (1.549 m), weight 49.4 kg (108 " lb 12.8 oz).    Physical Exam  Vitals reviewed.   Constitutional:       Appearance: She is well-developed.   HENT:      Head: Normocephalic and atraumatic.   Eyes:      Conjunctiva/sclera: Conjunctivae normal.      Pupils: Pupils are equal, round, and reactive to light.   Cardiovascular:      Rate and Rhythm: Normal rate and regular rhythm.      Heart sounds: Normal heart sounds.   Pulmonary:      Effort: Pulmonary effort is normal.      Breath sounds: Normal breath sounds.   Abdominal:      General: Bowel sounds are normal.      Palpations: Abdomen is soft.   Musculoskeletal:         General: Normal range of motion.      Cervical back: Normal range of motion and neck supple.   Skin:     General: Skin is warm and dry.   Neurological:      Mental Status: She is alert and oriented to person, place, and time.   Psychiatric:         Behavior: Behavior normal.         Thought Content: Thought content normal.         Judgment: Judgment normal.         Lab Results:   Lab Results   Component Value Date/Time    Hemoglobin A1C 7.5 (H) 11/15/2024 07:52 AM    Hemoglobin A1C 7.2 (H) 08/09/2024 08:36 AM    Hemoglobin A1C 7.5 (H) 04/29/2024 07:56 AM    White Blood Cell Count 5.2 11/15/2024 07:52 AM    White Blood Cell Count 7.0 08/09/2024 08:36 AM    White Blood Cell Count 5.3 04/29/2024 07:56 AM    Hemoglobin 14.3 11/15/2024 07:52 AM    Hemoglobin 15.4 08/09/2024 08:36 AM    Hemoglobin 14.9 04/29/2024 07:56 AM    HCT 44.5 11/15/2024 07:52 AM    HCT 46.1 08/09/2024 08:36 AM    HCT 45.5 04/29/2024 07:56 AM    MCV 96 11/15/2024 07:52 AM    MCV 93 08/09/2024 08:36 AM    MCV 95 04/29/2024 07:56 AM    Platelet Count 279 11/15/2024 07:52 AM    Platelet Count 241 08/09/2024 08:36 AM    Platelet Count 243 04/29/2024 07:56 AM    BUN 15 11/15/2024 07:52 AM    BUN 12 08/09/2024 08:36 AM    BUN 13 04/29/2024 07:56 AM    Potassium 4.3 11/15/2024 07:52 AM    Potassium 4.3 08/09/2024 08:36 AM    Potassium 4.3 04/29/2024 07:56 AM    Chloride 105  "11/15/2024 07:52 AM    Chloride 103 08/09/2024 08:36 AM    Chloride 103 04/29/2024 07:56 AM    CO2 23 11/15/2024 07:52 AM    CO2 19 (L) 08/09/2024 08:36 AM    CO2 24 04/29/2024 07:56 AM    Creatinine 0.77 11/15/2024 07:52 AM    Creatinine 0.67 08/09/2024 08:36 AM    Creatinine 0.75 04/29/2024 07:56 AM    AST 29 11/15/2024 07:52 AM    AST 30 08/09/2024 08:36 AM    AST 32 04/29/2024 07:56 AM    ALT 20 11/15/2024 07:52 AM    ALT 21 08/09/2024 08:36 AM    ALT 19 04/29/2024 07:56 AM    Protein, Total 6.5 11/15/2024 07:52 AM    Protein, Total 6.7 08/09/2024 08:36 AM    Protein, Total 6.1 04/29/2024 07:56 AM    Albumin 4.3 11/15/2024 07:52 AM    Albumin 4.4 08/09/2024 08:36 AM    Albumin 4.2 04/29/2024 07:56 AM    Globulin, Total 2.2 11/15/2024 07:52 AM    Globulin, Total 2.3 08/09/2024 08:36 AM    Globulin, Total 1.9 04/29/2024 07:56 AM    HDL 95 08/09/2024 08:36 AM    HDL 96 01/18/2024 09:21 AM    Triglycerides 102 08/09/2024 08:36 AM    Triglycerides 101 01/18/2024 09:21 AM       Portions of the record may have been created with voice recognition software. Occasional wrong word or \"sound a like\" substitutions may have occurred due to the inherent limitations of voice recognition software. Read the chart carefully and recognize, using context, where substitutions have occurred.    "

## 2024-12-19 ENCOUNTER — DOCUMENTATION (OUTPATIENT)
Dept: ENDOCRINOLOGY | Facility: HOSPITAL | Age: 74
End: 2024-12-19

## 2025-02-14 ENCOUNTER — DOCUMENTATION (OUTPATIENT)
Dept: ENDOCRINOLOGY | Facility: HOSPITAL | Age: 75
End: 2025-02-14

## 2025-03-01 LAB
ALBUMIN SERPL-MCNC: 4.3 G/DL (ref 3.8–4.8)
ALP SERPL-CCNC: 64 IU/L (ref 44–121)
ALT SERPL-CCNC: 17 IU/L (ref 0–32)
AST SERPL-CCNC: 29 IU/L (ref 0–40)
BASOPHILS # BLD AUTO: 0.1 X10E3/UL (ref 0–0.2)
BASOPHILS NFR BLD AUTO: 1 %
BILIRUB SERPL-MCNC: 1.2 MG/DL (ref 0–1.2)
BUN SERPL-MCNC: 13 MG/DL (ref 8–27)
BUN/CREAT SERPL: 20 (ref 12–28)
CALCIUM SERPL-MCNC: 9.4 MG/DL (ref 8.7–10.3)
CHLORIDE SERPL-SCNC: 106 MMOL/L (ref 96–106)
CHOLEST SERPL-MCNC: 172 MG/DL (ref 100–199)
CO2 SERPL-SCNC: 25 MMOL/L (ref 20–29)
CREAT SERPL-MCNC: 0.66 MG/DL (ref 0.57–1)
EGFR: 91 ML/MIN/1.73
EOSINOPHIL # BLD AUTO: 0.3 X10E3/UL (ref 0–0.4)
EOSINOPHIL NFR BLD AUTO: 7 %
ERYTHROCYTE [DISTWIDTH] IN BLOOD BY AUTOMATED COUNT: 12.7 % (ref 11.7–15.4)
GLOBULIN SER-MCNC: 1.9 G/DL (ref 1.5–4.5)
GLUCOSE SERPL-MCNC: 123 MG/DL (ref 70–99)
HBA1C MFR BLD: 7.7 % (ref 4.8–5.6)
HCT VFR BLD AUTO: 43.7 % (ref 34–46.6)
HDLC SERPL-MCNC: 91 MG/DL
HGB BLD-MCNC: 14.3 G/DL (ref 11.1–15.9)
IMM GRANULOCYTES # BLD: 0 X10E3/UL (ref 0–0.1)
IMM GRANULOCYTES NFR BLD: 0 %
LDLC SERPL CALC-MCNC: 67 MG/DL (ref 0–99)
LYMPHOCYTES # BLD AUTO: 1.3 X10E3/UL (ref 0.7–3.1)
LYMPHOCYTES NFR BLD AUTO: 28 %
MCH RBC QN AUTO: 30.6 PG (ref 26.6–33)
MCHC RBC AUTO-ENTMCNC: 32.7 G/DL (ref 31.5–35.7)
MCV RBC AUTO: 93 FL (ref 79–97)
MONOCYTES # BLD AUTO: 0.5 X10E3/UL (ref 0.1–0.9)
MONOCYTES NFR BLD AUTO: 11 %
NEUTROPHILS # BLD AUTO: 2.4 X10E3/UL (ref 1.4–7)
NEUTROPHILS NFR BLD AUTO: 53 %
PLATELET # BLD AUTO: 235 X10E3/UL (ref 150–450)
POTASSIUM SERPL-SCNC: 4.5 MMOL/L (ref 3.5–5.2)
PROT SERPL-MCNC: 6.2 G/DL (ref 6–8.5)
RBC # BLD AUTO: 4.68 X10E6/UL (ref 3.77–5.28)
SL AMB VLDL CHOLESTEROL CALC: 14 MG/DL (ref 5–40)
SODIUM SERPL-SCNC: 143 MMOL/L (ref 134–144)
TRIGL SERPL-MCNC: 74 MG/DL (ref 0–149)
WBC # BLD AUTO: 4.5 X10E3/UL (ref 3.4–10.8)

## 2025-03-03 ENCOUNTER — RESULTS FOLLOW-UP (OUTPATIENT)
Dept: ENDOCRINOLOGY | Facility: HOSPITAL | Age: 75
End: 2025-03-03

## 2025-03-06 ENCOUNTER — OFFICE VISIT (OUTPATIENT)
Dept: ENDOCRINOLOGY | Facility: HOSPITAL | Age: 75
End: 2025-03-06
Payer: MEDICARE

## 2025-03-06 VITALS
BODY MASS INDEX: 20.58 KG/M2 | OXYGEN SATURATION: 96 % | HEART RATE: 68 BPM | SYSTOLIC BLOOD PRESSURE: 120 MMHG | DIASTOLIC BLOOD PRESSURE: 72 MMHG | WEIGHT: 109 LBS | HEIGHT: 61 IN

## 2025-03-06 DIAGNOSIS — E10.8 TYPE 1 DIABETES MELLITUS WITH COMPLICATION (HCC): Primary | ICD-10-CM

## 2025-03-06 DIAGNOSIS — E10.649 HYPOGLYCEMIA UNAWARENESS ASSOCIATED WITH TYPE 1 DIABETES MELLITUS (HCC): ICD-10-CM

## 2025-03-06 DIAGNOSIS — Z96.41 INSULIN PUMP IN PLACE: ICD-10-CM

## 2025-03-06 DIAGNOSIS — E55.9 VITAMIN D DEFICIENCY: ICD-10-CM

## 2025-03-06 DIAGNOSIS — I10 ESSENTIAL HYPERTENSION: ICD-10-CM

## 2025-03-06 DIAGNOSIS — E78.2 MIXED HYPERLIPIDEMIA: ICD-10-CM

## 2025-03-06 DIAGNOSIS — E10.40 TYPE 1 DIABETES MELLITUS WITH DIABETIC NEUROPATHY (HCC): ICD-10-CM

## 2025-03-06 PROCEDURE — 95251 CONT GLUC MNTR ANALYSIS I&R: CPT | Performed by: NURSE PRACTITIONER

## 2025-03-06 PROCEDURE — 99214 OFFICE O/P EST MOD 30 MIN: CPT | Performed by: NURSE PRACTITIONER

## 2025-03-06 RX ORDER — BIOTIN 10 MG
TABLET ORAL
COMMUNITY

## 2025-03-06 NOTE — PROGRESS NOTES
Name: Danielle Rodriguez      : 1950      MRN: [id number]  Encounter Provider: DESHAWN Zapata  Encounter Date: 3/6/2025   Encounter department: [facility]    No chief complaint on file.  :  Assessment & Plan  Type 1 diabetes mellitus with complication (HCC)    Lab Results   Component Value Date    HGBA1C 7.7 (H) 2025       Orders:    CBC and differential; Future    Comprehensive metabolic panel; Future    Hemoglobin A1C; Future    TSH, 3rd generation with Free T4 reflex; Future    Type 1 diabetes mellitus with diabetic neuropathy (HCC)    Lab Results   Component Value Date    HGBA1C 7.7 (H) 2025       Orders:    CBC and differential; Future    Comprehensive metabolic panel; Future    Hemoglobin A1C; Future    TSH, 3rd generation with Free T4 reflex; Future    Insulin pump in place    Orders:    CBC and differential; Future    Comprehensive metabolic panel; Future    Hemoglobin A1C; Future    TSH, 3rd generation with Free T4 reflex; Future    Hypoglycemic unawareness associated with type 1 diabetes mellitus    Lab Results   Component Value Date    HGBA1C 7.7 (H) 2025       Orders:    CBC and differential; Future    Comprehensive metabolic panel; Future    Hemoglobin A1C; Future    TSH, 3rd generation with Free T4 reflex; Future    Essential hypertension    Orders:    CBC and differential; Future    Comprehensive metabolic panel; Future    Hemoglobin A1C; Future    TSH, 3rd generation with Free T4 reflex; Future    Mixed hyperlipidemia    Orders:    CBC and differential; Future    Comprehensive metabolic panel; Future    Hemoglobin A1C; Future    TSH, 3rd generation with Free T4 reflex; Future    Vitamin D deficiency       Plan:  1.  Type 1 diabetes:  Her most recent hemoglobin A1c is 7.7.  Review of her pump and sensor download reveals some hyperglycemia following dinner.  For this, have adjusted her insulin to carbohydrate ratio at 7 AM to  1:13 and at 6PM to 1:9.  Discussed the proper process for correcting her blood sugars instead of adding small amounts of carbs.  She will continue to utilize the dex com continuous glucose monitor.  Check hemoglobin A1c prior to next visit.  2.  Hyperlipidemia: Stable. Continue atorvastatin.   3.  Hypertension: He is normotensive in the office today.  Continue lisinopril.  Check comprehensive metabolic panel prior to next visit.  4.  Vitamin-D deficiency:  Continue supplementation with 1000 units of vitamin D3 daily.  Check 25-hydroxy vitamin D level prior to next visit.        History of Present Illness     74 y.o. female with type 1 diabetes for approximately 40 years. She was started on an insulin pump around 1998.  She is on insulin at home and takes Humalog insulin via a new tandem insulin pump.  Download of her Dexcom sensor and tandem pump from February 21 through March 6, 2025 shows an average glucose of 175 with a standard deviation of 65.  Her control IQ is active 93% of the time.  She is 58% of the time in range with 42% elevated readings and less than 1% low readings. She is also experiencing hyperglycemia following and breakfast and dinner.  Her most recent hemoglobin A1c from February 28, 2025 is 7.7. She denies any polyuria and polydipsia.  She has no polyphagia, but has once a night nocturia.  She is getting some blurry vision with some cataracts.  She has no numbness or tingling of the feet.  She denies chest pain or shortness of breath.  She denies nephropathy, heart attack, stroke and claudication but does admit to neuropathy and retinopathy.  She has been stressed with her son's cirrhosis and prostate cancer illness.     Her most recent diabetic eye exam was performed in February 2025. She is seen every 6 months. She has no complaints about her feet and does not follow Podiatry for regular diabetic foot care.  Most recent diabetic foot exam was performed at her office visit on May 2, 2024.    "  For her hypertension, she is treated with lisinopril 10 mg daily.     For her hyperlipidemia, she is treated with atorvastatin 10 mg.      For her vitamin-D deficiency, she supplements with 1000 units of vitamin D3 daily.    Last Eye Exam: 09/25/2023  Last Foot Exam: 05/02/2024  Health Maintenance   Topic Date Due    Diabetic Foot Exam  05/02/2025    Diabetic Eye Exam  09/25/2025         Review of Systems   Constitutional:  Positive for fatigue. Negative for chills and fever.   HENT:  Positive for dental problem (recent removal and prep for implants). Negative for trouble swallowing and voice change.    Eyes: Negative.  Negative for photophobia, pain, discharge, redness, itching and visual disturbance.   Respiratory: Negative.  Negative for chest tightness and shortness of breath.    Cardiovascular: Negative.  Negative for chest pain.   Gastrointestinal: Negative.  Negative for abdominal pain, constipation, diarrhea and vomiting.   Endocrine: Negative for cold intolerance, heat intolerance, polydipsia, polyphagia and polyuria.   Genitourinary: Negative.    Musculoskeletal:  Positive for arthralgias.   Skin: Negative.    Allergic/Immunologic: Negative.    Neurological: Negative.  Negative for dizziness, syncope, light-headedness and headaches.   Hematological: Negative.    Psychiatric/Behavioral: Negative.     All other systems reviewed and are negative.   as per HPI      Objective   /72   Pulse 68   Ht 5' 1\" (1.549 m)   Wt 49.4 kg (109 lb)   SpO2 96%   BMI 20.60 kg/m²      Body mass index is 20.6 kg/m².  Wt Readings from Last 3 Encounters:   03/06/25 49.4 kg (109 lb)   11/20/24 49.4 kg (108 lb 12.8 oz)   08/14/24 49.8 kg (109 lb 12.8 oz)     Physical Exam  Constitutional:       Appearance: She is well-developed.   HENT:      Head: Normocephalic and atraumatic.   Eyes:      Conjunctiva/sclera: Conjunctivae normal.      Pupils: Pupils are equal, round, and reactive to light.   Cardiovascular:      Rate " "and Rhythm: Normal rate and regular rhythm.      Heart sounds: Normal heart sounds.   Pulmonary:      Effort: Pulmonary effort is normal.      Breath sounds: Normal breath sounds.   Abdominal:      General: Bowel sounds are normal.      Palpations: Abdomen is soft.   Musculoskeletal:         General: Normal range of motion.      Cervical back: Normal range of motion and neck supple.   Skin:     General: Skin is warm and dry.   Neurological:      Mental Status: She is alert and oriented to person, place, and time.   Psychiatric:         Behavior: Behavior normal.         Thought Content: Thought content normal.         Judgment: Judgment normal.         Labs:   Lab Results   Component Value Date    HGBA1C 7.7 (H) 02/28/2025    HGBA1C 7.5 (H) 11/15/2024    HGBA1C 7.2 (H) 08/09/2024     Lab Results   Component Value Date    CREATININE 0.66 02/28/2025    CREATININE 0.77 11/15/2024    CREATININE 0.67 08/09/2024    BUN 13 02/28/2025    K 4.5 02/28/2025     02/28/2025    CO2 25 02/28/2025     eGFR   Date Value Ref Range Status   02/28/2025 91 >59 mL/min/1.73 Final     Lab Results   Component Value Date    HDL 91 02/28/2025    TRIG 74 02/28/2025    CHOLHDL 2.1 04/08/2022     Lab Results   Component Value Date    ALT 17 02/28/2025    AST 29 02/28/2025     No results found for: \"[id number]\"    There are no Patient Instructions on file for this visit.    Discussed with the patient and all questioned fully answered. She will call me if any problems arise.    "

## 2025-03-06 NOTE — ASSESSMENT & PLAN NOTE
Lab Results   Component Value Date    HGBA1C 7.7 (H) 02/28/2025       Orders:    CBC and differential; Future    Comprehensive metabolic panel; Future    Hemoglobin A1C; Future    TSH, 3rd generation with Free T4 reflex; Future

## 2025-03-06 NOTE — PATIENT INSTRUCTIONS
Be mindful of diet.      Stay active and stay hydrated.      We made some changes to help your blood sugars insulin pump settings to help with your high blood sugar after breakfast and dinner.    Please upgrade your TANDEM pump to use the DEX COM G7.     Please be careful not to stack insulin, as discussed.      Be sure to perform a bolus consistently at meals.     Continue to utilize the dex com continuous glucose monitor and perform a DEX COM download in 2 weeks for review.     Contact the office with any consistent hypoglycemia.     Continue lisinopril.      Continue atorvastatin.     Continue with regular supplementation of vitamin D3 daily.

## 2025-03-06 NOTE — ASSESSMENT & PLAN NOTE
Orders:    CBC and differential; Future    Comprehensive metabolic panel; Future    Hemoglobin A1C; Future    TSH, 3rd generation with Free T4 reflex; Future

## 2025-03-07 ENCOUNTER — DOCUMENTATION (OUTPATIENT)
Dept: ENDOCRINOLOGY | Facility: HOSPITAL | Age: 75
End: 2025-03-07

## 2025-03-28 PROCEDURE — 99490 CHRNC CARE MGMT STAFF 1ST 20: CPT

## 2025-04-04 DIAGNOSIS — E10.8 TYPE 1 DIABETES MELLITUS WITH COMPLICATION (HCC): ICD-10-CM

## 2025-04-04 RX ORDER — INSULIN LISPRO 100 [IU]/ML
INJECTION, SOLUTION INTRAVENOUS; SUBCUTANEOUS
Qty: 30 ML | Refills: 1 | Status: SHIPPED | OUTPATIENT
Start: 2025-04-04

## 2025-04-18 PROCEDURE — 99490 CHRNC CARE MGMT STAFF 1ST 20: CPT

## 2025-04-30 ENCOUNTER — TELEPHONE (OUTPATIENT)
Dept: INTERNAL MEDICINE | Facility: CLINIC | Age: 75
End: 2025-04-30
Payer: MEDICARE

## 2025-05-08 ENCOUNTER — TELEPHONE (OUTPATIENT)
Dept: CARDIOLOGY | Facility: CLINIC | Age: 75
End: 2025-05-08
Payer: MEDICARE

## 2025-05-09 ENCOUNTER — OFFICE VISIT (OUTPATIENT)
Dept: CARDIOLOGY | Facility: CLINIC | Age: 75
End: 2025-05-09
Payer: MEDICARE

## 2025-05-09 ENCOUNTER — HOSPITAL ENCOUNTER (OUTPATIENT)
Dept: CARDIOLOGY | Facility: CLINIC | Age: 75
Discharge: HOME | End: 2025-05-09
Attending: INTERNAL MEDICINE
Payer: MEDICARE

## 2025-05-09 VITALS — SYSTOLIC BLOOD PRESSURE: 124 MMHG | OXYGEN SATURATION: 98 % | DIASTOLIC BLOOD PRESSURE: 74 MMHG | HEART RATE: 70 BPM

## 2025-05-09 VITALS — HEIGHT: 59 IN | WEIGHT: 108 LBS | BODY MASS INDEX: 21.77 KG/M2

## 2025-05-09 DIAGNOSIS — E78.00 PURE HYPERCHOLESTEROLEMIA: ICD-10-CM

## 2025-05-09 DIAGNOSIS — I25.84 CORONARY ARTERY DISEASE DUE TO CALCIFIED CORONARY LESION: ICD-10-CM

## 2025-05-09 DIAGNOSIS — I25.10 CORONARY ARTERY DISEASE, UNSPECIFIED VESSEL OR LESION TYPE, UNSPECIFIED WHETHER ANGINA PRESENT, UNSPECIFIED WHETHER NATIVE OR TRANSPLANTED HEART: Primary | ICD-10-CM

## 2025-05-09 DIAGNOSIS — R06.09 OTHER FORM OF DYSPNEA: ICD-10-CM

## 2025-05-09 DIAGNOSIS — I10 ESSENTIAL HYPERTENSION: ICD-10-CM

## 2025-05-09 DIAGNOSIS — I25.10 CORONARY ARTERY DISEASE DUE TO CALCIFIED CORONARY LESION: ICD-10-CM

## 2025-05-09 DIAGNOSIS — E10.40 TYPE 1 DIABETES MELLITUS WITH DIABETIC NEUROPATHY (CMS/HCC): ICD-10-CM

## 2025-05-09 LAB
AORTIC ROOT ANNULUS: 2.3 CM
AORTIC VALVE MEAN VELOCITY: 0.94 M/S
AORTIC VALVE VELOCITY TIME INTEGRAL: 33.9 CM
ASCENDING AORTA: 2.6 CM
ATRIAL RATE: 69
AV MEAN GRADIENT: 4 MMHG
AV PEAK GRADIENT: 6 MMHG
AV PEAK VELOCITY-S: 1.21 M/S
AV VALVE AREA INDEX: 1.26
AV VALVE AREA: 1.71 CM2
AV VELOCITY RATIO: 0.64
AVA (VTI): 1.81 CM2
BSA FOR ECHO PROCEDURE: 1.43 M2
CUSP SEPARATION: 1.5 CM
DOP CALC LVOT STROKE VOLUME: 61.21 CM3
E WAVE DECELERATION TIME: 132 MS
E/A RATIO: 1.4
E/E' RATIO: 13.6
E/LAT E' RATIO: 10.2
EDV (BP): 55.8 CM3
EF (A4C): 56.4 %
EF A2C: 68.8 %
EJECTION FRACTION: 61.1 %
EST RIGHT VENT SYSTOLIC PRESSURE BY TRICUSPID REGURGITATION JET: 19 MMHG
ESV (BP): 21.7 CM3
FRACTIONAL SHORTENING: 36.89 %
INTERVENTRICULAR SEPTUM: 0.87 CM
LA ESV (BP): 37.2 CM3
LA ESV INDEX (A2C): 33.64 CM3/M2
LA ESV INDEX (BP): 26.01 CM3/M2
LA/AORTA RATIO: 1.43
LAAS-AP2: 16.9 CM2
LAAS-AP4: 12.8 CM2
LAD 2D: 3.3 CM
LAL MED-LAT (A4C): 4.71 CM
LAV-S: 48.1 CM3
LEFT ATRIAL LENGTH SUPERIOR-INFERIOR (APICAL 2-CHAMBER VIEW): 4.88 CM
LEFT ATRIUM VOLUME INDEX: 19.51 CM3/M2
LEFT ATRIUM VOLUME: 27.9 CM3
LEFT INTERNAL DIMENSION IN SYSTOLE: 2.6 CM (ref 2.2–3.33)
LEFT VENTRICLE DIASTOLIC VOLUME INDEX: 42.66 CM3/M2
LEFT VENTRICLE DIASTOLIC VOLUME: 61 CM3
LEFT VENTRICLE SYSTOLIC VOLUME INDEX: 18.6 CM3/M2
LEFT VENTRICLE SYSTOLIC VOLUME: 26.6 CM3
LEFT VENTRICULAR INTERNAL DIMENSION IN DIASTOLE: 4.12 CM (ref 3.7–5.14)
LEFT VENTRICULAR POSTERIOR WALL IN END DIASTOLE: 0.65 CM (ref 0.46–0.86)
LV DIASTOLIC VOLUME: 49.6 CM3
LV ESV (APICAL 2 CHAMBER): 15.5 CM3
LVAD-AP2: 19.1 CM2
LVAD-AP4: 22 CM2
LVAS-AP2: 9.24 CM2
LVAS-AP4: 12.8 CM2
LVEDVI(A2C): 34.69 CM3/M2
LVEDVI(BP): 39.02 CM3/M2
LVESVI(A2C): 10.84 CM3/M2
LVESVI(BP): 15.17 CM3/M2
LVLD-AP2: 6.22 CM
LVLD-AP4: 6.45 CM
LVLS-AP2: 4.47 CM
LVLS-AP4: 5.16 CM
LVOT 2D: 1.9 CM
LVOT A: 2.84 CM2
LVOT MG: 2 MMHG
LVOT MV: 0.71 M/S
LVOT PEAK VELOCITY: 0.93 M/S
LVOT PG: 3 MMHG
LVOT STROKE VOLUME INDEX: 42.81 ML/M2
LVOT VTI: 21.6 CM
MLH CV ECHO AVA INDEX VELOCITY RATIO: 1.2
MV E'TISSUE VEL-LAT: 0.09 M/S
MV E'TISSUE VEL-MED: 0.07 M/S
MV PEAK A VEL: 0.68 M/S
MV PEAK E VEL: 0.96 M/S
MV STENOSIS PRESSURE HALF TIME: 39 MS
MV VALVE AREA P 1/2 METHOD: 5.64 CM2
P AXIS: 76
POSTERIOR WALL: 0.65 CM
PR INTERVAL: 140
PV MEAN GRADIENT: 1 MMHG
PV MEAN VELOCITY: 0.54 M/S
PV PEAK GRADIENT: 2 MMHG
PV PV: 0.67 M/S
QRS DURATION: 80
QT INTERVAL: 400
QTC CALCULATION(BAZETT): 428
R AXIS: 58
RAP: 3 MMHG
RVOT VMAX: 0.46 M/S
RVOT VTI: 12.8 CM
SEPTAL TISSUE DOPPLER FREE WALL LATE DIA VELOCITY (APICAL 4 CHAMBER VIEW): 0.1 M/S
STRESS BASELINE BP: NORMAL MMHG
STRESS BASELINE HR: 61 BPM
STRESS PERCENT HR: 93 %
STRESS POST ESTIMATED WORKLOAD: 7 METS
STRESS POST EXERCISE DUR MIN: 6 MIN
STRESS POST EXERCISE DUR SEC: 8 SEC
STRESS POST PEAK BP: NORMAL MMHG
STRESS POST PEAK HR: 134 BPM
STRESS TARGET HR: 122 BPM
T WAVE AXIS: 65
TR MAX PG: 15.68 MMHG
TRICUSPID VALVE PEAK REGURGITATION VELOCITY: 1.98 M/S
VENTRICULAR RATE: 69
Z-SCORE OF LEFT VENTRICULAR DIMENSION IN END DIASTOLE: -0.57
Z-SCORE OF LEFT VENTRICULAR DIMENSION IN END SYSTOLE: -0.32
Z-SCORE OF LEFT VENTRICULAR POSTERIOR WALL IN END DIASTOLE: 0.15

## 2025-05-09 PROCEDURE — 93351 STRESS TTE COMPLETE: CPT | Performed by: INTERNAL MEDICINE

## 2025-05-09 PROCEDURE — 93320 DOPPLER ECHO COMPLETE: CPT | Performed by: INTERNAL MEDICINE

## 2025-05-09 PROCEDURE — 93325 DOPPLER ECHO COLOR FLOW MAPG: CPT | Performed by: INTERNAL MEDICINE

## 2025-05-09 RX ORDER — OMEGA-3S/DHA/EPA/FISH OIL/D3 1150-1000
LIQUID (ML) ORAL
COMMUNITY

## 2025-05-17 DIAGNOSIS — E78.00 PURE HYPERCHOLESTEROLEMIA: ICD-10-CM

## 2025-05-17 DIAGNOSIS — I25.10 CORONARY ARTERY DISEASE DUE TO CALCIFIED CORONARY LESION: ICD-10-CM

## 2025-05-17 DIAGNOSIS — I25.84 CORONARY ARTERY DISEASE DUE TO CALCIFIED CORONARY LESION: ICD-10-CM

## 2025-05-17 DIAGNOSIS — E10.40 TYPE 1 DIABETES MELLITUS WITH DIABETIC NEUROPATHY (CMS/HCC): ICD-10-CM

## 2025-05-19 RX ORDER — LISINOPRIL 10 MG/1
10 TABLET ORAL DAILY
Qty: 90 TABLET | Refills: 1 | Status: SHIPPED | OUTPATIENT
Start: 2025-05-19

## 2025-05-19 RX ORDER — ROSUVASTATIN CALCIUM 10 MG/1
10 TABLET, COATED ORAL DAILY
Qty: 90 TABLET | Refills: 1 | Status: SHIPPED | OUTPATIENT
Start: 2025-05-19

## 2025-05-29 ENCOUNTER — OFFICE VISIT (OUTPATIENT)
Dept: INTERNAL MEDICINE | Facility: CLINIC | Age: 75
End: 2025-05-29
Payer: MEDICARE

## 2025-05-29 VITALS
WEIGHT: 109 LBS | DIASTOLIC BLOOD PRESSURE: 60 MMHG | SYSTOLIC BLOOD PRESSURE: 112 MMHG | TEMPERATURE: 98.1 F | OXYGEN SATURATION: 96 % | HEIGHT: 59 IN | BODY MASS INDEX: 21.97 KG/M2 | HEART RATE: 86 BPM | RESPIRATION RATE: 16 BRPM

## 2025-05-29 DIAGNOSIS — I65.23 CAROTID ARTERY CALCIFICATION, BILATERAL: ICD-10-CM

## 2025-05-29 DIAGNOSIS — I25.84 CORONARY ARTERY DISEASE DUE TO CALCIFIED CORONARY LESION: ICD-10-CM

## 2025-05-29 DIAGNOSIS — E10.40 TYPE 1 DIABETES MELLITUS WITH DIABETIC NEUROPATHY (CMS/HCC): Primary | ICD-10-CM

## 2025-05-29 DIAGNOSIS — M05.79 RHEUMATOID ARTHRITIS INVOLVING MULTIPLE SITES WITH POSITIVE RHEUMATOID FACTOR (CMS/HCC): ICD-10-CM

## 2025-05-29 DIAGNOSIS — I25.10 CORONARY ARTERY DISEASE DUE TO CALCIFIED CORONARY LESION: ICD-10-CM

## 2025-05-29 DIAGNOSIS — E78.00 PURE HYPERCHOLESTEROLEMIA: ICD-10-CM

## 2025-05-29 PROCEDURE — 99214 OFFICE O/P EST MOD 30 MIN: CPT | Performed by: INTERNAL MEDICINE

## 2025-05-29 PROCEDURE — G8754 DIAS BP LESS 90: HCPCS | Performed by: INTERNAL MEDICINE

## 2025-05-29 PROCEDURE — G8752 SYS BP LESS 140: HCPCS | Performed by: INTERNAL MEDICINE

## 2025-05-29 RX ORDER — ASPIRIN 81 MG/1
81 TABLET ORAL DAILY
COMMUNITY

## 2025-06-06 ENCOUNTER — RESULTS FOLLOW-UP (OUTPATIENT)
Dept: ENDOCRINOLOGY | Facility: HOSPITAL | Age: 75
End: 2025-06-06

## 2025-06-06 LAB
ALBUMIN SERPL-MCNC: 4.5 G/DL (ref 3.8–4.8)
ALBUMIN SERPL-MCNC: 4.5 G/DL (ref 3.8–4.8)
ALBUMIN/CREAT UR: 12 MG/G CREAT (ref 0–29)
ALP SERPL-CCNC: 72 IU/L (ref 44–121)
ALP SERPL-CCNC: 74 IU/L (ref 44–121)
ALT SERPL-CCNC: 21 IU/L (ref 0–32)
ALT SERPL-CCNC: 24 IU/L (ref 0–32)
AST SERPL-CCNC: 32 IU/L (ref 0–40)
AST SERPL-CCNC: 32 IU/L (ref 0–40)
BASOPHILS # BLD AUTO: 0.1 X10E3/UL (ref 0–0.2)
BASOPHILS NFR BLD AUTO: 1 %
BILIRUB DIRECT SERPL-MCNC: 0.35 MG/DL (ref 0–0.4)
BILIRUB SERPL-MCNC: 1.1 MG/DL (ref 0–1.2)
BILIRUB SERPL-MCNC: 1.2 MG/DL (ref 0–1.2)
BUN SERPL-MCNC: 16 MG/DL (ref 8–27)
BUN/CREAT SERPL: 19 (ref 12–28)
CALCIUM SERPL-MCNC: 10.1 MG/DL (ref 8.7–10.3)
CHLORIDE SERPL-SCNC: 102 MMOL/L (ref 96–106)
CHOLEST SERPL-MCNC: 192 MG/DL (ref 100–199)
CO2 SERPL-SCNC: 23 MMOL/L (ref 20–29)
CREAT SERPL-MCNC: 0.84 MG/DL (ref 0.57–1)
CREAT UR-MCNC: 220.2 MG/DL
EGFR: 72 ML/MIN/1.73
EOSINOPHIL # BLD AUTO: 0.3 X10E3/UL (ref 0–0.4)
EOSINOPHIL NFR BLD AUTO: 6 %
ERYTHROCYTE [DISTWIDTH] IN BLOOD BY AUTOMATED COUNT: 12.8 % (ref 11.7–15.4)
GLOBULIN SER-MCNC: 2.2 G/DL (ref 1.5–4.5)
GLUCOSE SERPL-MCNC: 154 MG/DL (ref 70–99)
HBA1C MFR BLD: 7.3 % (ref 4.8–5.6)
HCT VFR BLD AUTO: 45.7 % (ref 34–46.6)
HDLC SERPL-MCNC: 84 MG/DL
HGB BLD-MCNC: 15.3 G/DL (ref 11.1–15.9)
IMM GRANULOCYTES # BLD: 0 X10E3/UL (ref 0–0.1)
IMM GRANULOCYTES NFR BLD: 0 %
LDLC SERPL CALC-MCNC: 88 MG/DL (ref 0–99)
LYMPHOCYTES # BLD AUTO: 1.4 X10E3/UL (ref 0.7–3.1)
LYMPHOCYTES NFR BLD AUTO: 25 %
MCH RBC QN AUTO: 30.7 PG (ref 26.6–33)
MCHC RBC AUTO-ENTMCNC: 33.5 G/DL (ref 31.5–35.7)
MCV RBC AUTO: 92 FL (ref 79–97)
MICROALBUMIN UR-MCNC: 25.4 UG/ML
MONOCYTES # BLD AUTO: 0.7 X10E3/UL (ref 0.1–0.9)
MONOCYTES NFR BLD AUTO: 12 %
NEUTROPHILS # BLD AUTO: 3.2 X10E3/UL (ref 1.4–7)
NEUTROPHILS NFR BLD AUTO: 56 %
PLATELET # BLD AUTO: 275 X10E3/UL (ref 150–450)
POTASSIUM SERPL-SCNC: 4.5 MMOL/L (ref 3.5–5.2)
PROT SERPL-MCNC: 6.6 G/DL (ref 6–8.5)
PROT SERPL-MCNC: 6.7 G/DL (ref 6–8.5)
RBC # BLD AUTO: 4.98 X10E6/UL (ref 3.77–5.28)
SODIUM SERPL-SCNC: 141 MMOL/L (ref 134–144)
TRIGL SERPL-MCNC: 119 MG/DL (ref 0–149)
TSH SERPL DL<=0.005 MIU/L-ACNC: 2.54 UIU/ML (ref 0.45–4.5)
VLDLC SERPL CALC-MCNC: 20 MG/DL (ref 5–40)
WBC # BLD AUTO: 5.6 X10E3/UL (ref 3.4–10.8)

## 2025-06-10 ENCOUNTER — OFFICE VISIT (OUTPATIENT)
Dept: ENDOCRINOLOGY | Facility: HOSPITAL | Age: 75
End: 2025-06-10
Payer: MEDICARE

## 2025-06-10 VITALS
HEART RATE: 76 BPM | BODY MASS INDEX: 20.35 KG/M2 | DIASTOLIC BLOOD PRESSURE: 80 MMHG | SYSTOLIC BLOOD PRESSURE: 122 MMHG | HEIGHT: 61 IN | WEIGHT: 107.8 LBS

## 2025-06-10 DIAGNOSIS — E78.2 MIXED HYPERLIPIDEMIA: ICD-10-CM

## 2025-06-10 DIAGNOSIS — E55.9 VITAMIN D DEFICIENCY: ICD-10-CM

## 2025-06-10 DIAGNOSIS — I10 ESSENTIAL HYPERTENSION: ICD-10-CM

## 2025-06-10 DIAGNOSIS — E10.40 TYPE 1 DIABETES MELLITUS WITH DIABETIC NEUROPATHY (HCC): ICD-10-CM

## 2025-06-10 DIAGNOSIS — E10.649 HYPOGLYCEMIA UNAWARENESS ASSOCIATED WITH TYPE 1 DIABETES MELLITUS (HCC): ICD-10-CM

## 2025-06-10 DIAGNOSIS — E10.8 TYPE 1 DIABETES MELLITUS WITH COMPLICATION (HCC): Primary | ICD-10-CM

## 2025-06-10 DIAGNOSIS — Z96.41 INSULIN PUMP IN PLACE: ICD-10-CM

## 2025-06-10 PROCEDURE — 99214 OFFICE O/P EST MOD 30 MIN: CPT | Performed by: NURSE PRACTITIONER

## 2025-06-10 PROCEDURE — 95251 CONT GLUC MNTR ANALYSIS I&R: CPT | Performed by: NURSE PRACTITIONER

## 2025-06-10 RX ORDER — INSULIN LISPRO 100 [IU]/ML
INJECTION, SOLUTION INTRAVENOUS; SUBCUTANEOUS
Qty: 30 ML | Refills: 4 | Status: SHIPPED | OUTPATIENT
Start: 2025-06-10

## 2025-06-10 RX ORDER — ASPIRIN 81 MG/1
81 TABLET, CHEWABLE ORAL DAILY
COMMUNITY

## 2025-06-10 NOTE — ASSESSMENT & PLAN NOTE
Lab Results   Component Value Date    HGBA1C 7.3 (H) 06/05/2025       Orders:    CBC and differential; Future    Comprehensive metabolic panel; Future    Hemoglobin A1C; Future

## 2025-06-10 NOTE — PROGRESS NOTES
Name: Danielle Rodriguez      : 1950      MRN: [id number]  Encounter Provider: DESHAWN Zapata  Encounter Date: 6/10/2025   Encounter department: [facility] [place]      :  Assessment & Plan  Type 1 diabetes mellitus with complication (HCC)    Lab Results   Component Value Date    HGBA1C 7.3 (H) 2025       Orders:    HumaLOG 100 UNIT/ML injection; INJECT UP  UNITS VIA INSULIN PUMP DAILY    CBC and differential; Future    Comprehensive metabolic panel; Future    Hemoglobin A1C; Future    Type 1 diabetes mellitus with diabetic neuropathy (HCC)    Lab Results   Component Value Date    HGBA1C 7.3 (H) 2025       Orders:    CBC and differential; Future    Comprehensive metabolic panel; Future    Hemoglobin A1C; Future    Insulin pump in place    Orders:    CBC and differential; Future    Comprehensive metabolic panel; Future    Hemoglobin A1C; Future    Hypoglycemia unawareness associated with type 1 diabetes mellitus (HCC)    Lab Results   Component Value Date    HGBA1C 7.3 (H) 2025       Orders:    CBC and differential; Future    Comprehensive metabolic panel; Future    Hemoglobin A1C; Future    Essential hypertension    Orders:    CBC and differential; Future    Comprehensive metabolic panel; Future    Hemoglobin A1C; Future    Mixed hyperlipidemia    Orders:    CBC and differential; Future    Comprehensive metabolic panel; Future    Hemoglobin A1C; Future    Vitamin D deficiency    Orders:    CBC and differential; Future    Comprehensive metabolic panel; Future    Hemoglobin A1C; Future    Plan:  1.  Type 1 diabetes:  Her most recent hemoglobin A1c is 7.3.  Review of her pump and sensor download reveals some hyperglycemia following dinner as a result of missed calculating her carbohydrates at dinner.  At times, that she will then stack her insulin doses in an attempt to correct her blood sugar.  Discussed the dangers of stacking her insulin  doses. She will continue to utilize the dex com continuous glucose monitor and provide downloads for review in between office visits.  Check hemoglobin A1c prior to next visit.  2.  Hyperlipidemia: Stable. Continue atorvastatin.   3.  Hypertension: He is normotensive in the office today.  Continue lisinopril.  Check comprehensive metabolic panel prior to next visit.  4.  Vitamin-D deficiency:  Continue supplementation with 1000 units of vitamin D3 daily.  Check 25-hydroxy vitamin D level prior to next visit.        History of Present Illness     74 y.o. female with type 1 diabetes for approximately 40 years. She was started on an insulin pump around 1998.  She is on insulin at home and takes Humalog insulin via a new tandem insulin pump.  Download of her Dexcom sensor and tandem pump from May 28 through Aleta 10, 2025 shows an average glucose of 165 with a standard deviation of 68.  Her control IQ is active 92% of the time.  She is 62% of the time in range with 34 % elevated readings and less than 3% low readings. Her average daily dose is 25.77 units.  47% is basal and 53% is bolus. She is also experiencing hyperglycemia following dinner.  Her most recent hemoglobin A1c from June 5, 2025 is 7.3. She denies any polyuria and polydipsia.  She has no polyphagia, but has once a night nocturia.  She is getting some blurry vision with some cataracts.  She has no numbness or tingling of the feet.  She denies chest pain or shortness of breath.  She denies nephropathy, heart attack, stroke and claudication but does admit to neuropathy and retinopathy.  She has been stressed with her son's cirrhosis and prostate cancer illness.     Her most recent diabetic eye exam was performed in February 2025. She is seen every 6 months. She has no complaints about her feet and does not follow Podiatry for regular diabetic foot care.  Most recent diabetic foot exam was performed at her office visit on May 2, 2024.     For her hypertension,  "she is treated with lisinopril 10 mg daily.     For her hyperlipidemia, she is treated with atorvastatin 10 mg.      For her vitamin-D deficiency, she supplements with 1000 units of vitamin D3 daily.     Review of Systems   Constitutional:  Positive for fatigue. Negative for chills and fever.   HENT:  Positive for dental problem. Negative for trouble swallowing and voice change.    Eyes: Negative.  Negative for photophobia, pain, discharge, redness, itching and visual disturbance.   Respiratory: Negative.  Negative for chest tightness and shortness of breath.    Cardiovascular: Negative.  Negative for chest pain.   Gastrointestinal: Negative.  Negative for abdominal pain, constipation, diarrhea and vomiting.   Endocrine: Positive for polyuria. Negative for cold intolerance, heat intolerance, polydipsia and polyphagia.   Genitourinary: Negative.    Musculoskeletal:  Positive for arthralgias and myalgias.   Skin: Negative.    Allergic/Immunologic: Negative.    Neurological: Negative.  Negative for dizziness, syncope, light-headedness and headaches.   Hematological: Negative.    Psychiatric/Behavioral: Negative.     All other systems reviewed and are negative.   as per HPI      Objective   /80   Pulse 76   Ht 5' 1\" (1.549 m)   Wt 48.9 kg (107 lb 12.8 oz)   BMI 20.37 kg/m²      Body mass index is 20.37 kg/m².  Wt Readings from Last 3 Encounters:   06/10/25 48.9 kg (107 lb 12.8 oz)   03/06/25 49.4 kg (109 lb)   11/20/24 49.4 kg (108 lb 12.8 oz)     Physical Exam  Vitals reviewed.   Constitutional:       Appearance: She is well-developed.   HENT:      Head: Normocephalic and atraumatic.     Eyes:      Conjunctiva/sclera: Conjunctivae normal.      Pupils: Pupils are equal, round, and reactive to light.       Cardiovascular:      Rate and Rhythm: Normal rate and regular rhythm.      Pulses: no weak pulses.           Dorsalis pedis pulses are 1+ on the right side and 1+ on the left side.        Posterior tibial " pulses are 1+ on the right side and 1+ on the left side.      Heart sounds: Normal heart sounds.   Pulmonary:      Effort: Pulmonary effort is normal.      Breath sounds: Normal breath sounds.   Abdominal:      General: Bowel sounds are normal.      Palpations: Abdomen is soft.     Musculoskeletal:         General: Normal range of motion.      Cervical back: Normal range of motion and neck supple.   Feet:      Right foot:      Skin integrity: Callus present. No ulcer, skin breakdown, erythema, warmth or dry skin.      Left foot:      Skin integrity: No ulcer, skin breakdown, erythema, warmth, callus or dry skin.     Skin:     General: Skin is warm and dry.     Neurological:      Mental Status: She is alert and oriented to person, place, and time.     Psychiatric:         Behavior: Behavior normal.         Thought Content: Thought content normal.         Judgment: Judgment normal.       Last Eye Exam: 09/25/2023  Last Foot Exam: 05/02/2024  Health Maintenance   Topic Date Due    Diabetic Foot Exam  05/02/2025    Diabetic Eye Exam  09/25/2025     Patient's shoes and socks removed.    Right Foot/Ankle   Right Foot Inspection  Skin Exam: skin normal, skin intact, callus and callus. No dry skin, no warmth, no erythema, no maceration, no abnormal color, no pre-ulcer and no ulcer.     Toe Exam: ROM and strength within normal limits.     Sensory   Monofilament testing: intact    Vascular  Capillary refills: < 3 seconds  The right DP pulse is 1+. The right PT pulse is 1+.     Left Foot/Ankle  Left Foot Inspection  Skin Exam: skin normal and skin intact. No dry skin, no warmth, no erythema, no maceration, normal color, no pre-ulcer, no ulcer and no callus.     Toe Exam: ROM and strength within normal limits.     Sensory   Monofilament testing: intact    Vascular  Capillary refills: < 3 seconds  The left DP pulse is 1+. The left PT pulse is 1+.     Assign Risk Category  No deformity present  No loss of protective sensation  No  "weak pulses  Risk: 0        Labs: I have reviewed pertinent labs including:   Lab Results   Component Value Date    HGBA1C 7.3 (H) 06/05/2025    HGBA1C 7.7 (H) 02/28/2025    HGBA1C 7.5 (H) 11/15/2024      Lab Results   Component Value Date    CREATININE 0.84 06/05/2025    CREATININE 0.66 02/28/2025    CREATININE 0.77 11/15/2024    BUN 16 06/05/2025    K 4.5 06/05/2025     06/05/2025    CO2 23 06/05/2025      eGFR   Date Value Ref Range Status   06/05/2025 72 >59 mL/min/1.73 Final      HDL   Date Value Ref Range Status   02/28/2025 91 >39 mg/dL Final     Triglycerides   Date Value Ref Range Status   02/28/2025 74 0 - 149 mg/dL Final     T. Chol/HDL Ratio   Date Value Ref Range Status   04/08/2022 2.1 0.0 - 4.4 ratio Final     Comment:                                       T. Chol/HDL Ratio                                              Men  Women                                1/2 Avg.Risk  3.4    3.3                                    Avg.Risk  5.0    4.4                                 2X Avg.Risk  9.6    7.1                                 3X Avg.Risk 23.4   11.0        ALT   Date Value Ref Range Status   06/05/2025 24 0 - 32 IU/L Final     AST   Date Value Ref Range Status   06/05/2025 32 0 - 40 IU/L Final      No results found for: \"[id number]\"   Lab Results   Component Value Date    FREET4 1.21 08/09/2024        Discussed with the patient and all questioned fully answered. She will call me if any problems arise.      "

## 2025-06-11 ENCOUNTER — RESULTS FOLLOW-UP (OUTPATIENT)
Dept: CARDIOLOGY | Facility: CLINIC | Age: 75
End: 2025-06-11
Payer: MEDICARE

## 2025-06-11 NOTE — TELEPHONE ENCOUNTER
----- Message from Madiha Wise sent at 6/10/2025  8:04 AM EDT -----  Regarding: Crestor/lipids and LFTs  Please tell her that her LDL is too high at 88.  Double her Crestor to 20 mg daily and repeat lipids and LFTs in 3 months  ----- Message -----  From: [facility] Lab Results In  Sent: 6/6/2025  10:05 AM EDT  To: Madiha Wise MD

## 2025-06-11 NOTE — TELEPHONE ENCOUNTER
----- Message from Madiha Wise sent at 6/10/2025  8:04 AM EDT -----  Regarding: Crestor/lipids and LFTs  Please tell her that her LDL is too high at 88.  Double her Crestor to 20 mg daily and repeat lipids and LFTs in 3 months  ----- Message -----  From: [facility] Lab Results In  Sent: 6/6/2025  10:05 AM EDT  To: Madiha Wise MD

## 2025-06-12 NOTE — TELEPHONE ENCOUNTER
Pt called returning Maureen's call regarding pt's test results    Pt can be reached at [phone]

## 2025-06-12 NOTE — TELEPHONE ENCOUNTER
----- Message from Madiha Wise sent at 6/10/2025  8:04 AM EDT -----  Regarding: Crestor/lipids and LFTs  Please tell her that her LDL is too high at 88.  Double her Crestor to 20 mg daily and repeat lipids and LFTs in 3 months  ----- Message -----  From: [facility] Lab Results In  Sent: 6/6/2025  10:05 AM EDT  To: Madiah Wise MD

## 2025-06-12 NOTE — TELEPHONE ENCOUNTER
Patient requesting a call back to S/w Maureen. Patient stated she  keeps calling and cannot reach her. Pls Advise P:[phone]

## 2025-06-17 DIAGNOSIS — E78.00 PURE HYPERCHOLESTEROLEMIA: Primary | ICD-10-CM

## 2025-06-17 RX ORDER — ROSUVASTATIN CALCIUM 20 MG/1
20 TABLET, COATED ORAL DAILY
Qty: 90 TABLET | Refills: 3 | Status: SHIPPED | OUTPATIENT
Start: 2025-06-17 | End: 2026-06-12

## 2025-07-08 ENCOUNTER — TELEPHONE (OUTPATIENT)
Age: 75
End: 2025-07-08

## 2025-07-08 ENCOUNTER — TELEPHONE (OUTPATIENT)
Dept: DIABETES SERVICES | Facility: CLINIC | Age: 75
End: 2025-07-08

## 2025-07-08 NOTE — TELEPHONE ENCOUNTER
L/M for patient advising her we need a referral before we can make an appt. Advised her to call back when she receives referral to be scheduled. If patient calls back, please schedule...    CGM Training- The following information should be in Note section    What type of CGM ? (Lance or Dexcom) Dexcom     What version? ( Lance 2(Plus),Lance 3(Plus), Dexcom G6, Dexcom G7) confirm    Will the patient be using the [place] or the Application on their phone? confirm    If using a phone, be sure they know which apps need to be downloaded prior to their appointment. ( Lance 2, Lance 3, Dexcom G6, Dexcom G7, Dexcom Clarity)   Please confirm patient's email  New patient  75 Minutes    Send to Education Clerical Pool/Sofie to send paperwork

## 2025-07-08 NOTE — TELEPHONE ENCOUNTER
Received a call back from the patient. She is scheduled for 7/10/25.     She has the Dexcom G7 sensors and said that she has a reader. She would be interested in the dino, but is not tech savvy and would need help downloading it.    Email verified.    Thanks!

## 2025-07-08 NOTE — TELEPHONE ENCOUNTER
Patient called in stating that she received her Dexcom sensors and would like to learn how to use them. Please call patient back and schedule

## 2025-07-10 ENCOUNTER — OFFICE VISIT (OUTPATIENT)
Dept: DIABETES SERVICES | Facility: HOSPITAL | Age: 75
End: 2025-07-10
Payer: MEDICARE

## 2025-07-10 DIAGNOSIS — E10.40 TYPE 1 DIABETES MELLITUS WITH DIABETIC NEUROPATHY (HCC): Primary | ICD-10-CM

## 2025-07-10 PROCEDURE — G0108 DIAB MANAGE TRN  PER INDIV: HCPCS | Performed by: DIETITIAN, REGISTERED

## 2025-07-10 NOTE — TELEPHONE ENCOUNTER
Can you please place a referral for dexcom G7 training for laura for me from melissa? Thanks! -Cyndy

## 2025-07-10 NOTE — PROGRESS NOTES
"Diabetes DSME    HPI: Met with Danielle Rodriguez for DSME Follow-up visit.  Here for help with her sensor. Transitioning from G6 to G7 with Tandem. Instructed on use, G7 started in office and in warm up. Also discussed steel sets as she has been having some kinked cannulas. I gave her a few to try.     Ht Readings from Last 1 Encounters:   06/10/25 5' 1\" (1.549 m)     Wt Readings from Last 3 Encounters:   06/10/25 48.9 kg (107 lb 12.8 oz)   03/06/25 49.4 kg (109 lb)   11/20/24 49.4 kg (108 lb 12.8 oz)        There is no height or weight on file to calculate BMI.     Lab Results   Component Value Date    HGBA1C 7.3 (H) 06/05/2025    HGBA1C 7.7 (H) 02/28/2025    HGBA1C 7.5 (H) 11/15/2024       No results found for: \"CHOL\"  Lab Results   Component Value Date    HDL 91 02/28/2025    HDL 95 08/09/2024    HDL 96 01/18/2024     Lab Results   Component Value Date    LDLCALC 67 02/28/2025    LDLCALC 83 08/09/2024    LDLCALC 87 01/18/2024     Lab Results   Component Value Date    TRIG 74 02/28/2025    TRIG 102 08/09/2024    TRIG 101 01/18/2024     Lab Results   Component Value Date    CHOLHDL 2.1 04/08/2022     No results found for: \"MICROALBUR\", \"[id number]\"    Diabetes Education Record  Danielle received the following handouts: none       Patient response to instruction    Comprehensiongood  Motivationgood  Expected Compliancegood    Thank you for referring your patient to [facility], it was a pleasure working with them today. Please feel free to call with any questions or concerns.    Cyndy Augustin, RD  [address]  [facility]  [facility] CenterLIBERT[facility] [place]ARG [id number]    "

## 2025-07-22 ENCOUNTER — APPOINTMENT (OUTPATIENT)
Dept: RADIOLOGY | Age: 75
End: 2025-07-22
Payer: MEDICARE

## 2025-07-23 ENCOUNTER — HOSPITAL ENCOUNTER (OUTPATIENT)
Dept: CARDIOLOGY | Age: 75
Discharge: HOME | End: 2025-07-23
Attending: INTERNAL MEDICINE
Payer: MEDICARE

## 2025-07-23 ENCOUNTER — HOSPITAL ENCOUNTER (OUTPATIENT)
Dept: RADIOLOGY | Age: 75
Discharge: HOME | End: 2025-07-23
Attending: INTERNAL MEDICINE
Payer: MEDICARE

## 2025-07-23 DIAGNOSIS — Z12.31 VISIT FOR SCREENING MAMMOGRAM: ICD-10-CM

## 2025-07-23 DIAGNOSIS — I65.23 CAROTID ARTERY CALCIFICATION, BILATERAL: ICD-10-CM

## 2025-07-23 LAB
LEFT CCA DIST DIAS: 15.5 CM/S
LEFT CCA DIST SYS: 77 CM/S
LEFT CCA MID DIAS: 11.8 CM/S
LEFT CCA MID SYS: 80.8 CM/S
LEFT CCA PROX DIAS: 13 CM/S
LEFT CCA PROX SYS: 72.7 CM/S
LEFT ICA DIST DIAS: 21.6 CM/S
LEFT ICA DIST SYS: 76.2 CM/S
LEFT ICA MID DIAS: 16.2 CM/S
LEFT ICA MID SYS: 62.9 CM/S
LEFT ICA PROX DIAS: 14.3 CM/S
LEFT ICA PROX SYS: 49.6 CM/S
LEFT ICA/CCA SYS: 0.99
LEFT SUBCLAVIAN SYS: 30 CM/S
LEFT VERTEBRAL SYS: 60 CM/S
LT ECA PROX EDV: 8.66 CM/S
LT ECA PROX PSV: 124 CM/S
LT SUBCLAVIAN PROX PSV: 30 CM/S
LT VERTEBRAL PROX EDV: 12.3 CM/S
LT VERTEBRAL PROX PSV: 60 CM/S
RIGHT CCA DIST DIAS: 16.2 CM/S
RIGHT CCA DIST SYS: 83.2 CM/S
RIGHT CCA MID DIAS: 11.8 CM/S
RIGHT CCA MID SYS: 83.2 CM/S
RIGHT CCA PROX DIAS: 11.8 CM/S
RIGHT CCA PROX SYS: 78.3 CM/S
RIGHT ECA SYS: 98.2 CM/S
RIGHT ICA DIST DIAS: 18 CM/S
RIGHT ICA DIST SYS: 68.3 CM/S
RIGHT ICA MID DIAS: 17.4 CM/S
RIGHT ICA MID SYS: 60.3 CM/S
RIGHT ICA PROX DIAS: 15.5 CM/S
RIGHT ICA PROX SYS: 57.8 CM/S
RIGHT ICA/CCA SYS: 0.82
RIGHT SUBCLAVIAN SYS: 46.3 CM/S
RIGHT VA 1 EDV: 11.8 CM/S
RIGHT VA 1 MEAN: 26.5 CM/S
RIGHT VA 1 PI: 1.66
RIGHT VA 1 PSV: 55.9 CM/S
RIGHT VA 1 RI: 0.79
RIGHT VA 1 S/D RATIO: 4.74
RIGHT VERTEBRAL SYS: 55.9 CM/S
RT ECA PROC EDV: 7.46 CM/S
RT SUBCLAVIAN PROX PSV: 46.3 CM/S
RT VERTEBRAL PROX EDV: 11.8 CM/S

## 2025-07-23 PROCEDURE — 93880 EXTRACRANIAL BILAT STUDY: CPT

## 2025-07-23 PROCEDURE — 93880 EXTRACRANIAL BILAT STUDY: CPT | Mod: 26 | Performed by: STUDENT IN AN ORGANIZED HEALTH CARE EDUCATION/TRAINING PROGRAM

## 2025-07-23 PROCEDURE — 77063 BREAST TOMOSYNTHESIS BI: CPT

## 2025-07-24 ENCOUNTER — TELEPHONE (OUTPATIENT)
Dept: INTERNAL MEDICINE | Facility: CLINIC | Age: 75
End: 2025-07-24
Payer: MEDICARE

## 2025-07-25 ENCOUNTER — RESULTS FOLLOW-UP (OUTPATIENT)
Dept: INTERNAL MEDICINE | Facility: CLINIC | Age: 75
End: 2025-07-25
Payer: MEDICARE

## 2025-07-25 NOTE — TELEPHONE ENCOUNTER
Spoke to pt for her US carotid result and mammogram.  I told her she has mild stenosis on her US carotid and speak with her cardiologist per DR. Gallardo's recommendation.  Screening mammogram came back normal.

## 2025-08-10 PROCEDURE — 99490 CHRNC CARE MGMT STAFF 1ST 20: CPT

## 2025-09-03 ENCOUNTER — TELEMEDICINE (OUTPATIENT)
Dept: INTERNAL MEDICINE | Facility: CLINIC | Age: 75
End: 2025-09-03
Payer: MEDICARE

## 2025-09-03 DIAGNOSIS — U07.1 COVID-19 VIRUS INFECTION: Primary | ICD-10-CM

## 2025-09-03 DIAGNOSIS — R11.0 NAUSEA: ICD-10-CM

## 2025-09-03 PROCEDURE — 99213 OFFICE O/P EST LOW 20 MIN: CPT | Mod: 95 | Performed by: INTERNAL MEDICINE

## 2025-09-03 RX ORDER — ONDANSETRON 4 MG/1
4 TABLET, ORALLY DISINTEGRATING ORAL EVERY 8 HOURS PRN
Qty: 21 TABLET | Refills: 0 | Status: SHIPPED | OUTPATIENT
Start: 2025-09-03

## 2025-09-03 ASSESSMENT — ENCOUNTER SYMPTOMS
FEVER: 0
APPETITE CHANGE: 1
ABDOMINAL PAIN: 0
COUGH: 1
DIFFICULTY URINATING: 0
SHORTNESS OF BREATH: 0
HEADACHES: 0
VOMITING: 0
SORE THROAT: 1
UNEXPECTED WEIGHT CHANGE: 0
NAUSEA: 1

## (undated) DEVICE — "***USE 138659*** 0 COATED VICRYL UNDYED 3X18"" OS-8 CR"

## (undated) DEVICE — PAD GROUND ELECTROSURGICAL W/CORD

## (undated) DEVICE — MANIFOLD FOUR PORT NEPTUNE

## (undated) DEVICE — MANIFOLD SINGLE PORT NEPTUNE

## (undated) DEVICE — SLEEVE SUCTION 165MM

## (undated) DEVICE — SUTURE QUILL 2 PDO RX-2066Q

## (undated) DEVICE — GLOVE SZ 7.5 MICRO PROTEXIS LATEX

## (undated) DEVICE — GLOVE SZ 8 LINER PROTEXIS PI BL

## (undated) DEVICE — COUNTER NEEDLE FOAM BLOCK 1840

## (undated) DEVICE — APPLICATOR CHLORAPREP 26ML ORANGE TINT

## (undated) DEVICE — SOLN IV 0.9% NSS 1000ML

## (undated) DEVICE — PACK RFID TOTAL HIP

## (undated) DEVICE — BLADE SAGITTAL

## (undated) DEVICE — FLYTE HOOD W/ PEELAWAY

## (undated) DEVICE — Device

## (undated) DEVICE — TUBING SMOKE EVAC PENCIL COATED

## (undated) DEVICE — GLOVE SZ 8 MICRO PROTEXIS LATEX

## (undated) DEVICE — ADHESIVE SKIN DERMABOND ADVANCED 0.7ML

## (undated) DEVICE — SUCTION 18FR FRAZIER DISPOSABLE

## (undated) DEVICE — DRAPE-U-1015

## (undated) DEVICE — 4-0 STRATAFIX SPIRAL PGA-PCL UNDYED 30CMX3

## (undated) DEVICE — SWABSTICK BETADINE

## (undated) DEVICE — HOOD STERISHIELD

## (undated) DEVICE — SUTURE VICRYL 3-0  J864D CR SH UNDYED

## (undated) DEVICE — TIP BOVIE BLADE COATED 6IN

## (undated) DEVICE — SOLN IRRIG .9%SOD 1000ML

## (undated) DEVICE — "***USE 138625*** 1 ETHIBOND GREEN 8X18"" CTX CR"

## (undated) DEVICE — STIRRUP STRAP DISPOSABLE